# Patient Record
Sex: FEMALE | Race: WHITE | NOT HISPANIC OR LATINO | Employment: PART TIME | URBAN - METROPOLITAN AREA
[De-identification: names, ages, dates, MRNs, and addresses within clinical notes are randomized per-mention and may not be internally consistent; named-entity substitution may affect disease eponyms.]

---

## 2017-01-09 ENCOUNTER — LAB CONVERSION - ENCOUNTER (OUTPATIENT)
Dept: OTHER | Facility: OTHER | Age: 42
End: 2017-01-09

## 2017-01-09 ENCOUNTER — GENERIC CONVERSION - ENCOUNTER (OUTPATIENT)
Dept: OTHER | Facility: OTHER | Age: 42
End: 2017-01-09

## 2017-01-09 LAB
ADDITIONAL INFORMATION (HISTORICAL): NORMAL
ADEQUACY: (HISTORICAL): NORMAL
CYTOTECHNOLOGIST: (HISTORICAL): NORMAL
HPV MRNA E6/E7 (HISTORICAL): NOT DETECTED
INTERPRETATION (HISTORICAL): NORMAL
LMP (HISTORICAL): NORMAL
PREV. BX: (HISTORICAL): NORMAL
PREV. PAP (HISTORICAL): NORMAL
SOURCE (HISTORICAL): NORMAL

## 2017-07-07 ENCOUNTER — ALLSCRIPTS OFFICE VISIT (OUTPATIENT)
Dept: OTHER | Facility: OTHER | Age: 42
End: 2017-07-07

## 2017-07-07 DIAGNOSIS — Z12.31 ENCOUNTER FOR SCREENING MAMMOGRAM FOR MALIGNANT NEOPLASM OF BREAST: ICD-10-CM

## 2018-01-10 NOTE — PROGRESS NOTES
Assessment    1  Encounter for preventive health examination (V70 0) (Z00 00)   2  Obesity (278 00) (E66 9)    Plan   Laboratory exam ordered as part of routine general medical examination    · (1) CBC/PLT/DIFF; Status:Active; Requested for:[id number];    · (1) COMPREHENSIVE METABOLIC PANEL; Status:Active; Requested for:76Mne7458;    · (1) TSH; Status:Active; Requested for:12Oxr5756;    · (1) VITAMIN D 25-HYDROXY; Status:Active; Requested for:93Onw1400;   Screening, lipid    · (1) LIPID PANEL, FASTING; Status:Active; Requested for:07Jul2017;     * MAMMO SCREENING BILATERAL W CAD; Status:Hold For - Scheduling; Requested for:07Jul2017;   Perform:[facility]; BND:[id number]; Ordered;   For:Encounter for screening mammogram for malignant neoplasm of breast; Ordered By:Tara Urbano;      Discussion/Summary  health maintenance visit Currently, she eats a poor diet and has an inadequate exercise regimen  cervical cancer screening is current Breast cancer screening: mammogram has been ordered  Colorectal cancer screening: colorectal cancer screening is not indicated  Osteoporosis screening: bone mineral density testing is not indicated  Screening lab work includes hemoglobin, glucose, lipid profile, thyroid function testing and 25-hydroxyvitamin D  The immunizations are needed  Advice and education were given regarding nutrition, aerobic exercise and weight loss  Patient discussion: discussed with the patient  1  Obesity  Discussed healthy diet, limit junk food  Start regular aerobic exercise  2  Hx of blurred vision  Improved, has prescription glasses  Due for eye exam   3  HM  ?Tdap  Mammogram due, PAPs updated  Due for routine labs  Follow up in 1 year or prn  The patient was counseled regarding instructions for management, risk factor reductions, impressions  Chief Complaint  pt here for annual physical      History of Present Illness  HM, Adult Female:  The patient is being seen for a health maintenance evaluation  General Health: The patient's health since the last visit is described as good  She denies vision problems  Lifestyle:  She does not have a healthy diet  She has weight concerns  She does not exercise regularly  Screening: Cervical cancer screening includes a pap smear performed last year  Breast cancer screening includes uncertain timing of her last mammogram  She hasn't been previously screened for colorectal cancer  Metabolic screening includes lipid profile performed within the past five years, glucose screening performed 2015, thyroid function test performed 2015 and no previous DEXA  Cardiovascular risk factors: obesity and sedentary lifestyle, but no hypertension  Safety elements used: no seat belt  HPI: Mrs Coral Quinonez feels well, no complaints  She would like to lose some weight, does not exercise regularly  She walks frequently at work; however, also eats a lot of junk food while in the mall  She is still contemplating pregnancy  Denies any tremors  She has had issues with her vision, slightly better since using glasses regularly  She has not had an eye exam recently  Obesity (Follow-Up): The patient is being seen for follow-up of obesity  The patient reports no change in the condition  Interval symptoms:  stable poor eating habits, denies anxiety, denies dyspnea, denies fatigue and denies back pain  Associated symptoms: no constipation  The patient is not currently on medication for this problem  Disease management:  the patient is not doing well with her goals  Diet plan: limited junk food and limited high sugar drinks  Review of Systems    Constitutional: not feeling poorly and not feeling tired  Eyes: eyesight problems  ENT: no earache and no nasal discharge  Cardiovascular: no chest pain, no palpitations and no lower extremity edema  Respiratory: no shortness of breath and no cough     Gastrointestinal: no abdominal pain and no constipation  Genitourinary: no dysuria and no incontinence  Musculoskeletal: no arthralgias  Integumentary: no rashes  Neurological: no headache and no dizziness  Psychiatric: no sleep disturbances  no feelings of weakness      Active Problems    1  Calculus of kidney (592 0) (N20 0)   2  Encounter for gynecological examination with Papanicolaou smear of cervix (V72 31)   (Z01 419)   3  Encounter for screening mammogram for malignant neoplasm of breast (V76 12)   (Z12 31)   4  Patient desires pregnancy (V26 9) (Z31 9)   5  Well female exam with routine gynecological exam (V72 31) (Z01 419)    Past Medical History    · History of Contraceptives (V25 02)   · History of blurred vision (V12 49) (Z86 69)   · History of pneumonia (V12 61) (Z87 01)   · History of renal calculi (V13 01) (Z87 442)   · History of tremor (333 1) (G25 0)   · History of Kidney stones, calcium oxalate (592 0) (N20 0)   · History of Urinary problem (V47 4) (R39 89)    Surgical History    · History of  Section   · History of Oral Surgery Tooth Extraction    Family History  Mother    · Family history of diabetes mellitus (V18 0) (Z83 3)   · Family history of hypertension (V17 49) (Z82 49)  Father    · Family history of Drug abuse   · Family history of alcohol abuse (V61 41) (Z81 1)   · Family history of diabetes mellitus (V18 0) (Z83 3)   · Family history of hypertension (V17 49) (Z82 49)    Social History    · Denied: History of Alcohol use   ·    · Never smoker   · No drug use    Current Meds   1  No Reported Medications Recorded    Allergies    1  No Known Drug Allergies    2  No Known Environmental Allergies   3   No Known Food Allergies    Vitals   Recorded: 72SNO4919 10:05AM   Temperature 97 2 F   Heart Rate 78   Respiration 18   Systolic 556   Diastolic 70   Height 5 ft 3 5 in   Weight 209 lb 6 08 oz   BMI Calculated 36 51   BSA Calculated 1 98   O2 Saturation 98     Physical Exam    Constitutional   General appearance: No acute distress, well appearing and well nourished  appears healthy, comfortable, obese, clothing appropriate and well hydrated  Head and Face   Head and face: Normal     Eyes   Pupils and irises: Equal, round, reactive to light  Ears, Nose, Mouth, and Throat   External inspection of ears and nose: Normal     Otoscopic examination: Tympanic membranes translucent with normal light reflex  Canals patent without erythema  Oropharynx: Normal with no erythema, edema, exudate or lesions  Neck   Neck: Supple, symmetric, trachea midline, no masses  Pulmonary   Respiratory effort: No increased work of breathing or signs of respiratory distress  Auscultation of lungs: Clear to auscultation  Cardiovascular   Auscultation of heart: Normal rate and rhythm, normal S1 and S2, no murmurs  Examination of extremities for edema and/or varicosities: Normal     Abdomen   Abdomen: Non-tender, no masses  Lymphatic   Palpation of lymph nodes in neck: No lymphadenopathy  Musculoskeletal   Gait and station: Normal     Skin   Skin and subcutaneous tissue: Normal without rashes or lesions  Neurologic   Cortical function: Normal mental status  Psychiatric   Orientation to person, place, and time: Normal     Mood and affect: Normal        Results/Data  PHQ-2 Adult Depression Screening [id number] 10:06AM User, s     Test Name Result Flag Reference   PHQ-2 Adult Depression Score 0     Over the last two weeks, how often have you been bothered by any of the following problems?   Little interest or pleasure in doing things: Not at all - 0  Feeling down, depressed, or hopeless: Not at all - 0   PHQ-2 Adult Depression Screening Negative         Signatures   Electronically signed by : Wing Mary MD; Jul 7 2017 11:37AM EST                       (Author)

## 2018-01-13 NOTE — RESULT NOTES
Verified Results  (Q) THINPREP PAP AND HR HPV DNA [id number] 12:00AM DarytrenaLori     Test Name Result Flag Reference   CLINICAL INFORMATION:      none given   LMP:      NONE GIVEN   PREV  PAP:      NONE GIVEN   PREV  BX:      NONE GIVEN   SOURCE:      [facility]   STATEMENT OF ADEQUACY:      Satisfactory for evaluation  Endocervical/transformation zone component  present  Age and/or menstrual status not provided   INTERPRETATION/RESULT:      Negative for intraepithelial lesion or malignancy  CYTOTECHNOLOGIST:      MARCIANO Oviedo(ASCP)  CT screening location: [address] [place] [address]   HPV mRNA E6/E7 Not Detected  Not Detected   This test was performed using the APTIMA HPV Assay ([employer] )  This assay detects E6/E7 viral messenger RNA (mRNA) from 14  high-risk HPV types (16,18,31,33,35,39,45,51,52,56,58,59,66,68)         Signatures   Electronically signed by : SPENCER Fuchs ; Jan 9 2017  6:56PM EST                       (Author)

## 2018-01-14 VITALS
OXYGEN SATURATION: 98 % | TEMPERATURE: 97.2 F | DIASTOLIC BLOOD PRESSURE: 70 MMHG | BODY MASS INDEX: 35.74 KG/M2 | SYSTOLIC BLOOD PRESSURE: 124 MMHG | HEART RATE: 78 BPM | WEIGHT: 209.38 LBS | RESPIRATION RATE: 18 BRPM | HEIGHT: 64 IN

## 2018-10-14 ENCOUNTER — APPOINTMENT (EMERGENCY)
Dept: CT IMAGING | Facility: HOSPITAL | Age: 43
End: 2018-10-14
Payer: COMMERCIAL

## 2018-10-14 ENCOUNTER — HOSPITAL ENCOUNTER (OUTPATIENT)
Facility: HOSPITAL | Age: 43
Setting detail: OUTPATIENT SURGERY
LOS: 1 days | Discharge: HOME | End: 2018-10-15
Attending: EMERGENCY MEDICINE | Admitting: SURGERY
Payer: COMMERCIAL

## 2018-10-14 ENCOUNTER — ANESTHESIA (INPATIENT)
Dept: PERIOP | Facility: HOSPITAL | Age: 43
End: 2018-10-14
Payer: COMMERCIAL

## 2018-10-14 ENCOUNTER — ANESTHESIA EVENT (INPATIENT)
Dept: PERIOP | Facility: HOSPITAL | Age: 43
End: 2018-10-14
Payer: COMMERCIAL

## 2018-10-14 DIAGNOSIS — K35.890 OTHER ACUTE APPENDICITIS: ICD-10-CM

## 2018-10-14 DIAGNOSIS — K37 APPENDICITIS: Primary | ICD-10-CM

## 2018-10-14 LAB
ABO GROUP BLD: NORMAL
ALBUMIN SERPL BCP-MCNC: 3.6 G/DL (ref 3.5–5)
ALP SERPL-CCNC: 69 U/L (ref 46–116)
ALT SERPL W P-5'-P-CCNC: 22 U/L (ref 12–78)
ANION GAP SERPL CALCULATED.3IONS-SCNC: 3 MMOL/L (ref 4–13)
APTT PPP: 31 SECONDS (ref 24–36)
AST SERPL W P-5'-P-CCNC: 19 U/L (ref 5–45)
BACTERIA UR QL AUTO: ABNORMAL /HPF
BASOPHILS # BLD AUTO: 0.04 THOUSANDS/ΜL (ref 0–0.1)
BASOPHILS NFR BLD AUTO: 0 % (ref 0–1)
BILIRUB SERPL-MCNC: 0.5 MG/DL (ref 0.2–1)
BILIRUB UR QL STRIP: NEGATIVE
BLD GP AB SCN SERPL QL: NEGATIVE
BUN SERPL-MCNC: 13 MG/DL (ref 5–25)
CALCIUM SERPL-MCNC: 9 MG/DL (ref 8.3–10.1)
CHLORIDE SERPL-SCNC: 101 MMOL/L (ref 100–108)
CLARITY UR: ABNORMAL
CO2 SERPL-SCNC: 30 MMOL/L (ref 21–32)
COLOR UR: ABNORMAL
CREAT SERPL-MCNC: 0.73 MG/DL (ref 0.6–1.3)
EOSINOPHIL # BLD AUTO: 0.02 THOUSAND/ΜL (ref 0–0.61)
EOSINOPHIL NFR BLD AUTO: 0 % (ref 0–6)
ERYTHROCYTE [DISTWIDTH] IN BLOOD BY AUTOMATED COUNT: 12.8 % (ref 11.6–15.1)
EXT PREG TEST URINE: NEGATIVE
GFR SERPL CREATININE-BSD FRML MDRD: 102 ML/MIN/1.73SQ M
GLUCOSE SERPL-MCNC: 117 MG/DL (ref 65–140)
GLUCOSE UR STRIP-MCNC: NEGATIVE MG/DL
HCT VFR BLD AUTO: 42.3 % (ref 34.8–46.1)
HGB BLD-MCNC: 14 G/DL (ref 11.5–15.4)
HGB UR QL STRIP.AUTO: ABNORMAL
IMM GRANULOCYTES # BLD AUTO: 0.03 THOUSAND/UL (ref 0–0.2)
IMM GRANULOCYTES NFR BLD AUTO: 0 % (ref 0–2)
INR PPP: 1.01 (ref 0.86–1.17)
KETONES UR STRIP-MCNC: ABNORMAL MG/DL
LEUKOCYTE ESTERASE UR QL STRIP: ABNORMAL
LIPASE SERPL-CCNC: 121 U/L (ref 73–393)
LYMPHOCYTES # BLD AUTO: 1.94 THOUSANDS/ΜL (ref 0.6–4.47)
LYMPHOCYTES NFR BLD AUTO: 17 % (ref 14–44)
MCH RBC QN AUTO: 28.8 PG (ref 26.8–34.3)
MCHC RBC AUTO-ENTMCNC: 33.1 G/DL (ref 31.4–37.4)
MCV RBC AUTO: 87 FL (ref 82–98)
MONOCYTES # BLD AUTO: 0.89 THOUSAND/ΜL (ref 0.17–1.22)
MONOCYTES NFR BLD AUTO: 8 % (ref 4–12)
NEUTROPHILS # BLD AUTO: 8.35 THOUSANDS/ΜL (ref 1.85–7.62)
NEUTS SEG NFR BLD AUTO: 75 % (ref 43–75)
NITRITE UR QL STRIP: POSITIVE
NON-SQ EPI CELLS URNS QL MICRO: ABNORMAL /HPF
NRBC BLD AUTO-RTO: 0 /100 WBCS
PH UR STRIP.AUTO: 7 [PH] (ref 4.5–8)
PLATELET # BLD AUTO: 428 THOUSANDS/UL (ref 149–390)
PMV BLD AUTO: 9.3 FL (ref 8.9–12.7)
POTASSIUM SERPL-SCNC: 3.5 MMOL/L (ref 3.5–5.3)
PROT SERPL-MCNC: 7.7 G/DL (ref 6.4–8.2)
PROT UR STRIP-MCNC: ABNORMAL MG/DL
PROTHROMBIN TIME: 13 SECONDS (ref 11.8–14.2)
RBC # BLD AUTO: 4.86 MILLION/UL (ref 3.81–5.12)
RBC #/AREA URNS AUTO: ABNORMAL /HPF
RH BLD: NEGATIVE
SODIUM SERPL-SCNC: 134 MMOL/L (ref 136–145)
SP GR UR STRIP.AUTO: 1.02 (ref 1–1.03)
SPECIMEN EXPIRATION DATE: NORMAL
UROBILINOGEN UR QL STRIP.AUTO: 1 E.U./DL
WBC # BLD AUTO: 11.27 THOUSAND/UL (ref 4.31–10.16)
WBC #/AREA URNS AUTO: ABNORMAL /HPF

## 2018-10-14 PROCEDURE — 96376 TX/PRO/DX INJ SAME DRUG ADON: CPT

## 2018-10-14 PROCEDURE — 80053 COMPREHEN METABOLIC PANEL: CPT | Performed by: PHYSICIAN ASSISTANT

## 2018-10-14 PROCEDURE — 86900 BLOOD TYPING SEROLOGIC ABO: CPT | Performed by: PHYSICIAN ASSISTANT

## 2018-10-14 PROCEDURE — 83690 ASSAY OF LIPASE: CPT | Performed by: PHYSICIAN ASSISTANT

## 2018-10-14 PROCEDURE — 99285 EMERGENCY DEPT VISIT HI MDM: CPT

## 2018-10-14 PROCEDURE — 36415 COLL VENOUS BLD VENIPUNCTURE: CPT | Performed by: PHYSICIAN ASSISTANT

## 2018-10-14 PROCEDURE — 85025 COMPLETE CBC W/AUTO DIFF WBC: CPT | Performed by: PHYSICIAN ASSISTANT

## 2018-10-14 PROCEDURE — 88304 TISSUE EXAM BY PATHOLOGIST: CPT | Performed by: PATHOLOGY

## 2018-10-14 PROCEDURE — 81025 URINE PREGNANCY TEST: CPT | Performed by: PHYSICIAN ASSISTANT

## 2018-10-14 PROCEDURE — 74177 CT ABD & PELVIS W/CONTRAST: CPT

## 2018-10-14 PROCEDURE — 86901 BLOOD TYPING SEROLOGIC RH(D): CPT | Performed by: PHYSICIAN ASSISTANT

## 2018-10-14 PROCEDURE — 86850 RBC ANTIBODY SCREEN: CPT | Performed by: PHYSICIAN ASSISTANT

## 2018-10-14 PROCEDURE — 96374 THER/PROPH/DIAG INJ IV PUSH: CPT

## 2018-10-14 PROCEDURE — 85610 PROTHROMBIN TIME: CPT | Performed by: PHYSICIAN ASSISTANT

## 2018-10-14 PROCEDURE — 81001 URINALYSIS AUTO W/SCOPE: CPT | Performed by: PHYSICIAN ASSISTANT

## 2018-10-14 PROCEDURE — 96361 HYDRATE IV INFUSION ADD-ON: CPT

## 2018-10-14 PROCEDURE — 85730 THROMBOPLASTIN TIME PARTIAL: CPT | Performed by: PHYSICIAN ASSISTANT

## 2018-10-14 RX ORDER — ONDANSETRON 2 MG/ML
INJECTION INTRAMUSCULAR; INTRAVENOUS AS NEEDED
Status: DISCONTINUED | OUTPATIENT
Start: 2018-10-14 | End: 2018-10-14 | Stop reason: SURG

## 2018-10-14 RX ORDER — HYDROMORPHONE HCL/PF 1 MG/ML
0.5 SYRINGE (ML) INJECTION
Status: DISCONTINUED | OUTPATIENT
Start: 2018-10-14 | End: 2018-10-15 | Stop reason: HOSPADM

## 2018-10-14 RX ORDER — KETOROLAC TROMETHAMINE 30 MG/ML
INJECTION, SOLUTION INTRAMUSCULAR; INTRAVENOUS AS NEEDED
Status: DISCONTINUED | OUTPATIENT
Start: 2018-10-14 | End: 2018-10-14 | Stop reason: SURG

## 2018-10-14 RX ORDER — HYDROMORPHONE HCL/PF 1 MG/ML
0.5 SYRINGE (ML) INJECTION
Status: DISCONTINUED | OUTPATIENT
Start: 2018-10-14 | End: 2018-10-14 | Stop reason: HOSPADM

## 2018-10-14 RX ORDER — FENTANYL CITRATE 50 UG/ML
INJECTION, SOLUTION INTRAMUSCULAR; INTRAVENOUS AS NEEDED
Status: DISCONTINUED | OUTPATIENT
Start: 2018-10-14 | End: 2018-10-14 | Stop reason: SURG

## 2018-10-14 RX ORDER — DIPHENOXYLATE HYDROCHLORIDE AND ATROPINE SULFATE 2.5; .025 MG/1; MG/1
1 TABLET ORAL DAILY
COMMUNITY

## 2018-10-14 RX ORDER — ONDANSETRON 2 MG/ML
4 INJECTION INTRAMUSCULAR; INTRAVENOUS ONCE AS NEEDED
Status: DISCONTINUED | OUTPATIENT
Start: 2018-10-14 | End: 2018-10-14 | Stop reason: HOSPADM

## 2018-10-14 RX ORDER — ONDANSETRON 2 MG/ML
4 INJECTION INTRAMUSCULAR; INTRAVENOUS EVERY 4 HOURS PRN
Status: DISCONTINUED | OUTPATIENT
Start: 2018-10-14 | End: 2018-10-15 | Stop reason: HOSPADM

## 2018-10-14 RX ORDER — OXYCODONE HYDROCHLORIDE AND ACETAMINOPHEN 5; 325 MG/1; MG/1
1 TABLET ORAL EVERY 4 HOURS PRN
Status: DISCONTINUED | OUTPATIENT
Start: 2018-10-14 | End: 2018-10-15 | Stop reason: HOSPADM

## 2018-10-14 RX ORDER — PROPOFOL 10 MG/ML
INJECTION, EMULSION INTRAVENOUS AS NEEDED
Status: DISCONTINUED | OUTPATIENT
Start: 2018-10-14 | End: 2018-10-14 | Stop reason: SURG

## 2018-10-14 RX ORDER — SUCCINYLCHOLINE/SOD CL,ISO/PF 100 MG/5ML
SYRINGE (ML) INTRAVENOUS AS NEEDED
Status: DISCONTINUED | OUTPATIENT
Start: 2018-10-14 | End: 2018-10-14 | Stop reason: SURG

## 2018-10-14 RX ORDER — SODIUM CHLORIDE, SODIUM LACTATE, POTASSIUM CHLORIDE, CALCIUM CHLORIDE 600; 310; 30; 20 MG/100ML; MG/100ML; MG/100ML; MG/100ML
125 INJECTION, SOLUTION INTRAVENOUS CONTINUOUS
Status: DISCONTINUED | OUTPATIENT
Start: 2018-10-14 | End: 2018-10-15 | Stop reason: HOSPADM

## 2018-10-14 RX ORDER — SODIUM CHLORIDE 9 MG/ML
125 INJECTION, SOLUTION INTRAVENOUS CONTINUOUS
Status: DISCONTINUED | OUTPATIENT
Start: 2018-10-14 | End: 2018-10-15 | Stop reason: HOSPADM

## 2018-10-14 RX ORDER — GLYCOPYRROLATE 0.2 MG/ML
INJECTION INTRAMUSCULAR; INTRAVENOUS AS NEEDED
Status: DISCONTINUED | OUTPATIENT
Start: 2018-10-14 | End: 2018-10-14 | Stop reason: SURG

## 2018-10-14 RX ORDER — BUPIVACAINE HYDROCHLORIDE AND EPINEPHRINE 5; 5 MG/ML; UG/ML
INJECTION, SOLUTION EPIDURAL; INTRACAUDAL; PERINEURAL AS NEEDED
Status: DISCONTINUED | OUTPATIENT
Start: 2018-10-14 | End: 2018-10-14 | Stop reason: HOSPADM

## 2018-10-14 RX ORDER — ROCURONIUM BROMIDE 10 MG/ML
INJECTION, SOLUTION INTRAVENOUS AS NEEDED
Status: DISCONTINUED | OUTPATIENT
Start: 2018-10-14 | End: 2018-10-14 | Stop reason: SURG

## 2018-10-14 RX ORDER — SODIUM CHLORIDE 9 MG/ML
INJECTION, SOLUTION INTRAVENOUS CONTINUOUS PRN
Status: DISCONTINUED | OUTPATIENT
Start: 2018-10-14 | End: 2018-10-14 | Stop reason: SURG

## 2018-10-14 RX ORDER — LIDOCAINE HYDROCHLORIDE 10 MG/ML
INJECTION, SOLUTION INFILTRATION; PERINEURAL AS NEEDED
Status: DISCONTINUED | OUTPATIENT
Start: 2018-10-14 | End: 2018-10-14 | Stop reason: SURG

## 2018-10-14 RX ORDER — METOCLOPRAMIDE HYDROCHLORIDE 5 MG/ML
10 INJECTION INTRAMUSCULAR; INTRAVENOUS ONCE AS NEEDED
Status: DISCONTINUED | OUTPATIENT
Start: 2018-10-14 | End: 2018-10-14 | Stop reason: HOSPADM

## 2018-10-14 RX ADMIN — Medication 100 MG: at 19:36

## 2018-10-14 RX ADMIN — ROCURONIUM BROMIDE 25 MG: 10 INJECTION INTRAVENOUS at 19:42

## 2018-10-14 RX ADMIN — CEFAZOLIN SODIUM 2000 MG: 2 SOLUTION INTRAVENOUS at 17:01

## 2018-10-14 RX ADMIN — SODIUM CHLORIDE, SODIUM LACTATE, POTASSIUM CHLORIDE, AND CALCIUM CHLORIDE 125 ML/HR: .6; .31; .03; .02 INJECTION, SOLUTION INTRAVENOUS at 18:16

## 2018-10-14 RX ADMIN — Medication 500 MG: at 19:32

## 2018-10-14 RX ADMIN — PROPOFOL 200 MG: 10 INJECTION, EMULSION INTRAVENOUS at 19:36

## 2018-10-14 RX ADMIN — LIDOCAINE HYDROCHLORIDE 50 MG: 10 INJECTION, SOLUTION INFILTRATION; PERINEURAL at 19:36

## 2018-10-14 RX ADMIN — NEOSTIGMINE METHYLSULFATE 3 MG: 1 INJECTION, SOLUTION INTRAMUSCULAR; INTRAVENOUS; SUBCUTANEOUS at 20:10

## 2018-10-14 RX ADMIN — IOHEXOL 100 ML: 350 INJECTION, SOLUTION INTRAVENOUS at 16:07

## 2018-10-14 RX ADMIN — GLYCOPYRROLATE 0.4 MG: 0.2 INJECTION, SOLUTION INTRAMUSCULAR; INTRAVENOUS at 20:10

## 2018-10-14 RX ADMIN — ONDANSETRON 4 MG: 2 INJECTION INTRAMUSCULAR; INTRAVENOUS at 19:55

## 2018-10-14 RX ADMIN — CEFAZOLIN SODIUM 2000 MG: 2 SOLUTION INTRAVENOUS at 19:32

## 2018-10-14 RX ADMIN — FENTANYL CITRATE 50 MCG: 50 INJECTION INTRAMUSCULAR; INTRAVENOUS at 19:36

## 2018-10-14 RX ADMIN — KETOROLAC TROMETHAMINE 15 MG: 30 INJECTION, SOLUTION INTRAMUSCULAR at 20:00

## 2018-10-14 RX ADMIN — MORPHINE SULFATE 2 MG: 2 INJECTION, SOLUTION INTRAMUSCULAR; INTRAVENOUS at 14:52

## 2018-10-14 RX ADMIN — SODIUM CHLORIDE 1000 ML: 0.9 INJECTION, SOLUTION INTRAVENOUS at 14:52

## 2018-10-14 RX ADMIN — SODIUM CHLORIDE: 0.9 INJECTION, SOLUTION INTRAVENOUS at 19:32

## 2018-10-14 RX ADMIN — FENTANYL CITRATE 50 MCG: 50 INJECTION INTRAMUSCULAR; INTRAVENOUS at 19:48

## 2018-10-14 RX ADMIN — HYDROMORPHONE HYDROCHLORIDE 0.5 MG: 1 INJECTION, SOLUTION INTRAMUSCULAR; INTRAVENOUS; SUBCUTANEOUS at 18:18

## 2018-10-14 RX ADMIN — DEXAMETHASONE SODIUM PHOSPHATE 8 MG: 10 INJECTION INTRAMUSCULAR; INTRAVENOUS at 19:40

## 2018-10-14 RX ADMIN — METRONIDAZOLE 500 MG: 500 INJECTION, SOLUTION INTRAVENOUS at 18:16

## 2018-10-14 RX ADMIN — MORPHINE SULFATE 2 MG: 2 INJECTION, SOLUTION INTRAMUSCULAR; INTRAVENOUS at 15:43

## 2018-10-14 NOTE — ED PROVIDER NOTES
History  Chief Complaint   Patient presents with    Abdominal Pain     PT REPORTS VFF starting yesterday when getting up  Pt reports today pain has worsened and become more constant  pt denies n/v/d  reports decreased appitite  52-year-old female with past medical history of , who presents to the emergency department for right lower quadrant pain that started last night and has gradually worsened today  Patient describes the right lower quadrant nonradiating sharp and pulling pain as a 9/10  States that it is present with movement, and is mildly improved with lying completely still  She does complain of decreased p o  Intake secondary to losing her appetite today  Otherwise denies fevers, chills, chest pain, shortness of breath, nausea, vomiting, diarrhea, urinary pain/frequency/urgency, vaginal discharge  She is currently on her menstrual period  Did not attempt any over-the-counter analgesia prior to arrival in the emergency department  Last consumed food and drink at 10 o'clock this morning  Denies recent heavy lifting  Has had normal bowel movements morning  History provided by:  Patient   used: No    Abdominal Pain   Associated symptoms: no chest pain, no chills, no constipation, no cough, no diarrhea, no dysuria, no fever, no hematuria, no nausea, no shortness of breath, no vaginal bleeding, no vaginal discharge and no vomiting        Prior to Admission Medications   Prescriptions Last Dose Informant Patient Reported? Taking?   multivitamin (THERAGRAN) TABS   Yes Yes   Sig: Take 1 tablet by mouth daily      Facility-Administered Medications: None       Past Medical History:   Diagnosis Date    Known health problems: none        Past Surgical History:   Procedure Laterality Date     SECTION      WISDOM TOOTH EXTRACTION         History reviewed  No pertinent family history  I have reviewed and agree with the history as documented      Social History Substance Use Topics    Smoking status: Never Smoker    Smokeless tobacco: Never Used    Alcohol use No        Review of Systems   Constitutional: Negative for chills and fever  HENT: Negative  Eyes: Negative  Respiratory: Negative for cough, chest tightness, shortness of breath and wheezing  Cardiovascular: Negative for chest pain, palpitations and leg swelling  Gastrointestinal: Positive for abdominal pain  Negative for constipation, diarrhea, nausea and vomiting  Genitourinary: Negative for dysuria, flank pain, frequency, hematuria, urgency, vaginal bleeding, vaginal discharge and vaginal pain  Musculoskeletal: Negative for arthralgias, back pain, gait problem, joint swelling, myalgias, neck pain and neck stiffness  Skin: Negative for color change, pallor, rash and wound  Neurological: Negative for dizziness, syncope, weakness, light-headedness, numbness and headaches  Psychiatric/Behavioral: Negative  Physical Exam  Physical Exam   Constitutional: She is oriented to person, place, and time  She appears well-developed and well-nourished  No distress  HENT:   Head: Normocephalic and atraumatic  Mouth/Throat: Oropharynx is clear and moist    Eyes: Pupils are equal, round, and reactive to light  Conjunctivae and EOM are normal    Neck: Normal range of motion  Neck supple  Cardiovascular: Normal rate, regular rhythm and intact distal pulses  Pulmonary/Chest: Effort normal and breath sounds normal  She has no wheezes  She has no rales  Abdominal: Soft  Bowel sounds are normal  She exhibits no distension  There is tenderness (Positive McBurney's point tenderness  There is mild tenderness with palpation to the right upper quadrant, but negative Ellis sign  )  There is no rebound and no guarding  No CVA tenderness bilaterally  Musculoskeletal: Normal range of motion  She exhibits no edema or tenderness     Neurological: She is alert and oriented to person, place, and time  No cranial nerve deficit or sensory deficit  She exhibits normal muscle tone  Coordination normal    Skin: Skin is warm and dry  Capillary refill takes less than 2 seconds  She is not diaphoretic  Psychiatric: She has a normal mood and affect  Her behavior is normal    Nursing note and vitals reviewed        Vital Signs  ED Triage Vitals [10/14/18 1431]   Temperature Pulse Respirations Blood Pressure SpO2   98 3 °F (36 8 °C) 88 16 125/83 97 %      Temp Source Heart Rate Source Patient Position - Orthostatic VS BP Location FiO2 (%)   Oral Monitor Lying Right arm --      Pain Score       9           Vitals:    10/14/18 1431 10/14/18 1652   BP: 125/83 124/74   Pulse: 88 102   Patient Position - Orthostatic VS: Lying Lying       Visual Acuity      ED Medications  Medications   ceFAZolin (ANCEF) IVPB (premix) 2,000 mg (2,000 mg Intravenous New Bag 10/14/18 1701)   metroNIDAZOLE (FLAGYL) IVPB (premix) 500 mg (not administered)   sodium chloride 0 9 % bolus 1,000 mL (0 mL Intravenous Stopped 10/14/18 1650)   morphine injection 2 mg (2 mg Intravenous Given 10/14/18 1452)   morphine injection 2 mg (2 mg Intravenous Given 10/14/18 1543)   iohexol (OMNIPAQUE) 350 MG/ML injection (MULTI-DOSE) 100 mL (100 mL Intravenous Given 10/14/18 1607)       Diagnostic Studies  Results Reviewed     Procedure Component Value Units Date/Time    Urine Microscopic [[id number]]  (Abnormal) Collected:  10/14/18 1455    Lab Status:  Final result Specimen:  Urine from Urine, Clean Catch Updated:  10/14/18 1556     RBC, UA Innumerable (A) /hpf      WBC, UA 2-4 (A) /hpf      Epithelial Cells Occasional /hpf      Bacteria, UA Innumerable (A) /hpf     Comprehensive metabolic panel [[id number]]  (Abnormal) Collected:  10/14/18 1451    Lab Status:  Final result Specimen:  Blood from Arm, Right Updated:  10/14/18 1516     Sodium 134 (L) mmol/L      Potassium 3 5 mmol/L      Chloride 101 mmol/L      CO2 30 mmol/L      ANION GAP 3 (L) mmol/L      BUN 13 mg/dL      Creatinine 0 73 mg/dL      Glucose 117 mg/dL      Calcium 9 0 mg/dL      AST 19 U/L      ALT 22 U/L      Alkaline Phosphatase 69 U/L      Total Protein 7 7 g/dL      Albumin 3 6 g/dL      Total Bilirubin 0 50 mg/dL      eGFR 102 ml/min/1 73sq m     Narrative:         National Kidney Disease Education Program recommendations are as follows:  GFR calculation is accurate only with a steady state creatinine  Chronic Kidney disease less than 60 ml/min/1 73 sq  meters  Kidney failure less than 15 ml/min/1 73 sq  meters      Lipase [[id number]]  (Normal) Collected:  10/14/18 1451    Lab Status:  Final result Specimen:  Blood from Arm, Right Updated:  10/14/18 1516     Lipase 121 u/L     Protime-INR [[id number]]  (Normal) Collected:  10/14/18 1451    Lab Status:  Final result Specimen:  Blood from Arm, Right Updated:  10/14/18 1511     Protime 13 0 seconds      INR 1 01    APTT [[id number]]  (Normal) Collected:  10/14/18 1451    Lab Status:  Final result Specimen:  Blood from Arm, Right Updated:  10/14/18 1511     PTT 31 seconds     UA w Reflex to Microscopic w Reflex to Culture [[id number]]  (Abnormal) Collected:  10/14/18 1455    Lab Status:  Final result Specimen:  Urine from Urine, Clean Catch Updated:  10/14/18 1507     Color, UA Brown     Clarity, UA Turbid     Specific [place], UA 1 020     pH, UA 7 0     Leukocytes, UA Trace (A)     Nitrite, UA Positive (A)     Protein,  (2+) (A) mg/dl      Glucose, UA Negative mg/dl      Ketones, UA Trace (A) mg/dl      Urobilinogen, UA 1 0 E U /dl      Bilirubin, UA Negative     Blood, UA Large (A)    CBC and differential [[id number]]  (Abnormal) Collected:  10/14/18 1451    Lab Status:  Final result Specimen:  Blood from Arm, Right Updated:  10/14/18 1500     WBC 11 27 (H) Thousand/uL      RBC 4 86 Million/uL      Hemoglobin 14 0 g/dL      Hematocrit 42 3 %      MCV 87 fL      MCH 28 8 pg      MCHC 33 1 g/dL      RDW 12 8 %      MPV 9 3 fL      Platelets 585 (H) Thousands/uL      nRBC 0 /100 WBCs      Neutrophils Relative 75 %      Immat GRANS % 0 %      Lymphocytes Relative 17 %      Monocytes Relative 8 %      Eosinophils Relative 0 %      Basophils Relative 0 %      Neutrophils Absolute 8 35 (H) Thousands/µL      Immature Grans Absolute 0 03 Thousand/uL      Lymphocytes Absolute 1 94 Thousands/µL      Monocytes Absolute 0 89 Thousand/µL      Eosinophils Absolute 0 02 Thousand/µL      Basophils Absolute 0 04 Thousands/µL     POCT pregnancy, urine [[id number]]  (Normal) Resulted:  10/14/18 1500    Lab Status:  Final result Updated:  10/14/18 1500     EXT PREG TEST UR (Ref: Negative) negative                 CT abdomen pelvis with contrast   Final Result by Lord Hakan MD (10/14 7906)      Findings consistent with acute uncomplicated appendicitis  I personally discussed this study with Tomeka Duong on 10/14/2018 at 4:29 PM                Workstation performed: [id number]                    Procedures  Procedures       Phone Contacts  ED Phone Contact    ED Course  ED Course as of Oct 14 1721   Sun Oct 14, 2018   1507 WBC: (!) 11 27   1507 Patient has positive leuks and nitrites and ketones, consistent with UTI  Blood is from menstrual period  1512 Patient continues to have 9/10 pain, additional morphine 2 mg IV ordered for the patient  7330 Notified by radiologist that patient has appendicitis  Surgery paged (Dr Mahamed Samaniego)  65 Spoke with Dr Mahamed Samaniego (surgery), who wishes patient to be admitted to the floor with IV Ancef and Flagyl  He will come to see the patient and speak with anesthesia regarding surgery time                                   MDM  Number of Diagnoses or Management Options  Appendicitis:   Diagnosis management comments: Differential Diagnosis includes but is not limited to: appendicitis, cholecystitis, biliary colic, constipation, UTI, renal stone, hernia, ovarian torsion, ruptured ovarian cyst, pregnancy, if pregnant ectopic pregnancy, PID, STD  Labs shows mild leukocytosis, otherwise labs are generally unremarkable  CT abdomen pelvis with contrast shows uncomplicated appendicitis  Spoke with Dr Peter Salas (surgery), who recommends admission to the floor with IV Ancef and Flagyl  He will contact anesthesia for surgical procedure  UA with Ucx shows positive leuks and nitrites and ketones and blood  Positive for urinary tract infection, which will be treated with the Ancef  Morphine given for analgesia with moderate improvement  Admit to surgery for appendicitis with plan for appendectomy  Amount and/or Complexity of Data Reviewed  Clinical lab tests: ordered and reviewed  Tests in the radiology section of CPT®: reviewed and ordered  Discuss the patient with other providers: yes      CritCare Time    Disposition  Final diagnoses:   Appendicitis     Time reflects when diagnosis was documented in both MDM as applicable and the Disposition within this note     Time User Action Codes Description Comment    10/14/2018  4:53  [facility], [address] [place] [address] Appendicitis       ED Disposition     ED Disposition Condition Comment    Admit  Case was discussed with Dr Peter Salas (surgery) and the patient's admission status was agreed to be Admission Status: inpatient status to the service of Dr Peter Salas   Follow-up Information    None         Patient's Medications   Discharge Prescriptions    No medications on file     No discharge procedures on file      ED Provider  Electronically Signed by           Kashif Neff PA-C  10/14/18 2605

## 2018-10-14 NOTE — ANESTHESIA PREPROCEDURE EVALUATION
Review of Systems/Medical History  Patient summary reviewed  Chart reviewed  No history of anesthetic complications     Cardiovascular  Negative cardio ROS    Pulmonary  Negative pulmonary ROS        GI/Hepatic            Endo/Other    Obesity    GYN       Hematology   Musculoskeletal       Neurology   Psychology           Physical Exam    Airway    Mallampati score: II  TM Distance: >3 FB  Neck ROM: full     Dental       Cardiovascular  Comment: Negative ROS,     Pulmonary      Other Findings        Anesthesia Plan  ASA Score- 2 Emergent    Anesthesia Type- general with ASA Monitors  Additional Monitors:   Airway Plan: ETT  Plan Factors-    Induction- intravenous  Postoperative Plan-     Informed Consent- Anesthetic plan and risks discussed with patient  I personally reviewed this patient with the CRNA  Discussed and agreed on the Anesthesia Plan with the CRNA  Karrie Nissen

## 2018-10-14 NOTE — H&P
I agree with the emergency room history of present illness with physical findings  Patient is a 42-year-old who presents with 1 day of right lower quadrant abdominal pain  Imaging is consistent with acute appendicitis  She denies fevers or chills or shortness of breath  No history for per similar pain in the past     Complete review of systems negative except for abdominal pain  Past medical history includes  section, and wisdom tooth extraction  No known medical problems  Social history denies smoking or E2 H access  Physical exam HEENT within normal limits  Breath sounds are clear heart tones are regular  Abdomen is soft  Point tenderness over McBurney's point  Associated with minimal rebound  No rigidity  Back no CVA tenderness  Extremities no edema  Assessment is acute appendicitis  Recommend laparoscopic appendectomy  Risks and benefits explained patient agrees

## 2018-10-15 VITALS
DIASTOLIC BLOOD PRESSURE: 59 MMHG | WEIGHT: 230.38 LBS | RESPIRATION RATE: 16 BRPM | SYSTOLIC BLOOD PRESSURE: 106 MMHG | TEMPERATURE: 98 F | HEART RATE: 68 BPM | HEIGHT: 65 IN | OXYGEN SATURATION: 94 % | BODY MASS INDEX: 38.38 KG/M2

## 2018-10-15 PROBLEM — K35.80 ACUTE APPENDICITIS: Status: ACTIVE | Noted: 2018-10-15

## 2018-10-15 PROCEDURE — 90686 IIV4 VACC NO PRSV 0.5 ML IM: CPT | Performed by: SURGERY

## 2018-10-15 RX ORDER — OXYCODONE HYDROCHLORIDE AND ACETAMINOPHEN 5; 325 MG/1; MG/1
1 TABLET ORAL EVERY 4 HOURS PRN
Qty: 20 TABLET | Refills: 0 | Status: SHIPPED | OUTPATIENT
Start: 2018-10-15 | End: 2018-10-25

## 2018-10-15 RX ADMIN — OXYCODONE HYDROCHLORIDE AND ACETAMINOPHEN 1 TABLET: 5; 325 TABLET ORAL at 02:14

## 2018-10-15 RX ADMIN — SODIUM CHLORIDE, SODIUM LACTATE, POTASSIUM CHLORIDE, AND CALCIUM CHLORIDE 125 ML/HR: .6; .31; .03; .02 INJECTION, SOLUTION INTRAVENOUS at 01:17

## 2018-10-15 RX ADMIN — INFLUENZA VIRUS VACCINE 0.5 ML: 15; 15; 15; 15 SUSPENSION INTRAMUSCULAR at 12:44

## 2018-10-15 NOTE — ANESTHESIA POSTPROCEDURE EVALUATION
Post-Op Assessment Note      CV Status:  Stable    Mental Status:  Alert and awake    Hydration Status:  Euvolemic    PONV Controlled:  Controlled    Airway Patency:  Patent    Post Op Vitals Reviewed: Yes          Staff: CRNA           /55 (10/14/18 2021)    Temp 98 9 °F (37 2 °C) (10/14/18 2021)    Pulse 73 (10/14/18 2021)   Resp 18 (10/14/18 2021)    SpO2 99 % (10/14/18 2021)

## 2018-10-15 NOTE — PLAN OF CARE
INFECTION - ADULT     Absence or prevention of progression during hospitalization Progressing        PAIN - ADULT     Verbalizes/displays adequate comfort level or baseline comfort level Progressing        SAFETY ADULT     Patient will remain free of falls Progressing        SKIN/TISSUE INTEGRITY - ADULT     Incision(s), wounds(s) or drain site(s) healing without S/S of infection Progressing

## 2018-10-15 NOTE — PLAN OF CARE
Problem: SKIN/TISSUE INTEGRITY - ADULT  Goal: Incision(s), wounds(s) or drain site(s) healing without S/S of infection  INTERVENTIONS   - Assess and document risk factors for skin impairment   - Assess and document dressing, incision, wound bed, drain sites and surrounding tissue     Outcome: Completed Date Met: 10/15/18      Problem: PAIN - ADULT  Goal: Verbalizes/displays adequate comfort level or baseline comfort level  Interventions:  - Encourage patient to monitor pain and request assistance  - Assess pain using appropriate pain scale  - Administer analgesics based on type and severity of pain and evaluate response  - Implement non-pharmacological measures as appropriate and evaluate response  - Consider cultural and social influences on pain and pain management  - Notify physician/advanced practitioner if interventions unsuccessful or patient reports new pain   Outcome: Completed Date Met: 10/15/18      Problem: INFECTION - ADULT  Goal: Absence or prevention of progression during hospitalization  INTERVENTIONS:  - Assess and monitor for signs and symptoms of infection  - Monitor lab/diagnostic results  - Monitor all insertion sites, i e  indwelling lines, tubes, and drains  - Purvis appropriate cooling/warming therapies per order  - Administer medications as ordered  - Instruct and encourage patient and family to use good hand hygiene technique  - Identify and instruct in appropriate isolation precautions for identified infection/condition   Outcome: Completed Date Met: 10/15/18      Problem: SAFETY ADULT  Goal: Patient will remain free of falls  INTERVENTIONS:  - Assess patient frequently for physical needs  -  Identify cognitive and physical deficits and behaviors that affect risk of falls    -  Purvis fall precautions as indicated by assessment   - Educate patient/family on patient safety including physical limitations  - Instruct patient to call for assistance with activity based on assessment  - Modify environment to reduce risk of injury   Outcome: Completed Date Met: 10/15/18    Goal: Maintain or return to baseline ADL function  INTERVENTIONS:  -  Assess patient's ability to carry out ADLs; assess patient's baseline for ADL function and identify physical deficits which impact ability to perform ADLs (bathing, care of mouth/teeth, toileting, grooming, dressing, etc )  - Assess/evaluate cause of self-care deficits   - Assess range of motion  - Assess patient's mobility; develop plan if impaired  - Assess patient's need for assistive devices and provide as appropriate  - Encourage maximum independence but intervene and supervise when necessary  ¯ Assess for home care needs following discharge   ¯ Provide patient education as appropriate   Outcome: Completed Date Met: 10/15/18      Problem: Prexisting or High Potential for Compromised Skin Integrity  Goal: Skin integrity is maintained or improved  INTERVENTIONS:  - Identify patients at risk for skin breakdown  - Assess and monitor skin integrity  - Assess and monitor nutrition and hydration status  - Monitor labs (i e  albumin)  - Assess for incontinence   - Turn and reposition patient  - Assist with mobility/ambulation  - Relieve pressure over bony prominences  - Avoid friction and shearing  - Provide appropriate hygiene as needed including keeping skin clean and dry  - Evaluate need for skin moisturizer/barrier cream  - Collaborate with interdisciplinary team (i e  Nutrition, Rehabilitation, etc )   - Patient/family teaching   Outcome: Completed Date Met: 10/15/18

## 2018-10-15 NOTE — UTILIZATION REVIEW
Initial Clinical Review    Admission: Date/Time/Statement: 10/14/18 @ 1654 INPATIENT and changed to OUTPATIENT no CHARGE BED 10/15/2018 RE: discharge anticipated today, patient admitted with appendicitis on 10/14 and laparoscopic appendectomy done which is an outpatient procedure    Start   Ordered   10/15/18 0909  Outpatient No Charge Bed Once     Transfer Service: Surgery-General       Question: Admitting Physician Answer: Balwinder Cevallos    10/15/18 0909       ED: Date/Time/Mode of Arrival:   ED Arrival Information     Expected Arrival Acuity Means of Arrival Escorted By Service Admission Type    - 10/14/2018 14:10 Urgent Walk-In Self Surgery-General Urgent    Arrival Complaint    ABDOMINAL PAIN          Chief Complaint:   Chief Complaint   Patient presents with    Abdominal Pain     PT [id number] [address] starting yesterday when getting up  Pt reports today pain has worsened and become more constant  pt denies n/v/d  reports decreased appitite  History of Illness: 43-year-old female with past medical history of , who presents to the emergency department for right lower quadrant pain that started last night and has gradually worsened today  Patient describes the right lower quadrant nonradiating sharp and pulling pain as a 9/10  States that it is present with movement, and is mildly improved with lying completely still  She does complain of decreased p o  Intake secondary to losing her appetite today    ED Vital Signs:   ED Triage Vitals [10/14/18 1431]   Temperature Pulse Respirations Blood Pressure SpO2   98 3 °F (36 8 °C) 88 16 125/83 97 %      Temp Source Heart Rate Source Patient Position - Orthostatic VS BP Location FiO2 (%)   Oral Monitor Lying Right arm --      Pain Score       9        Wt Readings from Last 1 Encounters:   10/14/18 104 kg (230 lb 6 1 oz)       Vital Signs (abnormal): maximum temperature 99 1     Abnormal Labs/Diagnostic Test Results:   Na 134  Anion gap 3    UA trace leukocytes  + nitrites  2+ protein  Large blood  Wbc 11 27    Ct abdomen - Findings consistent with acute uncomplicated appendicitis    Procedure - laparoscopic appendectomy  Findings - Acute suppurative appendicitis was noted without perforation    ED Treatment:   Medication Administration from 10/14/2018 1409 to 10/14/2018 1750       Date/Time Order Dose Route Action Comments     10/14/2018 1650 sodium chloride 0 9 % bolus 1,000 mL 0 mL Intravenous Stopped      10/14/2018 1452 sodium chloride 0 9 % bolus 1,000 mL 1,000 mL Intravenous New Bag      10/14/2018 1452 morphine injection 2 mg 2 mg Intravenous Given      10/14/2018 1543 morphine injection 2 mg 2 mg Intravenous Given      10/14/2018 1607 iohexol (OMNIPAQUE) 350 MG/ML injection (MULTI-DOSE) 100 mL 100 mL Intravenous Given      10/14/2018 1701 ceFAZolin (ANCEF) IVPB (premix) 2,000 mg 2,000 mg Intravenous New Bag           Past Medical/Surgical History:    Known health problems: none        Admitting Diagnosis: Appendicitis [K37]  Abdominal pain [R10 9]    Age/Sex: 44 y o  female    Assessment/Plan: Patient is a 73-year-old who presents with 1 day of right lower quadrant abdominal pain  Imaging is consistent with acute appendicitis  Patient to OR for laparoscopic appendectomy  Post op IVF, diet as tolerated, OOB as tolerated, pain control   Anticipate discharge 10/15/2018    Admission Orders:  10/14/2018  1655 INPATIENT and changed to outpatient no charge bed 10/15/2018  0909  Scheduled Meds:   Current Facility-Administered Medications:  HYDROmorphone 0 5 mg Intravenous Q1H PRN    lactated ringers 125 mL/hr Intravenous Continuous Last Rate: 125 mL/hr (10/15/18 0117)   ondansetron 4 mg Intravenous Q4H PRN    oxyCODONE-acetaminophen 1 tablet Oral Q4H PRN    sodium chloride 125 mL/hr Intravenous Continuous Last Rate: Stopped (10/14/18 1829)     Continuous Infusions:   lactated ringers 125 mL/hr Last Rate: 125 mL/hr (10/15/18 0117)   sodium chloride 125 mL/hr Last Rate: Stopped (10/14/18 1829)     PRN Meds:   HYDROmorphone0 5 iv - used x 1      oxyCODONE-acetaminophen - used x 1  Activity as tolerated     scds  Diet surgical lite meal

## 2018-10-15 NOTE — OP NOTE
OPERATIVE REPORT  PATIENT NAME: Dante Foreman    :  1975  MRN: [id number]  Pt Location: * No operating room entered *    SURGERY DATE:         Preop Diagnosis:  Acute appendicitis  Postop diagnosis acute appendicitis    Procedure-laparoscopic appendectomy            Specimen(s):    Estimated Blood Loss:   Minimal    Drains:             Operative Findings:  Independent, nonsmoker, ASA 2 emergency      BMI 28, weight 230, height 65    Cardiovascular  Negative cardio ROS  Pulmonary  Negative pulmonary ROS       GI/Hepatic          Endo/Other  Obesity     GYN      Hematology Musculoskeletal      Neurology Psychology          Complications:   None    Procedure and Technique:  Patient was identified visually and via armband  Placed in the supine position  After general anesthesia the abdomen was prepped and draped in a sterile fashion  0 25% Marcaine with epinephrine was utilized throughout the procedure  Incision was made at the umbilicus  This carried down through skin subcutaneous tissue  Under direct vision a 12 mm trocar was inserted  This was followed by CO2 insufflation with back pressure 15  Two midline ports were then placed under direct vision  Acute suppurative appendicitis was noted without perforation  Mesoappendix was divided using Harmonic scalpel  Base of the appendix was then divided with the Endo-KARISHMA stapling device  The good the appendix was removed through the umbilical trocar site  Fascia was closed with 0 Vicryl suture followed by 4 Monocryl suture and Dermabond  The patient tolerated this procedure well  Sponge instrument count correct     I was present for the entire procedure    Patient Disposition:  PACU     SIGNATURE: Savannah Braga MD  DATE: 2018  TIME: 8:25 PM

## 2018-10-15 NOTE — DISCHARGE INSTRUCTIONS
Diet and activity as tolerated  May shower  May drive when not taking pain medication  Please call [phone] for a follow-up office visit with Dr Jaylen Rome for 2 weeks  [address] [place] [address]  Between [facility] and [employer]    Influenza Vaccine   WHAT YOU NEED TO KNOW:   What is the influenza vaccine? The influenza vaccine is an injection given to help prevent influenza (flu)  The flu is caused by a virus  The virus spreads from person to person through coughing and sneezing  Several types of viruses cause the flu  The viruses change over time, so new vaccines are made each year  The vaccine begins to protect you about 2 weeks after you get it  The flu shot usually injected into your upper arm  It may be given in your thigh  You may get a vaccine with a weak or dead virus  When should I get the influenza vaccine? The influenza vaccine is offered every year starting in September or October  Get the influenza vaccine as soon as it is available  Children 6 months to 6years old need 2 doses during the first year they get the vaccine  The 2 doses should be given at least 4 weeks apart  It is best if the same type of vaccine is given both times  The child can then receive 1 dose each year  Children 9 years or older should get 1 dose each year  Who should get the flu shot? · Infants 6 months or older    · Any healthy adult who would like to decrease the risk for the flu    · Anyone living with or caring for children younger than 5 years     · Healthcare workers    · Anyone who lives in a long-term care facility    · Anyone who has chronic health problems, such as asthma, diabetes, or blood disorders    · Anyone who has a weak immune system    · Women who are or will be pregnant during the flu season  Who should not get the flu shot? If you have an egg allergy, ask your healthcare provider if it is safe to get the flu shot   You will need to be closely monitored by a healthcare provider while you receive the vaccine, and for an hour or more after  The following should not get the flu shot:  · Infants younger than 6 months     · Anyone who has had an allergic reaction to the flu shot    · Anyone who is sick or has a fever    · Anyone who received a diagnosis of Guillain-Barré syndrome within 6 weeks of getting a flu vaccine    · Anyone who is allergic to thimerosal (mercury)  What are the risks of the influenza vaccine? The flu shot may cause mild symptoms, such as a fever, headache, and muscle aches  It may also cause mild to moderate soreness or redness at the area where you were given the shot  You may still get the flu after you receive the influenza vaccine  If you are allergic to eggs, ask about an egg-free vaccine  You may have an allergic reaction to the vaccine  This can be life-threatening  Call 911 for any of the following:   · Your mouth and throat are swollen  · You are wheezing or have trouble breathing  · You have chest pain or your heart is beating faster than normal for you  · You feel like you are going to faint  When should I seek immediate care? · Your face is red or swollen  · You have hives that spread over your body  · You feel weak or dizzy  When should I contact my healthcare provider? · You have increased pain, redness, or swelling around the area where the shot was given  · You have questions or concerns about the influenza vaccine  CARE AGREEMENT:   You have the right to help plan your care  Learn about your health condition and how it may be treated  Discuss treatment options with your caregivers to decide what care you want to receive  You always have the right to refuse treatment  The above information is an  only  It is not intended as medical advice for individual conditions or treatments   Talk to your doctor, nurse or pharmacist before following any medical regimen to see if it is safe and effective for you   © 2017 [facility] Information is for End User's use only and may not be sold, redistributed or otherwise used for commercial purposes  All illustrations and images included in CareNotes® are the copyrighted property of A D A M , Inc  or Mike Winchester

## 2018-10-15 NOTE — PROGRESS NOTES
Progress Note - General Surgery  Aston Garcia 44 y o  female MRN: [id number]  Unit/Bed#: -01 Encounter: [id number]      Assessment:  43 yo female POD#1 s/p laparoscopic appendectomy     Plan:  Discharge today  Surgical soft diet  Pain control prn  OOB and ambulate     Subjective: No events overnight  Pt complains of some incisional pain and bloating  Denies N/V    Objective:   Blood pressure 106/59, pulse 68, temperature 98 °F (36 7 °C), temperature source Oral, resp  rate 16, height 5' 5" (1 651 m), weight 104 kg (230 lb 6 1 oz), last menstrual period 10/12/2018, SpO2 94 %  ,Body mass index is 38 34 kg/m²        Intake/Output Summary (Last 24 hours) at 10/15/18 0818  Last data filed at 10/15/18 0225   Gross per 24 hour   Intake          2477 08 ml   Output              150 ml   Net          2327 08 ml       Invasive Devices     Peripheral Intravenous Line            Peripheral IV 10/14/18 Right Antecubital less than 1 day                Physical Exam:  Constitutional: no acute distress, AAOx3  Cardio: RRR  Pulm: normal respiratory effort   Abdomen: soft, rounded, tenderness to palpation in RLQ, no guarding or rebound   Incision: clean, dry, intact     Lab, Imaging and other studies:    CBC:   Lab Results   Component Value Date    WBC 11 27 (H) 10/14/2018    HGB 14 0 10/14/2018    HCT 42 3 10/14/2018    MCV 87 10/14/2018     (H) 10/14/2018    MCH 28 8 10/14/2018    MCHC 33 1 10/14/2018    RDW 12 8 10/14/2018    MPV 9 3 10/14/2018    NRBC 0 10/14/2018   , CMP:   Lab Results   Component Value Date     (L) 10/14/2018    K 3 5 10/14/2018     10/14/2018    CO2 30 10/14/2018    BUN 13 10/14/2018    CREATININE 0 73 10/14/2018    CALCIUM 9 0 10/14/2018    AST 19 10/14/2018    ALT 22 10/14/2018    ALKPHOS 69 10/14/2018    EGFR 102 10/14/2018       VTE Mechanical Prophylaxis: sequential compression device

## 2019-06-03 ENCOUNTER — TELEPHONE (OUTPATIENT)
Dept: OBGYN CLINIC | Facility: HOSPITAL | Age: 44
End: 2019-06-03

## 2019-06-03 NOTE — TELEPHONE ENCOUNTER
6/3/2019 SPOKE TO PT BUT SHE HAD INSURANCE THAT WAS UNFAMILIAR WHICH WAS CALLED [facility] SO ASKED HER TO CALL HER INSURANCE CARRIER AND MAKE SURE WE ARE IN NETWORK  SHE WILL ONLY CALL BACK IF WE ARE IN NETWORK AND SHE WISHES TO MAKE APPT W/DR FOSS

## 2019-06-12 ENCOUNTER — APPOINTMENT (OUTPATIENT)
Dept: RADIOLOGY | Facility: AMBULARY SURGERY CENTER | Age: 44
End: 2019-06-12
Payer: COMMERCIAL

## 2019-06-12 ENCOUNTER — OFFICE VISIT (OUTPATIENT)
Dept: OBGYN CLINIC | Facility: CLINIC | Age: 44
End: 2019-06-12
Payer: COMMERCIAL

## 2019-06-12 VITALS
HEIGHT: 65 IN | HEART RATE: 73 BPM | WEIGHT: 222 LBS | BODY MASS INDEX: 36.99 KG/M2 | DIASTOLIC BLOOD PRESSURE: 83 MMHG | SYSTOLIC BLOOD PRESSURE: 125 MMHG

## 2019-06-12 DIAGNOSIS — Z13.828 SCOLIOSIS CONCERN: ICD-10-CM

## 2019-06-12 DIAGNOSIS — Z13.828 SCOLIOSIS CONCERN: Primary | ICD-10-CM

## 2019-06-12 PROCEDURE — 99204 OFFICE O/P NEW MOD 45 MIN: CPT | Performed by: ORTHOPAEDIC SURGERY

## 2019-06-12 PROCEDURE — 72082 X-RAY EXAM ENTIRE SPI 2/3 VW: CPT

## 2019-08-16 ENCOUNTER — ANNUAL EXAM (OUTPATIENT)
Dept: OBGYN CLINIC | Facility: CLINIC | Age: 44
End: 2019-08-16
Payer: COMMERCIAL

## 2019-08-16 VITALS
HEIGHT: 64 IN | DIASTOLIC BLOOD PRESSURE: 84 MMHG | BODY MASS INDEX: 36.7 KG/M2 | WEIGHT: 215 LBS | SYSTOLIC BLOOD PRESSURE: 136 MMHG

## 2019-08-16 DIAGNOSIS — Z11.51 SCREENING FOR HPV (HUMAN PAPILLOMAVIRUS): ICD-10-CM

## 2019-08-16 DIAGNOSIS — Z12.31 ENCOUNTER FOR SCREENING MAMMOGRAM FOR MALIGNANT NEOPLASM OF BREAST: ICD-10-CM

## 2019-08-16 DIAGNOSIS — Z01.419 WOMEN'S ANNUAL ROUTINE GYNECOLOGICAL EXAMINATION: Primary | ICD-10-CM

## 2019-08-16 DIAGNOSIS — Z12.4 SCREENING FOR MALIGNANT NEOPLASM OF THE CERVIX: ICD-10-CM

## 2019-08-16 PROBLEM — E66.9 OBESITY: Status: ACTIVE | Noted: 2017-07-07

## 2019-08-16 PROCEDURE — 87624 HPV HI-RISK TYP POOLED RSLT: CPT | Performed by: OBSTETRICS & GYNECOLOGY

## 2019-08-16 PROCEDURE — G0145 SCR C/V CYTO,THINLAYER,RESCR: HCPCS | Performed by: OBSTETRICS & GYNECOLOGY

## 2019-08-16 PROCEDURE — 99396 PREV VISIT EST AGE 40-64: CPT | Performed by: OBSTETRICS & GYNECOLOGY

## 2019-08-17 NOTE — PROGRESS NOTES
ASSESSMENT & PLAN: Darryn Barnett is a 38 y o   with normal gynecologic exam     1   Routine well woman exam done today  2    Pap and HPV:Pap with HPV was done today  Current ASCCP Guidelines reviewed  Last Pap  2016 :  no abnormalities  3  Mammogram ordered  Recommend yearly mammography  4   The patient declined STD testing  Safe sex practices have been discussed  5  The patient is not sexually active   has ED  She declined contraception and options have been discussed  6  The following were reviewed in today's visit: breast self exam, mammography screening ordered, family planning choices, exercise and healthy diet  7  Patient to return to office in 12 months for annual      All questions have been answered to her satisfaction  CC:  Annual Gynecologic Examination    HPI: Darryn Barnett is a 38 y o  Jay Paul who presents for annual gynecologic examination  She has the following concerns:  none    Health Maintenance:    She exercises 0 days per week  She wears her seatbelt routinely  She does perform irregular monthly self breast exams  She feels safe at home  Patients does follow a regular diet  Past Medical History:   Diagnosis Date    HPV (human papilloma virus) infection     Known health problems: none     Scoliosis        Past Surgical History:   Procedure Laterality Date     SECTION      AL LAP,APPENDECTOMY N/A 10/14/2018    Procedure: APPENDECTOMY LAPAROSCOPIC;  Surgeon: Shaquille Riggins MD;  [facility];  Service: General    WISDOM TOOTH EXTRACTION         Past OB/Gyn History:  Period Cycle (Days): 28  Period Duration (Days): 4-5  Period Pattern: Regular  Menstrual Flow: Moderate  Dysmenorrhea: (!) Mild  Dysmenorrhea Symptoms: CrampingPatient's last menstrual period was 2019 (exact date)    Menstrual History:  OB History        1    Para   1    Term   1            AB        Living   1       SAB        TAB        Ectopic Multiple        Live Births   1                History of sexually transmitted infection No  Patient is not currently sexually active  heterosexual Birth control: none  Last Pap  2016:  no abnormalities  Family History   Problem Relation Age of Onset    Hypertension Father     Diabetes Father        Social History:  Social History     Socioeconomic History    Marital status: /Civil Union     Spouse name: Not on file    Number of children: Not on file    Years of education: Not on file    Highest education level: Not on file   Occupational History    Not on file   Social Needs    Financial 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 on file    Food insecurity:     Worry: Not on file     Inability: Not on file    Transportation needs:     Medical: Not on file     Non-medical: Not on file   Tobacco Use    Smoking status: Never Smoker    Smokeless tobacco: Never Used   Substance and Sexual Activity    Alcohol use: No    Drug use: No    Sexual activity: Never   Lifestyle    Physical activity:     Days per week: Not on file     Minutes per session: Not on file    Stress: Not on file   Relationships    Social connections:     Talks on phone: Not on file     Gets together: Not on file     Attends [employer] service: Not on file     Active member of club or organization: Not on file     Attends meetings of clubs or organizations: Not on file     Relationship status: Not on file    Intimate partner violence:     Fear of current or ex partner: Not on file     Emotionally abused: Not on file     Physically abused: Not on file     Forced sexual activity: Not on file   Other Topics Concern    Not on file   Social History Narrative    Not on file     Presently lives with her family  Patient is     Patient is currently employed   No Known Allergies    Current Outpatient Medications:     multivitamin (THERAGRAN) TABS, Take 1 tablet by mouth daily, Disp: , Rfl:     Review of Systems:  Review of Systems Constitutional: Negative  HENT: Negative  Respiratory: Negative  Cardiovascular: Negative  Gastrointestinal: Negative  Genitourinary: Negative  Neurological: Negative  Psychiatric/Behavioral: Negative  Physical Exam:  /84   Ht 5' 3 5" (1 613 m)   Wt 97 5 kg (215 lb)   LMP 08/09/2019 (Exact Date)   Breastfeeding? No   BMI 37 49 kg/m²    Physical Exam   Constitutional: She is oriented to person, place, and time  She appears well-developed and well-nourished  No distress  Genitourinary: Vagina normal and uterus normal  There is no rash, tenderness, lesion or injury on the right labia  There is no rash, tenderness, lesion or injury on the left labia  Vagina exhibits rugosity  No tenderness or bleeding in the vagina  No vaginal discharge found  Right adnexum does not display mass, does not display tenderness and does not display fullness  Left adnexum does not display mass, does not display tenderness and does not display fullness  Cervix is parous  Cervix exhibits pinkness  Cervix does not exhibit motion tenderness, discharge or polyp  Uterus is mobile  Uterus is not enlarged, tender or exhibiting a mass  HENT:   Head: Normocephalic and atraumatic  Cardiovascular: Normal rate, regular rhythm and normal heart sounds  No murmur heard  Pulmonary/Chest: Effort normal and breath sounds normal  No respiratory distress  She has no wheezes  Right breast exhibits no inverted nipple, no mass, no nipple discharge, no skin change and no tenderness  Left breast exhibits no inverted nipple, no mass, no nipple discharge, no skin change and no tenderness  Abdominal: Soft  She exhibits no distension  There is no tenderness  There is no guarding  Neurological: She is alert and oriented to person, place, and time  Skin: Skin is warm and dry  She is not diaphoretic  No erythema  No pallor  Nursing note and vitals reviewed

## 2019-08-19 LAB
HPV HR 12 DNA CVX QL NAA+PROBE: NEGATIVE
HPV16 DNA CVX QL NAA+PROBE: NEGATIVE
HPV18 DNA CVX QL NAA+PROBE: NEGATIVE

## 2019-08-20 LAB
LAB AP GYN PRIMARY INTERPRETATION: NORMAL
Lab: NORMAL

## 2019-09-23 PROBLEM — K35.80 ACUTE APPENDICITIS: Status: RESOLVED | Noted: 2018-10-15 | Resolved: 2019-09-23

## 2019-09-24 NOTE — PROGRESS NOTES
Assessment/Plan:  Problem List Items Addressed This Visit        Genitourinary    Kidney stones, calcium oxalate     Stable  Caution c calcium supplements  RESOLVED: Kidney stone       Other    Obesity     Recommend lifestyle modifications  Relevant Orders    TSH, 3rd generation with Free T4 reflex    Scoliosis concern     Stable  Management per Ortho  Tremor     Stable s meds  Other Visit Diagnoses     Annual physical exam    -  Primary    BMI 36 0-36 9,adult        Relevant Orders    TSH, 3rd generation with Free T4 reflex    Screening-pulmonary TB        Relevant Orders    TB Skin Test (Completed)    Screening for diabetes mellitus        Relevant Orders    Hemoglobin A1C    Screening for cardiovascular condition        Relevant Orders    CBC and differential    Comprehensive metabolic panel    Encounter for immunization        Relevant Orders    influenza vaccine, [id number], quadrivalent, 0 5 mL, preservative-free, for adult and pediatric patients 6 mos+ (AFLURIA, FLUARIX, FLULAVAL, FLUZONE) (Completed)    Lipid screening        Relevant Orders    Lipid panel    LDL cholesterol, direct    Thyroid disorder screen        Relevant Orders    TSH, 3rd generation with Free T4 reflex    Myalgia        Relevant Orders    Vitamin D 25 hydroxy           Return in about 6 weeks (around 11/8/2019) for Recheck Obesity, Labs  Future Appointments   Date Time Provider Michelle Martinez   9/30/2019  9:40 AM [facility] NURSE [facility]   10/8/2019 11:00 AM [facility]   11/8/2019  9:20 AM Winter Beavers DO [facility]        Subjective:     Ina Diaz is a 38 y o  female who presents today as a new patient for her medical conditions        New Patient    Previous PCP:  [facility] [place] / Dr Paresh Stuart at [place] [address] [facility]  Reason for Transfer:  Insurance  Last seen by previous PCP:  Fall 2018/7/7/17  Last Labs:  10/14/18  Last Physical:  7/7/17  Medical Records Requsted:  No      HPI:  Chief Complaint   Patient presents with    Annual Exam     annual exam, no concerns  -- Above per clinical staff and reviewed  --      HPI      Today:      Physical    No concerns  Obesity - Trying to watch diet  No regular exercise  Walks occasionally  Scoliosis - Management per Ortho  F/U PRN  Denies back pain  Occasionally has paresthesias mid-thoracic back        Reviewed:  Labs 10/14/18, Gyn 8/16/19     Buster Marciano Tucker yearly  Next appt 8/20  Overdue for Dentist     Gregory Montoya for Optho  May have had Tdap - [facility] - inpatient, baby born 5/18/2009 - CG request 9/27/19      PHQ-9 Depression Screening    PHQ-9:    Frequency of the following problems over the past two weeks:       Little interest or pleasure in doing things:  0 - not at all  Feeling down, depressed, or hopeless:  0 - not at all  PHQ-2 Score:  0           The following portions of the patient's history were reviewed and updated as appropriate: allergies, current medications, past family history, past medical history, past social history, past surgical history and problem list       Review of Systems   Constitutional: Negative for appetite change, chills, diaphoresis, fatigue and fever  Respiratory: Negative for chest tightness and shortness of breath  Gastrointestinal: Negative for abdominal pain, blood in stool, diarrhea, nausea and vomiting  Genitourinary: Negative for dysuria  Musculoskeletal: Negative for back pain          Current Outpatient Medications   Medication Sig Dispense Refill    Biotin 1 MG CAPS Take 1 mg by mouth daily      Calcium Carbonate-Vit D-Min (CALCIUM 1200 PO) Take 1 tablet by mouth daily      cholecalciferol (VITAMIN D3) 1,000 units tablet Take 2,000 Units by mouth daily      multivitamin (THERAGRAN) TABS Take 1 tablet by mouth daily       No current facility-administered medications for this visit  Objective:  /64   Pulse 81   Temp 98 2 °F (36 8 °C) (Tympanic)   Resp 16   Ht 5' 4" (1 626 m)   Wt 95 9 kg (211 lb 6 4 oz)   LMP 09/06/2019 (Exact Date)   SpO2 98%   Breastfeeding? No   BMI 36 29 kg/m²    Wt Readings from Last 3 Encounters:   09/27/19 95 9 kg (211 lb 6 4 oz)   08/16/19 97 5 kg (215 lb)   06/12/19 101 kg (222 lb)      BP Readings from Last 3 Encounters:   09/27/19 102/64   08/16/19 136/84   06/12/19 125/83          Physical Exam   Constitutional: She is oriented to person, place, and time  She appears well-developed and well-nourished  HENT:   Head: Normocephalic and atraumatic  Right Ear: Tympanic membrane, external ear and ear canal normal    Left Ear: Tympanic membrane, external ear and ear canal normal    Nose: Nose normal  Right sinus exhibits no maxillary sinus tenderness and no frontal sinus tenderness  Left sinus exhibits no maxillary sinus tenderness and no frontal sinus tenderness  Mouth/Throat: Uvula is midline, oropharynx is clear and moist and mucous membranes are normal  No tonsillar exudate  Eyes: Pupils are equal, round, and reactive to light  Conjunctivae and EOM are normal    Neck: Neck supple  No thyromegaly present  Cardiovascular: Normal rate, regular rhythm, normal heart sounds and intact distal pulses  Pulmonary/Chest: Effort normal and breath sounds normal    Abdominal: Soft  Bowel sounds are normal  She exhibits no distension and no mass  There is no tenderness  There is no rebound and no guarding  Musculoskeletal: Normal range of motion  She exhibits no edema or tenderness  +Thoracolumbar scoliosis   Lymphadenopathy:     She has no cervical adenopathy  Neurological: She is alert and oriented to person, place, and time  She displays normal reflexes  No cranial nerve deficit or sensory deficit  She exhibits normal muscle tone   Coordination normal    Skin: No rash noted    Psychiatric: She has a normal mood and affect  Her behavior is normal  Judgment and thought content normal    Nursing note and vitals reviewed  Lab Results:      Lab Results   Component Value Date    WBC 11 27 (H) 10/14/2018    HGB 14 0 10/14/2018    HCT 42 3 10/14/2018     (H) 10/14/2018    ALT 22 10/14/2018    AST 19 10/14/2018     06/24/2015    K 3 5 10/14/2018     10/14/2018    CREATININE 0 73 10/14/2018    BUN 13 10/14/2018    CO2 30 10/14/2018    INR 1 01 10/14/2018     No results found for: URICACID  Invalid input(s): BASENAME Vitamin D    No results found  POCT Labs              BMI Counseling: Body mass index is 36 29 kg/m²  The BMI is above normal  Nutrition recommendations include decreasing overall calorie intake  Exercise recommendations include exercising 3-5 times per week

## 2019-09-27 ENCOUNTER — OFFICE VISIT (OUTPATIENT)
Dept: FAMILY MEDICINE CLINIC | Facility: CLINIC | Age: 44
End: 2019-09-27
Payer: COMMERCIAL

## 2019-09-27 VITALS
RESPIRATION RATE: 16 BRPM | TEMPERATURE: 98.2 F | WEIGHT: 211.4 LBS | HEART RATE: 81 BPM | SYSTOLIC BLOOD PRESSURE: 102 MMHG | OXYGEN SATURATION: 98 % | HEIGHT: 64 IN | BODY MASS INDEX: 36.09 KG/M2 | DIASTOLIC BLOOD PRESSURE: 64 MMHG

## 2019-09-27 DIAGNOSIS — Z11.1 SCREENING-PULMONARY TB: ICD-10-CM

## 2019-09-27 DIAGNOSIS — E66.9 CLASS 2 OBESITY WITH BODY MASS INDEX (BMI) OF 36.0 TO 36.9 IN ADULT, UNSPECIFIED OBESITY TYPE, UNSPECIFIED WHETHER SERIOUS COMORBIDITY PRESENT: ICD-10-CM

## 2019-09-27 DIAGNOSIS — N20.0 KIDNEY STONES, CALCIUM OXALATE: ICD-10-CM

## 2019-09-27 DIAGNOSIS — R25.1 TREMOR: ICD-10-CM

## 2019-09-27 DIAGNOSIS — Z13.1 SCREENING FOR DIABETES MELLITUS: ICD-10-CM

## 2019-09-27 DIAGNOSIS — Z23 ENCOUNTER FOR IMMUNIZATION: ICD-10-CM

## 2019-09-27 DIAGNOSIS — Z13.6 SCREENING FOR CARDIOVASCULAR CONDITION: ICD-10-CM

## 2019-09-27 DIAGNOSIS — Z13.220 LIPID SCREENING: ICD-10-CM

## 2019-09-27 DIAGNOSIS — Z13.29 THYROID DISORDER SCREEN: ICD-10-CM

## 2019-09-27 DIAGNOSIS — N20.0 KIDNEY STONE: ICD-10-CM

## 2019-09-27 DIAGNOSIS — Z13.828 SCOLIOSIS CONCERN: ICD-10-CM

## 2019-09-27 DIAGNOSIS — M79.10 MYALGIA: ICD-10-CM

## 2019-09-27 DIAGNOSIS — Z00.00 ANNUAL PHYSICAL EXAM: Primary | ICD-10-CM

## 2019-09-27 PROCEDURE — 90471 IMMUNIZATION ADMIN: CPT

## 2019-09-27 PROCEDURE — 86580 TB INTRADERMAL TEST: CPT

## 2019-09-27 PROCEDURE — 90686 IIV4 VACC NO PRSV 0.5 ML IM: CPT

## 2019-09-27 PROCEDURE — 99386 PREV VISIT NEW AGE 40-64: CPT | Performed by: FAMILY MEDICINE

## 2019-09-27 RX ORDER — MELATONIN
2000 DAILY
COMMUNITY

## 2019-09-27 RX ORDER — NICOTINE POLACRILEX 2 MG
1 GUM BUCCAL DAILY
COMMUNITY

## 2019-09-27 NOTE — PATIENT INSTRUCTIONS
Obesity   AMBULATORY CARE:   Obesity  is when your body mass index (BMI) is greater than 30  Your healthcare provider will use your height and weight to measure your BMI  The risks of obesity include  many health problems, such as injuries or physical disability  You may need tests to check for the following:  · Diabetes     · High blood pressure or high cholesterol     · Heart disease     · Gallbladder or liver disease     · Cancer of the colon, breast, prostate, liver, or kidney     · Sleep apnea     · Arthritis or gout  Seek care immediately if:   · You have a severe headache, confusion, or difficulty speaking  · You have weakness on one side of your body  · You have chest pain, sweating, or shortness of breath  Contact your healthcare provider if:   · You have symptoms of gallbladder or liver disease, such as pain in your upper abdomen  · You have knee or hip pain and discomfort while walking  · You have symptoms of diabetes, such as intense hunger and thirst, and frequent urination  · You have symptoms of sleep apnea, such as snoring or daytime sleepiness  · You have questions or concerns about your condition or care  Treatment for obesity  focuses on helping you lose weight to improve your health  Even a small decrease in BMI can reduce the risk for many health problems  Your healthcare provider will help you set a weight-loss goal   · Lifestyle changes  are the first step in treating obesity  These include making healthy food choices and getting regular physical activity  Your healthcare provider may suggest a weight-loss program that involves coaching, education, and therapy  · Medicine  may help you lose weight when it is used with a healthy diet and physical activity  · Surgery  can help you lose weight if you are very obese and have other health problems  There are several types of weight-loss surgery  Ask your healthcare provider for more information    Be successful losing weight:   · Set small, realistic goals  An example of a small goal is to walk for 20 minutes 5 days a week  Anther goal is to lose 5% of your body weight  · Tell friends, family members, and coworkers about your goals  and ask for their support  Ask a friend to lose weight with you, or join a weight-loss support group  · Identify foods or triggers that may cause you to overeat , and find ways to avoid them  Remove tempting high-calorie foods from your home and workplace  Place a bowl of fresh fruit on your kitchen counter  If stress causes you to eat, then find other ways to cope with stress  · Keep a diary to track what you eat and drink  Also write down how many minutes of physical activity you do each day  Weigh yourself once a week and record it in your diary  Eating changes: You will need to eat 500 to 1,000 fewer calories each day than you currently eat to lose 1 to 2 pounds a week  The following changes will help you cut calories:  · Eat smaller portions  Use small plates, no larger than 9 inches in diameter  Fill your plate half full of fruits and vegetables  Measure your food using measuring cups until you know what a serving size looks like  · Eat 3 meals and 1 or 2 snacks each day  Plan your meals in advance  Estefania Cleary and eat at home most of the time  Eat slowly  · Eat fruits and vegetables at every meal   They are low in calories and high in fiber, which makes you feel full  Do not add butter, margarine, or cream sauce to vegetables  Use herbs to season steamed vegetables  · Eat less fat and fewer fried foods  Eat more baked or grilled chicken and fish  These protein sources are lower in calories and fat than red meat  Limit fast food  Dress your salads with olive oil and vinegar instead of bottled dressing  · Limit the amount of sugar you eat  Do not drink sugary beverages  Limit alcohol  Activity changes:  Physical activity is good for your body in many ways   It helps you burn calories and build strong muscles  It decreases stress and depression, and improves your mood  It can also help you sleep better  Talk to your healthcare provider before you begin an exercise program   · Exercise for at least 30 minutes 5 days a week  Start slowly  Set aside time each day for physical activity that you enjoy and that is convenient for you  It is best to do both weight training and an activity that increases your heart rate, such as walking, bicycling, or swimming  · Find ways to be more active  Do yard work and housecleaning  Walk up the stairs instead of using elevators  Spend your leisure time going to events that require walking, such as outdoor festivals or fairs  This extra physical activity can help you lose weight and keep it off  Follow up with your healthcare provider as directed: You may need to meet with a dietitian  Write down your questions so you remember to ask them during your visits  © 2017 [facility] Information is for End User's use only and may not be sold, redistributed or otherwise used for commercial purposes  All illustrations and images included in CareNotes® are the copyrighted property of [employer]  or Mike Winchester  The above information is an  only  It is not intended as medical advice for individual conditions or treatments  Talk to your doctor, nurse or pharmacist before following any medical regimen to see if it is safe and effective for you  Low Fat Diet   AMBULATORY CARE:   A low-fat diet  is an eating plan that is low in total fat, unhealthy fat, and cholesterol  You may need to follow a low-fat diet if you have trouble digesting or absorbing fat  You may also need to follow this diet if you have high cholesterol  You can also lower your cholesterol by increasing the amount of fiber in your diet  Soluble fiber is a type of fiber that helps to decrease cholesterol levels     Different types of fat in food:   · Limit unhealthy fats  A diet that is high in cholesterol, saturated fat, and trans fat may cause unhealthy cholesterol levels  Unhealthy cholesterol levels increase your risk of heart disease  ¨ Cholesterol:  Limit intake of cholesterol to less than 200 mg per day  Cholesterol is found in meat, eggs, and dairy  ¨ Saturated fat:  Limit saturated fat to less than 7% of your total daily calories  Ask your dietitian how many calories you need each day  Saturated fat is found in butter, cheese, ice cream, whole milk, and palm oil  Saturated fat is also found in meat, such as beef, pork, chicken skin, and processed meats  Processed meats include sausage, hot dogs, and bologna  ¨ Trans fat:  Avoid trans fat as much as possible  Trans fat is used in fried and baked foods  Foods that say trans fat free on the label may still have up to 0 5 grams of trans fat per serving  · Include healthy fats  Replace foods that are high in saturated and trans fat with foods high in healthy fats  This may help to decrease high cholesterol levels  ¨ Monounsaturated fats: These are found in avocados, nuts, and vegetable oils, such as olive, canola, and sunflower oil  ¨ Polyunsaturated fats: These can be found in vegetable oils, such as soybean or corn oil  Omega-3 fats can help to decrease the risk of heart disease  Omega-3 fats are found in fish, such as salmon, herring, trout, and tuna  Omega-3 fats can also be found in plant foods, such as walnuts, flaxseed, soybeans, and canola oil    Foods to limit or avoid:   · Grains:      ¨ Snacks that are made with partially hydrogenated oils, such as chips, regular crackers, and butter-flavored popcorn    ¨ High-fat baked goods, such as biscuits, croissants, doughnuts, pies, cookies, and pastries    · Dairy:      ¨ Whole milk, 2% milk, and yogurt and ice cream made with whole milk    ¨ Half and half creamer, heavy cream, and whipping cream    ¨ Cheese, cream cheese, and sour cream    · Meats and proteins:      ¨ High-fat cuts of meat (T-bone steak, regular hamburger, and ribs)    ¨ Fried meat, poultry (turkey and chicken), and fish    ¨ Poultry (chicken and turkey) with skin    ¨ Cold cuts (salami or bologna), hot dogs, salas, and sausage    ¨ Whole eggs and egg yolks    · Vegetables and fruits with added fat:      ¨ Fried vegetables or vegetables in butter or high-fat sauces, such as cream or cheese sauces    ¨ Fried fruit or fruit served with butter or cream    · Fats:      ¨ Butter, stick margarine, and shortening    ¨ Coconut, palm oil, and palm kernel oil  Foods to include:   · Grains:      ¨ Whole-grain breads, cereals, pasta, and brown rice    ¨ Low-fat crackers and pretzels    · Vegetables and fruits:      ¨ Fresh, frozen, or canned vegetables (no salt or low-sodium)    ¨ Fresh, frozen, dried, or canned fruit (canned in light syrup or fruit juice)    ¨ Avocado    · Low-fat dairy products:      ¨ Nonfat (skim) or 1% milk    ¨ Nonfat or low-fat cheese, yogurt, and cottage cheese    · Meats and proteins:      ¨ Chicken or turkey with no skin    ¨ Baked or broiled fish    ¨ Lean beef and pork (loin, round, extra lean hamburger)    ¨ Beans and peas, unsalted nuts, soy products    ¨ Egg whites and substitutes    ¨ Seeds and nuts    · Fats:      ¨ Unsaturated oil, such as canola, olive, peanut, soybean, or sunflower oil    ¨ Soft or liquid margarine and vegetable oil spread    ¨ Low-fat salad dressing  Other ways to decrease fat:   · Read food labels before you buy foods  Choose foods that have less than 30% of calories from fat  Choose low-fat or fat-free dairy products  Remember that fat free does not mean calorie free  These foods still contain calories, and too many calories can lead to weight gain  · Trim fat from meat and avoid fried food  Trim all visible fat from meat before you cook it  Remove the skin from poultry  Do not wallace meat, fish, or poultry  Bake, roast, boil, or broil these foods instead  Avoid fried foods  Eat a baked potato instead of Western Any fries  Steam vegetables instead of sautéing them in butter  · Add less fat to foods  Use imitation salas bits on salads and baked potatoes instead of regular salas bits  Use fat-free or low-fat salad dressings instead of regular dressings  Use low-fat or nonfat butter-flavored topping instead of regular butter or margarine on popcorn and other foods  Ways to decrease fat in recipes:  Replace high-fat ingredients with low-fat or nonfat ones  This may cause baked goods to be drier than usual  You may need to use nonfat cooking spray on pans to prevent food from sticking  You also may need to change the amount of other ingredients, such as water, in the recipe  Try the following:  · Use low-fat or light margarine instead of regular margarine or shortening  · Use lean ground turkey breast or chicken, or lean ground beef (less than 5% fat) instead of hamburger  · Add 1 teaspoon of canola oil to 8 ounces of skim milk instead of using cream or half and half  · Use grated zucchini, carrots, or apples in breads instead of coconut  · Use blenderized, low-fat cottage cheese, plain tofu, or low-fat ricotta cheese instead of cream cheese  · Use 1 egg white and 1 teaspoon of canola oil, or use ¼ cup (2 ounces) of fat-free egg substitute instead of a whole egg  · Replace half of the oil that is called for in a recipe with applesauce when you bake  Use 3 tablespoons of cocoa powder and 1 tablespoon of canola oil instead of a square of baking chocolate  How to increase fiber:  Eat enough high-fiber foods to get 20 to 30 grams of fiber every day  Slowly increase your fiber intake to avoid stomach cramps, gas, and other problems  · Eat 3 ounces of whole-grain foods each day  An ounce is about 1 slice of bread  Eat whole-grain breads, such as whole-wheat bread   Whole wheat, whole-wheat flour, or other whole grains should be listed as the first ingredient on the food label  Replace white flour with whole-grain flour or use half of each in recipes  Whole-grain flour is heavier than white flour, so you may have to add more yeast or baking powder  · Eat a high-fiber cereal for breakfast   Oatmeal is a good source of soluble fiber  Look for cereals that have bran or fiber in the name  Choose whole-grain products, such as brown rice, barley, and whole-wheat pasta  · Eat more beans, peas, and lentils  For example, add beans to soups or salads  Eat at least 5 cups of fruits and vegetables each day  Eat fruits and vegetables with the peel because the peel is high in fiber  © 2017 [facility] Information is for End User's use only and may not be sold, redistributed or otherwise used for commercial purposes  All illustrations and images included in CareNotes® are the copyrighted property of [employer]  or Mike Winchester  The above information is an  only  It is not intended as medical advice for individual conditions or treatments  Talk to your doctor, nurse or pharmacist before following any medical regimen to see if it is safe and effective for you  Heart Healthy Diet   AMBULATORY CARE:   A heart healthy diet  is an eating plan low in total fat, unhealthy fats, and sodium (salt)  A heart healthy diet helps decrease your risk for heart disease and stroke  Limit the amount of fat you eat to 25% to 35% of your total daily calories  Limit sodium to less than 2,300 mg each day  Healthy fats:  Healthy fats can help improve cholesterol levels  The risk for heart disease is decreased when cholesterol levels are normal  Choose healthy fats, such as the following:  · Unsaturated fat  is found in foods such as soybean, canola, olive, corn, and safflower oils  It is also found in soft tub margarine that is made with liquid vegetable oil       · Omega-3 fat  is found in certain fish, such as salmon, tuna, and trout, and in walnuts and flaxseed  Unhealthy fats:  Unhealthy fats can cause unhealthy cholesterol levels in your blood and increase your risk of heart disease  Limit unhealthy fats, such as the following:  · Cholesterol  is found in animal foods, such as eggs and lobster, and in dairy products made from whole milk  Limit cholesterol to less than 300 milligrams (mg) each day  You may need to limit cholesterol to 200 mg each day if you have heart disease  · Saturated fat  is found in meats, such as salas and hamburger  It is also found in chicken or turkey skin, whole milk, and butter  Limit saturated fat to less than 7% of your total daily calories  Limit saturated fat to less than 6% if you have heart disease or are at increased risk for it  · Trans fat  is found in packaged foods, such as potato chips and cookies  It is also in hard margarine, some fried foods, and shortening  Avoid trans fats as much as possible    Heart healthy foods and drinks to include:  Ask your dietitian or healthcare provider how many servings to have from each of the following food groups:  · Grains:      ¨ Whole-wheat breads, cereals, and pastas, and brown rice    ¨ Low-fat, low-sodium crackers and chips    · Vegetables:      ¨ Broccoli, green beans, green peas, and spinach    ¨ Collards, kale, and lima beans    ¨ Carrots, sweet potatoes, tomatoes, and peppers    ¨ Canned vegetables with no salt added    · Fruits:      ¨ Bananas, peaches, pears, and pineapple    ¨ Grapes, raisins, and dates    ¨ Oranges, tangerines, grapefruit, orange juice, and grapefruit juice    ¨ Apricots, mangoes, melons, and papaya    ¨ Raspberries and strawberries    ¨ Canned fruit with no added sugar    · Low-fat dairy products:      ¨ Nonfat (skim) milk, 1% milk, and low-fat almond, cashew, or soy milks fortified with calcium    ¨ Low-fat cheese, regular or frozen yogurt, and cottage cheese    · Meats and proteins , such as lean cuts of beef and pork (loin, leg, round), skinless chicken and turkey, legumes, soy products, egg whites, and nuts  Foods and drinks to limit or avoid:  Ask your dietitian or healthcare provider about these and other foods that are high in unhealthy fat, sodium, and sugar:  · Snack or packaged foods , such as frozen dinners, cookies, macaroni and cheese, and cereals with more than 300 mg of sodium per serving    · Canned or dry mixes  for cakes, soups, sauces, or gravies    · Vegetables with added sodium , such as instant potatoes, vegetables with added sauces, or regular canned vegetables    · Other foods high in sodium , such as ketchup, barbecue sauce, salad dressing, pickles, olives, soy sauce, and miso    · High-fat dairy foods  such as whole or 2% milk, cream cheese, or sour cream, and cheeses     · High-fat protein foods  such as high-fat cuts of beef (T-bone steaks, ribs), chicken or turkey with skin, and organ meats, such as liver    · Cured or smoked meats , such as hot dogs, salas, and sausage    · Unhealthy fats and oils , such as butter, stick margarine, shortening, and cooking oils such as coconut or palm oil    · Food and drinks high in sugar , such as soft drinks (soda), sports drinks, sweetened tea, candy, cake, cookies, pies, and doughnuts  Other diet guidelines to follow:   · Eat more foods containing omega-3 fats  Eat fish high in omega-3 fats at least 2 times a week  · Limit alcohol  Too much alcohol can damage your heart and raise your blood pressure  Women should limit alcohol to 1 drink a day  Men should limit alcohol to 2 drinks a day  A drink of alcohol is 12 ounces of beer, 5 ounces of wine, or 1½ ounces of liquor  · Choose low-sodium foods  High-sodium foods can lead to high blood pressure  Add little or no salt to food you prepare  Use herbs and spices in place of salt  · Eat more fiber  to help lower cholesterol levels   Eat at least 5 servings of fruits and vegetables each day  Eat 3 ounces of whole-grain foods each day  Legumes (beans) are also a good source of fiber  Lifestyle guidelines:   · Do not smoke  Nicotine and other chemicals in cigarettes and cigars can cause lung and heart damage  Ask your healthcare provider for information if you currently smoke and need help to quit  E-cigarettes or smokeless tobacco still contain nicotine  Talk to your healthcare provider before you use these products  · Exercise regularly  to help you maintain a healthy weight and improve your blood pressure and cholesterol levels  Ask your healthcare provider about the best exercise plan for you  Do not start an exercise program without asking your healthcare provider  Follow up with your healthcare provider as directed:  Write down your questions so you remember to ask them during your visits  © 2017 [facility] Information is for End User's use only and may not be sold, redistributed or otherwise used for commercial purposes  All illustrations and images included in CareNotes® are the copyrighted property of [employer]  or Mike Annabella  The above information is an  only  It is not intended as medical advice for individual conditions or treatments  Talk to your doctor, nurse or pharmacist before following any medical regimen to see if it is safe and effective for you  Calorie Counting Diet   WHAT YOU NEED TO KNOW:   What is a calorie counting diet? It is a meal plan based on counting calories each day to reach a healthy body weight  You will need to eat fewer calories if you are trying to lose weight  Weight loss may decrease your risk for certain health problems or improve your health if you have health problems  Some of these health problems include heart disease, high blood pressure, and diabetes  What foods should I avoid?   Your dietitian will tell you if you need to avoid certain foods based on your body weight and health condition  You may need to avoid high-fat foods if you are at risk for or have heart disease  You may need to eat fewer foods from the breads and starches food group if you have diabetes  How many calories are in foods? The following is a list of foods and drinks with the approximate number of calories in each  Check the food label to find the exact number of calories  A dietitian can tell you how many calories you should have from each food group each day    · Carbohydrate:      ¨ ½ of a 3-inch bagel, 1 slice of bread, or ½ of a hamburger bun or hot dog bun (80)    ¨ 1 (8-inch) flour tortilla or ½ cup of cooked rice (100)    ¨ 1 (6-inch) corn tortilla (80)    ¨ 1 (6-inch) pancake or 1 cup of bran flakes cereal (110)    ¨ ½ cup of cooked cereal (80)    ¨ ½ cup of cooked pasta (85)    ¨ 1 ounce of pretzels (100)    ¨ 3 cups of air-popped popcorn without butter or oil (80)    · Dairy:      ¨ 1 cup of skim or 1% milk (90)    ¨ 1 cup of 2% milk (120)    ¨ 1 cup of whole milk (160)    ¨ 1 cup of 2% chocolate milk (220)    ¨ 1 ounce of low-fat cheese with 3 grams of fat per ounce (70)    ¨ 1 ounce of cheddar cheese (114)    ¨ ½ cup of 1% fat cottage cheese (80)    ¨ 1 cup of plain or sugar-free, fat-free yogurt (90)    · Protein foods:      ¨ 3 ounces of fish (not breaded or fried) (95)    ¨ 3 ounces of breaded, fried fish (195)    ¨ ¾ cup of tuna canned in water (105)    ¨ 3 ounces of chicken breast without skin (105)    ¨ 1 fried chicken breast with skin (350)    ¨ ¼ cup of fat free egg substitute (40)    ¨ 1 large egg (75)    ¨ 3 ounces of lean beef or pork (165)    ¨ 3 ounces of fried pork chop or ham (185)    ¨ ½ cup of cooked dried beans, such as kidney, vance, lentils, or navy (115)    ¨ 3 ounces of bologna or lunch meat (225)    ¨ 2 links of breakfast sausage (140)    · Vegetables:      ¨ ½ cup of sliced mushrooms (10)    ¨ 1 cup of salad greens, such as lettuce, spinach, or luis eduardo (15)    ¨ ½ cup of steamed asparagus (20)    ¨ ½ cup of cooked summer squash, zucchini squash, or green or wax beans (25)    ¨ 1 cup of broccoli or cauliflower florets, or 1 medium tomato (25)    ¨ 1 large raw carrot or ½ cup of cooked carrots (40)    ¨ ? of a medium cucumber or 1 stalk of celery (5)    ¨ 1 small baked potato (160)    ¨ 1 cup of breaded, fried vegetables (230)    · Fruit:      ¨ 1 (6-inch) banana (55)     ¨ ½ of a 4-inch grapefruit (55)    ¨ 15 grapes (60)    ¨ 1 medium orange or apple (70)    ¨ 1 large peach (65)    ¨ 1 cup of fresh pineapple chunks (75)    ¨ 1 cup of melon cubes (50)    ¨ 1¼ cups of whole strawberries (45)    ¨ ½ cup of fruit canned in juice (55)    ¨ ½ cup of fruit canned in heavy syrup (110)    ¨ ?  cup of raisins (130)    ¨ ½ cup of unsweetened fruit juice (60)    ¨ ½ cup of grape, cranberry, or prune juice (90)    · Fat:      ¨ 10 peanuts or 2 teaspoons of peanut butter (55)    ¨ 2 tablespoons of avocado or 1 tablespoon of regular salad dressing (45)    ¨ 2 slices of salas (90)    ¨ 1 teaspoon of oil, such as safflower, canola, corn, or olive oil (45)    ¨ 2 teaspoons of low-fat margarine, or 1 tablespoon of low-fat mayonnaise (50)    ¨ 1 teaspoon of regular margarine (40)    ¨ 1 tablespoon of regular mayonnaise (135)    ¨ 1 tablespoon of cream cheese or 2 tablespoons of low-fat cream cheese (45)    ¨ 2 tablespoons of vegetable shortening (215)    · Dessert and sweets:      ¨ 8 animal crackers or 5 vanilla wafers (80)    ¨ 1 frozen fruit juice bar (80)    ¨ ½ cup of ice milk or low-fat frozen yogurt (90)    ¨ ½ cup of sherbet or sorbet (125)    ¨ ½ cup of sugar-free pudding or custard (60)    ¨ ½ cup of ice cream (140)    ¨ ½ cup of pudding or custard (175)    ¨ 1 (2-inch) square chocolate brownie (185)    · Combination foods:      ¨ Bean burrito made with an 8-inch tortilla, without cheese (275)    ¨ Chicken breast sandwich with lettuce and tomato (325)    ¨ 1 cup of chicken noodle soup (60)    ¨ 1 beef taco (175)    ¨ Regular hamburger with lettuce and tomato (310)    ¨ Regular cheeseburger with lettuce and tomato (410)     ¨ ¼ of a 12-inch cheese pizza (280)    ¨ Fried fish sandwich with lettuce and tomato (425)    ¨ Hot dog and bun (275)    ¨ 1½ cups of macaroni and cheese (310)    ¨ Taco salad with a fried tortilla shell (870)    · Low-calorie foods:      ¨ 1 tablespoon of ketchup or 1 tablespoon of fat free sour cream (15)    ¨ 1 teaspoon of mustard (5)    ¨ ¼ cup of salsa (20)    ¨ 1 large dill pickle (15)    ¨ 1 tablespoon of fat free salad dressing (10)    ¨ 2 teaspoons of low-sugar, light jam or jelly, or 1 tablespoon of sugar-free syrup (15)    ¨ 1 sugar-free popsicle (15)    ¨ 1 cup of club soda, seltzer water, or diet soda (0)  CARE AGREEMENT:   You have the right to help plan your care  Discuss treatment options with your caregivers to decide what care you want to receive  You always have the right to refuse treatment  The above information is an  only  It is not intended as medical advice for individual conditions or treatments  Talk to your doctor, nurse or pharmacist before following any medical regimen to see if it is safe and effective for you  © 2017 [address] Information is for End User's use only and may not be sold, redistributed or otherwise used for commercial purposes  All illustrations and images included in CareNotes® are the copyrighted property of [employer]  or Mike Winchester  Pediatric Dentists Address Phone Age start seeing children   Insurance [address] / MARANDA Domingo, DMD [address]  [place] [address] [phone] 12-18 months Accepts no insurances   [facility]     [facility] [address] [facility] [address] AND  [address]   [phone] 18 months * [facility] [address] [facility] [address]  [place] [address] [phone] 12 months     Jessy Dark, DDS [address] [facility] 12 months     Fadi Low [place] [address] [phone] 12 months     Yeny Temple[address] 7, [address] [place] [address] [phone] 18 months *     [facility] [address] [place] [address] [phone] 12 months *     Marie Baez [address]  [address] 12 months *     [facility] Address Phone Age start seeing children   Insurance [facility] / [address] [facility] MARANDA Mcke[address] [id number] [phone] 3  4 years * Accepts only   [address] [facility] Param [phone] 2  3 years     [facility], [facility] Param [phone] 12 months Accepts no insurances     Lani Jennings, DMD [address]  [employer]  [facility] Param [phone] 12 months     [address] [facility] [address] [place] [address] [phone] 18 months     Alize Ellington, DMD [address]  [facility] [phone] 12 months     Carmen Hernandez, DMD [address]  [employer]  [address]  [facility] [address] [phone] 12 months Accepts no insurances     Sherryle Norton,  [address] [phone] 2 years     [address]   [address] [facility] [phone] 3 years     Rosalio Redding, DMD [address]  [facility] Param [phone] 12 months     Viridiana Powers,  N   [address] [facility] [address] [phone] 3 years     [address] Phyllis Ge [facility], [address] [facility] [phone] 5-6 years    Na Catrina [address] [facility] , 2nd Floor of [address] [place] [address], [address] [phone] 43 months    [facility] [address] Amandeep Rater [address] [phone] 2 years    [facility], Pamela Castillo, [facility], [facility]  [address] [place] [phone] 12 months    Rex Baskin, [address] [facility],4Th Floor, [address], [address] 3 years    [address]  [address] [facility], [address] [phone] 12 months            Wellness Visit for Adults   AMBULATORY CARE:   A wellness visit  is when you see your healthcare provider to get screened for health problems  You can also get advice on how to stay healthy  Write down your questions so you remember to ask them  Ask your healthcare provider how often you should have a wellness visit  What happens at a wellness visit:  Your healthcare provider will ask about your health, and your family history of health problems  This includes high blood pressure, heart disease, and cancer  He or she will ask if you have symptoms that concern you, if you smoke, and about your mood  You may also be asked about your intake of medicines, supplements, food, and alcohol  Any of the following may be done:  · Your weight  will be checked  Your height may also be checked so your body mass index (BMI) can be calculated  Your BMI shows if you are at a healthy weight  · Your blood pressure  and heart rate will be checked  Your temperature may also be checked  · Blood and urine tests  may be done  Blood tests may be done to check your cholesterol levels  Abnormal cholesterol levels increase your risk for heart disease and stroke  You may also need a blood or urine test to check for diabetes if you are at increased risk  Urine tests may be done to look for signs of an infection or kidney disease  · A physical exam  includes checking your heartbeat and lungs with a stethoscope  Your healthcare provider may also check your skin to look for sun damage       · Screening tests  may be recommended  A screening test is done to check for diseases that may not cause symptoms  The screening tests you may need depend on your age, gender, family history, and lifestyle habits  For example, colorectal screening may be recommended if you are 48years old or older  Screening tests you need if you are a woman:   · A Pap smear  is used to screen for cervical cancer  Pap smears are usually done every 3 to 5 years depending on your age  You may need them more often if you have had abnormal Pap smear test results in the past  Ask your healthcare provider how often you should have a Pap smear  · A mammogram  is an x-ray of your breasts to screen for breast cancer  Experts recommend mammograms every 2 years starting at age 48 years  You may need a mammogram at age 52 years or younger if you have an increased risk for breast cancer  Talk to your healthcare provider about when you should start having mammograms and how often you need them  Vaccines you may need:   · Get an influenza vaccine  every year  The influenza vaccine protects you from the flu  Several types of viruses cause the flu  The viruses change over time, so new vaccines are made each year  · Get a tetanus-diphtheria (Td) booster vaccine  every 10 years  This vaccine protects you against tetanus and diphtheria  Tetanus is a severe infection that may cause painful muscle spasms and lockjaw  Diphtheria is a severe bacterial infection that causes a thick covering in the back of your mouth and throat  · Get a human papillomavirus (HPV) vaccine  if you are female and aged 23 to 32 or male 23 to 24 and never received it  This vaccine protects you from HPV infection  HPV is the most common infection spread by sexual contact  HPV may also cause vaginal, penile, and anal cancers  · Get a pneumococcal vaccine  if you are aged 72 years or older  The pneumococcal vaccine is an injection given to protect you from pneumococcal disease   Pneumococcal disease is an infection caused by pneumococcal bacteria  The infection may cause pneumonia, meningitis, or an ear infection  · Get a shingles vaccine  if you are aged 61 or older, even if you have had shingles before  The shingles vaccine is an injection to protect you from the varicella-zoster virus  This is the same virus that causes chickenpox  Shingles is a painful rash that develops in people who had chickenpox or have been exposed to the virus  How to eat healthy:  My Plate is a model for planning healthy meals  It shows the types and amounts of foods that should go on your plate  Fruits and vegetables make up about half of your plate, and grains and protein make up the other half  A serving of dairy is included on the side of your plate  The amount of calories and serving sizes you need depends on your age, gender, weight, and height  Examples of healthy foods are listed below:  · Eat a variety of vegetables  such as dark green, red, and orange vegetables  You can also include canned vegetables low in sodium (salt) and frozen vegetables without added butter or sauces  · Eat a variety of fresh fruits , canned fruit in 100% juice, frozen fruit, and dried fruit  · Include whole grains  At least half of the grains you eat should be whole grains  Examples include whole-wheat bread, wheat pasta, brown rice, and whole-grain cereals such as oatmeal     · Eat a variety of protein foods such as seafood (fish and shellfish), lean meat, and poultry without skin (turkey and chicken)  Examples of lean meats include pork leg, shoulder, or tenderloin, and beef round, sirloin, tenderloin, and extra lean ground beef  Other protein foods include eggs and egg substitutes, beans, peas, soy products, nuts, and seeds  · Choose low-fat dairy products such as skim or 1% milk or low-fat yogurt, cheese, and cottage cheese  · Limit unhealthy fats  such as butter, hard margarine, and shortening         Exercise: Exercise at least 30 minutes per day on most days of the week  Some examples of exercise include walking, biking, dancing, and swimming  You can also fit in more physical activity by taking the stairs instead of the elevator or parking farther away from stores  Include muscle strengthening activities 2 days each week  Regular exercise provides many health benefits  It helps you manage your weight, and decreases your risk for type 2 diabetes, heart disease, stroke, and high blood pressure  Exercise can also help improve your mood  Ask your healthcare provider about the best exercise plan for you  General health and safety guidelines:   · Do not smoke  Nicotine and other chemicals in cigarettes and cigars can cause lung damage  Ask your healthcare provider for information if you currently smoke and need help to quit  E-cigarettes or smokeless tobacco still contain nicotine  Talk to your healthcare provider before you use these products  · Limit alcohol  A drink of alcohol is 12 ounces of beer, 5 ounces of wine, or 1½ ounces of liquor  · Lose weight, if needed  Being overweight increases your risk of certain health conditions  These include heart disease, high blood pressure, type 2 diabetes, and certain types of cancer  · Protect your skin  Do not sunbathe or use tanning beds  Use sunscreen with a SPF 15 or higher  Apply sunscreen at least 15 minutes before you go outside  Reapply sunscreen every 2 hours  Wear protective clothing, hats, and sunglasses when you are outside  · Drive safely  Always wear your seatbelt  Make sure everyone in your car wears a seatbelt  A seatbelt can save your life if you are in an accident  Do not use your cell phone when you are driving  This could distract you and cause an accident  Pull over if you need to make a call or send a text message  · Practice safe sex  Use latex condoms if are sexually active and have more than one partner   Your healthcare provider may recommend screening tests for sexually transmitted infections (STIs)  · Wear helmets, lifejackets, and protective gear  Always wear a helmet when you ride a bike or motorcycle, go skiing, or play sports that could cause a head injury  Wear protective equipment when you play sports  Wear a lifejacket when you are on a boat or doing water sports  © 2017 [address] Information is for End User's use only and may not be sold, redistributed or otherwise used for commercial purposes  All illustrations and images included in CareNotes® are the copyrighted property of A D A M , Inc  or Mike Winchester  The above information is an  only  It is not intended as medical advice for individual conditions or treatments  Talk to your doctor, nurse or pharmacist before following any medical regimen to see if it is safe and effective for you  Please contact your insurance if you are uncertain of coverage for plan of care items  Your insurance may not cover the cost of your Vitamin D blood test, which is approximately $65-70  Please notify the lab prior to blood draw if you would like to decline this test       Cholesterol and Your Health   AMBULATORY CARE:   Cholesterol  is a waxy, fat-like substance  Cholesterol is made by your body, but also comes from certain foods you eat  Your body uses cholesterol to make hormones and new cells  Your body also uses cholesterol to protect nerves  Cholesterol comes from foods such as meat and dairy products  Your total cholesterol level is made up by LDL cholesterol, HDL cholesterol, and triglycerides:  · LDL cholesterol  is called bad cholesterol  because it forms plaque in your arteries  As plaque builds up, your arteries become narrow, and less blood flows through  When plaque decreases blood flow to your heart, you may have chest pain  If plaque completely blocks an artery that bring blood to your heart, you may have a heart attack  Plaque can break off and form blood clots  Blood clots may block arteries in your brain and cause a stroke  · HDL cholesterol  is called good cholesterol  because it helps remove LDL cholesterol from your arteries  It does this by attaching to LDL cholesterol and carrying it to your liver  Your liver breaks down LDL cholesterol so your body can get rid of it  High levels of HDL cholesterol can help prevent a heart attack and stroke  Low levels of HDL cholesterol can increase your risk for heart disease, heart attack, and stroke  · Triglycerides  are a type of fat that store energy from foods you eat  High levels of triglycerides also cause plaque buildup  This can increase your risk for a heart attack or stroke  If your triglyceride level is high, your LDL cholesterol level may also be high  How food affects your cholesterol levels:   · Unhealthy fats  increase LDL cholesterol and triglyceride levels in your blood  They are found in foods high in cholesterol, saturated fat, and trans fat:     ¨ Cholesterol  is found in eggs, dairy, and meat  ¨ Saturated fat  is found in butter, cheese, ice cream, whole milk, and coconut oil  Saturated fat is also found in meat, such as sausage, hot dogs, and bologna  ¨ Trans fat  is found in liquid oils and is used in fried and baked foods  Foods that contain trans fats include chips, crackers, muffins, sweet rolls, microwave popcorn, and cookies  · Healthy fats,  also called unsaturated fats, help lower LDL cholesterol and triglyceride levels  Healthy fats include the following:     ¨ Monounsaturated fats  are found in foods such as olive oil, canola oil, avocado, nuts, and olives  ¨ Polyunsaturated fats,  such as omega 3 fats, are found in fish, such as salmon, trout, and tuna  They can also be found in plant foods such as flaxseed, walnuts, and soybeans    Other things that affect your cholesterol levels:   · Smoking cigarettes    · Being overweight or obese     · Drinking large amounts of alcohol    · Not enough exercise or no exercise    · Certain genes passed from your parents to you  What you need to know about having your cholesterol levels checked: Adults 21to 39years of age should have their cholesterol levels checked every 4 to 6 years  Adults 45 years and older should have their cholesterol checked every 1 to 2 years  You may need your cholesterol checked more often, or at a younger age, if you have risk factors for heart disease  You may also need to have your cholesterol checked more often if you have other health conditions, such as diabetes  Blood tests are used to check cholesterol levels  Blood tests measure your levels of triglycerides, LDL cholesterol, and HDL cholesterol  Cholesterol level goals: Your cholesterol level goal may depend on your risk for heart disease  It may also depend on your age and other health conditions  Ask your healthcare provider if the following goals are right for you:  · Your total cholesterol level  should be less than 200 mg/dL  This number may also depend on your HDL and LDL cholesterol goals  · Your LDL cholesterol level  should be less than 130 mg/dL  · Your HDL cholesterol level  should be 60 mg/dL or higher  · Your triglyceride level  should be less than 150 mg/dL  Treatment for high cholesterol:  Treatment for high cholesterol will also decrease your risk of heart disease, heart attack, and stroke  Treatment may include any of the following:  · Medicines  may be given to lower your LDL cholesterol, triglyceride levels, or total cholesterol level   You may need medicines to lower your cholesterol if any of the following is true:     ¨ You have a history of stroke, TIA, unstable angina, or a heart attack    ¨ Your LDL cholesterol level is 190 mg/dL or higher    ¨ You are age 36to 76years of age, have diabetes, and your LDL cholesterol is 70 mg/dL or higher    ¨ You are age 36 to 76 years of age, have risk factors for heart disease, and your LDL cholesterol is 70 mg/dL or higher    · Lifestyle changes  include changes to your diet, exercise, weight loss, and quitting smoking  It also includes decreasing the amount of alcohol you drink  · Supplements  include fish oil, red yeast rice, and garlic  Fish oil may help lower your triglyceride and LDL cholesterol levels  It may also increase your HDL cholesterol level  Red yeast rice may help decrease your total cholesterol level and LDL cholesterol level  Garlic may help lower your total cholesterol level  Do not take these supplements without talking to your healthcare provider  Nutrition to help lower your cholesterol levels:  A registered dietitian can help you create a healthy eating plan  Read food labels and choose foods low in saturated fat, trans fats, and cholesterol  · Decrease the total amount of fat you eat  Choose lean meats, fat-free or 1% fat milk, and low-fat dairy products, such as yogurt and cheese  Try to limit or avoid red meats  Limit or do not eat fried foods or baked goods such as cookies  · Replace unhealthy fats with healthy fats  Cook foods in olive oil or canola oil  Choose soft margarines that are low in saturated fat and trans fat  Seeds, nuts, and avocados are other examples of healthy fats  · Eat foods with omega-3 fats  Examples include salmon, tuna, mackerel, walnuts, and flaxseed  Eat fish 2 times per week  Children and pregnant women should not eat fish that have high levels of mercury, such as shark, swordfish, and fabiano mackerel  · Increase the amount of plant-based foods you eat  Plant-based foods are low in cholesterol and fat  Eating more of these foods may help lower your cholesterol and help you lose weight  Examples of plant-based foods includes fruits, vegetables, legumes, and whole grains  Replace milk that contains dairy with almond, soy, or coconut milk   Eat beans and foods with soy for protein instead of meat  Ask your healthcare provider or dietitian for more information on plant-based foods  · Increase the amount of fiber you eat  High-fiber foods can help lower your LDL cholesterol  You should eat between 20 and 30 grams of fiber each day  Eat at least 5 servings of fruits and vegetables each day  Other examples of high-fiber foods include whole-grain or whole-wheat breads, pastas, or cereals, and brown rice  Eat 3 ounces of whole-grain foods each day  Increase fiber slowly  You may have abdominal discomfort, bloating, and gas if you add fiber to your diet too quickly  Lifestyle changes you can make to help lower your cholesterol levels:   · Maintain a healthy weight  Ask your healthcare provider how much you should weigh  Ask him or her to help you create a weight loss plan if you are overweight  Weight loss can decrease your total cholesterol and triglyceride levels  · Exercise regularly  Exercise can help lower your total cholesterol level and maintain a healthy weight  Exercise can also help increase your HDL cholesterol level  Work with your healthcare provider to create an exercise program that is right for you  Get at least 30 minutes of moderate exercise most days of the week  Examples of exercise include brisk walking, swimming, or biking  · Do not smoke  Nicotine and other chemicals in cigarettes and cigars can damage your lungs, heart, and blood vessels  They can also raise your triglyceride levels  Ask your healthcare provider for information if you currently smoke and need help to quit  E-cigarettes or smokeless tobacco still contain nicotine  Talk to your healthcare provider before you use these products  · Limit or do not drink alcohol  Alcohol can increase your triglyceride levels  Ask your healthcare provider if it is safe for you to drink alcohol  Also ask how much is safe for you to drink each day    © 2017 [address] Information is for End User's use only and may not be sold, redistributed or otherwise used for commercial purposes  All illustrations and images included in CareNotes® are the copyrighted property of A D A M , Inc  or Mike Winchester  The above information is an  only  It is not intended as medical advice for individual conditions or treatments  Talk to your doctor, nurse or pharmacist before following any medical regimen to see if it is safe and effective for you

## 2019-09-30 ENCOUNTER — CLINICAL SUPPORT (OUTPATIENT)
Dept: FAMILY MEDICINE CLINIC | Facility: CLINIC | Age: 44
End: 2019-09-30

## 2019-09-30 DIAGNOSIS — Z11.1 ENCOUNTER FOR PPD SKIN TEST READING: Primary | ICD-10-CM

## 2019-10-08 ENCOUNTER — HOSPITAL ENCOUNTER (OUTPATIENT)
Dept: RADIOLOGY | Age: 44
Discharge: HOME | End: 2019-10-08
Payer: COMMERCIAL

## 2019-10-08 VITALS — BODY MASS INDEX: 36.02 KG/M2 | HEIGHT: 64 IN | WEIGHT: 211 LBS

## 2019-10-08 DIAGNOSIS — Z12.31 ENCOUNTER FOR SCREENING MAMMOGRAM FOR MALIGNANT NEOPLASM OF BREAST: ICD-10-CM

## 2019-10-08 PROCEDURE — 77067 SCR MAMMO BI INCL CAD: CPT

## 2019-10-28 ENCOUNTER — TELEPHONE (OUTPATIENT)
Dept: FAMILY MEDICINE CLINIC | Facility: CLINIC | Age: 44
End: 2019-10-28

## 2019-10-28 NOTE — TELEPHONE ENCOUNTER
Called to speak with patient regarding insurance bill she received  Left non-detailed message to call office back to discuss  Regarding: RE: Non-Urgent Medical Question  Contact: [phone]  ----- Message from David Caba DO sent at 10/25/2019  9:13 AM EDT -----       ----- Message sent from David Caba DO to Felisa Melissa at 10/25/2019  9:10 AM -----   Dallin Tovar,    I am sorry to hear about your expenses  I will ask my  if she has any helpful suggestions  Take care,    Dr Briana Short    ----- Message -----     From: Zaria Lam     Sent: 10/24/2019  4:09 PM EDT       To: Amandeep Diez DO  Subject: Non-Urgent Medical Question    Had to cancel my appointment due to my medical coverage not paying for my past visits  Most recent bill is from I saw you for a physical  That bill is $367  I promise to reschedule as soon as I find better coverage  Thank you for understanding   Have a great weekend  :-)    Zaria Lam

## 2020-09-14 NOTE — PROGRESS NOTES
Assessment/Plan:  Problem List Items Addressed This Visit        Other    Obesity     Recommend lifestyle modifications  Relevant Orders    TSH, 3rd generation with Free T4 reflex    Vitamin D 25 hydroxy    Hyperlipidemia     Stable off statin  Current 10 year ASCVD risk is 0 4%  Recommend lifestyle modifications  Scoliosis concern     Stable  Management per Ortho  F/U PRN  Relevant Orders    XR hip/pelv 2-3 vws left if performed    Ambulatory referral to Physical Therapy    Vitamin D 25 hydroxy      Other Visit Diagnoses     Annual physical exam    -  Primary    Left hip pain        Relevant Orders    XR hip/pelv 2-3 vws left if performed    Ambulatory referral to Physical Therapy    Vitamin D 25 hydroxy    Suspect somatic dysfunction s/p antalgic gait caused by scoliosis and childbirth vs  Arthritis  Advise Motrin/Tylenol PRN  Screening for HIV (human immunodeficiency virus)        Relevant Orders    HIV 1/2 Antigen/Antibody (4th Generation) w Reflex [facility]    Need for hepatitis C screening test        Relevant Orders    Hepatitis C antibody    Need for vaccination        Relevant Orders    TDAP VACCINE GREATER THAN OR EQUAL TO 8YO IM (Completed)    Screening for diabetes mellitus        Relevant Orders    Hemoglobin A1C    Screening for cardiovascular condition        Relevant Orders    CBC and differential    Comprehensive metabolic panel    Lipid panel    LDL cholesterol, direct    BMI 36 0-36 9,adult        Relevant Orders    Lipid panel    Vitamin D 25 hydroxy    Myalgia        Relevant Orders    Vitamin D 25 hydroxy           Return in about 1 year (around 9/17/2021) for Annual physical; PRN Labs        Future Appointments   Date Time Provider Michelle Martinez   9/18/2020 11:15 AM [facility]   10/10/2020 10:40 AM [facility]   9/17/2021 10:20 AM Isabella Robles DO [facility] Subjective:     Bonifacio Bird is a 41 y o  female who presents today for a follow-up on her chronic medical conditions  HPI:  Chief Complaint   Patient presents with    Annual Exam     Annual exam,     Hip Pain     Left hip pain, feels pressure when walking and bending       -- Above per clinical staff and reviewed  --      HPI      Today:      Patient did not complete labs 09/27/2019    L Hip Pain - Symptoms x years, worse in the past year  Left anterior groin pain, no radiation  Pressure  Feels a grinding sensation  Current and max 4-5/10  Worse c flexing and walking  Denies trauma  Lasts a few seconds  No OTC meds as not exteremly painful  She states her hips have never been normal childbirth in 2010        Obesity - Trying to watch diet  No regular exercise  Walks occasionally        Scoliosis - Management per Ortho   F/U PRN  Denies back pain  Occasionally has paresthesias mid-thoracic back        Reviewed:  Labs 10/14/18, Gyn 8/16/19     Sees Collette Doty yearly   Next appt 9/20        Overdue for Dentist       Shiela Sutton q2 years        May have had Tdap - [facility] - inpatient, baby born 5/18/2009 - CG request 9/27/19 unsuccesfful           PHQ-9 Depression Screening    PHQ-9:    Frequency of the following problems over the past two weeks:       Little interest or pleasure in doing things:  0 - not at all  Feeling down, depressed, or hopeless:  0 - not at all  PHQ-2 Score:  0           The following portions of the patient's history were reviewed and updated as appropriate: allergies, current medications, past family history, past medical history, past social history, past surgical history and problem list       Review of Systems   Constitutional: Negative for appetite change, chills, diaphoresis, fatigue and fever  Respiratory: Negative for chest tightness and shortness of breath  Cardiovascular: Negative for chest pain  Gastrointestinal: Negative for abdominal pain, blood in stool, diarrhea, nausea and vomiting  Genitourinary: Negative for dysuria  Musculoskeletal: Positive for arthralgias  Negative for back pain  Current Outpatient Medications   Medication Sig Dispense Refill    Biotin 1 MG CAPS Take 1 mg by mouth daily      Calcium Carbonate-Vit D-Min (CALCIUM 1200 PO) Take 1 tablet by mouth daily      cholecalciferol (VITAMIN D3) 1,000 units tablet Take 2,000 Units by mouth daily      multivitamin (THERAGRAN) TABS Take 1 tablet by mouth daily       No current facility-administered medications for this visit  Objective:  /66   Pulse 77   Temp 98 7 °F (37 1 °C)   Resp 16   Ht 5' 4" (1 626 m)   Wt 96 9 kg (213 lb 9 6 oz)   SpO2 97%   BMI 36 66 kg/m²    Wt Readings from Last 3 Encounters:   09/17/20 96 9 kg (213 lb 9 6 oz)   10/08/19 95 7 kg (211 lb)   09/27/19 95 9 kg (211 lb 6 4 oz)      BP Readings from Last 3 Encounters:   09/17/20 100/66   09/27/19 102/64   08/16/19 136/84          Physical Exam  Vitals signs and nursing note reviewed  Constitutional:       Appearance: Normal appearance  She is well-developed  She is obese  HENT:      Head: Normocephalic and atraumatic  Right Ear: Tympanic membrane, ear canal and external ear normal       Left Ear: Tympanic membrane, ear canal and external ear normal       Nose: Nose normal       Right Sinus: No maxillary sinus tenderness or frontal sinus tenderness  Left Sinus: No maxillary sinus tenderness or frontal sinus tenderness  Mouth/Throat:      Mouth: Mucous membranes are moist       Pharynx: Oropharynx is clear  Uvula midline  Tonsils: No tonsillar exudate  Eyes:      Extraocular Movements: Extraocular movements intact  Conjunctiva/sclera: Conjunctivae normal       Pupils: Pupils are equal, round, and reactive to light  Neck:      Musculoskeletal: Neck supple     Cardiovascular:      Rate and Rhythm: Normal rate and regular rhythm  Pulses: Normal pulses  Heart sounds: Normal heart sounds  Pulmonary:      Effort: Pulmonary effort is normal       Breath sounds: Normal breath sounds  Abdominal:      General: Bowel sounds are normal  There is no distension  Palpations: Abdomen is soft  There is no mass  Tenderness: There is no abdominal tenderness  There is no guarding or rebound  Musculoskeletal:         General: Deformity (Scoliosis) present  No swelling or tenderness  Right lower leg: No edema  Left lower leg: No edema  Comments: B/L Hips Stable  L Hip c decreased AROM c flexion, internal and external rotation  Lumbar spine c full AROM in all planes  Lymphadenopathy:      Cervical: No cervical adenopathy  Skin:     Findings: No rash  Neurological:      General: No focal deficit present  Mental Status: She is alert and oriented to person, place, and time  Psychiatric:         Mood and Affect: Mood normal          Behavior: Behavior normal          Thought Content: Thought content normal          Judgment: Judgment normal          Lab Results:      Lab Results   Component Value Date    WBC 6 41 09/17/2020    HGB 13 7 09/17/2020    HCT 41 8 09/17/2020     (H) 09/17/2020    TRIG 76 09/17/2020    HDL 52 09/17/2020    LDLDIRECT 104 (H) 09/17/2020    ALT 20 09/17/2020    AST 16 09/17/2020     06/24/2015    K 4 2 09/17/2020     09/17/2020    CREATININE 0 65 09/17/2020    BUN 14 09/17/2020    CO2 26 09/17/2020    INR 1 01 10/14/2018    GLUF 86 09/17/2020     No results found for: URICACID  Invalid input(s): BASENAME Vitamin D    No results found  POCT Labs      BMI Counseling: Body mass index is 36 66 kg/m²  The BMI is above normal  Nutrition recommendations include encouraging healthy choices of fruits and vegetables  Exercise recommendations include exercising 3-5 times per week  No pharmacotherapy was ordered

## 2020-09-17 ENCOUNTER — HOSPITAL ENCOUNTER (OUTPATIENT)
Dept: RADIOLOGY | Facility: HOSPITAL | Age: 45
Discharge: HOME | End: 2020-09-17
Attending: FAMILY MEDICINE
Payer: COMMERCIAL

## 2020-09-17 ENCOUNTER — LAB (OUTPATIENT)
Dept: LAB | Facility: CLINIC | Age: 45
End: 2020-09-17
Payer: COMMERCIAL

## 2020-09-17 ENCOUNTER — OFFICE VISIT (OUTPATIENT)
Dept: FAMILY MEDICINE CLINIC | Facility: CLINIC | Age: 45
End: 2020-09-17
Payer: COMMERCIAL

## 2020-09-17 VITALS
HEIGHT: 64 IN | OXYGEN SATURATION: 97 % | SYSTOLIC BLOOD PRESSURE: 100 MMHG | DIASTOLIC BLOOD PRESSURE: 66 MMHG | RESPIRATION RATE: 16 BRPM | BODY MASS INDEX: 36.47 KG/M2 | WEIGHT: 213.6 LBS | TEMPERATURE: 98.7 F | HEART RATE: 77 BPM

## 2020-09-17 DIAGNOSIS — Z23 NEED FOR VACCINATION: ICD-10-CM

## 2020-09-17 DIAGNOSIS — Z13.6 SCREENING FOR CARDIOVASCULAR CONDITION: ICD-10-CM

## 2020-09-17 DIAGNOSIS — M79.10 MYALGIA: ICD-10-CM

## 2020-09-17 DIAGNOSIS — M25.552 LEFT HIP PAIN: ICD-10-CM

## 2020-09-17 DIAGNOSIS — Z11.4 SCREENING FOR HIV (HUMAN IMMUNODEFICIENCY VIRUS): ICD-10-CM

## 2020-09-17 DIAGNOSIS — M16.12 OSTEOARTHRITIS OF LEFT HIP, UNSPECIFIED OSTEOARTHRITIS TYPE: ICD-10-CM

## 2020-09-17 DIAGNOSIS — Z13.828 SCOLIOSIS CONCERN: ICD-10-CM

## 2020-09-17 DIAGNOSIS — Z00.00 ANNUAL PHYSICAL EXAM: Primary | ICD-10-CM

## 2020-09-17 DIAGNOSIS — Z13.220 LIPID SCREENING: ICD-10-CM

## 2020-09-17 DIAGNOSIS — E66.9 CLASS 2 OBESITY WITH BODY MASS INDEX (BMI) OF 36.0 TO 36.9 IN ADULT, UNSPECIFIED OBESITY TYPE, UNSPECIFIED WHETHER SERIOUS COMORBIDITY PRESENT: ICD-10-CM

## 2020-09-17 DIAGNOSIS — Z13.1 SCREENING FOR DIABETES MELLITUS: ICD-10-CM

## 2020-09-17 DIAGNOSIS — E78.5 HYPERLIPIDEMIA, UNSPECIFIED HYPERLIPIDEMIA TYPE: ICD-10-CM

## 2020-09-17 DIAGNOSIS — Z11.59 NEED FOR HEPATITIS C SCREENING TEST: ICD-10-CM

## 2020-09-17 DIAGNOSIS — Z13.29 THYROID DISORDER SCREEN: ICD-10-CM

## 2020-09-17 LAB
25(OH)D3 SERPL-MCNC: 30.4 NG/ML (ref 30–100)
ALBUMIN SERPL BCP-MCNC: 4 G/DL (ref 3.5–5)
ALP SERPL-CCNC: 58 U/L (ref 46–116)
ALT SERPL W P-5'-P-CCNC: 20 U/L (ref 12–78)
ANION GAP SERPL CALCULATED.3IONS-SCNC: 9 MMOL/L (ref 4–13)
AST SERPL W P-5'-P-CCNC: 16 U/L (ref 5–45)
BASOPHILS # BLD AUTO: 0.05 THOUSANDS/ΜL (ref 0–0.1)
BASOPHILS NFR BLD AUTO: 1 % (ref 0–1)
BILIRUB SERPL-MCNC: 0.6 MG/DL (ref 0.2–1)
BUN SERPL-MCNC: 14 MG/DL (ref 5–25)
CALCIUM SERPL-MCNC: 9.1 MG/DL (ref 8.3–10.1)
CHLORIDE SERPL-SCNC: 103 MMOL/L (ref 100–108)
CHOLEST SERPL-MCNC: 173 MG/DL (ref 50–200)
CO2 SERPL-SCNC: 26 MMOL/L (ref 21–32)
CREAT SERPL-MCNC: 0.65 MG/DL (ref 0.6–1.3)
EOSINOPHIL # BLD AUTO: 0.08 THOUSAND/ΜL (ref 0–0.61)
EOSINOPHIL NFR BLD AUTO: 1 % (ref 0–6)
ERYTHROCYTE [DISTWIDTH] IN BLOOD BY AUTOMATED COUNT: 13.7 % (ref 11.6–15.1)
EST. AVERAGE GLUCOSE BLD GHB EST-MCNC: 105 MG/DL
GFR SERPL CREATININE-BSD FRML MDRD: 108 ML/MIN/1.73SQ M
GLUCOSE P FAST SERPL-MCNC: 86 MG/DL (ref 65–99)
HBA1C MFR BLD: 5.3 %
HCT VFR BLD AUTO: 41.8 % (ref 34.8–46.1)
HCV AB SER QL: NORMAL
HDLC SERPL-MCNC: 52 MG/DL
HGB BLD-MCNC: 13.7 G/DL (ref 11.5–15.4)
IMM GRANULOCYTES # BLD AUTO: 0.02 THOUSAND/UL (ref 0–0.2)
IMM GRANULOCYTES NFR BLD AUTO: 0 % (ref 0–2)
LDLC SERPL CALC-MCNC: 106 MG/DL (ref 0–100)
LDLC SERPL DIRECT ASSAY-MCNC: 104 MG/DL (ref 0–100)
LYMPHOCYTES # BLD AUTO: 2.47 THOUSANDS/ΜL (ref 0.6–4.47)
LYMPHOCYTES NFR BLD AUTO: 39 % (ref 14–44)
MCH RBC QN AUTO: 29.1 PG (ref 26.8–34.3)
MCHC RBC AUTO-ENTMCNC: 32.8 G/DL (ref 31.4–37.4)
MCV RBC AUTO: 89 FL (ref 82–98)
MONOCYTES # BLD AUTO: 0.45 THOUSAND/ΜL (ref 0.17–1.22)
MONOCYTES NFR BLD AUTO: 7 % (ref 4–12)
NEUTROPHILS # BLD AUTO: 3.34 THOUSANDS/ΜL (ref 1.85–7.62)
NEUTS SEG NFR BLD AUTO: 52 % (ref 43–75)
NONHDLC SERPL-MCNC: 121 MG/DL
NRBC BLD AUTO-RTO: 0 /100 WBCS
PLATELET # BLD AUTO: 401 THOUSANDS/UL (ref 149–390)
PMV BLD AUTO: 9.4 FL (ref 8.9–12.7)
POTASSIUM SERPL-SCNC: 4.2 MMOL/L (ref 3.5–5.3)
PROT SERPL-MCNC: 7.3 G/DL (ref 6.4–8.2)
RBC # BLD AUTO: 4.7 MILLION/UL (ref 3.81–5.12)
SODIUM SERPL-SCNC: 138 MMOL/L (ref 136–145)
TRIGL SERPL-MCNC: 76 MG/DL
TSH SERPL DL<=0.05 MIU/L-ACNC: 2.89 UIU/ML (ref 0.36–3.74)
WBC # BLD AUTO: 6.41 THOUSAND/UL (ref 4.31–10.16)

## 2020-09-17 PROCEDURE — 99396 PREV VISIT EST AGE 40-64: CPT | Performed by: FAMILY MEDICINE

## 2020-09-17 PROCEDURE — 90715 TDAP VACCINE 7 YRS/> IM: CPT | Performed by: FAMILY MEDICINE

## 2020-09-17 PROCEDURE — 80061 LIPID PANEL: CPT

## 2020-09-17 PROCEDURE — 85025 COMPLETE CBC W/AUTO DIFF WBC: CPT

## 2020-09-17 PROCEDURE — 99214 OFFICE O/P EST MOD 30 MIN: CPT | Performed by: FAMILY MEDICINE

## 2020-09-17 PROCEDURE — 90471 IMMUNIZATION ADMIN: CPT | Performed by: FAMILY MEDICINE

## 2020-09-17 PROCEDURE — 84443 ASSAY THYROID STIM HORMONE: CPT

## 2020-09-17 PROCEDURE — 83036 HEMOGLOBIN GLYCOSYLATED A1C: CPT

## 2020-09-17 PROCEDURE — 36415 COLL VENOUS BLD VENIPUNCTURE: CPT

## 2020-09-17 PROCEDURE — 82306 VITAMIN D 25 HYDROXY: CPT

## 2020-09-17 PROCEDURE — 3725F SCREEN DEPRESSION PERFORMED: CPT | Performed by: FAMILY MEDICINE

## 2020-09-17 PROCEDURE — 86803 HEPATITIS C AB TEST: CPT

## 2020-09-17 PROCEDURE — 73502 X-RAY EXAM HIP UNI 2-3 VIEWS: CPT

## 2020-09-17 PROCEDURE — 87389 HIV-1 AG W/HIV-1&-2 AB AG IA: CPT

## 2020-09-17 PROCEDURE — 83721 ASSAY OF BLOOD LIPOPROTEIN: CPT

## 2020-09-17 PROCEDURE — 80053 COMPREHEN METABOLIC PANEL: CPT

## 2020-09-17 NOTE — PROGRESS NOTES
Assessment/Plan:  Problem List Items Addressed This Visit        Other    Obesity     Recommend lifestyle modifications  Relevant Orders    TSH, 3rd generation with Free T4 reflex    Vitamin D 25 hydroxy    Hyperlipidemia     Stable off statin  Current 10 year ASCVD risk is 0 4%  Recommend lifestyle modifications  Scoliosis concern     Stable  Management per Ortho  F/U PRN  Relevant Orders    XR hip/pelv 2-3 vws left if performed    Ambulatory referral to Physical Therapy    Vitamin D 25 hydroxy      Other Visit Diagnoses     Annual physical exam    -  Primary    Left hip pain        Relevant Orders    XR hip/pelv 2-3 vws left if performed    Ambulatory referral to Physical Therapy    Vitamin D 25 hydroxy    Screening for HIV (human immunodeficiency virus)        Relevant Orders    HIV 1/2 Antigen/Antibody (4th Generation) w Reflex [facility]    Need for hepatitis C screening test        Relevant Orders    Hepatitis C antibody    Need for vaccination        Relevant Orders    TDAP VACCINE GREATER THAN OR EQUAL TO 6YO IM (Completed)    Screening for diabetes mellitus        Relevant Orders    Hemoglobin A1C    Screening for cardiovascular condition        Relevant Orders    CBC and differential    Comprehensive metabolic panel    Lipid panel    LDL cholesterol, direct    BMI 36 0-36 9,adult        Relevant Orders    Lipid panel    Vitamin D 25 hydroxy    Myalgia        Relevant Orders    Vitamin D 25 hydroxy           Return in about 1 year (around 9/17/2021) for Annual physical; PRN Labs        Future Appointments   Date Time Provider Michelle Martinez   9/18/2020 11:15 AM Fransisco Ortiz MD [facility]   10/10/2020 10:40 AM [facility]   9/17/2021 10:20 AM DO [facility]        Subjective:     James Navarrete is a 41 y o  female who presents today for her physical         HPI:  Chief Complaint   Patient presents with   Chilo Fulton Annual Exam     Annual exam,     Hip Pain     Left hip pain, feels pressure when walking and bending       -- Above per clinical staff and reviewed  --      HPI      Today:      Physical    Trying to watch diet  No regular exercise  HIV and Hep C screening ordered today  Scott James yearly   Next appt 9/20   Mammo up to date        Overdue for Dentist        Sees Optho q2 years       Will receive Tdap today  The following portions of the patient's history were reviewed and updated as appropriate: allergies, current medications, past family history, past medical history, past social history, past surgical history and problem list       Review of Systems     See other note  Current Outpatient Medications   Medication Sig Dispense Refill    Biotin 1 MG CAPS Take 1 mg by mouth daily      Calcium Carbonate-Vit D-Min (CALCIUM 1200 PO) Take 1 tablet by mouth daily      cholecalciferol (VITAMIN D3) 1,000 units tablet Take 2,000 Units by mouth daily      multivitamin (THERAGRAN) TABS Take 1 tablet by mouth daily       No current facility-administered medications for this visit  Objective:  /66   Pulse 77   Temp 98 7 °F (37 1 °C)   Resp 16   Ht 5' 4" (1 626 m)   Wt 96 9 kg (213 lb 9 6 oz)   SpO2 97%   BMI 36 66 kg/m²    Wt Readings from Last 3 Encounters:   09/17/20 96 9 kg (213 lb 9 6 oz)   10/08/19 95 7 kg (211 lb)   09/27/19 95 9 kg (211 lb 6 4 oz)      BP Readings from Last 3 Encounters:   09/17/20 100/66   09/27/19 102/64   08/16/19 136/84          Physical Exam     Vitals signs and nursing note reviewed  Constitutional:       Appearance: Normal appearance  She is well-developed  She is obese  HENT:      Head: Normocephalic and atraumatic        Right Ear: Tympanic membrane, ear canal and external ear normal       Left Ear: Tympanic membrane, ear canal and external ear normal       Nose: Nose normal       Right Sinus: No maxillary sinus tenderness or frontal sinus tenderness  Left Sinus: No maxillary sinus tenderness or frontal sinus tenderness  Mouth/Throat:      Mouth: Mucous membranes are moist       Pharynx: Oropharynx is clear  Uvula midline  Tonsils: No tonsillar exudate  Eyes:      Extraocular Movements: Extraocular movements intact  Conjunctiva/sclera: Conjunctivae normal       Pupils: Pupils are equal, round, and reactive to light  Neck:      Musculoskeletal: Neck supple  Cardiovascular:      Rate and Rhythm: Normal rate and regular rhythm  Pulses: Normal pulses  Heart sounds: Normal heart sounds  Pulmonary:      Effort: Pulmonary effort is normal       Breath sounds: Normal breath sounds  Abdominal:      General: Bowel sounds are normal  There is no distension  Palpations: Abdomen is soft  There is no mass  Tenderness: There is no abdominal tenderness  There is no guarding or rebound  Musculoskeletal:         General: Deformity (Scoliosis) present  No swelling or tenderness  Right lower leg: No edema  Left lower leg: No edema  Comments: B/L Hips Stable  L Hip c decreased AROM c flexion, internal and external rotation  Lumbar spine c full AROM in all planes  Lymphadenopathy:      Cervical: No cervical adenopathy  Skin:     Findings: No rash  Neurological:      General: No focal deficit present  Mental Status: She is alert and oriented to person, place, and time  Psychiatric:         Mood and Affect: Mood normal          Behavior: Behavior normal          Thought Content:  Thought content normal          Judgment: Judgment normal      Lab Results:      Lab Results   Component Value Date    WBC 6 41 09/17/2020    HGB 13 7 09/17/2020    HCT 41 8 09/17/2020     (H) 09/17/2020    TRIG 76 09/17/2020    HDL 52 09/17/2020    LDLDIRECT 104 (H) 09/17/2020    ALT 20 09/17/2020    AST 16 09/17/2020     06/24/2015    K 4 2 09/17/2020     09/17/2020    CREATININE 0 65 09/17/2020    BUN 14 09/17/2020    CO2 26 09/17/2020    INR 1 01 10/14/2018    GLUF 86 09/17/2020     No results found for: URICACID  Invalid input(s): BASENAME Vitamin D    No results found       POCT Labs

## 2020-09-17 NOTE — PATIENT INSTRUCTIONS
Wellness Visit for Adults   AMBULATORY CARE:   A wellness visit  is when you see your healthcare provider to get screened for health problems  You can also get advice on how to stay healthy  Write down your questions so you remember to ask them  Ask your healthcare provider how often you should have a wellness visit  What happens at a wellness visit:  Your healthcare provider will ask about your health, and your family history of health problems  This includes high blood pressure, heart disease, and cancer  He or she will ask if you have symptoms that concern you, if you smoke, and about your mood  You may also be asked about your intake of medicines, supplements, food, and alcohol  Any of the following may be done:  · Your weight  will be checked  Your height may also be checked so your body mass index (BMI) can be calculated  Your BMI shows if you are at a healthy weight  · Your blood pressure  and heart rate will be checked  Your temperature may also be checked  · Blood and urine tests  may be done  Blood tests may be done to check your cholesterol levels  Abnormal cholesterol levels increase your risk for heart disease and stroke  You may also need a blood or urine test to check for diabetes if you are at increased risk  Urine tests may be done to look for signs of an infection or kidney disease  · A physical exam  includes checking your heartbeat and lungs with a stethoscope  Your healthcare provider may also check your skin to look for sun damage  · Screening tests  may be recommended  A screening test is done to check for diseases that may not cause symptoms  The screening tests you may need depend on your age, gender, family history, and lifestyle habits  For example, colorectal screening may be recommended if you are 48years old or older  Screening tests you need if you are a woman:   · A Pap smear  is used to screen for cervical cancer   Pap smears are usually done every 3 to 5 years depending on your age  You may need them more often if you have had abnormal Pap smear test results in the past  Ask your healthcare provider how often you should have a Pap smear  · A mammogram  is an x-ray of your breasts to screen for breast cancer  Experts recommend mammograms every 2 years starting at age 48 years  You may need a mammogram at age 52 years or younger if you have an increased risk for breast cancer  Talk to your healthcare provider about when you should start having mammograms and how often you need them  Vaccines you may need:   · Get an influenza vaccine  every year  The influenza vaccine protects you from the flu  Several types of viruses cause the flu  The viruses change over time, so new vaccines are made each year  · Get a tetanus-diphtheria (Td) booster vaccine  every 10 years  This vaccine protects you against tetanus and diphtheria  Tetanus is a severe infection that may cause painful muscle spasms and lockjaw  Diphtheria is a severe bacterial infection that causes a thick covering in the back of your mouth and throat  · Get a human papillomavirus (HPV) vaccine  if you are female and aged 23 to 32 or male 23 to 24 and never received it  This vaccine protects you from HPV infection  HPV is the most common infection spread by sexual contact  HPV may also cause vaginal, penile, and anal cancers  · Get a pneumococcal vaccine  if you are aged 72 years or older  The pneumococcal vaccine is an injection given to protect you from pneumococcal disease  Pneumococcal disease is an infection caused by pneumococcal bacteria  The infection may cause pneumonia, meningitis, or an ear infection  · Get a shingles vaccine  if you are aged 61 or older, even if you have had shingles before  The shingles vaccine is an injection to protect you from the varicella-zoster virus  This is the same virus that causes chickenpox   Shingles is a painful rash that develops in people who had chickenpox or have been exposed to the virus  How to eat healthy:  My Plate is a model for planning healthy meals  It shows the types and amounts of foods that should go on your plate  Fruits and vegetables make up about half of your plate, and grains and protein make up the other half  A serving of dairy is included on the side of your plate  The amount of calories and serving sizes you need depends on your age, gender, weight, and height  Examples of healthy foods are listed below:  · Eat a variety of vegetables  such as dark green, red, and orange vegetables  You can also include canned vegetables low in sodium (salt) and frozen vegetables without added butter or sauces  · Eat a variety of fresh fruits , canned fruit in 100% juice, frozen fruit, and dried fruit  · Include whole grains  At least half of the grains you eat should be whole grains  Examples include whole-wheat bread, wheat pasta, brown rice, and whole-grain cereals such as oatmeal     · Eat a variety of protein foods such as seafood (fish and shellfish), lean meat, and poultry without skin (turkey and chicken)  Examples of lean meats include pork leg, shoulder, or tenderloin, and beef round, sirloin, tenderloin, and extra lean ground beef  Other protein foods include eggs and egg substitutes, beans, peas, soy products, nuts, and seeds  · Choose low-fat dairy products such as skim or 1% milk or low-fat yogurt, cheese, and cottage cheese  · Limit unhealthy fats  such as butter, hard margarine, and shortening  Exercise:  Exercise at least 30 minutes per day on most days of the week  Some examples of exercise include walking, biking, dancing, and swimming  You can also fit in more physical activity by taking the stairs instead of the elevator or parking farther away from stores  Include muscle strengthening activities 2 days each week  Regular exercise provides many health benefits   It helps you manage your weight, and decreases your risk for type 2 diabetes, heart disease, stroke, and high blood pressure  Exercise can also help improve your mood  Ask your healthcare provider about the best exercise plan for you  General health and safety guidelines:   · Do not smoke  Nicotine and other chemicals in cigarettes and cigars can cause lung damage  Ask your healthcare provider for information if you currently smoke and need help to quit  E-cigarettes or smokeless tobacco still contain nicotine  Talk to your healthcare provider before you use these products  · Limit alcohol  A drink of alcohol is 12 ounces of beer, 5 ounces of wine, or 1½ ounces of liquor  · Lose weight, if needed  Being overweight increases your risk of certain health conditions  These include heart disease, high blood pressure, type 2 diabetes, and certain types of cancer  · Protect your skin  Do not sunbathe or use tanning beds  Use sunscreen with a SPF 15 or higher  Apply sunscreen at least 15 minutes before you go outside  Reapply sunscreen every 2 hours  Wear protective clothing, hats, and sunglasses when you are outside  · Drive safely  Always wear your seatbelt  Make sure everyone in your car wears a seatbelt  A seatbelt can save your life if you are in an accident  Do not use your cell phone when you are driving  This could distract you and cause an accident  Pull over if you need to make a call or send a text message  · Practice safe sex  Use latex condoms if are sexually active and have more than one partner  Your healthcare provider may recommend screening tests for sexually transmitted infections (STIs)  · Wear helmets, lifejackets, and protective gear  Always wear a helmet when you ride a bike or motorcycle, go skiing, or play sports that could cause a head injury  Wear protective equipment when you play sports  Wear a lifejacket when you are on a boat or doing water sports    © 2017 [address] Information is for End User's use only and may not be sold, redistributed or otherwise used for commercial purposes  All illustrations and images included in CareNotes® are the copyrighted property of [employer]  or Mike Winchester  The above information is an  only  It is not intended as medical advice for individual conditions or treatments  Talk to your doctor, nurse or pharmacist before following any medical regimen to see if it is safe and effective for you  Obesity   AMBULATORY CARE:   Obesity  is when your body mass index (BMI) is greater than 30  Your healthcare provider will use your height and weight to measure your BMI  The risks of obesity include  many health problems, such as injuries or physical disability  You may need tests to check for the following:  · Diabetes     · High blood pressure or high cholesterol     · Heart disease     · Gallbladder or liver disease     · Cancer of the colon, breast, prostate, liver, or kidney     · Sleep apnea     · Arthritis or gout  Seek care immediately if:   · You have a severe headache, confusion, or difficulty speaking  · You have weakness on one side of your body  · You have chest pain, sweating, or shortness of breath  Contact your healthcare provider if:   · You have symptoms of gallbladder or liver disease, such as pain in your upper abdomen  · You have knee or hip pain and discomfort while walking  · You have symptoms of diabetes, such as intense hunger and thirst, and frequent urination  · You have symptoms of sleep apnea, such as snoring or daytime sleepiness  · You have questions or concerns about your condition or care  Treatment for obesity  focuses on helping you lose weight to improve your health  Even a small decrease in BMI can reduce the risk for many health problems  Your healthcare provider will help you set a weight-loss goal   · Lifestyle changes  are the first step in treating obesity   These include making healthy food choices and getting regular physical activity  Your healthcare provider may suggest a weight-loss program that involves coaching, education, and therapy  · Medicine  may help you lose weight when it is used with a healthy diet and physical activity  · Surgery  can help you lose weight if you are very obese and have other health problems  There are several types of weight-loss surgery  Ask your healthcare provider for more information  Be successful losing weight:   · Set small, realistic goals  An example of a small goal is to walk for 20 minutes 5 days a week  Anther goal is to lose 5% of your body weight  · Tell friends, family members, and coworkers about your goals  and ask for their support  Ask a friend to lose weight with you, or join a weight-loss support group  · Identify foods or triggers that may cause you to overeat , and find ways to avoid them  Remove tempting high-calorie foods from your home and workplace  Place a bowl of fresh fruit on your kitchen counter  If stress causes you to eat, then find other ways to cope with stress  · Keep a diary to track what you eat and drink  Also write down how many minutes of physical activity you do each day  Weigh yourself once a week and record it in your diary  Eating changes: You will need to eat 500 to 1,000 fewer calories each day than you currently eat to lose 1 to 2 pounds a week  The following changes will help you cut calories:  · Eat smaller portions  Use small plates, no larger than 9 inches in diameter  Fill your plate half full of fruits and vegetables  Measure your food using measuring cups until you know what a serving size looks like  · Eat 3 meals and 1 or 2 snacks each day  Plan your meals in advance  Brad Blake and eat at home most of the time  Eat slowly  · Eat fruits and vegetables at every meal   They are low in calories and high in fiber, which makes you feel full   Do not add butter, margarine, or cream sauce to vegetables  Use herbs to season steamed vegetables  · Eat less fat and fewer fried foods  Eat more baked or grilled chicken and fish  These protein sources are lower in calories and fat than red meat  Limit fast food  Dress your salads with olive oil and vinegar instead of bottled dressing  · Limit the amount of sugar you eat  Do not drink sugary beverages  Limit alcohol  Activity changes:  Physical activity is good for your body in many ways  It helps you burn calories and build strong muscles  It decreases stress and depression, and improves your mood  It can also help you sleep better  Talk to your healthcare provider before you begin an exercise program   · Exercise for at least 30 minutes 5 days a week  Start slowly  Set aside time each day for physical activity that you enjoy and that is convenient for you  It is best to do both weight training and an activity that increases your heart rate, such as walking, bicycling, or swimming  · Find ways to be more active  Do yard work and housecleaning  Walk up the stairs instead of using elevators  Spend your leisure time going to events that require walking, such as outdoor festivals or fairs  This extra physical activity can help you lose weight and keep it off  Follow up with your healthcare provider as directed: You may need to meet with a dietitian  Write down your questions so you remember to ask them during your visits  © 2017 [address] Information is for End User's use only and may not be sold, redistributed or otherwise used for commercial purposes  All illustrations and images included in CareNotes® are the copyrighted property of [employer]  or [employer] Winche[employer]  The above information is an  only  It is not intended as medical advice for individual conditions or treatments   Talk to your doctor, nurse or pharmacist before following any medical regimen to see if it is safe and effective for you     Cholesterol and Your Health   AMBULATORY CARE:   Cholesterol  is a waxy, fat-like substance  Cholesterol is made by your body, but also comes from certain foods you eat  Your body uses cholesterol to make hormones and new cells  Your body also uses cholesterol to protect nerves  Cholesterol comes from foods such as meat and dairy products  Your total cholesterol level is made up by LDL cholesterol, HDL cholesterol, and triglycerides:  · LDL cholesterol  is called bad cholesterol  because it forms plaque in your arteries  As plaque builds up, your arteries become narrow, and less blood flows through  When plaque decreases blood flow to your heart, you may have chest pain  If plaque completely blocks an artery that bring blood to your heart, you may have a heart attack  Plaque can break off and form blood clots  Blood clots may block arteries in your brain and cause a stroke  · HDL cholesterol  is called good cholesterol  because it helps remove LDL cholesterol from your arteries  It does this by attaching to LDL cholesterol and carrying it to your liver  Your liver breaks down LDL cholesterol so your body can get rid of it  High levels of HDL cholesterol can help prevent a heart attack and stroke  Low levels of HDL cholesterol can increase your risk for heart disease, heart attack, and stroke  · Triglycerides  are a type of fat that store energy from foods you eat  High levels of triglycerides also cause plaque buildup  This can increase your risk for a heart attack or stroke  If your triglyceride level is high, your LDL cholesterol level may also be high  How food affects your cholesterol levels:   · Unhealthy fats  increase LDL cholesterol and triglyceride levels in your blood  They are found in foods high in cholesterol, saturated fat, and trans fat:     ¨ Cholesterol  is found in eggs, dairy, and meat  ¨ Saturated fat  is found in butter, cheese, ice cream, whole milk, and coconut oil  Saturated fat is also found in meat, such as sausage, hot dogs, and bologna  ¨ Trans fat  is found in liquid oils and is used in fried and baked foods  Foods that contain trans fats include chips, crackers, muffins, sweet rolls, microwave popcorn, and cookies  · Healthy fats,  also called unsaturated fats, help lower LDL cholesterol and triglyceride levels  Healthy fats include the following:     ¨ Monounsaturated fats  are found in foods such as olive oil, canola oil, avocado, nuts, and olives  ¨ Polyunsaturated fats,  such as omega 3 fats, are found in fish, such as salmon, trout, and tuna  They can also be found in plant foods such as flaxseed, walnuts, and soybeans  Other things that affect your cholesterol levels:   · Smoking cigarettes    · Being overweight or obese     · Drinking large amounts of alcohol    · Not enough exercise or no exercise    · Certain genes passed from your parents to you  What you need to know about having your cholesterol levels checked: Adults 21to 39years of age should have their cholesterol levels checked every 4 to 6 years  Adults 45 years and older should have their cholesterol checked every 1 to 2 years  You may need your cholesterol checked more often, or at a younger age, if you have risk factors for heart disease  You may also need to have your cholesterol checked more often if you have other health conditions, such as diabetes  Blood tests are used to check cholesterol levels  Blood tests measure your levels of triglycerides, LDL cholesterol, and HDL cholesterol  Cholesterol level goals: Your cholesterol level goal may depend on your risk for heart disease  It may also depend on your age and other health conditions  Ask your healthcare provider if the following goals are right for you:  · Your total cholesterol level  should be less than 200 mg/dL  This number may also depend on your HDL and LDL cholesterol goals       · Your LDL cholesterol level  should be less than 130 mg/dL  · Your HDL cholesterol level  should be 60 mg/dL or higher  · Your triglyceride level  should be less than 150 mg/dL  Treatment for high cholesterol:  Treatment for high cholesterol will also decrease your risk of heart disease, heart attack, and stroke  Treatment may include any of the following:  · Medicines  may be given to lower your LDL cholesterol, triglyceride levels, or total cholesterol level  You may need medicines to lower your cholesterol if any of the following is true:     ¨ You have a history of stroke, TIA, unstable angina, or a heart attack    ¨ Your LDL cholesterol level is 190 mg/dL or higher    ¨ You are age 36to 76years of age, have diabetes, and your LDL cholesterol is 70 mg/dL or higher    ¨ You are age 36to 76years of age, have risk factors for heart disease, and your LDL cholesterol is 70 mg/dL or higher    · Lifestyle changes  include changes to your diet, exercise, weight loss, and quitting smoking  It also includes decreasing the amount of alcohol you drink  · Supplements  include fish oil, red yeast rice, and garlic  Fish oil may help lower your triglyceride and LDL cholesterol levels  It may also increase your HDL cholesterol level  Red yeast rice may help decrease your total cholesterol level and LDL cholesterol level  Garlic may help lower your total cholesterol level  Do not take these supplements without talking to your healthcare provider  Nutrition to help lower your cholesterol levels:  A registered dietitian can help you create a healthy eating plan  Read food labels and choose foods low in saturated fat, trans fats, and cholesterol  · Decrease the total amount of fat you eat  Choose lean meats, fat-free or 1% fat milk, and low-fat dairy products, such as yogurt and cheese  Try to limit or avoid red meats  Limit or do not eat fried foods or baked goods such as cookies  · Replace unhealthy fats with healthy fats    Cook foods in olive oil or canola oil  Choose soft margarines that are low in saturated fat and trans fat  Seeds, nuts, and avocados are other examples of healthy fats  · Eat foods with omega-3 fats  Examples include salmon, tuna, mackerel, walnuts, and flaxseed  Eat fish 2 times per week  Children and pregnant women should not eat fish that have high levels of mercury, such as shark, swordfish, and fabiano mackerel  · Increase the amount of plant-based foods you eat  Plant-based foods are low in cholesterol and fat  Eating more of these foods may help lower your cholesterol and help you lose weight  Examples of plant-based foods includes fruits, vegetables, legumes, and whole grains  Replace milk that contains dairy with almond, soy, or coconut milk  Eat beans and foods with soy for protein instead of meat  Ask your healthcare provider or dietitian for more information on plant-based foods  · Increase the amount of fiber you eat  High-fiber foods can help lower your LDL cholesterol  You should eat between 20 and 30 grams of fiber each day  Eat at least 5 servings of fruits and vegetables each day  Other examples of high-fiber foods include whole-grain or whole-wheat breads, pastas, or cereals, and brown rice  Eat 3 ounces of whole-grain foods each day  Increase fiber slowly  You may have abdominal discomfort, bloating, and gas if you add fiber to your diet too quickly  Lifestyle changes you can make to help lower your cholesterol levels:   · Maintain a healthy weight  Ask your healthcare provider how much you should weigh  Ask him or her to help you create a weight loss plan if you are overweight  Weight loss can decrease your total cholesterol and triglyceride levels  · Exercise regularly  Exercise can help lower your total cholesterol level and maintain a healthy weight  Exercise can also help increase your HDL cholesterol level   Work with your healthcare provider to create an exercise program that is right for you  Get at least 30 minutes of moderate exercise most days of the week  Examples of exercise include brisk walking, swimming, or biking  · Do not smoke  Nicotine and other chemicals in cigarettes and cigars can damage your lungs, heart, and blood vessels  They can also raise your triglyceride levels  Ask your healthcare provider for information if you currently smoke and need help to quit  E-cigarettes or smokeless tobacco still contain nicotine  Talk to your healthcare provider before you use these products  · Limit or do not drink alcohol  Alcohol can increase your triglyceride levels  Ask your healthcare provider if it is safe for you to drink alcohol  Also ask how much is safe for you to drink each day  © 2017 [facility] [facility] Information is for End User's use only and may not be sold, redistributed or otherwise used for commercial purposes  All illustrations and images included in CareNotes® are the copyrighted property of [employer]  or [employer]inche[employer]  The above information is an  only  It is not intended as medical advice for individual conditions or treatments  Talk to your doctor, nurse or pharmacist before following any medical regimen to see if it is safe and effective for you  Please contact your insurance if you are uncertain of coverage for plan of care items  Your insurance may not cover the cost of your Vitamin D blood test, which is approximately $65-70    Please notify the lab prior to blood draw if you would like to decline this test

## 2020-09-17 NOTE — RESULT ENCOUNTER NOTE
Hyperlipidemia - Advise low fat, low cholesterol diet, exercise, and weight loss  See cholesterol handout given at appointment today  All other resulted labs stable        Message sent to patient via [employer] patient portal

## 2020-09-18 ENCOUNTER — ANNUAL EXAM (OUTPATIENT)
Dept: OBGYN CLINIC | Facility: CLINIC | Age: 45
End: 2020-09-18
Payer: COMMERCIAL

## 2020-09-18 VITALS
BODY MASS INDEX: 36.47 KG/M2 | HEIGHT: 64 IN | WEIGHT: 213.6 LBS | TEMPERATURE: 98.2 F | SYSTOLIC BLOOD PRESSURE: 114 MMHG | DIASTOLIC BLOOD PRESSURE: 70 MMHG

## 2020-09-18 DIAGNOSIS — Z12.31 VISIT FOR SCREENING MAMMOGRAM: ICD-10-CM

## 2020-09-18 DIAGNOSIS — Z01.419 WOMEN'S ANNUAL ROUTINE GYNECOLOGICAL EXAMINATION: Primary | ICD-10-CM

## 2020-09-18 DIAGNOSIS — L29.9 ITCHING: ICD-10-CM

## 2020-09-18 LAB — HIV 1+2 AB+HIV1 P24 AG SERPL QL IA: NORMAL

## 2020-09-18 PROCEDURE — S0612 ANNUAL GYNECOLOGICAL EXAMINA: HCPCS | Performed by: OBSTETRICS & GYNECOLOGY

## 2020-09-18 NOTE — RESULT ENCOUNTER NOTE
Negative Hepatitis C screening        Message sent to patient via [employer] patient portal

## 2020-09-20 NOTE — PROGRESS NOTES
ASSESSMENT & PLAN: Eriberto Arrington is a 41 y o   with normal gynecologic exam     1   Routine well woman exam done today  2    Pap and HPV:Pap with HPV was not done today  Current ASCCP Guidelines reviewed  Last Pap  2019 :  no abnormalities  3  Mammogram ordered  Recommend yearly mammography  4   The patient declined STD testing  Safe sex practices have been discussed  5  The patient is not sexually active  She declined contraception and options have been discussed  6  The following were reviewed in today's visit: breast self exam, STD testing, family planning choices, adequate intake of calcium and vitamin D, exercise and healthy diet  She reports abdominal itching after having her flu vaccine  Request a referral to Dermatology  7  Patient to return to office in 12 months for annual      All questions have been answered to her satisfaction  CC:  Annual Gynecologic Examination    HPI: Eriberto Arrington is a 41 y o  [place] who presents for annual gynecologic examination  She has the following concerns:  Abdominal itching after having her flu vaccine    Health Maintenance:    She exercises 0 days per week  She wears her seatbelt routinely  She does perform irregular monthly self breast exams  She feels safe at home  Patients does follow a regular diet        Past Medical History:   Diagnosis Date    Abnormal Pap smear of cervix     HPV (human papilloma virus) infection     Hyperlipidemia     Kidney stone 2015    Kidney stones, calcium oxalate 2015    Obesity     Pneumonia     Resolved 2017     Scoliosis     Tremor     Left Hand Resting Tremor    Varicella     Visual impairment     Wear glasses       Past Surgical History:   Procedure Laterality Date     SECTION       x 1 ; Failure to progress    ME LAP,APPENDECTOMY N/A 10/14/2018    Procedure: APPENDECTOMY LAPAROSCOPIC;  Surgeon: Tony Sidhu MD;  [facility];  Service: General    WISDOM TOOTH EXTRACTION         Past OB/Gyn History:  Period Cycle (Days): 28  Period Duration (Days): 5  Period Pattern: Regular  Menstrual Flow: Moderate  Menstrual Control: Thin pad  Dysmenorrhea: (!) Mild  Dysmenorrhea Symptoms: CrampingPatient's last menstrual period was 2020 (exact date)  Menstrual History:  OB History        1    Para   1    Term   1       0    AB   0    Living   1       SAB   0    TAB   0    Ectopic   0    Multiple   0    Live Births   1                  History of sexually transmitted infection No  Patient is not currently sexually active  heterosexual Birth control: none  Last Pap  2019 :  no abnormalities      Family History   Problem Relation Age of Onset    Hypertension Father     Alcohol abuse Father     Kidney disease Father         CKD on ESRD    Diabetes type II Father 48    No Known Problems Mother     Scoliosis Brother     Hip dysplasia Daughter     No Known Problems Maternal Grandmother     No Known Problems Maternal Grandfather     No Known Problems Paternal Grandmother     No Known Problems Paternal Grandfather     No Known Problems Maternal Aunt     No Known Problems Maternal Aunt     No Known Problems Paternal Aunt     No Known Problems Paternal Aunt     No Known Problems Paternal Aunt     No Known Problems Paternal Aunt        Social History:  Social History     Socioeconomic History    Marital status: /Civil Union     Spouse name: Not on file    Number of children: 1    Years of education: Not on file    Highest education level: Not on file   Occupational History    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 on file    Food insecurity     Worry: Not on file     Inability: Not on file   [employer] needs     Medical: Not on file     Non-medical: Not on file   Tobacco Use    Smoking status: Never Smoker    Smokeless tobacco: Never Used   Substance and Sexual Activity    Alcohol use: Yes     Alcohol/week: 1 0 standard drinks     Types: 1 Glasses of wine per week     Frequency: Monthly or less     Drinks per session: 1 or 2     Binge frequency: Never     Comment: rare    Drug use: No    Sexual activity: Not Currently     Birth control/protection: None   Lifestyle    Physical activity     Days per week: Not on file     Minutes per session: Not on file    Stress: Not on file   Relationships    Social connections     Talks on phone: Not on file     Gets together: Not on file     Attends Jew service: Not on file     Active member of club or organization: Not on file     Attends meetings of clubs or organizations: Not on file     Relationship status: Not on file    Intimate partner violence     Fear of current or ex partner: Not on file     Emotionally abused: Not on file     Physically abused: Not on file     Forced sexual activity: Not on file   Other Topics Concern    Not on file   Social History Narrative        Lives with , Daughter    1 Child - 1 Daughter    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 [employer]     Presently lives with her family  Patient is   Patient is currently employed   No Known Allergies    Current Outpatient Medications:     Biotin 1 MG CAPS, Take 1 mg by mouth daily, Disp: , Rfl:     Calcium Carbonate-Vit D-Min (CALCIUM 1200 PO), Take 1 tablet by mouth daily, Disp: , Rfl:     cholecalciferol (VITAMIN D3) 1,000 units tablet, Take 2,000 Units by mouth daily, Disp: , Rfl:     multivitamin (THERAGRAN) TABS, Take 1 tablet by mouth daily, Disp: , Rfl:     Review of Systems:  Review of Systems   Constitutional: Negative  HENT: Negative  Respiratory: Negative  Cardiovascular: Negative  Gastrointestinal: Negative  Genitourinary: Negative  Neurological: Negative          Physical Exam:  /70   Temp 98 2 °F (36 8 °C)   Ht 5' 3 5" (1 613 m)   Wt 96 9 kg (213 lb 9 6 oz)   LMP 08/24/2020 (Exact Date)   BMI 37 24 kg/m²    Physical Exam  Constitutional:       Appearance: Normal appearance  Genitourinary:      Vulva, urethra, bladder, vagina, cervix, uterus, right adnexa and left adnexa normal       Vaginal rugosity present  No vaginal discharge, tenderness or bleeding  Cervix is parous  Cervical pinkness present  Uterus is mobile  Uterus is not enlarged or tender  HENT:      Head: Normocephalic and atraumatic  Eyes:      Extraocular Movements: Extraocular movements intact  Conjunctiva/sclera: Conjunctivae normal       Pupils: Pupils are equal, round, and reactive to light  Cardiovascular:      Rate and Rhythm: Normal rate and regular rhythm  Heart sounds: Normal heart sounds  No murmur  Pulmonary:      Effort: Pulmonary effort is normal  No respiratory distress  Breath sounds: Normal breath sounds  No wheezing  Chest:      Breasts:         Right: Normal          Left: Normal    Abdominal:      General: There is no distension  Palpations: Abdomen is soft  Tenderness: There is no abdominal tenderness  There is no guarding  Neurological:      General: No focal deficit present  Mental Status: She is alert and oriented to person, place, and time  Skin:     General: Skin is warm and dry  Vitals signs and nursing note reviewed

## 2020-09-21 NOTE — RESULT ENCOUNTER NOTE
Xray of left hip and pelvis shows no new bony abnormality, but moderate to severe arthritic changes in the left hip joint  Ok to start physical therapy as referred, but nurse to contact patient with new order for Ortho consult as well  Message sent to patient via [employer] patient portal   Nurse to also call patient

## 2020-09-28 ENCOUNTER — OFFICE VISIT (OUTPATIENT)
Dept: OBGYN CLINIC | Facility: CLINIC | Age: 45
End: 2020-09-28
Payer: COMMERCIAL

## 2020-09-28 VITALS
DIASTOLIC BLOOD PRESSURE: 78 MMHG | HEART RATE: 69 BPM | WEIGHT: 214 LBS | BODY MASS INDEX: 36.54 KG/M2 | SYSTOLIC BLOOD PRESSURE: 121 MMHG | HEIGHT: 64 IN | TEMPERATURE: 97.9 F

## 2020-09-28 DIAGNOSIS — M25.552 LEFT HIP PAIN: ICD-10-CM

## 2020-09-28 DIAGNOSIS — Z13.828 SCOLIOSIS CONCERN: ICD-10-CM

## 2020-09-28 DIAGNOSIS — M16.32 OSTEOARTHRITIS RESULTING FROM LEFT HIP DYSPLASIA: Primary | ICD-10-CM

## 2020-09-28 PROCEDURE — 1036F TOBACCO NON-USER: CPT | Performed by: ORTHOPAEDIC SURGERY

## 2020-09-28 PROCEDURE — 99244 OFF/OP CNSLTJ NEW/EST MOD 40: CPT | Performed by: ORTHOPAEDIC SURGERY

## 2020-09-28 NOTE — PROGRESS NOTES
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       Discussion/Summary:    41 y o  female left hip pain and osteoarthritis from likely developmental hip dysplasia with acetabular dysplasia, left worse than right  It is also possible she had spondyloepiphyseal dysplasia given her lumbar scoliosis  Conservative treatment is recommended initially, FL inj of cortisone will be ordered for diagnostic and therapeutic reasons  PT referral will be given  Discussed that due to her age it is advised that she should try to exhaust conservative measures prior to total hip replacement  She may take over the counter NSAIDs as needed, if these are ineffective can consider prescription strength NSAID  Might consider formal leg length evaluation with CT scanogram in the future  If conservative measures fail will discus total hip arthroplasty   Follow up PRN    The patient was seen and examined by Dr Gabrielle Pelletier and myself  The assessment and plan were formulated by Dr Gabrielle Pelletier and I assisted in carrying it out  Subjective:   Patient ID: Alan Benavidez is a 41 y o  female   HPI    Patient presents to the office complaining of left hip pain, she has a history of hip dysplasia  Patient is referred to us by Yuliana Perez DO for consultation for these symptoms  Symptoms began starting earlier this year, no injury or trauma  Pain is located groin and lateral  Pain is described as sharp, intermittent  The pain does not radiate   The pain is 3-4/10 at worst, 0/10 at best  It is made worse by walking long distances  It is made better by rest  So far the patient has tried no medications, PT, or formal treatment  The patient has had past episodes similar to this  The patient denies any numbness or tingling  The following portions of the patient's history were reviewed and updated as appropriate: allergies, current medications, past family history, past social history, past surgical history and problem list     Social History     Socioeconomic History    Marital status: /Civil Union     Spouse name: Not on file    Number of children: 1    Years of education: Not on file    Highest education level: Not on file   Occupational History    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[address] 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 on file    Food insecurity     Worry: Not on file     Inability: Not on file   [employer] needs     Medical: Not on file     Non-medical: Not on file   Tobacco Use    Smoking status: Never Smoker    Smokeless tobacco: Never Used   Substance and Sexual Activity    Alcohol use:  Yes     Alcohol/week: 1 0 standard drinks     Types: 1 Glasses of wine per week     Frequency: Monthly or less     Drinks per session: 1 or 2     Binge frequency: Never     Comment: rare    Drug use: No    Sexual activity: Not Currently     Birth control/protection: None   Lifestyle    Physical activity     Days per week: Not on file     Minutes per session: Not on file    Stress: Not on file   Relationships    Social connections     Talks on phone: Not on file     Gets together: Not on file     Attends Oriental orthodox service: Not on file     Active member of club or organization: Not on file     Attends meetings of clubs or organizations: Not on file     Relationship status: Not on file    Intimate partner violence     Fear of current or ex partner: Not on file     Emotionally abused: Not on file Physically abused: Not on file     Forced sexual activity: Not on file   Other Topics Concern    Not on file   Social History Narrative        Lives with , Daughter    1 Child - 1 Daughter    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 [employer]     Past Medical History:   Diagnosis Date    Abnormal Pap smear of cervix     HPV (human papilloma virus) infection     Hyperlipidemia     Kidney stone 2015    Kidney stones, calcium oxalate 2015    Obesity     Pneumonia     Resolved 2017     Scoliosis     Tremor     Left Hand Resting Tremor    Varicella     Visual impairment     Wear glasses     Past Surgical History:   Procedure Laterality Date     SECTION       x 1 ; Failure to progress    NV LAP,APPENDECTOMY N/A 10/14/2018    Procedure: APPENDECTOMY LAPAROSCOPIC;  Surgeon: Miriam Bermeo MD;  [facility];  Service: General    WISDOM TOOTH EXTRACTION       No Known Allergies  Current Outpatient Medications on File Prior to Visit   Medication Sig Dispense Refill    Biotin 1 MG CAPS Take 1 mg by mouth daily      Calcium Carbonate-Vit D-Min (CALCIUM 1200 PO) Take 1 tablet by mouth daily      cholecalciferol (VITAMIN D3) 1,000 units tablet Take 2,000 Units by mouth daily      multivitamin (THERAGRAN) TABS Take 1 tablet by mouth daily       No current facility-administered medications on file prior to visit  Review of Systems   Constitutional: Negative for chills, fever and unexpected weight change  HENT: Negative for hearing loss, nosebleeds and sore throat  Eyes: Negative for pain, redness and visual disturbance  Respiratory: Negative for cough, shortness of breath and wheezing  Cardiovascular: Negative for chest pain, palpitations and leg swelling  Gastrointestinal: Negative for abdominal pain, nausea and vomiting  Endocrine: Negative for polydipsia and polyuria  Genitourinary: Negative for dysuria and hematuria  Musculoskeletal:         As noted in HPI   Skin: Negative for rash and wound  Neurological: Negative for dizziness, numbness and headaches  Psychiatric/Behavioral: Negative for decreased concentration, dysphoric mood and suicidal ideas  The patient is not nervous/anxious  Objective:    Vitals:    09/28/20 1526   BP: 121/78   Pulse: 69   Temp: 97 9 °F (36 6 °C)       Physical Exam  Constitutional:       Appearance: She is well-developed  HENT:      Head: Normocephalic and atraumatic  Eyes:      General: No scleral icterus  Conjunctiva/sclera: Conjunctivae normal    Neck:      Musculoskeletal: Neck supple  Cardiovascular:      Comments: No discernible arrhthymias  Pulmonary:      Effort: Pulmonary effort is normal  No respiratory distress  Breath sounds: No stridor  Abdominal:      General: There is no distension  Palpations: Abdomen is soft  Skin:     General: Skin is warm and dry  Findings: No erythema  Neurological:      Mental Status: She is alert and oriented to person, place, and time  Psychiatric:         Behavior: Behavior normal          Left Hip Exam     Tenderness   The patient is experiencing tenderness in the greater trochanter  Range of Motion   Abduction: abnormal   Adduction: abnormal   Flexion: normal   External rotation: normal   Internal rotation: abnormal     Muscle Strength   The patient has normal left hip strength  Tests   ZENY: positive  Je: negative    Other   Erythema: absent  Scars: absent  Sensation: normal  Pulse: present    Comments:  Pain with passive supine internal and external rotation  SLR with no pain            I have personally reviewed pertinent films in PACS and my interpretation is XR of the left hip from 9/17/20 shows moderate to severe left hip osteoarthritis and acetabular dysplasia bilaterally, left worse than right  Left femoral head is less than 50% covered by acetabular dome  Osteophyte formation is seen  Scoliosis of the lumbar spine is partially visualized  Procedures  No Procedures performed today    Portions of the record may have been created with voice recognition software  Occasional wrong word or "sound a like" substitutions may have occurred due to the inherent limitations of voice recognition software  Read the chart carefully and recognize, using context, where substitutions have occurred

## 2020-10-10 ENCOUNTER — HOSPITAL ENCOUNTER (OUTPATIENT)
Dept: RADIOLOGY | Age: 45
Discharge: HOME | End: 2020-10-10
Payer: COMMERCIAL

## 2020-10-10 VITALS — BODY MASS INDEX: 37.56 KG/M2 | WEIGHT: 212 LBS | HEIGHT: 63 IN

## 2020-10-10 DIAGNOSIS — Z12.31 VISIT FOR SCREENING MAMMOGRAM: ICD-10-CM

## 2020-10-10 PROCEDURE — 77067 SCR MAMMO BI INCL CAD: CPT

## 2020-10-10 PROCEDURE — 77063 BREAST TOMOSYNTHESIS BI: CPT

## 2020-10-12 ENCOUNTER — HOSPITAL ENCOUNTER (OUTPATIENT)
Dept: RADIOLOGY | Facility: HOSPITAL | Age: 45
Discharge: HOME | End: 2020-10-12
Payer: COMMERCIAL

## 2020-10-12 DIAGNOSIS — M16.32 OSTEOARTHRITIS RESULTING FROM LEFT HIP DYSPLASIA: ICD-10-CM

## 2020-10-12 PROCEDURE — 77002 NEEDLE LOCALIZATION BY XRAY: CPT

## 2020-10-12 PROCEDURE — 20610 DRAIN/INJ JOINT/BURSA W/O US: CPT

## 2020-10-12 RX ORDER — METHYLPREDNISOLONE ACETATE 80 MG/ML
80 INJECTION, SUSPENSION INTRA-ARTICULAR; INTRALESIONAL; INTRAMUSCULAR; SOFT TISSUE
Status: COMPLETED | OUTPATIENT
Start: 2020-10-12 | End: 2020-10-12

## 2020-10-12 RX ORDER — BUPIVACAINE HYDROCHLORIDE 2.5 MG/ML
10 INJECTION, SOLUTION EPIDURAL; INFILTRATION; INTRACAUDAL
Status: COMPLETED | OUTPATIENT
Start: 2020-10-12 | End: 2020-10-12

## 2020-10-12 RX ORDER — LIDOCAINE HYDROCHLORIDE 10 MG/ML
10 INJECTION, SOLUTION EPIDURAL; INFILTRATION; INTRACAUDAL; PERINEURAL
Status: COMPLETED | OUTPATIENT
Start: 2020-10-12 | End: 2020-10-12

## 2020-10-12 RX ADMIN — IOHEXOL 2 ML: 300 INJECTION, SOLUTION INTRAVENOUS at 11:52

## 2020-10-12 RX ADMIN — METHYLPREDNISOLONE ACETATE 80 MG: 80 INJECTION, SUSPENSION INTRA-ARTICULAR; INTRALESIONAL; INTRAMUSCULAR; SOFT TISSUE at 11:51

## 2020-10-12 RX ADMIN — LIDOCAINE HYDROCHLORIDE 8 ML: 10 INJECTION, SOLUTION EPIDURAL; INFILTRATION; INTRACAUDAL at 11:49

## 2020-10-12 RX ADMIN — BUPIVACAINE HYDROCHLORIDE 2 ML: 2.5 INJECTION, SOLUTION EPIDURAL; INFILTRATION; INTRACAUDAL; PERINEURAL at 11:51

## 2020-11-02 ENCOUNTER — OFFICE VISIT (OUTPATIENT)
Dept: OBGYN CLINIC | Facility: CLINIC | Age: 45
End: 2020-11-02
Payer: COMMERCIAL

## 2020-11-02 VITALS
HEIGHT: 63 IN | TEMPERATURE: 98.8 F | DIASTOLIC BLOOD PRESSURE: 71 MMHG | BODY MASS INDEX: 38.45 KG/M2 | HEART RATE: 71 BPM | SYSTOLIC BLOOD PRESSURE: 120 MMHG | WEIGHT: 217 LBS

## 2020-11-02 DIAGNOSIS — M16.32 OSTEOARTHRITIS RESULTING FROM LEFT HIP DYSPLASIA: Primary | ICD-10-CM

## 2020-11-02 PROCEDURE — 3008F BODY MASS INDEX DOCD: CPT | Performed by: ORTHOPAEDIC SURGERY

## 2020-11-02 PROCEDURE — 99211 OFF/OP EST MAY X REQ PHY/QHP: CPT | Performed by: ORTHOPAEDIC SURGERY

## 2021-03-22 ENCOUNTER — PATIENT MESSAGE (OUTPATIENT)
Dept: FAMILY MEDICINE CLINIC | Facility: CLINIC | Age: 46
End: 2021-03-22

## 2021-03-23 NOTE — TELEPHONE ENCOUNTER
Patient is UTD on Tdap  Patient is not UTD on her Hep B and MMR (no records on file)  Patient will also need a NV for a 1 step PPD or blood work ordered for TB  Will route to provider as patient is not due for a office visit until 9/2021

## 2021-03-23 NOTE — TELEPHONE ENCOUNTER
From: Yuliya Kiran  To: Que Mathews DO  Sent: 3/22/2021 9:53 PM EDT  Subject: Non-Urgent Medical Question    I have a medical form from [facility] [place] that needs to be filled by you  Do you have any free time available? I would like to bring it in and show you  I'm working from 10:30am-2pm Tuesday through Friday  I can fill in the top portion anytime  Just need your help with the sections 2 and 3       Thank you,     Robyn Pearl

## 2021-04-13 ENCOUNTER — PATIENT MESSAGE (OUTPATIENT)
Dept: FAMILY MEDICINE CLINIC | Facility: CLINIC | Age: 46
End: 2021-04-13

## 2021-04-13 NOTE — TELEPHONE ENCOUNTER
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 Wasn't sure if should hit the education and  tab or the  tab  So I deleted it for now  I definitely want to be vaccinated  My  is already vaccinated  Got his done through the [facility] (he's a )       Thanks,   Joss Stone

## 2021-04-29 ENCOUNTER — IMMUNIZATIONS (OUTPATIENT)
Dept: FAMILY MEDICINE CLINIC | Facility: HOSPITAL | Age: 46
End: 2021-04-29

## 2021-04-29 DIAGNOSIS — Z23 ENCOUNTER FOR IMMUNIZATION: Primary | ICD-10-CM

## 2021-04-29 PROCEDURE — 0001A SARS-COV-2 / COVID-19 MRNA VACCINE (PFIZER-BIONTECH) 30 MCG: CPT

## 2021-04-29 PROCEDURE — 91300 SARS-COV-2 / COVID-19 MRNA VACCINE (PFIZER-BIONTECH) 30 MCG: CPT

## 2021-05-14 NOTE — PROGRESS NOTES
COVID-19 Outpatient Progress Note    Assessment/Plan:    Problem List Items Addressed This Visit     None      Visit Diagnoses     Viral infection, unspecified    -  Primary    Relevant Orders    Novel Coronavirus (Covid-19),PCR [facility] - Collected at Mobile Vans or Care Now         Disposition:     I referred patient to one of our centralized sites for a COVID-19 swab  Pending labs  Patient should remain in quarantine until further notice  If her COVID-19 test is negative, she can discontinue quarantine  If her COVID-19 test is positive, she should complete 10 days of isolation through Tuesday, 5/18/21, at the earliest ending on Wednesday, 5/19/21  I have spent 17 minutes directly with the patient  Greater than 50% of this time was spent in counseling/coordination of care regarding: diagnostic results, prognosis, instructions for management, patient and family education, risk factor reductions and impressions  Encounter provider Juana Moe DO    Provider located at [address] [place] [address],[address]  [address]  [facility] [address] [phone]    Recent Visits  No visits were found meeting these conditions  Showing recent visits within past 7 days and meeting all other requirements     Today's Visits  Date Type Provider Dept   05/17/21 Telemedicine Sanam Navarro DO [facility] today's visits and meeting all other requirements     Future Appointments  No visits were found meeting these conditions  Showing future appointments within next 150 days and meeting all other requirements      This virtual check-in was done via [employer] and patient was informed that this is a secure, HIPAA-compliant platform  She agrees to proceed  Patient agrees to participate in a virtual check in via telephone or video visit instead of presenting to the office to address urgent/immediate medical needs   Patient is aware this is a billable service  After connecting through [facility], the patient was identified by name and date of birth  Albert Brannon was informed that this was a telemedicine visit and that the exam was being conducted confidentially over secure lines  My office door was closed  No one else was in the room  Albert Brannon acknowledged consent and understanding of privacy and security of the telemedicine visit  I informed the patient that I have reviewed her record in Epic and presented the opportunity for her to ask any questions regarding the visit today  The patient agreed to participate  Subjective:   Albert Brannon is a 41 y o  female who is concerned about COVID-19  Patient's symptoms include nasal congestion, rhinorrhea and cough (Dry)  Patient denies fever, chills, fatigue, sore throat, anosmia, loss of taste, shortness of breath, chest tightness, abdominal pain, nausea, vomiting, diarrhea, myalgias and headaches  Date of symptom onset: 5/8/2021    Exposure:   Contact with a person who is under investigation (PUI) for or who is positive for COVID-19 within the last 14 days?: No    Hospitalized recently for fever and/or lower respiratory symptoms?: No      Currently a healthcare worker that is involved in direct patient care?: No      Works in a special setting where the risk of COVID-19 transmission may be high? (this may include long-term care, correctional and group home facilities; homeless shelters; assisted-living facilities and group homes ): No      Resident in a special setting where the risk of COVID-19 transmission may be high? (this may include long-term care, correctional and group home facilities; homeless shelters; assisted-living facilities and group homes ): No      Patient could not use AM well  Using Dayquil and Nyquil s relief  Had 1st COVID vaccine on 4/29/21, due for 2nd vaccine Saturday, 5/22/21        No results found for: [address] [facility], [address] [facility], [address] Medical History:   Diagnosis Date    Abnormal Pap smear of cervix     HPV (human papilloma virus) infection     Hyperlipidemia     Kidney stone 2015    Kidney stones, calcium oxalate 2015    Obesity     Pneumonia     Resolved 2017     Scoliosis     Tremor     Left Hand Resting Tremor    Varicella     Visual impairment     Wear glasses     Past Surgical History:   Procedure Laterality Date     SECTION       x 1 ; Failure to progress    FL INJECTION LEFT HIP (NON ARTHROGRAM)  10/12/2020    CT LAP,APPENDECTOMY N/A 10/14/2018    Procedure: APPENDECTOMY LAPAROSCOPIC;  Surgeon: Margarita Noble MD;  Location[facility];  Service: General    WISDOM TOOTH EXTRACTION       Current Outpatient Medications   Medication Sig Dispense Refill    Biotin 1 MG CAPS Take 1 mg by mouth daily      Calcium Carbonate-Vit D-Min (CALCIUM 1200 PO) Take 1 tablet by mouth daily      cholecalciferol (VITAMIN D3) 1,000 units tablet Take 2,000 Units by mouth daily      multivitamin (THERAGRAN) TABS Take 1 tablet by mouth daily       No current facility-administered medications for this visit  No Known Allergies    Review of Systems   Constitutional: Negative for chills, fatigue and fever  HENT: Positive for congestion and rhinorrhea  Negative for sore throat  Respiratory: Positive for cough (Dry)  Negative for chest tightness and shortness of breath  Gastrointestinal: Negative for abdominal pain, diarrhea, nausea and vomiting  Musculoskeletal: Negative for myalgias  Neurological: Negative for headaches  Objective:    Vitals:    21 1444   Temp: 97 7 °F (36 5 °C)   TempSrc: Temporal   Weight: 98 4 kg (217 lb)   Height: 5' 3" (1 6 m)       Physical Exam  Vitals signs and nursing note reviewed  Constitutional:       General: She is not in acute distress  Appearance: Normal appearance  She is well-developed  She is obese  HENT:      Head: Normocephalic and atraumatic        Right Ear: External ear normal       Left Ear: External ear normal       Nose: Nose normal       Right Sinus: No maxillary sinus tenderness or frontal sinus tenderness  Left Sinus: No maxillary sinus tenderness or frontal sinus tenderness  Mouth/Throat:      Mouth: Mucous membranes are moist       Pharynx: Oropharynx is clear  Eyes:      Extraocular Movements: Extraocular movements intact  Conjunctiva/sclera: Conjunctivae normal    Neck:      Musculoskeletal: Normal range of motion  Pulmonary:      Effort: Pulmonary effort is normal  No respiratory distress  Musculoskeletal:         General: No swelling  Right lower leg: No edema  Left lower leg: No edema  Skin:     General: Skin is warm and dry  Neurological:      General: No focal deficit present  Mental Status: She is alert and oriented to person, place, and time  Psychiatric:         Mood and Affect: Mood normal        VIRTUAL VISIT DISCLAIMER    Soraida Chamberlain acknowledges that she has consented to an online visit or consultation  She understands that the online visit is based solely on information provided by her, and that, in the absence of a face-to-face physical evaluation by the physician, the diagnosis she receives is both limited and provisional in terms of accuracy and completeness  This is not intended to replace a full medical face-to-face evaluation by the physician  Soraida Chamberlain understands and accepts these terms

## 2021-05-17 ENCOUNTER — TELEMEDICINE (OUTPATIENT)
Dept: FAMILY MEDICINE CLINIC | Facility: CLINIC | Age: 46
End: 2021-05-17
Payer: COMMERCIAL

## 2021-05-17 VITALS — BODY MASS INDEX: 38.45 KG/M2 | HEIGHT: 63 IN | TEMPERATURE: 97.7 F | WEIGHT: 217 LBS

## 2021-05-17 DIAGNOSIS — B34.9 VIRAL INFECTION, UNSPECIFIED: Primary | ICD-10-CM

## 2021-05-17 DIAGNOSIS — B34.9 VIRAL INFECTION, UNSPECIFIED: ICD-10-CM

## 2021-05-17 PROCEDURE — U0003 INFECTIOUS AGENT DETECTION BY NUCLEIC ACID (DNA OR RNA); SEVERE ACUTE RESPIRATORY SYNDROME CORONAVIRUS 2 (SARS-COV-2) (CORONAVIRUS DISEASE [COVID-19]), AMPLIFIED PROBE TECHNIQUE, MAKING USE OF HIGH THROUGHPUT TECHNOLOGIES AS DESCRIBED BY CMS-2020-01-R: HCPCS | Performed by: FAMILY MEDICINE

## 2021-05-17 PROCEDURE — U0005 INFEC AGEN DETEC AMPLI PROBE: HCPCS | Performed by: FAMILY MEDICINE

## 2021-05-17 PROCEDURE — 99213 OFFICE O/P EST LOW 20 MIN: CPT | Performed by: FAMILY MEDICINE

## 2021-05-17 NOTE — PATIENT INSTRUCTIONS
[address]    Your healthcare provider and/or public health staff have evaluated you and have determined that you do not need to remain in the hospital at this time  At this time you can be isolated at home where you will be monitored by staff from your local or state health department  You should carefully follow the prevention and isolation steps below until a healthcare provider or local or state health department says that you can return to your normal activities  Stay home except to get medical care    People who are mildly ill with COVID-19 are able to isolate at home during their illness  You should restrict activities outside your home, except for getting medical care  Do not go to work, school, or public areas  Avoid using public transportation, ride-sharing, or taxis  Separate yourself from other people and animals in your home    People: As much as possible, you should stay in a specific room and away from other people in your home  Also, you should use a separate bathroom, if available  Animals: You should restrict contact with pets and other animals while you are sick with COVID-19, just like you would around other people  Although there have not been reports of pets or other animals becoming sick with COVID-19, it is still recommended that people sick with COVID-19 limit contact with animals until more information is known about the virus  When possible, have another member of your household care for your animals while you are sick  If you are sick with COVID-19, avoid contact with your pet, including petting, snuggling, being kissed or licked, and sharing food  If you must care for your pet or be around animals while you are sick, wash your hands before and after you interact with pets and wear a facemask  See COVID-19 and Animals for more information      Call ahead before visiting your doctor    If you have a medical appointment, call the healthcare provider and tell them that you have or may have COVID-19  This will help the healthcare providers office take steps to keep other people from getting infected or exposed  Wear a facemask    You should wear a facemask when you are around other people (e g , sharing a room or vehicle) or pets and before you enter a healthcare providers office  If you are not able to wear a facemask (for example, because it causes trouble breathing), then people who live with you should not stay in the same room with you, or they should wear a facemask if they enter your room  Cover your coughs and sneezes    Cover your mouth and nose with a tissue when you cough or sneeze  Throw used tissues in a lined trash can  Immediately wash your hands with soap and water for at least 20 seconds or, if soap and water are not available, clean your hands with an alcohol-based hand  that contains at least 60% alcohol  Clean your hands often    Wash your hands often with soap and water for at least 20 seconds, especially after blowing your nose, coughing, or sneezing; going to the bathroom; and before eating or preparing food  If soap and water are not readily available, use an alcohol-based hand  with at least 60% alcohol, covering all surfaces of your hands and rubbing them together until they feel dry  Soap and water are the best option if hands are visibly dirty  Avoid touching your eyes, nose, and mouth with unwashed hands  Avoid sharing personal household items    You should not share dishes, drinking glasses, cups, eating utensils, towels, or bedding with other people or pets in your home  After using these items, they should be washed thoroughly with soap and water  Clean all high-touch surfaces everyday    High touch surfaces include counters, tabletops, doorknobs, bathroom fixtures, toilets, phones, keyboards, tablets, and bedside tables  Also, clean any surfaces that may have blood, stool, or body fluids on them   Use a household cleaning spray or wipe, according to the label instructions  Labels contain instructions for safe and effective use of the cleaning product including precautions you should take when applying the product, such as wearing gloves and making sure you have good ventilation during use of the product  Monitor your symptoms    Seek prompt medical attention if your illness is worsening (e g , difficulty breathing)  Before seeking care, call your healthcare provider and tell them that you have, or are being evaluated for, COVID-19  Put on a facemask before you enter the facility  These steps will help the healthcare providers office to keep other people in the office or waiting room from getting infected or exposed  Ask your healthcare provider to call the local or [facility] health department  Persons who are placed under active monitoring or facilitated self-monitoring should follow instructions provided by their local health department or occupational health professionals, as appropriate  If you have a medical emergency and need to call 911, notify the dispatch personnel that you have, or are being evaluated for COVID-19  If possible, put on a facemask before emergency medical services arrive      Discontinuing home isolation    Patients with confirmed COVID-19 should remain under home isolation precautions until the following conditions are met:   - They have had no fever for at least 24 hours (that is one full day of no fever without the use medicine that reduces fevers)  AND  - other symptoms have improved (for example, when their cough or shortness of breath have improved)  AND  - If had mild or moderate illness, at least 10 days have passed since their symptoms first appeared or if severe illness (needed oxygen) or immunosuppressed, at least 20 days have passed since symptoms first appeared  Patients with confirmed COVID-19 should also notify close contacts (including their workplace) and ask that they self-quarantine  Currently, close contact is defined as being within 6 feet for 15 minutes or more from the period 24 hours starting 48 hours before symptom onset to the time at which the patient went into isolation  Close contacts of patients diagnosed with COVID-19 should be instructed by the patient to self-quarantine for 14 days from the last time of their last contact with the patient  Source: [employer] fi      COVID TESTING TRIAGE:    Other Screening: (Travel, Work, School, etc  with no known direct exposure to [address]) - Refer to [employer] ([employer], [address] [place] [address], [employer]) Call ahead to see if these locations provide these services  In an effort to best serve our community in this time of increased COVID activity, [facility] will only be providing COVID testing for those individuals who are symptomatic or have had a direct exposure to a COVID case  Unfortunately, due to resource constraints we are unable to provide testing for asymptomatic individuals who need a swab for clearance to travel, or return to work or school  Please note that [facility] Employee's in need of testing for return to work in a Network position can continue to obtain testing through this hotline  Thank you in advance for your understanding and cooperation during these challenging times  Symptomatic Patient or Exposed Patient(Close Contact with COVID + Person):  Testing Sites:   Centralized Testing [facility]Address: [place] [place] [address]  oHours: Monday-Friday 8:00A - 5:00P  [address] ([facility]ty [facility])oAddress: [address] [facility] [place], [place] [address]HAZLLE: Monday-Friday 8:00A - 5:00P   [facility])oAddress: [address] [facility], [place] [address]WTNCLH: Monday-Friday 8:00A - 5:00P  [address] CenteroAddress: [address] [place], [place] [address]x Hours: Monday-Friday 8:00A - 5:00P   [facility]oAddress: [address], hospitals, [place] [address]RMEMHZ: Monday-Friday 8:00A - 5:00P & Saturday 8:00A - 1:00P   [facility] [employer] oAddress: [address] [place]  [address](located in the parking lot behind the building)oHours: Monday-Friday 8:00A - 1:00P   [address] [place] [address] oAddress: [address] [place], [place]  [address]lTylof: Monday-Friday 10:00A - 2:00P   [facility] Sites:  [facility] [address] [facility]) oAddress: [address] [facility], [place] [address], hospitals, [address]: Monday-Friday 7:30A - 10:30P & Sat/Sun 8:00A - 8:00P   [facility] [place] ([facility])oAddress: [address] [facility] [address]rAraku: Monday-Friday 7:30A - 10:30P & Sat/Sun 8A - 8P  [address] Now- [place]oAddress: [address] [facility] [address]bQcrzw: Monday-Friday 8:00A - 8:00P & Sat/Sun 8:00A - 4:00P  [address] ([address] [facility],6Th [facility] Center)oAddress: [address], [address]: Monday-Friday 8:00A - 8:00P & Sat/Sun 8:00A - 4:00P  [address] Now- [place] oAddress: [address]) Librado Bloomt: Monday-Friday 8:00A - 8:00P  Chanetta Jakub [facility] Now-ForksoAddress: [address] [place], [place] [address]WZHVOA: Monday-Friday 8:00A-8:00P & Sat/Sun 8:00A - 4:00P  [address] Now- [place]Address: [address] [facility] [address]eVvvyk: Monday-Friday 8:00A - 8:00P & Sat/Sun 8:00A - 4:00P  [address], [place], [place] [address]WQFQQV: Monday-Friday 8:00A - 8:00P  [address] Now- [place]oAddress: [place], [place] [address]lKrflv: 7 days a week 8:00A - 8:00P    [facility] [facility] Now- [place]oAddress: [address] [facility] [address], [place] [address]tMinab: Monday-Friday 8:00A - 8:00P  [address] Now- [facility]noAddress: [address] [place], [place] [address]gDsrso: Monday-Friday 8:00A - 8:00P & Sat/Sun 8:00A - 4:00P   [facility])oAddress: [address], [place], [place] [address]EVTJFI: Monday-Friday 8:00A - 8:00P  [address] Now- [place]Address: [address] [place], [place] [address]ZDIQSC: Monday-Friday 8:00A - 8:00P & Sat/Sun 8:00A - 4:00PSt  [facility] NowPhoenix Memorial HospitaltownoAddress: [address] [place], [place] [address]YEFHAD: 7 days a week 8:00A - 8:00P   [address] Now [place]Address: [address], [place], [place] [address]GBYKUW: 7 days a week 8:00A - 8:00P  [address], [address], [place], [place] [address]DPTDBB: Monday-Friday 7:30A - 10:30P & Sat/Sun 8:00A - 8:00P

## 2021-05-18 LAB — SARS-COV-2 RNA RESP QL NAA+PROBE: NEGATIVE

## 2021-05-18 NOTE — RESULT ENCOUNTER NOTE
As her COVID-19 test is negative, she can discontinue quarantine  Message sent to patient via [employer] patient portal   Nurse to also call patient

## 2021-05-22 ENCOUNTER — IMMUNIZATIONS (OUTPATIENT)
Dept: FAMILY MEDICINE CLINIC | Facility: HOSPITAL | Age: 46
End: 2021-05-22

## 2021-05-22 DIAGNOSIS — Z23 ENCOUNTER FOR IMMUNIZATION: Primary | ICD-10-CM

## 2021-05-22 PROCEDURE — 91300 SARS-COV-2 / COVID-19 MRNA VACCINE (PFIZER-BIONTECH) 30 MCG: CPT

## 2021-05-22 PROCEDURE — 0002A SARS-COV-2 / COVID-19 MRNA VACCINE (PFIZER-BIONTECH) 30 MCG: CPT

## 2021-09-17 ENCOUNTER — OFFICE VISIT (OUTPATIENT)
Dept: FAMILY MEDICINE CLINIC | Facility: CLINIC | Age: 46
End: 2021-09-17
Payer: OTHER GOVERNMENT

## 2021-09-17 VITALS
DIASTOLIC BLOOD PRESSURE: 62 MMHG | RESPIRATION RATE: 16 BRPM | OXYGEN SATURATION: 98 % | TEMPERATURE: 97.5 F | BODY MASS INDEX: 37.55 KG/M2 | HEIGHT: 65 IN | HEART RATE: 70 BPM | SYSTOLIC BLOOD PRESSURE: 118 MMHG | WEIGHT: 225.4 LBS

## 2021-09-17 DIAGNOSIS — M79.10 MYALGIA: ICD-10-CM

## 2021-09-17 DIAGNOSIS — E78.5 HYPERLIPIDEMIA, UNSPECIFIED HYPERLIPIDEMIA TYPE: ICD-10-CM

## 2021-09-17 DIAGNOSIS — Z13.6 SCREENING FOR CARDIOVASCULAR CONDITION: ICD-10-CM

## 2021-09-17 DIAGNOSIS — Z00.00 ANNUAL PHYSICAL EXAM: Primary | ICD-10-CM

## 2021-09-17 DIAGNOSIS — E66.9 OBESITY (BMI 35.0-39.9 WITHOUT COMORBIDITY): ICD-10-CM

## 2021-09-17 DIAGNOSIS — Z13.1 SCREENING FOR DIABETES MELLITUS: ICD-10-CM

## 2021-09-17 DIAGNOSIS — R25.1 TREMOR: ICD-10-CM

## 2021-09-17 DIAGNOSIS — E66.01 SEVERE OBESITY (BMI 35.0-39.9) WITH COMORBIDITY (HCC): ICD-10-CM

## 2021-09-17 DIAGNOSIS — M16.32 OSTEOARTHRITIS RESULTING FROM LEFT HIP DYSPLASIA: ICD-10-CM

## 2021-09-17 DIAGNOSIS — E66.9 CLASS 2 OBESITY WITH BODY MASS INDEX (BMI) OF 37.0 TO 37.9 IN ADULT, UNSPECIFIED OBESITY TYPE, UNSPECIFIED WHETHER SERIOUS COMORBIDITY PRESENT: ICD-10-CM

## 2021-09-17 DIAGNOSIS — Z12.11 SCREENING FOR COLORECTAL CANCER: ICD-10-CM

## 2021-09-17 DIAGNOSIS — Z12.12 SCREENING FOR COLORECTAL CANCER: ICD-10-CM

## 2021-09-17 PROCEDURE — 99396 PREV VISIT EST AGE 40-64: CPT | Performed by: FAMILY MEDICINE

## 2021-09-17 NOTE — PROGRESS NOTES
Assessment/Plan:  Problem List Items Addressed This Visit        Musculoskeletal and Integument    Osteoarthritis resulting from left hip dysplasia     Management per Ortho  Stable  Other    Obesity     Worsening  Recommend lifestyle modifications  Relevant Orders    Vitamin D 25 hydroxy    Hyperlipidemia     Stable off statin  Current 10 year ASCVD risk is 0 6%  Recommend lifestyle modifications  Relevant Orders    CBC and differential    Comprehensive metabolic panel    Lipid panel    TSH, 3rd generation with Free T4 reflex    LDL cholesterol, direct    Tremor     Stable s meds  Relevant Orders    CBC and differential    TSH, 3rd generation with Free T4 reflex    Magnesium      Other Visit Diagnoses     Annual physical exam    -  Primary    Screening for diabetes mellitus        Relevant Orders    Hemoglobin A1C    Screening for cardiovascular condition        Relevant Orders    CBC and differential    Comprehensive metabolic panel    Obesity (BMI 35 0-39 9 without comorbidity)        Relevant Orders    Vitamin D 25 hydroxy    Severe obesity (BMI 35 0-39  9) with comorbidity ([address] )        Relevant Orders    Vitamin D 25 hydroxy    BMI 37 0-37 9, adult        Relevant Orders    Vitamin D 25 hydroxy    Screening for colorectal cancer        Relevant Orders    Ambulatory referral to Gastroenterology    Myalgia        Relevant Orders    Vitamin D 25 hydroxy           Return in about 1 year (around 9/17/2022) for Annual physical; PRN Labs  Future Appointments   Date Time Provider Michelle Martinez   10/15/2021  1:15 PM Obinna Gant MD [facility]   10/16/2021 10:40 AM [facility]   9/19/2022  2:00 PM DO [facility]        Subjective:     Alan Rodriguez is a 40 y o  female who presents today for a follow-up on her chronic medical conditions          HPI:  Chief Complaint   Patient presents with    Physical Exam     no concerns     Medication Refill     none    Labs Only     complted     HM     PHQ     -- Above per clinical staff and reviewed  --      HPI      Today:      Return in about 1 year (around 9/17/2021) for Annual physical; PRN Labs  L Hip Pain / Left Hip Dysplasia - Management per Ortho Dr Aura Hooper   F/U PRN  She states she needs hip replacement at 51yo  She is not doing CSI as she is getting relief from CBD gummies  She states her hips have never been normal childbirth in 2010        Obesity - Trying to watch diet   No regular exercise   Walks occasionally  Hyperlipidemia - No statin previously        Scoliosis - Management per Ortho   F/U PRN    Denies back pain   Occasionally has paresthesias mid-thoracic back        Reviewed:  Labs 9/17/20, Gyn 9/18/20, Ortho 11/2/20     Sees Esha [facility] yearly   Next appt 10/21        Overdue for Dentist  Michelle Zuleta q2 years        May [address] - inpatient, baby born 5/18/2009 - CG request 9/27/19 unsuccesfful             PHQ-9 Depression Screening    PHQ-9:   Frequency of the following problems over the past two weeks:      Little interest or pleasure in doing things: 0 - not at all  Feeling down, depressed, or hopeless: 0 - not at all  PHQ-2 Score: 0           The following portions of the patient's history were reviewed and updated as appropriate: allergies, current medications, past family history, past medical history, past social history, past surgical history and problem list       Review of Systems   Constitutional: Negative for appetite change, chills, diaphoresis, fatigue and fever  Respiratory: Negative for chest tightness and shortness of breath  Cardiovascular: Negative for chest pain  Gastrointestinal: Negative for abdominal pain, blood in stool, diarrhea, nausea and vomiting  Genitourinary: Negative for dysuria          Current Outpatient Medications   Medication Sig Dispense Refill    Biotin 1 MG CAPS Take 1 mg by mouth daily      Calcium Carbonate-Vit D-Min (CALCIUM 1200 PO) Take 1 tablet by mouth daily      cholecalciferol (VITAMIN D3) 1,000 units tablet Take 2,000 Units by mouth daily      multivitamin (THERAGRAN) TABS Take 1 tablet by mouth daily      NON FORMULARY Take 1 tablet by mouth daily CBD Gummies       No current facility-administered medications for this visit  Objective:  /62   Pulse 70   Temp 97 5 °F (36 4 °C)   Resp 16   Ht 5' 4 76" (1 645 m)   Wt 102 kg (225 lb 6 4 oz)   SpO2 98%   BMI 37 78 kg/m²    Wt Readings from Last 3 Encounters:   09/17/21 102 kg (225 lb 6 4 oz)   05/17/21 98 4 kg (217 lb)   11/02/20 98 4 kg (217 lb)      BP Readings from Last 3 Encounters:   09/17/21 118/62   11/02/20 120/71   09/28/20 121/78          Physical Exam  Vitals and nursing note reviewed  Constitutional:       Appearance: Normal appearance  She is well-developed  She is obese  HENT:      Head: Normocephalic and atraumatic  Right Ear: Tympanic membrane, ear canal and external ear normal       Left Ear: Tympanic membrane, ear canal and external ear normal       Nose: Nose normal       Right Sinus: No maxillary sinus tenderness or frontal sinus tenderness  Left Sinus: No maxillary sinus tenderness or frontal sinus tenderness  Mouth/Throat:      Mouth: Mucous membranes are moist       Pharynx: Uvula midline  Tonsils: No tonsillar exudate  Eyes:      Extraocular Movements: Extraocular movements intact  Conjunctiva/sclera: Conjunctivae normal       Pupils: Pupils are equal, round, and reactive to light  Cardiovascular:      Rate and Rhythm: Normal rate and regular rhythm  Pulses: Normal pulses  Heart sounds: Normal heart sounds  Pulmonary:      Effort: Pulmonary effort is normal       Breath sounds: Normal breath sounds  Abdominal:      General: Bowel sounds are normal  There is no distension        Palpations: Abdomen is soft  There is no mass  Tenderness: There is no abdominal tenderness  There is no guarding or rebound  Musculoskeletal:         General: No swelling or tenderness  Cervical back: Neck supple  Right lower leg: No edema  Left lower leg: No edema  Lymphadenopathy:      Cervical: No cervical adenopathy  Skin:     Findings: No rash  Neurological:      General: No focal deficit present  Mental Status: She is alert and oriented to person, place, and time  Psychiatric:         Mood and Affect: Mood normal          Behavior: Behavior normal          Thought Content: Thought content normal          Judgment: Judgment normal          Lab Results:      Lab Results   Component Value Date    WBC 6 41 09/17/2020    HGB 13 7 09/17/2020    HCT 41 8 09/17/2020     (H) 09/17/2020    TRIG 76 09/17/2020    HDL 52 09/17/2020    LDLDIRECT 104 (H) 09/17/2020    ALT 20 09/17/2020    AST 16 09/17/2020     06/24/2015    K 4 2 09/17/2020     09/17/2020    CREATININE 0 65 09/17/2020    BUN 14 09/17/2020    CO2 26 09/17/2020    INR 1 01 10/14/2018    GLUF 86 09/17/2020    HGBA1C 5 3 09/17/2020     No results found for: URICACID  Invalid input(s): BASENAME Vitamin D    No results found  POCT Labs      BMI Counseling: Body mass index is 37 78 kg/m²  The BMI is above normal  Nutrition recommendations include encouraging healthy choices of fruits and vegetables  Exercise recommendations include exercising 3-5 times per week  No pharmacotherapy was ordered  Rationale for BMI follow-up plan is due to patient being overweight or obese  Depression Screening and Follow-up Plan:   Patient was screened for depression during today's encounter  They screened negative with a PHQ-2 score of 0

## 2021-09-17 NOTE — PATIENT INSTRUCTIONS
Wellness Visit for Adults   AMBULATORY CARE:   A wellness visit  is when you see your healthcare provider to get screened for health problems  Your healthcare provider will also give you advice on how to stay healthy  Write down your questions so you remember to ask them  Ask your healthcare provider how often you should have a wellness visit  What happens at a wellness visit:  Your healthcare provider will ask about your health, and your family history of health problems  This includes high blood pressure, heart disease, and cancer  He or she will ask if you have symptoms that concern you, if you smoke, and about your mood  You may also be asked about your intake of medicines, supplements, food, and alcohol  Any of the following may be done:  · Your weight  will be checked  Your height may also be checked so your body mass index (BMI) can be calculated  Your BMI shows if you are at a healthy weight  · Your blood pressure  and heart rate will be checked  Your temperature may also be checked  · Blood and urine tests  may be done  Blood tests may be done to check your cholesterol levels  Abnormal cholesterol levels increase your risk for heart disease and stroke  You may also need a blood or urine test to check for diabetes if you are at increased risk  Urine tests may be done to look for signs of an infection or kidney disease  · A physical exam  includes checking your heartbeat and lungs with a stethoscope  Your healthcare provider may also check your skin to look for sun damage  · Screening tests  may be recommended  A screening test is done to check for diseases that may not cause symptoms  The screening tests you may need depend on your age, gender, family history, and lifestyle habits  For example, colorectal screening may be recommended if you are 48years old or older  Screening tests you need if you are a woman:   · A Pap smear  is used to screen for cervical cancer   Pap smears are usually done every 3 to 5 years depending on your age  You may need them more often if you have had abnormal Pap smear test results in the past  Ask your healthcare provider how often you should have a Pap smear  · A mammogram  is an x-ray of your breasts to screen for breast cancer  Experts recommend mammograms every 2 years starting at age 48 years  You may need a mammogram at age 52 years or younger if you have an increased risk for breast cancer  Talk to your healthcare provider about when you should start having mammograms and how often you need them  Vaccines you may need:   · Get an influenza vaccine  every year  The influenza vaccine protects you from the flu  Several types of viruses cause the flu  The viruses change over time, so new vaccines are made each year  · Get a tetanus-diphtheria (Td) booster vaccine  every 10 years  This vaccine protects you against tetanus and diphtheria  Tetanus is a severe infection that may cause painful muscle spasms and lockjaw  Diphtheria is a severe bacterial infection that causes a thick covering in the back of your mouth and throat  · Get a human papillomavirus (HPV) vaccine  if you are female and aged 23 to 32 or male 23 to 24 and never received it  This vaccine protects you from HPV infection  HPV is the most common infection spread by sexual contact  HPV may also cause vaginal, penile, and anal cancers  · Get a pneumococcal vaccine  if you are aged 72 years or older  The pneumococcal vaccine is an injection given to protect you from pneumococcal disease  Pneumococcal disease is an infection caused by pneumococcal bacteria  The infection may cause pneumonia, meningitis, or an ear infection  · Get a shingles vaccine  if you are 60 or older, even if you have had shingles before  The shingles vaccine is an injection to protect you from the varicella-zoster virus  This is the same virus that causes chickenpox   Shingles is a painful rash that develops in people who had chickenpox or have been exposed to the virus  How to eat healthy:  My Plate is a model for planning healthy meals  It shows the types and amounts of foods that should go on your plate  Fruits and vegetables make up about half of your plate, and grains and protein make up the other half  A serving of dairy is included on the side of your plate  The amount of calories and serving sizes you need depends on your age, gender, weight, and height  Examples of healthy foods are listed below:  · Eat a variety of vegetables  such as dark green, red, and orange vegetables  You can also include canned vegetables low in sodium (salt) and frozen vegetables without added butter or sauces  · Eat a variety of fresh fruits , canned fruit in 100% juice, frozen fruit, and dried fruit  · Include whole grains  At least half of the grains you eat should be whole grains  Examples include whole-wheat bread, wheat pasta, brown rice, and whole-grain cereals such as oatmeal     · Eat a variety of protein foods such as seafood (fish and shellfish), lean meat, and poultry without skin (turkey and chicken)  Examples of lean meats include pork leg, shoulder, or tenderloin, and beef round, sirloin, tenderloin, and extra lean ground beef  Other protein foods include eggs and egg substitutes, beans, peas, soy products, nuts, and seeds  · Choose low-fat dairy products such as skim or 1% milk or low-fat yogurt, cheese, and cottage cheese  · Limit unhealthy fats  such as butter, hard margarine, and shortening  Exercise:  Exercise at least 30 minutes per day on most days of the week  Some examples of exercise include walking, biking, dancing, and swimming  You can also fit in more physical activity by taking the stairs instead of the elevator or parking farther away from stores  Include muscle strengthening activities 2 days each week  Regular exercise provides many health benefits   It helps you manage your weight, and decreases your risk for type 2 diabetes, heart disease, stroke, and high blood pressure  Exercise can also help improve your mood  Ask your healthcare provider about the best exercise plan for you  General health and safety guidelines:   · Do not smoke  Nicotine and other chemicals in cigarettes and cigars can cause lung damage  Ask your healthcare provider for information if you currently smoke and need help to quit  E-cigarettes or smokeless tobacco still contain nicotine  Talk to your healthcare provider before you use these products  · Limit alcohol  A drink of alcohol is 12 ounces of beer, 5 ounces of wine, or 1½ ounces of liquor  · Lose weight, if needed  Being overweight increases your risk of certain health conditions  These include heart disease, high blood pressure, type 2 diabetes, and certain types of cancer  · Protect your skin  Do not sunbathe or use tanning beds  Use sunscreen with a SPF 15 or higher  Apply sunscreen at least 15 minutes before you go outside  Reapply sunscreen every 2 hours  Wear protective clothing, hats, and sunglasses when you are outside  · Drive safely  Always wear your seatbelt  Make sure everyone in your car wears a seatbelt  A seatbelt can save your life if you are in an accident  Do not use your cell phone when you are driving  This could distract you and cause an accident  Pull over if you need to make a call or send a text message  · Practice safe sex  Use latex condoms if are sexually active and have more than one partner  Your healthcare provider may recommend screening tests for sexually transmitted infections (STIs)  · Wear helmets, lifejackets, and protective gear  Always wear a helmet when you ride a bike or motorcycle, go skiing, or play sports that could cause a head injury  Wear protective equipment when you play sports  Wear a lifejacket when you are on a boat or doing water sports      © Copyright [employer] 2021 Information is for End User's use only and may not be sold, redistributed or otherwise used for commercial purposes  All illustrations and images included in CareNotes® are the copyrighted property of [employer]  or James Hanley  The above information is an  only  It is not intended as medical advice for individual conditions or treatments  Talk to your doctor, nurse or pharmacist before following any medical regimen to see if it is safe and effective for you  Obesity   AMBULATORY CARE:   Obesity  is when your body mass index (BMI) is greater than 30  Your healthcare provider will use your height and weight to measure your BMI  The risks of obesity include  many health problems, such as injuries or physical disability  You may need tests to check for the following:  · Diabetes    · High blood pressure or high cholesterol    · Heart disease    · Gallbladder or liver disease    · Cancer of the colon, breast, prostate, liver, or kidney    · Sleep apnea    · Arthritis or gout    Seek care immediately if:   · You have a severe headache, confusion, or difficulty speaking  · You have weakness on one side of your body  · You have chest pain, sweating, or shortness of breath  Contact your healthcare provider if:   · You have symptoms of gallbladder or liver disease, such as pain in your upper abdomen  · You have knee or hip pain and discomfort while walking  · You have symptoms of diabetes, such as intense hunger and thirst, and frequent urination  · You have symptoms of sleep apnea, such as snoring or daytime sleepiness  · You have questions or concerns about your condition or care  Treatment for obesity  focuses on helping you lose weight to improve your health  Even a small decrease in BMI can reduce the risk for many health problems  Your healthcare provider will help you set a weight-loss goal   · Lifestyle changes  are the first step in treating obesity   These include making healthy food choices and getting regular physical activity  Your healthcare provider may suggest a weight-loss program that involves coaching, education, and therapy  · Medicine  may help you lose weight when it is used with a healthy diet and physical activity  · Surgery  can help you lose weight if you are very obese and have other health problems  There are several types of weight-loss surgery  Ask your healthcare provider for more information  Be successful losing weight:   · Set small, realistic goals  An example of a small goal is to walk for 20 minutes 5 days a week  Anther goal is to lose 5% of your body weight  · Tell friends, family members, and coworkers about your goals  and ask for their support  Ask a friend to lose weight with you, or join a weight-loss support group  · Identify foods or triggers that may cause you to overeat , and find ways to avoid them  Remove tempting high-calorie foods from your home and workplace  Place a bowl of fresh fruit on your kitchen counter  If stress causes you to eat, then find other ways to cope with stress  · Keep a diary to track what you eat and drink  Also write down how many minutes of physical activity you do each day  Weigh yourself once a week and record it in your diary  Eating changes: You will need to eat 500 to 1,000 fewer calories each day than you currently eat to lose 1 to 2 pounds a week  The following changes will help you cut calories:  · Eat smaller portions  Use small plates, no larger than 9 inches in diameter  Fill your plate half full of fruits and vegetables  Measure your food using measuring cups until you know what a serving size looks like  · Eat 3 meals and 1 or 2 snacks each day  Plan your meals in advance  [place] and eat at home most of the time  Eat slowly  Do not skip meals  Skipping meals can lead to overeating later in the day  This can make it harder for you to lose weight   Talk with a dietitian to help you make a meal plan and schedule that is right for you  · Eat fruits and vegetables at every meal   They are low in calories and high in fiber, which makes you feel full  Do not add butter, margarine, or cream sauce to vegetables  Use herbs to season steamed vegetables  · Eat less fat and fewer fried foods  Eat more baked or grilled chicken and fish  These protein sources are lower in calories and fat than red meat  Limit fast food  Dress your salads with olive oil and vinegar instead of bottled dressing  · Limit the amount of sugar you eat  Do not drink sugary beverages  Limit alcohol  Activity changes:  Physical activity is good for your body in many ways  It helps you burn calories and build strong muscles  It decreases stress and depression, and improves your mood  It can also help you sleep better  Talk to your healthcare provider before you begin an exercise program   · Exercise for at least 30 minutes 5 days a week  Start slowly  Set aside time each day for physical activity that you enjoy and that is convenient for you  It is best to do both weight training and an activity that increases your heart rate, such as walking, bicycling, or swimming  · Find ways to be more active  Do yard work and housecleaning  Walk up the stairs instead of using elevators  Spend your leisure time going to events that require walking, such as outdoor festivals or fairs  This extra physical activity can help you lose weight and keep it off  Follow up with your healthcare provider as directed: You may need to meet with a dietitian  Write down your questions so you remember to ask them during your visits  © Copyright [employer] 2021 Information is for End User's use only and may not be sold, redistributed or otherwise used for commercial purposes   All illustrations and images included in CareNotes® are the copyrighted property of [employer] Inc  or James Celestin   The above information is an  only  It is not intended as medical advice for individual conditions or treatments  Talk to your doctor, nurse or pharmacist before following any medical regimen to see if it is safe and effective for you  Cholesterol and Your Health   AMBULATORY CARE:   Cholesterol  is a waxy, fat-like substance  Your body uses cholesterol to make hormones and new cells, and to protect nerves  Cholesterol is made by your body  It also comes from certain foods you eat, such as meat and dairy products  Your healthcare provider can help you set goals for your cholesterol levels  He or she can help you create a plan to meet your goals  Cholesterol level goals: Your cholesterol level goals depend on your risk for heart disease, your age, and your other health conditions  The following are general guidelines:  · Total cholesterol  includes low-density lipoprotein (LDL), high-density lipoprotein (HDL), and triglyceride levels  The total cholesterol level should be lower than 200 mg/dL and is best at about 150 mg/dL  · LDL cholesterol  is called bad cholesterol  because it forms plaque in your arteries  As plaque builds up, your arteries become narrow, and less blood flows through  When plaque decreases blood flow to your heart, you may have chest pain  If plaque completely blocks an artery that brings blood to your heart, you may have a heart attack  Plaque can break off and form blood clots  Blood clots may block arteries in your brain and cause a stroke  The level should be less than 130 mg/dL and is best at about 100 mg/dL  · HDL cholesterol  is called good cholesterol  because it helps remove LDL cholesterol from your arteries  It does this by attaching to LDL cholesterol and carrying it to your liver  Your liver breaks down LDL cholesterol so your body can get rid of it  High levels of HDL cholesterol can help prevent a heart attack and stroke   Low levels of HDL cholesterol can increase your risk for heart disease, heart attack, and stroke  The level should be 60 mg/dL or higher  · Triglycerides  are a type of fat that store energy from foods you eat  High levels of triglycerides also cause plaque buildup  This can increase your risk for a heart attack or stroke  If your triglyceride level is high, your LDL cholesterol level may also be high  The level should be less than 150 mg/dL  Any of the following can increase your risk for high cholesterol:   · Smoking cigarettes    · Being overweight or obese, or not getting enough exercise    · Drinking large amounts of alcohol    · A medical condition such as hypertension (high blood pressure) or diabetes    · Certain genes passed from your parents to you    · Age older than 65 years    What you need to know about having your cholesterol levels checked: Adults 21to 39years of age should have their cholesterol levels checked every 4 to 6 years  Adults 45 years or older should have their cholesterol checked every 1 to 2 years  You may need your cholesterol checked more often, or at a younger age, if you have risk factors for heart disease  You may also need to have your cholesterol checked more often if you have other health conditions, such as diabetes  Blood tests are used to check cholesterol levels  Blood tests measure your levels of triglycerides, LDL cholesterol, and HDL cholesterol  How healthy fats affect your cholesterol levels:  Healthy fats, also called unsaturated fats, help lower LDL cholesterol and triglyceride levels  Healthy fats include the following:  · Monounsaturated fats  are found in foods such as olive oil, canola oil, avocado, nuts, and olives  · Polyunsaturated fats,  such as omega 3 fats, are found in fish, such as salmon, trout, and tuna  They can also be found in plant foods such as flaxseed, walnuts, and soybeans      How unhealthy fats affect your cholesterol levels:  Unhealthy fats increase LDL cholesterol and triglyceride levels  They are found in foods high in cholesterol, saturated fat, and trans fat:  · Cholesterol  is found in eggs, dairy, and meat  · Saturated fat  is found in butter, cheese, ice cream, whole milk, and coconut oil  Saturated fat is also found in meat, such as sausage, hot dogs, and bologna  · Trans fat  is found in liquid oils and is used in fried and baked foods  Foods that contain trans fats include chips, crackers, muffins, sweet rolls, microwave popcorn, and cookies  Treatment  for high cholesterol will also decrease your risk of heart disease, heart attack, and stroke  Treatment may include any of the following:  · Lifestyle changes  may include food, exercise, weight loss, and quitting smoking  You may also need to decrease the amount of alcohol you drink  Your healthcare provider will want you to start with lifestyle changes  Other treatment may be added if lifestyle changes are not enough  Your healthcare provider may recommend you work with a team to manage hyperlipidemia  The team may include medical experts such as a dietitian, an exercise or physical therapist, and a behavior therapist  Your family members may be included in helping you create lifestyle changes  · Medicines  may be given to lower your LDL cholesterol, triglyceride levels, or total cholesterol level  You may need medicines to lower your cholesterol if any of the following is true:    ? You have a history of stroke, TIA, unstable angina, or a heart attack  ? Your LDL cholesterol level is 190 mg/dL or higher  ? You are age 36 to 76 years, have diabetes or heart disease risk factors, and your LDL cholesterol is 70 mg/dL or higher  · Supplements  include fish oil, red yeast rice, and garlic  Fish oil may help lower your triglyceride and LDL cholesterol levels  It may also increase your HDL cholesterol level  Red yeast rice may help decrease your total cholesterol level and LDL cholesterol level   Garlic may help lower your total cholesterol level  Do not take any supplements without talking to your healthcare provider  Food changes you can make to lower your cholesterol levels:  A dietitian can help you create a healthy eating plan  He or she can show you how to read food labels and choose foods low in saturated fat, trans fats, and cholesterol  · Decrease the total amount of fat you eat  Choose lean meats, fat-free or 1% fat milk, and low-fat dairy products, such as yogurt and cheese  Try to limit or avoid red meats  Limit or do not eat fried foods or baked goods, such as cookies  · Replace unhealthy fats with healthy fats  Cook foods in olive oil or canola oil  Choose soft margarines that are low in saturated fat and trans fat  Seeds, nuts, and avocados are other examples of healthy fats  · Eat foods with omega-3 fats  Examples include salmon, tuna, mackerel, walnuts, and flaxseed  Eat fish 2 times per week  Pregnant women should not eat fish that have high levels of mercury, such as shark, swordfish, and fabiano mackerel  · Increase the amount of high-fiber foods you eat  High-fiber foods can help lower your LDL cholesterol  Aim to get between 20 and 30 grams of fiber each day  Fruits and vegetables are high in fiber  Eat at least 5 servings each day  Other high-fiber foods are whole-grain or whole-wheat breads, pastas, or cereals, and brown rice  Eat 3 ounces of whole-grain foods each day  Increase fiber slowly  You may have abdominal discomfort, bloating, and gas if you add fiber to your diet too quickly  · Eat healthy protein foods  Examples include low-fat dairy products, skinless chicken and turkey, fish, and nuts  · Limit foods and drinks that are high in sugar  Your dietitian or healthcare provider can help you create daily limits for high-sugar foods and drinks  The limit may be lower if you have diabetes or another health condition   Limits can also help you lose weight if needed  Lifestyle changes you can make to lower your cholesterol levels:   · Maintain a healthy weight  Ask your healthcare provider what a healthy weight is for you  Ask him or her to help you create a weight loss plan if needed  Weight loss can decrease your total cholesterol and triglyceride levels  Weight loss may also help keep your blood pressure at a healthy level  · Be physically active throughout the day  Physical activity, such as exercise, can help lower your total cholesterol level and maintain a healthy weight  Physical activity can also help increase your HDL cholesterol level  Work with your healthcare provider to create an program that is right for you  Get at least 30 to 40 minutes of moderate physical activity most days of the week  Examples of exercise include brisk walking, swimming, or biking  Also include strength training at least 2 times each week  Your healthcare providers can help you create a physical activity plan  · Do not smoke  Nicotine and other chemicals in cigarettes and cigars can raise your cholesterol levels  Ask your healthcare provider for information if you currently smoke and need help to quit  E-cigarettes or smokeless tobacco still contain nicotine  Talk to your healthcare provider before you use these products  · Limit or do not drink alcohol  Alcohol can increase your triglyceride levels  Ask your healthcare provider before you drink alcohol  Ask how much is okay for you to drink in 24 hours or 1 week  Follow up with your doctor as directed:  Write down your questions so you remember to ask them during your visits  © Copyright [employer] 2021 Information is for End User's use only and may not be sold, redistributed or otherwise used for commercial purposes  All illustrations and images included in CareNotes® are the copyrighted property of [employer]  or James Celestin   The above information is an  only   It is not intended as medical advice for individual conditions or treatments  Talk to your doctor, nurse or pharmacist before following any medical regimen to see if it is safe and effective for you  Please contact your insurance if you are uncertain of coverage for plan of care items  Your insurance may not cover the cost of your Vitamin D blood test, which is approximately $65-70    Please notify the lab prior to blood draw if you would like to decline this test

## 2021-09-18 ENCOUNTER — PATIENT MESSAGE (OUTPATIENT)
Dept: FAMILY MEDICINE CLINIC | Facility: CLINIC | Age: 46
End: 2021-09-18

## 2021-09-18 ENCOUNTER — LAB (OUTPATIENT)
Dept: LAB | Age: 46
End: 2021-09-18
Payer: OTHER GOVERNMENT

## 2021-09-18 DIAGNOSIS — E78.5 HYPERLIPIDEMIA, UNSPECIFIED HYPERLIPIDEMIA TYPE: ICD-10-CM

## 2021-09-18 DIAGNOSIS — E66.01 SEVERE OBESITY (BMI 35.0-39.9) WITH COMORBIDITY (HCC): ICD-10-CM

## 2021-09-18 DIAGNOSIS — E66.9 OBESITY (BMI 35.0-39.9 WITHOUT COMORBIDITY): ICD-10-CM

## 2021-09-18 DIAGNOSIS — M79.10 MYALGIA: ICD-10-CM

## 2021-09-18 DIAGNOSIS — Z13.1 SCREENING FOR DIABETES MELLITUS: ICD-10-CM

## 2021-09-18 DIAGNOSIS — E66.9 CLASS 2 OBESITY WITH BODY MASS INDEX (BMI) OF 37.0 TO 37.9 IN ADULT, UNSPECIFIED OBESITY TYPE, UNSPECIFIED WHETHER SERIOUS COMORBIDITY PRESENT: ICD-10-CM

## 2021-09-18 DIAGNOSIS — Z13.6 SCREENING FOR CARDIOVASCULAR CONDITION: ICD-10-CM

## 2021-09-18 DIAGNOSIS — R25.1 TREMOR: ICD-10-CM

## 2021-09-18 LAB
25(OH)D3 SERPL-MCNC: 56.4 NG/ML (ref 30–100)
ALBUMIN SERPL BCP-MCNC: 3.4 G/DL (ref 3.5–5)
ALP SERPL-CCNC: 61 U/L (ref 46–116)
ALT SERPL W P-5'-P-CCNC: 27 U/L (ref 12–78)
ANION GAP SERPL CALCULATED.3IONS-SCNC: 3 MMOL/L (ref 4–13)
AST SERPL W P-5'-P-CCNC: 16 U/L (ref 5–45)
BASOPHILS # BLD AUTO: 0.04 THOUSANDS/ΜL (ref 0–0.1)
BASOPHILS NFR BLD AUTO: 1 % (ref 0–1)
BILIRUB SERPL-MCNC: 0.32 MG/DL (ref 0.2–1)
BUN SERPL-MCNC: 16 MG/DL (ref 5–25)
CALCIUM ALBUM COR SERPL-MCNC: 9.2 MG/DL (ref 8.3–10.1)
CALCIUM SERPL-MCNC: 8.7 MG/DL (ref 8.3–10.1)
CHLORIDE SERPL-SCNC: 110 MMOL/L (ref 100–108)
CHOLEST SERPL-MCNC: 151 MG/DL (ref 50–200)
CO2 SERPL-SCNC: 28 MMOL/L (ref 21–32)
CREAT SERPL-MCNC: 0.51 MG/DL (ref 0.6–1.3)
EOSINOPHIL # BLD AUTO: 0.11 THOUSAND/ΜL (ref 0–0.61)
EOSINOPHIL NFR BLD AUTO: 2 % (ref 0–6)
ERYTHROCYTE [DISTWIDTH] IN BLOOD BY AUTOMATED COUNT: 13.2 % (ref 11.6–15.1)
EST. AVERAGE GLUCOSE BLD GHB EST-MCNC: 114 MG/DL
GFR SERPL CREATININE-BSD FRML MDRD: 116 ML/MIN/1.73SQ M
GLUCOSE P FAST SERPL-MCNC: 89 MG/DL (ref 65–99)
HBA1C MFR BLD: 5.6 %
HCT VFR BLD AUTO: 41.5 % (ref 34.8–46.1)
HDLC SERPL-MCNC: 49 MG/DL
HGB BLD-MCNC: 13.6 G/DL (ref 11.5–15.4)
IMM GRANULOCYTES # BLD AUTO: 0.01 THOUSAND/UL (ref 0–0.2)
IMM GRANULOCYTES NFR BLD AUTO: 0 % (ref 0–2)
LDLC SERPL CALC-MCNC: 84 MG/DL (ref 0–100)
LDLC SERPL DIRECT ASSAY-MCNC: 81 MG/DL (ref 0–100)
LYMPHOCYTES # BLD AUTO: 1.79 THOUSANDS/ΜL (ref 0.6–4.47)
LYMPHOCYTES NFR BLD AUTO: 34 % (ref 14–44)
MAGNESIUM SERPL-MCNC: 2.3 MG/DL (ref 1.6–2.6)
MCH RBC QN AUTO: 29.4 PG (ref 26.8–34.3)
MCHC RBC AUTO-ENTMCNC: 32.8 G/DL (ref 31.4–37.4)
MCV RBC AUTO: 90 FL (ref 82–98)
MONOCYTES # BLD AUTO: 0.4 THOUSAND/ΜL (ref 0.17–1.22)
MONOCYTES NFR BLD AUTO: 8 % (ref 4–12)
NEUTROPHILS # BLD AUTO: 2.99 THOUSANDS/ΜL (ref 1.85–7.62)
NEUTS SEG NFR BLD AUTO: 55 % (ref 43–75)
NONHDLC SERPL-MCNC: 102 MG/DL
NRBC BLD AUTO-RTO: 0 /100 WBCS
PLATELET # BLD AUTO: 401 THOUSANDS/UL (ref 149–390)
PMV BLD AUTO: 10 FL (ref 8.9–12.7)
POTASSIUM SERPL-SCNC: 4 MMOL/L (ref 3.5–5.3)
PROT SERPL-MCNC: 6.9 G/DL (ref 6.4–8.2)
RBC # BLD AUTO: 4.63 MILLION/UL (ref 3.81–5.12)
SODIUM SERPL-SCNC: 141 MMOL/L (ref 136–145)
TRIGL SERPL-MCNC: 92 MG/DL
TSH SERPL DL<=0.05 MIU/L-ACNC: 1.97 UIU/ML (ref 0.36–3.74)
WBC # BLD AUTO: 5.34 THOUSAND/UL (ref 4.31–10.16)

## 2021-09-18 PROCEDURE — 83721 ASSAY OF BLOOD LIPOPROTEIN: CPT

## 2021-09-18 PROCEDURE — 80061 LIPID PANEL: CPT

## 2021-09-18 PROCEDURE — 80053 COMPREHEN METABOLIC PANEL: CPT

## 2021-09-18 PROCEDURE — 84443 ASSAY THYROID STIM HORMONE: CPT

## 2021-09-18 PROCEDURE — 36415 COLL VENOUS BLD VENIPUNCTURE: CPT

## 2021-09-18 PROCEDURE — 85025 COMPLETE CBC W/AUTO DIFF WBC: CPT

## 2021-09-18 PROCEDURE — 83735 ASSAY OF MAGNESIUM: CPT

## 2021-09-18 PROCEDURE — 83036 HEMOGLOBIN GLYCOSYLATED A1C: CPT

## 2021-09-18 PROCEDURE — 82306 VITAMIN D 25 HYDROXY: CPT

## 2021-09-19 NOTE — RESULT ENCOUNTER NOTE
Stable labs  Hemoglobin A1c is normal at 5 6, but close to prediabetes  Recommend lifestyle modifications - low carb, low sugar diet, exercise, and weight loss  Elevated chloride, slightly low anion gap, low creatinine, low albumin, stable slightly elevated platelets (may be a sign of inflammation - hips) are not clinically worrisome  No action is needed at this time  All other labs stable        Message sent to patient via [employer] patient portal

## 2021-09-20 NOTE — TELEPHONE ENCOUNTER
From: Abhi Villagran  To: Mary Mora DO  Sent: 9/18/2021 5:15 PM EDT  Subject: Test Results Question    What is Basophils? Says I have 1

## 2021-09-20 NOTE — TELEPHONE ENCOUNTER
From: Adelso Holm  To: Rody Boyle DO  Sent: 9/18/2021 5:24 PM EDT  Subject: Test Results Question    Can you help me understand the blood test results? Do the results that are in green, mean everything is normal levels?

## 2021-10-16 ENCOUNTER — HOSPITAL ENCOUNTER (OUTPATIENT)
Dept: RADIOLOGY | Age: 46
Discharge: HOME | End: 2021-10-16
Payer: OTHER GOVERNMENT

## 2021-10-16 VITALS — BODY MASS INDEX: 36.19 KG/M2 | HEIGHT: 64 IN | WEIGHT: 212 LBS

## 2021-10-16 DIAGNOSIS — Z12.31 ENCOUNTER FOR SCREENING MAMMOGRAM FOR MALIGNANT NEOPLASM OF BREAST: ICD-10-CM

## 2021-10-16 PROCEDURE — 77063 BREAST TOMOSYNTHESIS BI: CPT

## 2021-10-16 PROCEDURE — 77067 SCR MAMMO BI INCL CAD: CPT

## 2021-10-17 ENCOUNTER — PATIENT MESSAGE (OUTPATIENT)
Dept: FAMILY MEDICINE CLINIC | Facility: CLINIC | Age: 46
End: 2021-10-17

## 2021-10-22 ENCOUNTER — PATIENT MESSAGE (OUTPATIENT)
Dept: FAMILY MEDICINE CLINIC | Facility: CLINIC | Age: 46
End: 2021-10-22

## 2021-11-18 ENCOUNTER — OFFICE VISIT (OUTPATIENT)
Dept: OBGYN CLINIC | Facility: CLINIC | Age: 46
End: 2021-11-18
Payer: OTHER GOVERNMENT

## 2021-11-18 VITALS
BODY MASS INDEX: 36.37 KG/M2 | HEIGHT: 64 IN | SYSTOLIC BLOOD PRESSURE: 116 MMHG | WEIGHT: 213 LBS | DIASTOLIC BLOOD PRESSURE: 64 MMHG

## 2021-11-18 DIAGNOSIS — Z12.31 ENCOUNTER FOR SCREENING MAMMOGRAM FOR MALIGNANT NEOPLASM OF BREAST: ICD-10-CM

## 2021-11-18 DIAGNOSIS — Z01.419 WELL FEMALE EXAM WITH ROUTINE GYNECOLOGICAL EXAM: Primary | ICD-10-CM

## 2021-11-18 PROCEDURE — 99396 PREV VISIT EST AGE 40-64: CPT | Performed by: OBSTETRICS & GYNECOLOGY

## 2021-11-20 ENCOUNTER — PATIENT MESSAGE (OUTPATIENT)
Dept: FAMILY MEDICINE CLINIC | Facility: CLINIC | Age: 46
End: 2021-11-20

## 2021-11-24 ENCOUNTER — PATIENT MESSAGE (OUTPATIENT)
Dept: FAMILY MEDICINE CLINIC | Facility: CLINIC | Age: 46
End: 2021-11-24

## 2022-01-15 ENCOUNTER — PATIENT MESSAGE (OUTPATIENT)
Dept: FAMILY MEDICINE CLINIC | Facility: CLINIC | Age: 47
End: 2022-01-15

## 2022-01-17 NOTE — TELEPHONE ENCOUNTER
From: Oneal Brady  To: Pepper Doyle DO  Sent: 11/24/2021 3:43 PM EST  Subject: Arthritis     I was wondering who you recommend since my last orthopedic surgeon left [facility]? I now feel my arthritis in my right hip  Not sure how bad my arthritis has gotten now that its in both hips

## 2022-01-22 ENCOUNTER — PATIENT MESSAGE (OUTPATIENT)
Dept: FAMILY MEDICINE CLINIC | Facility: CLINIC | Age: 47
End: 2022-01-22

## 2022-01-22 DIAGNOSIS — Z11.9 ENCOUNTER FOR SCREENING FOR INFECTIOUS AND PARASITIC DISEASES, UNSPECIFIED: Primary | ICD-10-CM

## 2022-01-24 ENCOUNTER — PATIENT MESSAGE (OUTPATIENT)
Dept: FAMILY MEDICINE CLINIC | Facility: CLINIC | Age: 47
End: 2022-01-24

## 2022-01-24 NOTE — TELEPHONE ENCOUNTER
From: Jair Neumann  To: Lisbet Boyle DO  Sent: 11/24/2021 3:43 PM EST  Subject: Arthritis     I was wondering who you recommend since my last orthopedic surgeon left [facility]? I now feel my arthritis in my right hip  Not sure how bad my arthritis has gotten now that its in both hips

## 2022-01-24 NOTE — TELEPHONE ENCOUNTER
From: Louis Mittal  To: Steven Alexander DO  Sent: 11/24/2021 3:43 PM EST  Subject: Arthritis     I was wondering who you recommend since my last orthopedic surgeon left [facility]? I now feel my arthritis in my right hip  Not sure how bad my arthritis has gotten now that its in both hips

## 2022-02-28 ENCOUNTER — TELEPHONE (OUTPATIENT)
Dept: GASTROENTEROLOGY | Facility: CLINIC | Age: 47
End: 2022-02-28

## 2022-02-28 NOTE — TELEPHONE ENCOUNTER
Left message to reschedule appt 3/2 with Dr Luiza Pradhan   I offered her 3/15  3/16 with Dr Trino Fowler

## 2022-03-02 ENCOUNTER — CONSULT (OUTPATIENT)
Dept: GASTROENTEROLOGY | Facility: AMBULARY SURGERY CENTER | Age: 47
End: 2022-03-02
Payer: OTHER GOVERNMENT

## 2022-03-02 VITALS
DIASTOLIC BLOOD PRESSURE: 60 MMHG | RESPIRATION RATE: 18 BRPM | BODY MASS INDEX: 36.56 KG/M2 | HEIGHT: 64 IN | SYSTOLIC BLOOD PRESSURE: 106 MMHG

## 2022-03-02 DIAGNOSIS — Z12.11 SCREENING FOR COLORECTAL CANCER: ICD-10-CM

## 2022-03-02 DIAGNOSIS — Z12.12 SCREENING FOR COLORECTAL CANCER: ICD-10-CM

## 2022-03-02 PROCEDURE — 99203 OFFICE O/P NEW LOW 30 MIN: CPT | Performed by: PHYSICIAN ASSISTANT

## 2022-03-02 RX ORDER — SODIUM PICOSULFATE, MAGNESIUM OXIDE, AND ANHYDROUS CITRIC ACID 10; 3.5; 12 MG/160ML; G/160ML; G/160ML
1 LIQUID ORAL ONCE
Qty: 320 ML | Refills: 0 | Status: SHIPPED | OUTPATIENT
Start: 2022-03-02 | End: 2022-03-02

## 2022-03-02 NOTE — PATIENT INSTRUCTIONS
Scheduled date of colonoscopy (as of today):5/23/2022  Physician performing colonoscopy:dr erazo  Location of colonoscopy:[facility]  Bowel prep reviewed with patient:KEV MCNAMARA PA-C/PABLO GIVEN  Instructions reviewed with patient by:CHAI MCCRAY  Clearances:

## 2022-03-02 NOTE — PROGRESS NOTES
[facility] - Outpatient Consultation  Kasie Zelaya 40 y o  female MRN: [id number]  Encounter: [id number]          ASSESSMENT AND PLAN:      1  Screening for colorectal cancer  No prior colonoscopy  Patient without GI complaints  Hemoglobin last year was normal   No blood thinners  No family history of colon cancer       -schedule colonoscopy  -clenpiq ordered  Can use MiraLax with magnesium citrate if not covered   -discussed risks of the procedure including bleeding, infection and perforation  Patient will follow up after procedure as needed  ______________________________________________________________________    HPI:      Kasie Zelaya is a 55-year-old female with past medical history of osteoarthritis, scoliosis, hyperlipidemia who presents as a new patient for colon cancer screening  Lab work from 2021 with normal hemoglobin, platelets 607  CMP with normal liver function tests and kidney function  Patient reports she is doing well and has no complaints at this time  She denies any change in bowel movements, constipation, diarrhea, blood in stool, decreased appetite, unintentional weight loss or abdominal pain  She also denies any nausea, vomiting, reflux, dysphagia or odynophagia  Abdominal surgeries consistent for appendectomy and   Family history is without known colon cancer  Denies smoking or alcohol use  No prior EGD or colonoscopy  REVIEW OF SYSTEMS:    CONSTITUTIONAL: Denies any fever, chills, rigors, and weight loss  HEENT: No earache or tinnitus  Denies hearing loss or visual disturbances  CARDIOVASCULAR: No chest pain or palpitations  RESPIRATORY: Denies any cough, hemoptysis, shortness of breath or dyspnea on exertion  GASTROINTESTINAL: As noted in the History of Present Illness  GENITOURINARY: No problems with urination  Denies any hematuria or dysuria    NEUROLOGIC: No dizziness or vertigo, denies headaches  MUSCULOSKELETAL: Denies any muscle or joint pain  SKIN: Denies skin rashes or itching  ENDOCRINE: Denies excessive thirst  Denies intolerance to heat or cold  PSYCHOSOCIAL: Denies depression or anxiety  Denies any recent memory loss         Historical Information   Past Medical History:   Diagnosis Date    Abnormal Pap smear of cervix     HPV (human papilloma virus) infection 2007    Hyperlipidemia     Kidney stone 2015    Kidney stones, calcium oxalate 2015    Obesity     Pneumonia     Resolved 2017     Scoliosis     Tremor     Left Hand Resting Tremor    Varicella     Visual impairment     Wear glasses     Past Surgical History:   Procedure Laterality Date    APPENDECTOMY  2018    Appendix taken out     SECTION       x 1 ; Failure to progress    FL INJECTION LEFT HIP (NON ARTHROGRAM)  10/12/2020    NV LAP,APPENDECTOMY N/A 10/14/2018    Procedure: APPENDECTOMY LAPAROSCOPIC;  Surgeon: Latoya Hernandez MD;  [facility];  Service: General    WISDOM TOOTH EXTRACTION       Social History   Social History     Substance and Sexual Activity   Alcohol Use Yes    Alcohol/week: 1 0 standard drink    Types: 1 Glasses of wine per week    Comment: occ  social     Social History     Substance and Sexual Activity   Drug Use No     Social History     Tobacco Use   Smoking Status Never Smoker   Smokeless Tobacco Never Used     Family History   Problem Relation Age of Onset    Hypertension Father     Alcohol abuse Father     Kidney disease Father         CKD on ESRD    Diabetes type II Father 48    No Known Problems Mother     Scoliosis Brother     Hip dysplasia Daughter     No Known Problems Maternal Grandmother     No Known Problems Maternal Grandfather     No Known Problems Paternal Grandmother     No Known Problems Paternal Grandfather     No Known Problems Maternal Aunt     No Known Problems Maternal Aunt     No Known Problems Paternal Aunt     No Known Problems Paternal Aunt     No Known Problems Paternal Aunt     No Known Problems Paternal Aunt        Meds/Allergies       Current Outpatient Medications:     Biotin 1 MG CAPS    Calcium Carbonate-Vit D-Min (CALCIUM 1200 PO)    cholecalciferol (VITAMIN D3) 1,000 units tablet    multivitamin (THERAGRAN) TABS    NON FORMULARY    Sod Picosulfate-Mag Ox-Cit Acd (Clenpiq) 10-3 5-12 MG-GM -GM/160ML SOLN    No Known Allergies        Objective     Blood pressure 106/60, resp  rate 18, height 5' 4" (1 626 m), not currently breastfeeding  Body mass index is 36 56 kg/m²  PHYSICAL EXAM:      General Appearance:   Alert, cooperative, no distress   HEENT:   Normocephalic, atraumatic, anicteric      Neck:  Supple, symmetrical, trachea midline   Lungs:   Clear to auscultation bilaterally; no rales, rhonchi or wheezing; respirations unlabored    Heart[de-identified]   Regular rate and rhythm; no murmur, rub, or gallop  Abdomen:   Soft, non-tender, non-distended; normal bowel sounds; no masses, no organomegaly    Genitalia:   Deferred    Rectal:   Deferred    Extremities:  No cyanosis, clubbing or edema    Pulses:  2+ and symmetric    Skin:  No jaundice, rashes, or lesions    Lymph nodes:  No palpable cervical lymphadenopathy        Lab Results:   No visits with results within 1 Day(s) from this visit     Latest known visit with results is:   Lab on 09/18/2021   Component Date Value    WBC 09/18/2021 5 34     RBC 09/18/2021 4 63     Hemoglobin 09/18/2021 13 6     Hematocrit 09/18/2021 41 5     MCV 09/18/2021 90     MCH 09/18/2021 29 4     MCHC 09/18/2021 32 8     RDW 09/18/2021 13 2     MPV 09/18/2021 10 0     Platelets 84/10/2602 401*    nRBC 09/18/2021 0     Neutrophils Relative 09/18/2021 55     Immat GRANS % 09/18/2021 0     Lymphocytes Relative 09/18/2021 34     Monocytes Relative 09/18/2021 8     Eosinophils Relative 09/18/2021 2     Basophils Relative 09/18/2021 1     Neutrophils Absolute 09/18/2021 2 99     Immature Grans Absolute 09/18/2021 0 01     Lymphocytes Absolute 09/18/2021 1 79     Monocytes Absolute 09/18/2021 0 40     Eosinophils Absolute 09/18/2021 0 11     Basophils Absolute 09/18/2021 0 04     Sodium 09/18/2021 141     Potassium 09/18/2021 4 0     Chloride 09/18/2021 110*    CO2 09/18/2021 28     ANION GAP 09/18/2021 3*    BUN 09/18/2021 16     Creatinine 09/18/2021 0 51*    Glucose, Fasting 09/18/2021 89     Calcium 09/18/2021 8 7     Corrected Calcium 09/18/2021 9 2     AST 09/18/2021 16     ALT 09/18/2021 27     Alkaline Phosphatase 09/18/2021 61     Total Protein 09/18/2021 6 9     Albumin 09/18/2021 3 4*    Total Bilirubin 09/18/2021 0 32     eGFR 09/18/2021 116     Cholesterol 09/18/2021 151     Triglycerides 09/18/2021 92     HDL, Direct 09/18/2021 49     LDL Calculated 09/18/2021 84     Non-HDL-Chol (CHOL-HDL) 09/18/2021 102     TSH 3RD GENERATON 09/18/2021 1 970     Hemoglobin A1C 09/18/2021 5 6     EAG 09/18/2021 114     Vit D, 25-Hydroxy 09/18/2021 56 4     LDL Direct 09/18/2021 81     Magnesium 09/18/2021 2 3          Radiology Results:   No results found

## 2022-05-09 ENCOUNTER — ANESTHESIA (OUTPATIENT)
Dept: ANESTHESIOLOGY | Facility: HOSPITAL | Age: 47
End: 2022-05-09

## 2022-05-09 ENCOUNTER — ANESTHESIA EVENT (OUTPATIENT)
Dept: ANESTHESIOLOGY | Facility: HOSPITAL | Age: 47
End: 2022-05-09

## 2022-05-23 ENCOUNTER — ANESTHESIA EVENT (OUTPATIENT)
Dept: GASTROENTEROLOGY | Facility: AMBULARY SURGERY CENTER | Age: 47
End: 2022-05-23

## 2022-05-23 ENCOUNTER — HOSPITAL ENCOUNTER (OUTPATIENT)
Dept: GASTROENTEROLOGY | Facility: AMBULARY SURGERY CENTER | Age: 47
Setting detail: OUTPATIENT SURGERY
Discharge: HOME | End: 2022-05-23
Attending: INTERNAL MEDICINE
Payer: OTHER GOVERNMENT

## 2022-05-23 ENCOUNTER — ANESTHESIA (OUTPATIENT)
Dept: GASTROENTEROLOGY | Facility: AMBULARY SURGERY CENTER | Age: 47
End: 2022-05-23

## 2022-05-23 VITALS
OXYGEN SATURATION: 98 % | SYSTOLIC BLOOD PRESSURE: 110 MMHG | BODY MASS INDEX: 34.82 KG/M2 | RESPIRATION RATE: 18 BRPM | HEIGHT: 65 IN | WEIGHT: 209 LBS | DIASTOLIC BLOOD PRESSURE: 64 MMHG | TEMPERATURE: 97.2 F | HEART RATE: 70 BPM

## 2022-05-23 DIAGNOSIS — Z12.12 SCREENING FOR COLORECTAL CANCER: ICD-10-CM

## 2022-05-23 DIAGNOSIS — Z12.11 SCREENING FOR COLORECTAL CANCER: ICD-10-CM

## 2022-05-23 PROBLEM — Z86.010 HISTORY OF COLON POLYPS: Status: ACTIVE | Noted: 2022-05-01

## 2022-05-23 PROBLEM — Z86.0100 HISTORY OF COLON POLYPS: Status: ACTIVE | Noted: 2022-05-01

## 2022-05-23 PROCEDURE — 88305 TISSUE EXAM BY PATHOLOGIST: CPT | Performed by: PATHOLOGY

## 2022-05-23 PROCEDURE — 45380 COLONOSCOPY AND BIOPSY: CPT | Performed by: INTERNAL MEDICINE

## 2022-05-23 PROCEDURE — 45385 COLONOSCOPY W/LESION REMOVAL: CPT | Performed by: INTERNAL MEDICINE

## 2022-05-23 RX ORDER — SODIUM CHLORIDE, SODIUM LACTATE, POTASSIUM CHLORIDE, CALCIUM CHLORIDE 600; 310; 30; 20 MG/100ML; MG/100ML; MG/100ML; MG/100ML
INJECTION, SOLUTION INTRAVENOUS CONTINUOUS PRN
Status: DISCONTINUED | OUTPATIENT
Start: 2022-05-23 | End: 2022-05-23

## 2022-05-23 RX ORDER — PROPOFOL 10 MG/ML
INJECTION, EMULSION INTRAVENOUS AS NEEDED
Status: DISCONTINUED | OUTPATIENT
Start: 2022-05-23 | End: 2022-05-23

## 2022-05-23 RX ORDER — SIMETHICONE 20 MG/.3ML
EMULSION ORAL CODE/TRAUMA/SEDATION MEDICATION
Status: COMPLETED | OUTPATIENT
Start: 2022-05-23 | End: 2022-05-23

## 2022-05-23 RX ADMIN — SODIUM CHLORIDE, SODIUM LACTATE, POTASSIUM CHLORIDE, AND CALCIUM CHLORIDE: .6; .31; .03; .02 INJECTION, SOLUTION INTRAVENOUS at 10:56

## 2022-05-23 RX ADMIN — PROPOFOL 50 MG: 10 INJECTION, EMULSION INTRAVENOUS at 11:09

## 2022-05-23 RX ADMIN — PROPOFOL 150 MG: 10 INJECTION, EMULSION INTRAVENOUS at 10:59

## 2022-05-23 RX ADMIN — PROPOFOL 50 MG: 10 INJECTION, EMULSION INTRAVENOUS at 11:02

## 2022-05-23 RX ADMIN — PROPOFOL 50 MG: 10 INJECTION, EMULSION INTRAVENOUS at 11:04

## 2022-05-23 RX ADMIN — Medication 40 MG: at 11:03

## 2022-05-23 RX ADMIN — PROPOFOL 50 MG: 10 INJECTION, EMULSION INTRAVENOUS at 11:06

## 2022-05-23 NOTE — H&P
History and Physical - [facility]  Amy Aguirre 40 y o  female MRN: [id number]                  HPI: Amy Aguirre is a 40y o  year old female who presents for index screening colonoscopy      REVIEW OF SYSTEMS: Per the HPI, and otherwise unremarkable      Historical Information   Past Medical History:   Diagnosis Date    Abnormal Pap smear of cervix     HPV (human papilloma virus) infection     Hyperlipidemia     Kidney stone 2015    Kidney stones, calcium oxalate 2015    Obesity     Pneumonia     Resolved 2017     Scoliosis     Tremor     Left Hand Resting Tremor    Varicella     Visual impairment     Wear glasses     Past Surgical History:   Procedure Laterality Date    APPENDECTOMY  2018    Appendix taken out     SECTION       x 1 ; Failure to progress    FL INJECTION LEFT HIP (NON ARTHROGRAM)  10/12/2020    CT LAP,APPENDECTOMY N/A 10/14/2018    Procedure: APPENDECTOMY LAPAROSCOPIC;  Surgeon: Paddy Hopson MD;  [facility];  Service: General    WISDOM TOOTH EXTRACTION       Social History   Social History     Substance and Sexual Activity   Alcohol Use Yes    Alcohol/week: 1 0 standard drink    Types: 1 Glasses of wine per week    Comment: occ  social     Social History     Substance and Sexual Activity   Drug Use No     Social History     Tobacco Use   Smoking Status Never Smoker   Smokeless Tobacco Never Used     Family History   Problem Relation Age of Onset    Hypertension Father     Alcohol abuse Father     Kidney disease Father         CKD on ESRD    Diabetes type II Father 48    No Known Problems Mother     Scoliosis Brother     Hip dysplasia Daughter     No Known Problems Maternal Grandmother     No Known Problems Maternal Grandfather     No Known Problems Paternal Grandmother     No Known Problems Paternal Grandfather     No Known Problems Maternal Aunt     No Known Problems Maternal Aunt     No Known Problems Paternal Aunt     No Known Problems Paternal Aunt     No Known Problems Paternal Aunt     No Known Problems Paternal Aunt        Meds/Allergies       Current Outpatient Medications:     Biotin 1 MG CAPS    Calcium Carbonate-Vit D-Min (CALCIUM 1200 PO)    cholecalciferol (VITAMIN D3) 1,000 units tablet    multivitamin (THERAGRAN) TABS    NON FORMULARY    No Known Allergies    Objective     /78   Pulse 81   Temp (!) 97 1 °F (36 2 °C) (Temporal)   Resp 16   Ht 5' 5" (1 651 m)   Wt 94 8 kg (209 lb)   SpO2 97%   BMI 34 78 kg/m²       PHYSICAL EXAM    Gen: NAD  Head: NCAT  CV: RRR  CHEST: Clear  ABD: soft, NT/ND  EXT: no edema      ASSESSMENT/PLAN:  This is a 40y o  year old female here for index screening colonoscopy, and she is stable and optimized for her procedure

## 2022-05-23 NOTE — ANESTHESIA POSTPROCEDURE EVALUATION
Post-Op Assessment Note    CV Status:  Stable    Pain management: adequate     Mental Status:  Alert and awake   Hydration Status:  Euvolemic   PONV Controlled:  Controlled   Airway Patency:  Patent      Post Op Vitals Reviewed: Yes      Staff: CRNA, Anesthesiologist         No complications documented      BP     116 /81   Temp 98   Pulse 76   Resp 15   SpO2 100

## 2022-05-23 NOTE — ANESTHESIA PREPROCEDURE EVALUATION
Procedure:  COLONOSCOPY    Relevant Problems   CARDIO   (+) Hyperlipidemia      /RENAL   (+) Kidney stones, calcium oxalate      MUSCULOSKELETAL   (+) Osteoarthritis resulting from left hip dysplasia      Other   (+) Obesity   (+) Scoliosis concern   (+) Screening for colorectal cancer        Physical Exam    Airway    Mallampati score: I  TM Distance: >3 FB  Neck ROM: full     Dental   No notable dental hx     Cardiovascular  Rhythm: regular, Rate: normal, Cardiovascular exam normal    Pulmonary  Pulmonary exam normal Breath sounds clear to auscultation,     Other Findings        Anesthesia Plan  ASA Score- 2     Anesthesia Type- IV sedation with anesthesia with ASA Monitors  Additional Monitors:   Airway Plan:           Plan Factors-Exercise tolerance (METS): >4 METS  Chart reviewed  Patient summary reviewed  Patient is not a current smoker  Induction-     Postoperative Plan-     Informed Consent- Anesthetic plan and risks discussed with patient  I personally reviewed this patient with the CRNA  Discussed and agreed on the Anesthesia Plan with the CRNA  Grant Hernandez

## 2022-05-28 ENCOUNTER — PATIENT MESSAGE (OUTPATIENT)
Dept: FAMILY MEDICINE CLINIC | Facility: CLINIC | Age: 47
End: 2022-05-28

## 2022-05-28 LAB — SARS-COV-2 AG UPPER RESP QL IA: ABNORMAL

## 2022-05-31 ENCOUNTER — DOCUMENTATION (OUTPATIENT)
Dept: FAMILY MEDICINE CLINIC | Facility: CLINIC | Age: 47
End: 2022-05-31

## 2022-05-31 ENCOUNTER — TELEMEDICINE (OUTPATIENT)
Dept: FAMILY MEDICINE CLINIC | Facility: CLINIC | Age: 47
End: 2022-05-31
Payer: OTHER GOVERNMENT

## 2022-05-31 VITALS — HEIGHT: 65 IN | BODY MASS INDEX: 34.78 KG/M2

## 2022-05-31 DIAGNOSIS — U07.1 COVID-19: Primary | ICD-10-CM

## 2022-05-31 DIAGNOSIS — E66.9 CLASS 1 OBESITY WITH BODY MASS INDEX (BMI) OF 34.0 TO 34.9 IN ADULT, UNSPECIFIED OBESITY TYPE, UNSPECIFIED WHETHER SERIOUS COMORBIDITY PRESENT: ICD-10-CM

## 2022-05-31 DIAGNOSIS — E78.5 HYPERLIPIDEMIA, UNSPECIFIED HYPERLIPIDEMIA TYPE: ICD-10-CM

## 2022-05-31 DIAGNOSIS — E66.9 OBESITY (BMI 30.0-34.9): ICD-10-CM

## 2022-05-31 PROCEDURE — 99213 OFFICE O/P EST LOW 20 MIN: CPT | Performed by: FAMILY MEDICINE

## 2022-05-31 PROCEDURE — 87811 SARS-COV-2 COVID19 W/OPTIC: CPT | Performed by: FAMILY MEDICINE

## 2022-05-31 NOTE — PATIENT INSTRUCTIONS
[address]    Your healthcare provider and/or public health staff have evaluated you and have determined that you do not need to remain in the hospital at this time  At this time you can be isolated at home where you will be monitored by staff from your local or state health department  You should carefully follow the prevention and isolation steps below until a healthcare provider or local or state health department says that you can return to your normal activities  Stay home except to get medical care    People who are mildly ill with COVID-19 are able to isolate at home during their illness  You should restrict activities outside your home, except for getting medical care  Do not go to work, school, or public areas  Avoid using public transportation, ride-sharing, or taxis  Separate yourself from other people and animals in your home    People: As much as possible, you should stay in a specific room and away from other people in your home  Also, you should use a separate bathroom, if available  Animals: You should restrict contact with pets and other animals while you are sick with COVID-19, just like you would around other people  Although there have not been reports of pets or other animals becoming sick with COVID-19, it is still recommended that people sick with COVID-19 limit contact with animals until more information is known about the virus  When possible, have another member of your household care for your animals while you are sick  If you are sick with COVID-19, avoid contact with your pet, including petting, snuggling, being kissed or licked, and sharing food  If you must care for your pet or be around animals while you are sick, wash your hands before and after you interact with pets and wear a facemask  See COVID-19 and Animals for more information      Call ahead before visiting your doctor    If you have a medical appointment, call the healthcare provider and tell them that you have or may have COVID-19  This will help the healthcare providers office take steps to keep other people from getting infected or exposed  Wear a facemask    You should wear a facemask when you are around other people (e g , sharing a room or vehicle) or pets and before you enter a healthcare providers office  If you are not able to wear a facemask (for example, because it causes trouble breathing), then people who live with you should not stay in the same room with you, or they should wear a facemask if they enter your room  Cover your coughs and sneezes    Cover your mouth and nose with a tissue when you cough or sneeze  Throw used tissues in a lined trash can  Immediately wash your hands with soap and water for at least 20 seconds or, if soap and water are not available, clean your hands with an alcohol-based hand  that contains at least 60% alcohol  Clean your hands often    Wash your hands often with soap and water for at least 20 seconds, especially after blowing your nose, coughing, or sneezing; going to the bathroom; and before eating or preparing food  If soap and water are not readily available, use an alcohol-based hand  with at least 60% alcohol, covering all surfaces of your hands and rubbing them together until they feel dry  Soap and water are the best option if hands are visibly dirty  Avoid touching your eyes, nose, and mouth with unwashed hands  Avoid sharing personal household items    You should not share dishes, drinking glasses, cups, eating utensils, towels, or bedding with other people or pets in your home  After using these items, they should be washed thoroughly with soap and water  Clean all high-touch surfaces everyday    High touch surfaces include counters, tabletops, doorknobs, bathroom fixtures, toilets, phones, keyboards, tablets, and bedside tables  Also, clean any surfaces that may have blood, stool, or body fluids on them   Use a household cleaning spray or wipe, according to the label instructions  Labels contain instructions for safe and effective use of the cleaning product including precautions you should take when applying the product, such as wearing gloves and making sure you have good ventilation during use of the product  Monitor your symptoms    Seek prompt medical attention if your illness is worsening (e g , difficulty breathing)  Before seeking care, call your healthcare provider and tell them that you have, or are being evaluated for, COVID-19  Put on a facemask before you enter the facility  These steps will help the healthcare providers office to keep other people in the office or waiting room from getting infected or exposed  Ask your healthcare provider to call the local or [facility] health department  Persons who are placed under active monitoring or facilitated self-monitoring should follow instructions provided by their local health department or occupational health professionals, as appropriate  If you have a medical emergency and need to call 911, notify the dispatch personnel that you have, or are being evaluated for COVID-19  If possible, put on a facemask before emergency medical services arrive  Discontinuing home isolation    Patients with confirmed COVID-19 should remain under home isolation precautions until the following conditions are met:    They have had no fever for at least 24 hours (that is one full day of no fever without the use medicine that reduces fevers)  AND  other symptoms have improved (for example, when their cough or shortness of breath have improved)  AND  If had mild or moderate illness, at least 10 days have passed since their symptoms first appeared or if severe illness (needed oxygen) or immunosuppressed, at least 20 days have passed since symptoms first appeared  Patients with confirmed COVID-19 should also notify close contacts (including their workplace) and ask that they self-quarantine  Currently, close contact is defined as being within 6 feet for 15 minutes or more from the period 24 hours starting 48 hours before symptom onset to the time at which the patient went into isolation  Close contacts of patients diagnosed with COVID-19 should be instructed by the patient to self-quarantine for 14 days from the last time of their last contact with the patient  Source: [employer]        Obesity   AMBULATORY CARE:   Obesity  means your body mass index (BMI) is greater than 30  Your healthcare provider will use your height and weight to measure your BMI  The risks of obesity include  many health problems, including injuries or physical disability  Diabetes (high blood sugar level)    High blood pressure or high cholesterol    Heart disease    Stroke    Gallbladder or liver disease    Cancer of the colon, breast, prostate, liver, or kidney    Sleep apnea    Arthritis or gout    Screening  is done to check for health conditions before you have signs or symptoms  If you are 28to 79years old, your blood sugar level may be checked every 3 years for signs of prediabetes or diabetes  Your healthcare provider will check your blood pressure at each visit  High blood pressure can lead to a stroke or other problems  Your provider may check for signs of heart disease, cancer, or other health problems  Seek care immediately if:   You have a severe headache, confusion, or difficulty speaking  You have weakness on one side of your body  You have chest pain, sweating, or shortness of breath  Call your doctor if:   You have symptoms of gallbladder or liver disease, such as pain in your upper abdomen  You have knee or hip pain and discomfort while walking  You have symptoms of diabetes, such as intense hunger and thirst, and frequent urination      You have symptoms of sleep apnea, such as snoring or daytime sleepiness  You have questions or concerns about your condition or care  Treatment for obesity  focuses on helping you lose weight to improve your health  Even a small decrease in BMI can reduce the risk for many health problems  Your healthcare provider will help you set a weight-loss goal   Lifestyle changes  are the first step in treating obesity  These include making healthy food choices and getting regular physical activity  Your healthcare provider may suggest a weight-loss program that involves coaching, education, and therapy  Medicine  may help you lose weight when it is used with a healthy foods and physical activity  Surgery  can help you lose weight if you are very obese and have other health problems  There are several types of weight-loss surgery  Ask your healthcare provider for more information  Tips for safe weight loss:   Set small, realistic goals  An example of a small goal is to walk for 20 minutes 5 days a week  Anther goal is to lose 5% of your body weight  Tell friends, family members, and coworkers about your goals  and ask for their support  Ask a friend to lose weight with you, or join a weight-loss support group  Identify foods or triggers that may cause you to overeat , and find ways to avoid them  Remove tempting high-calorie foods from your home and workplace  Place a bowl of fresh fruit on your kitchen counter  If stress causes you to eat, then find other ways to cope with stress  A counselor or therapist may be able to help you  Keep a diary to track what you eat and drink  Also write down how many minutes of physical activity you do each day  Weigh yourself once a week and record it in your diary  Eating changes: You will need to eat 500 to 1,000 fewer calories each day than you currently eat to lose 1 to 2 pounds a week  The following changes will help you cut calories:  Eat smaller portions  Use small plates, no larger than 9 inches in diameter   Fill your plate half full of fruits and vegetables  Measure your food using measuring cups until you know what a serving size looks like  Eat 3 meals and 1 or 2 snacks each day  Plan your meals in advance  Yaneth Bermeo and eat at home most of the time  Eat slowly  Do not skip meals  Skipping meals can lead to overeating later in the day  This can make it harder for you to lose weight  Talk with a dietitian to help you make a meal plan and schedule that is right for you  Eat fruits and vegetables at every meal   They are low in calories and high in fiber, which makes you feel full  Do not add butter, margarine, or cream sauce to vegetables  Use herbs to season steamed vegetables  Eat less fat and fewer fried foods  Eat more baked or grilled chicken and fish  These protein sources are lower in calories and fat than red meat  Limit fast food  Dress your salads with olive oil and vinegar instead of bottled dressing  Limit the amount of sugar you eat  Do not drink sugary beverages  Limit alcohol  Activity changes:  Physical activity is good for your body in many ways  It helps you burn calories and build strong muscles  It decreases stress and depression, and improves your mood  It can also help you sleep better  Talk to your healthcare provider before you begin an exercise program   Exercise for at least 30 minutes 5 days a week  Start slowly  Set aside time each day for physical activity that you enjoy and that is convenient for you  It is best to do both weight training and an activity that increases your heart rate, such as walking, bicycling, or swimming  Find ways to be more active  Do yard work and housecleaning  Walk up the stairs instead of using elevators  Spend your leisure time going to events that require walking, such as outdoor festivals or fairs  This extra physical activity can help you lose weight and keep it off  Follow up with your doctor as directed:   You may need to meet with a dietitian  Write down your questions so you remember to ask them during your visits  © Copyright [employer] 2022 Information is for End User's use only and may not be sold, redistributed or otherwise used for commercial purposes  All illustrations and images included in CareNotes® are the copyrighted property of [employer]  or James Hanley  The above information is an  only  It is not intended as medical advice for individual conditions or treatments  Talk to your doctor, nurse or pharmacist before following any medical regimen to see if it is safe and effective for you

## 2022-05-31 NOTE — TELEPHONE ENCOUNTER
From: Kai Later  To: Sole Rowe DO  Sent: 5/28/2022 12:59 PM EDT  Subject: COVID test     I tested positive today for COVID  Still waiting for my daughter Courtney Cronin test results  Is there anything I can do? Kandy Brannon already emailed my supervisor

## 2022-05-31 NOTE — PROGRESS NOTES
COVID-19 Outpatient Progress Note    Assessment/Plan:    Problem List Items Addressed This Visit        Other    Obesity    Hyperlipidemia      Other Visit Diagnoses     COVID-19    -  Primary    Obesity (BMI 30 0-34  9)        BMI 34 0-34 9,adult             Disposition:     Risks and benefits of COVID-19 vaccination was discussed with patient  Patient has COVID-19 infection  Based off CDC guidelines, they were recommended to isolate for 5 days from the date of the positive test  If they remain asymptomatic, isolation may be ended followed by 5 days of wearing a mask when around othes to minimize risk of infecting others  If they have a fever, continue to stay home until fever resolves for at least 24 hours  Discussed symptom directed medication options with patient  Complete 5 days of isolation through Wednesday, 06/01/2022, at the earliest ending on Thursday, 06/02/2022 if she meets fever criteria  She would need to mask for an additional 5 days  Patient was counseled regarding Paxlovid and declines therapy  She is aware that tomorrow, Wednesday, 06/01/2022, is the last day she would be eligible to start Paxlovid  She will call back if she is interested  Patient meets criteria for PAXLOVID and they have been counseled appropriately according to EUA documentation released by the FDA  After discussion, patient agrees to treatment  Penny Shown is an investigational medicine used to treat mild-to-moderate COVID-19 in adults and children (15years of age and older weighing at least 80 pounds (40 kg)) with positive results of direct SARS-CoV-2 viral testing, and who are at high risk for progression to severe COVID-19, including hospitalization or death  PAXLOVID is investigational because it is still being studied  There is limited information about the safety and effectiveness of using PAXLOVID to treat people with mild-to-moderate COVID-19      The FDA has authorized the emergency use of PAXLOVID for the treatment of mild-tomoderate COVID-19 in adults and children (15years of age and older weighing at least 80 pounds (40 kg)) with a positive test for the virus that causes COVID-19, and who are at high risk for progression to severe COVID-19, including hospitalization or death, under an EUA  What should I tell my healthcare provider before I take PAXLOVID? Tell your healthcare provider if you:  - Have any allergies  - Have liver or kidney disease  - Are pregnant or plan to become pregnant  - Are breastfeeding a child  - Have any serious illnesses    Tell your healthcare provider about all the medicines you take, including prescription and over-the-counter medicines, vitamins, and herbal supplements  Some medicines may interact with PAXLOVID and may cause serious side effects  Keep a list of your medicines to show your healthcare provider and pharmacist when you get a new medicine  You can ask your healthcare provider or pharmacist for a list of medicines that interact with PAXLOVID  Do not start taking a new medicine without telling your healthcare provider  Your healthcare provider can tell you if it is safe to take PAXLOVID with other medicines  Tell your healthcare provider if you are taking combined hormonal contraceptive  PAXLOVID may affect how your birth control pills work  Females who are able to become pregnant should use another effective alternative form of contraception or an additional barrier method of contraception  Talk to your healthcare provider if you have any questions about contraceptive methods that might be right for you  How do I take PAXLOVID? PAXLOVID consists of 2 medicines: nirmatrelvir and ritonavir  - Take 2 pink tablets of nirmatrelvir with 1 white tablet of ritonavir by mouth 2 times each day (in the morning and in the evening) for 5 days  For each dose, take all 3 tablets at the same time  - If you have kidney disease, talk to your healthcare provider   You may need a different dose  - Swallow the tablets whole  Do not chew, break, or crush the tablets  - Take PAXLOVID with or without food  - Do not stop taking PAXLOVID without talking to your healthcare provider, even if you feel better  - If you miss a dose of PAXLOVID within 8 hours of the time it is usually taken, take it as soon as you remember  If you miss a dose by more than 8 hours, skip the missed dose and take the next dose at your regular time  Do not take 2 doses of PAXLOVID at the same time  - If you take too much PAXLOVID, call your healthcare provider or go to the nearest hospital emergency room right away  - If you are taking a ritonavir- or cobicistat-containing medicine to treat hepatitis C or Human Immunodeficiency Virus (HIV), you should continue to take your medicine as prescribed by your healthcare provider   - Talk to your healthcare provider if you do not feel better or if you feel worse after 5 days  Who should generally not take PAXLOVID? Do not take PAXLOVID if:  You are allergic to nirmatrelvir, ritonavir, or any of the ingredients in PAXLOVID  You are taking any of the following medicines:  - Alfuzosin  - Pethidine, piroxicam, propoxyphene  - Ranolazine  - Amiodarone, dronedarone, flecainide, propafenone, quinidine  - Colchicine  - Lurasidone, pimozide, clozapine  - Dihydroergotamine, ergotamine, methylergonovine  - Lovastatin, simvastatin  - Sildenafil (Revatio®) for pulmonary arterial hypertension (PAH)  - Triazolam, oral midazolam  - Apalutamide  - Carbamazepine, phenobarbital, phenytoin  - Rifampin  - St  Getachews Wort (hypericum perforatum)    What are the important possible side effects of PAXLOVID? Possible side effects of PAXLOVID are:  - Liver Problems   Tell your healthcare provider right away if you have any of these signs and symptoms of liver problems: loss of appetite, yellowing of your skin and the whites of eyes (jaundice), dark-colored urine, pale colored stools and itchy skin, stomach area (abdominal) pain  - Resistance to HIV Medicines  If you have untreated HIV infection, PAXLOVID may lead to some HIV medicines not working as well in the future  - Other possible side effects include: altered sense of taste, diarrhea, high blood pressure, or muscle aches    These are not all the possible side effects of PAXLOVID  Not many people have taken PAXLOVID  Serious and unexpected side effects may happen  Johny Richard is still being studied, so it is possible that all of the risks are not known at this time  What other treatment choices are there? Like Jene Bloch may allow for the emergency use of other medicines to treat people with COVID-19  Go to https://[employer]/ for information on the emergency use of other medicines that are authorized by FDA to treat people with COVID-19  Your healthcare provider may talk with you about clinical trials for which you may be eligible  It is your choice to be treated or not to be treated with PAXLOVID  Should you decide not to receive it or for your child not to receive it, it will not change your standard medical care  What if I am pregnant or breastfeeding? There is no experience treating pregnant women or breastfeeding mothers with PAXLOVID  For a mother and unborn baby, the benefit of taking PAXLOVID may be greater than the risk from the treatment  If you are pregnant, discuss your options and specific situation with your healthcare provider  It is recommended that you use effective barrier contraception or do not have sexual activity while taking PAXLOVID  If you are breastfeeding, discuss your options and specific situation with your healthcare provider  How do I report side effects with PAXLOVID?     Contact your healthcare provider if you have any side effects that bother you or do not go away     Report side effects to FDA MedWatch at www fda gov/medwatch or call 9-569-AZR0533 or you can report side effects to [facility]  at the contact information provided below  Website Fax number Telephone number   [place] [phone]     How should I store [address]? Store PAXLOVID tablets at room temperature between 68°F to 77°F (20°C to 25°C)  Full fact sheet for patients, parents, and caregivers can be found at: [employer] bartholomew    I have spent 18 minutes directly with the patient  Greater than 50% of this time was spent in counseling/coordination of care regarding: diagnostic results, prognosis, risks and benefits of treatment options, instructions for management, patient and family education, importance of treatment compliance, risk factor reductions and impressions  Encounter provider Kevin Brownlee DO    Provider located at [address] [place] [id number] [phone]    Recent Visits  No visits were found meeting these conditions  Showing recent visits within past 7 days and meeting all other requirements  Today's Visits  Date Type Provider Dept   05/31/22 Telemedicine Sam Mccracken DO Pg  [facility] today's visits and meeting all other requirements  Future Appointments  No visits were found meeting these conditions  Showing future appointments within next 150 days and meeting all other requirements     This virtual check-in was done via [employer] and patient was informed that this is a secure, HIPAA-compliant platform  She agrees to proceed  Patient agrees to participate in a virtual check in via telephone or video visit instead of presenting to the office to address urgent/immediate medical needs  Patient is aware this is a billable service  After connecting through [facility], the patient was identified by name and date of birth  Clarisse Pelletier was informed that this was a telemedicine visit and that the exam was being conducted confidentially over secure lines  My office door was closed  No one else was in the room  Clarisse Pelletier acknowledged consent and understanding of privacy and security of the telemedicine visit  I informed the patient that I have reviewed her record in Epic and presented the opportunity for her to ask any questions regarding the visit today  The patient agreed to participate  Verification of patient location:  Patient is located in the following state in which I hold an active license: [place]    Subjective:   Clarisse Pelletier is a 40 y o  female who has been screened for COVID-19  Symptom change since last report: improving  Patient's symptoms include fatigue, nasal congestion, rhinorrhea, cough and myalgias  Patient denies fever, chills, sore throat, anosmia, loss of taste, shortness of breath, chest tightness, abdominal pain, nausea, vomiting, diarrhea and headaches  - Date of symptom onset: 5/27/2022  - Date of positive COVID-19 test: 5/28/2022  Type of test: Home antigen  Home antigen result verified by provider  Will get picture of test uploaded/scanned into patient's chart  COVID-19 vaccination status: Fully vaccinated (primary series)    Sarah Garay has been staying home and has isolated themselves in her home  She is taking care to not share personal items and is cleaning all surfaces that are touched often, like counters, tabletops, and doorknobs using household cleaning sprays or wipes  She is wearing a mask when she leaves her room  Last Tylenol at 5:00am today c benefit  Eating and drinking s difficulty        Lab Results   Component Value Date    SARSCOV2 Negative 05/17/2021     Past Medical History:   Diagnosis Date    Abnormal Pap smear of cervix     Arthritis     It was last year    COVID-19 05/27/2022    History of colon polyps 05/2022    HPV (human papilloma virus) infection 2007    Hyperlipidemia     Internal hemorrhoids 2022    Kidney stone 2015    Kidney stones, calcium oxalate 2015    Obesity     Pneumonia     Resolved 2017     Scoliosis     Tremor     Left Hand Resting Tremor    Varicella     Visual impairment     Wear glasses     Past Surgical History:   Procedure Laterality Date    APPENDECTOMY  2018    Appendix taken out     SECTION       x 1 ; Failure to progress    FL INJECTION LEFT HIP (NON ARTHROGRAM)  10/12/2020    AR LAP,APPENDECTOMY N/A 10/14/2018    Procedure: APPENDECTOMY LAPAROSCOPIC;  Surgeon: Princess Santiago MD;  [facility];  Service: General    WISDOM TOOTH EXTRACTION       Current Outpatient Medications   Medication Sig Dispense Refill    Biotin 1 MG CAPS Take 1 mg by mouth daily      Calcium Carbonate-Vit D-Min (CALCIUM 1200 PO) Take 1 tablet by mouth daily      cholecalciferol (VITAMIN D3) 1,000 units tablet Take 2,000 Units by mouth daily      multivitamin (THERAGRAN) TABS Take 1 tablet by mouth daily      NON FORMULARY Take 1 tablet by mouth daily CBD Gummies       No current facility-administered medications for this visit  No Known Allergies    Review of Systems   Constitutional: Positive for fatigue  Negative for chills and fever  HENT: Positive for congestion and rhinorrhea  Negative for sore throat  Respiratory: Positive for cough  Negative for chest tightness and shortness of breath  Gastrointestinal: Negative for abdominal pain, diarrhea, nausea and vomiting  Musculoskeletal: Positive for myalgias  Neurological: Negative for headaches  Objective:    Vitals:    22 1030   Height: 5' 5" (1 651 m)       Physical Exam  Vitals and nursing note reviewed  Constitutional:       General: She is not in acute distress  Appearance: Normal appearance  She is well-developed  She is obese  HENT:      Head: Normocephalic and atraumatic        Right Ear: External ear normal       Left Ear: External ear normal       Nose: Nose normal       Mouth/Throat:      Mouth: Mucous membranes are moist       Pharynx: No posterior oropharyngeal erythema  Eyes:      Extraocular Movements: Extraocular movements intact  Conjunctiva/sclera: Conjunctivae normal    Pulmonary:      Effort: Pulmonary effort is normal  No respiratory distress  Abdominal:      Palpations: Abdomen is soft  Tenderness: There is no abdominal tenderness  There is no guarding or rebound  Musculoskeletal:         General: No swelling  Cervical back: Normal range of motion  Right lower leg: No edema  Left lower leg: No edema  Skin:     General: Skin is warm and dry  Neurological:      General: No focal deficit present  Mental Status: She is alert and oriented to person, place, and time  Psychiatric:         Mood and Affect: Mood normal          VIRTUAL VISIT DISCLAIMER    Christie Perales verbally agrees to participate in [employer]  Pt is aware that [employer] could be limited without vital signs or the ability to perform a full hands-on physical exam  Suzie Marcano understands she or the provider may request at any time to terminate the video visit and request the patient to seek care or treatment in person  BMI Counseling: Body mass index is 34 78 kg/m²  The BMI is above normal  Nutrition recommendations include 3-5 servings of fruits/vegetables daily  Exercise recommendations include exercising 3-5 times per week

## 2022-08-03 ENCOUNTER — HOSPITAL ENCOUNTER (EMERGENCY)
Facility: HOSPITAL | Age: 47
Discharge: HOME | End: 2022-08-03
Attending: EMERGENCY MEDICINE
Payer: OTHER GOVERNMENT

## 2022-08-03 VITALS
SYSTOLIC BLOOD PRESSURE: 124 MMHG | WEIGHT: 223.99 LBS | OXYGEN SATURATION: 97 % | DIASTOLIC BLOOD PRESSURE: 76 MMHG | HEIGHT: 65 IN | RESPIRATION RATE: 16 BRPM | TEMPERATURE: 97.7 F | HEART RATE: 90 BPM | BODY MASS INDEX: 37.32 KG/M2

## 2022-08-03 DIAGNOSIS — T63.441A BEE STING REACTION: ICD-10-CM

## 2022-08-03 DIAGNOSIS — T78.2XXA ANAPHYLAXIS, INITIAL ENCOUNTER: Primary | ICD-10-CM

## 2022-08-03 PROCEDURE — 96372 THER/PROPH/DIAG INJ SC/IM: CPT

## 2022-08-03 PROCEDURE — 99283 EMERGENCY DEPT VISIT LOW MDM: CPT

## 2022-08-03 PROCEDURE — 96375 TX/PRO/DX INJ NEW DRUG ADDON: CPT

## 2022-08-03 PROCEDURE — 93005 ELECTROCARDIOGRAM TRACING: CPT

## 2022-08-03 PROCEDURE — 99284 EMERGENCY DEPT VISIT MOD MDM: CPT | Performed by: EMERGENCY MEDICINE

## 2022-08-03 PROCEDURE — 96374 THER/PROPH/DIAG INJ IV PUSH: CPT

## 2022-08-03 RX ORDER — EPINEPHRINE 0.3 MG/.3ML
0.3 INJECTION SUBCUTANEOUS ONCE
Qty: 0.6 ML | Refills: 0 | Status: SHIPPED | OUTPATIENT
Start: 2022-08-03 | End: 2022-10-11

## 2022-08-03 RX ORDER — DIPHENHYDRAMINE HYDROCHLORIDE 50 MG/ML
25 INJECTION INTRAMUSCULAR; INTRAVENOUS ONCE
Status: COMPLETED | OUTPATIENT
Start: 2022-08-03 | End: 2022-08-03

## 2022-08-03 RX ORDER — METHYLPREDNISOLONE SODIUM SUCCINATE 125 MG/2ML
125 INJECTION, POWDER, LYOPHILIZED, FOR SOLUTION INTRAMUSCULAR; INTRAVENOUS ONCE
Status: COMPLETED | OUTPATIENT
Start: 2022-08-03 | End: 2022-08-03

## 2022-08-03 RX ORDER — EPINEPHRINE 1 MG/ML
0.5 INJECTION, SOLUTION, CONCENTRATE INTRAVENOUS ONCE
Status: COMPLETED | OUTPATIENT
Start: 2022-08-03 | End: 2022-08-03

## 2022-08-03 RX ORDER — FAMOTIDINE 10 MG/ML
20 INJECTION, SOLUTION INTRAVENOUS ONCE
Status: COMPLETED | OUTPATIENT
Start: 2022-08-03 | End: 2022-08-03

## 2022-08-03 RX ADMIN — METHYLPREDNISOLONE SODIUM SUCCINATE 125 MG: 125 INJECTION, POWDER, FOR SOLUTION INTRAMUSCULAR; INTRAVENOUS at 15:19

## 2022-08-03 RX ADMIN — DIPHENHYDRAMINE HYDROCHLORIDE 25 MG: 50 INJECTION, SOLUTION INTRAMUSCULAR; INTRAVENOUS at 15:16

## 2022-08-03 RX ADMIN — EPINEPHRINE 0.5 MG: 1 INJECTION, SOLUTION, CONCENTRATE INTRAVENOUS at 15:08

## 2022-08-03 RX ADMIN — FAMOTIDINE 20 MG: 10 INJECTION, SOLUTION INTRAVENOUS at 15:11

## 2022-08-03 NOTE — DISCHARGE INSTRUCTIONS
-  EpiPen from your pharmacy and use if you have another episode of allergic reaction with symptoms that include hives, facial swelling, lightheadedness, nausea, vomiting, diarrhea  - return to the emergency department if any of these symptoms returned this evening

## 2022-08-03 NOTE — ED ATTENDING ATTESTATION
8/3/2022  Keily ROSSIal, DO, saw and evaluated the patient  I have discussed the patient with the resident/non-physician practitioner and agree with the resident's/non-physician practitioner's findings, Plan of Care, and MDM as documented in the resident's/non-physician practitioner's note, except where noted  All available labs and Radiology studies were reviewed  I was present for key portions of any procedure(s) performed by the resident/non-physician practitioner and I was immediately available to provide assistance  At this point I agree with the current assessment done in the Emergency Department  I have conducted an independent evaluation of this patient a history and physical is as follows:    Patient is a 32-year-old female who presents with an allergic reaction  Patient states that she was stung multiple times by yellow jackets  She describes at least 3 bee stings  Immediately after, she developed hives and itching  She also describes swelling of her tongue  She denies any difficulty swallowing or difficulty breathing  She does feel as though her speech is a little abnormal   She denies chest pain, abdominal pain, diarrhea or other complaints  She has not had similar reactions in the past     On exam, patient has generalized urticarial rash with excoriations  She is in no acute distress  Heart is tachycardic, regular rhythm  Periorbital edema bilaterally  Mild dysarthria and mild swelling of the tongue  No swelling of the uvula or posterior pharynx  The patient was given antihistamines, steroids and IM epinephrine  She experienced significant relief with these medications  She was observed for 3 hours  She no longer has angioedema and is stable for discharge  Will prescribe EpiPen  Patient advised to return to ED if her symptoms recur  She is well appearing on discharge  Portions of the above record have been created with voice recognition software    Occasional wrong word or "sound alike" substitutions may have occurred due to the inherent limitations of voice recognition software  Read the chart carefully and recognize, using context, where substitutions may have occurred        ED Course         Critical Care Time  CriticalCare Time  Performed by: Edi Osborne DO  Authorized by: Edi Osborne DO     Critical care provider statement:     Critical care time (minutes):  35    Critical care time was exclusive of:  Separately billable procedures and treating other patients    Critical care was time spent personally by me on the following activities:  Obtaining history from patient or surrogate, evaluation of patient's response to treatment, examination of patient, development of treatment plan with patient or surrogate, re-evaluation of patient's condition, review of old charts, interpretation of cardiac output measurements and ordering and performing treatments and interventions  Comments:      Patient treated for anaphylaxis

## 2022-08-03 NOTE — ED PROVIDER NOTES
History  Chief Complaint   Patient presents with    Allergic Reaction       States that patient was stung by yellow jackets 45 mins prior to arrival   Never stung by bee prior  Patient has + hives, and feels that lips are swelling  Did take benadryl prior to coming  Patient is a 79-year-old female with no significant PMH that presents to the emergency department 45 minutes after being stung by 3 yellow jackets  She reports being stung on her right ankle and left buttock and states she has never been stung before  After being stung she initially started to feel itchy it is across her body and developed a rash including hives worse in her axilla bilaterally  She reports taking a 25 mg tablet of Benadryl with no perceived improvement of symptoms  She also started to notice swelling of her face including eyelids, on either side of her nose, and her lips  While in the department she states she feels like her tongue is swelling mildly and feels like it is affecting her speech  She denies shortness of breath, wheezing, difficulty swallowing at this time  The patient also denies nausea, vomiting, diarrhea, lightheadedness, weakness, fatigue  She has no history of anaphylactic reaction but has never been stung by a bee or wasp in the past   Prior to this event she felt well  History provided by:  Patient  Allergic Reaction  Presenting symptoms: itching, rash (Hives) and swelling    Presenting symptoms: no difficulty breathing, no difficulty swallowing and no wheezing    Severity:  Moderate  Duration:  45 minutes  Prior allergic episodes:  No prior episodes  Context: insect bite/sting    Relieved by:  Nothing  Worsened by:  Nothing  Ineffective treatments:  Antihistamines      Prior to Admission Medications   Prescriptions Last Dose Informant Patient Reported? Taking?    Biotin 1 MG CAPS  Self Yes No   Sig: Take 1 mg by mouth daily   Calcium Carbonate-Vit D-Min (CALCIUM 1200 PO)  Self Yes No   Sig: Take 1 tablet by mouth daily   NON FORMULARY  Self Yes No   Sig: Take 1 tablet by mouth daily CBD Gummies   cholecalciferol (VITAMIN D3) 1,000 units tablet  Self Yes No   Sig: Take 2,000 Units by mouth daily   multivitamin (THERAGRAN) TABS  Self Yes No   Sig: Take 1 tablet by mouth daily      Facility-Administered Medications: None       Past Medical History:   Diagnosis Date    Abnormal Pap smear of cervix     Arthritis     It was last year    COVID-19 2022    History of colon polyps 2022    HPV (human papilloma virus) infection     Hyperlipidemia     Internal hemorrhoids 2022    Kidney stone 2015    Kidney stones, calcium oxalate 2015    Obesity     Pneumonia     Resolved 2017     Scoliosis     Tremor     Left Hand Resting Tremor    Varicella     Visual impairment     Wear glasses       Past Surgical History:   Procedure Laterality Date    APPENDECTOMY  2018    Appendix taken out     SECTION       x 1 ; Failure to progress    FL INJECTION LEFT HIP (NON ARTHROGRAM)  10/12/2020    OK LAP,APPENDECTOMY N/A 10/14/2018    Procedure: APPENDECTOMY LAPAROSCOPIC;  Surgeon: Ralph Zhang MD;  Location[facility];  Service: General    WISDOM TOOTH EXTRACTION         Family History   Problem Relation Age of Onset    Hypertension Father     Alcohol abuse Father     Kidney disease Father         CKD on ESRD    Diabetes type II Father 48    No Known Problems Mother     Scoliosis Brother     Hip dysplasia Daughter     No Known Problems Maternal Grandmother     No Known Problems Maternal Grandfather     No Known Problems Paternal Grandmother     No Known Problems Paternal Grandfather     No Known Problems Maternal Aunt     No Known Problems Maternal Aunt     No Known Problems Paternal Aunt     No Known Problems Paternal Aunt     No Known Problems Paternal Aunt     No Known Problems Paternal Aunt      I have reviewed and agree with the history as documented  E-Cigarette/Vaping    E-Cigarette Use Never User      E-Cigarette/Vaping Substances    Nicotine No     THC No     CBD No     Flavoring No      Social History     Tobacco Use    Smoking status: Never Smoker    Smokeless tobacco: Never Used   Vaping Use    Vaping Use: Never used   Substance Use Topics    Alcohol use: Yes     Alcohol/week: 1 0 standard drink     Types: 1 Glasses of wine per week     Comment: Once in a while  I barely touch  alchahol   Drug use: No        Review of Systems   Constitutional: Negative for chills and fever  HENT: Positive for voice change  Negative for congestion and trouble swallowing  Eyes: Negative for pain and visual disturbance  Respiratory: Negative for cough, shortness of breath and wheezing  Cardiovascular: Negative for chest pain and palpitations  Gastrointestinal: Negative for abdominal pain, diarrhea, nausea and vomiting  Musculoskeletal: Negative for arthralgias and back pain  Skin: Positive for itching and rash (Hives)  Negative for color change  Neurological: Negative for dizziness, seizures, syncope, light-headedness and headaches  All other systems reviewed and are negative  Physical Exam  ED Triage Vitals   Temperature Pulse Respirations Blood Pressure SpO2   08/03/22 1439 08/03/22 1439 08/03/22 1439 08/03/22 1439 08/03/22 1439   97 7 °F (36 5 °C) (!) 120 16 170/68 94 %      Temp src Heart Rate Source Patient Position - Orthostatic VS BP Location FiO2 (%)   -- 08/03/22 1527 08/03/22 1439 08/03/22 1527 --    Monitor Lying Left arm       Pain Score       08/03/22 1527       No Pain             Orthostatic Vital Signs  Vitals:    08/03/22 1439 08/03/22 1527 08/03/22 1700 08/03/22 1800   BP: 170/68 132/84 124/67 124/76   Pulse: (!) 120 80 92 90   Patient Position - Orthostatic VS: Lying Lying         Physical Exam  Vitals and nursing note reviewed     Constitutional:       General: She is in acute distress (Mild due to facial swelling and itching)  Appearance: Normal appearance  She is well-developed  She is not ill-appearing  HENT:      Head: Normocephalic and atraumatic  Comments: Edema of eyelids and nasolabial folds bilaterally  Very mild swelling of the left side of the tongue     Right Ear: External ear normal       Left Ear: External ear normal       Mouth/Throat:      Pharynx: Oropharynx is clear  No oropharyngeal exudate or posterior oropharyngeal erythema  Eyes:      General:         Right eye: No discharge  Left eye: No discharge  Extraocular Movements: Extraocular movements intact  Conjunctiva/sclera: Conjunctivae normal    Cardiovascular:      Rate and Rhythm: Normal rate and regular rhythm  Pulses: Normal pulses  Heart sounds: Normal heart sounds  No murmur heard  Pulmonary:      Effort: Pulmonary effort is normal  No respiratory distress  Breath sounds: Normal breath sounds  No wheezing, rhonchi or rales  Abdominal:      General: Abdomen is flat  Palpations: Abdomen is soft  Tenderness: There is no abdominal tenderness  There is no guarding or rebound  Musculoskeletal:         General: No swelling or deformity  Normal range of motion  Cervical back: Neck supple  No tenderness  Skin:     General: Skin is warm and dry  Capillary Refill: Capillary refill takes less than 2 seconds  Findings: Rash ( diffuse macular rash across the body with uric area on the chest and arms) present  Neurological:      Mental Status: She is alert           ED Medications  Medications   Famotidine (PF) (PEPCID) injection 20 mg (20 mg Intravenous Given 8/3/22 1511)   diphenhydrAMINE (BENADRYL) injection 25 mg (25 mg Intravenous Given 8/3/22 1516)   methylPREDNISolone sodium succinate (Solu-MEDROL) injection 125 mg (125 mg Intravenous Given 8/3/22 1519)   EPINEPHrine PF (ADRENALIN) 1 mg/mL injection 0 5 mg (0 5 mg Intramuscular Given 8/3/22 1508)       Diagnostic Studies  Results Reviewed     None                 No orders to display         Procedures  ECG 12 Lead Documentation Only    Date/Time: 8/3/2022 3:00 PM  Performed by: Troy Kurtz DO  Authorized by: Troy Kurtz DO     Indications / Diagnosis:  Anaphylaxis  ECG reviewed by me, the ED Provider: yes    Patient location:  ED  Previous ECG:     Previous ECG:  Unavailable  Interpretation:     Interpretation: normal    Quality:     Tracing quality:  Limited by artifact  Rate:     ECG rate:  98    ECG rate assessment: normal    Rhythm:     Rhythm: sinus rhythm    Ectopy:     Ectopy: none    QRS:     QRS axis:  Normal    QRS intervals:  Normal  Conduction:     Conduction: normal    ST segments:     ST segments:  Normal  T waves:     T waves: normal            ED Course                             SBIRT 22yo+    Flowsheet Row Most Recent Value   SBIRT (23 yo +)    In order to provide better care to our patients, we are screening all of our patients for alcohol and drug use  Would it be okay to ask you these screening questions? Unable to answer at this time Filed at: 08/03/2022 1449                [facility]  Number of Diagnoses or Management Options  Anaphylaxis, initial encounter  Bee sting reaction  Diagnosis management comments: 47F comes to the emergency department 45 minutes after being stung by 3 yellow jackets  She has never been stone in the past and has never had any allergic reactions in the past but presents with uric area and facial swelling  She denies bowel symptoms, lightheadedness, and blood pressure is initially 170/68  EKG is within normal limits  Out of concern for anaphylactic reaction the patient is administered 125 mg of Solu-Medrol, additional 25 mg of Benadryl, 20 mg of Pepcid, and 0 5 mg of epinephrine  After observation of the patient for 3 hours swelling and hives are completely resolved  She states itchiness has completely resolved as well    Had long discussion with the patient regarding symptoms to watch out for concerning rebound anaphylactic symptoms  Also prescribed the patient EpiPen to be used for future anaphylactic reactions  Return precautions are discussed with the patient and they demonstrate understanding of the plan  The patient's questions are all answered to the their satisfaction and the patient is discharged home  Disposition  Final diagnoses:   Anaphylaxis, initial encounter   Bee sting reaction     Time reflects when diagnosis was documented in both MDM as applicable and the Disposition within this note     Time User Action Codes Description Comment    8/3/2022  6:09 PM Sudarshan Eppersonpe  2XXA] Anaphylaxis, initial encounter     8/3/2022  6:10 PM Isabelljr Sweeney Add [[id number] [id number]] Bee sting reaction       ED Disposition     ED Disposition   Discharge    Condition   Stable    Date/Time   Wed Aug 3, 2022  6:08 PM    Comment   Suze Olson [facility] discharge to home/self care  Follow-up Information     Follow up With Specialties Details Why Contact Info Additional Information    Gadiel Ocampo DO Family Medicine Call  To schedule an appointment for further evaluation and treatment of allergies [address]  [place] [phone]       [facility] Emergency Department Emergency Medicine Go to  If symptoms of mouth swelling or hives return  Or if you have nausea and vomiting or lightheadedness   [address] [facility] [id number] [place] [address] Emergency Department,  [address], [place], [place], [address]          Discharge Medication List as of 8/3/2022  6:18 PM      START taking these medications    Details   EPINEPHrine (EPIPEN) 0 3 mg/0 3 mL SOAJ Inject 0 3 mL (0 3 mg total) into a muscle once for 1 dose, Starting Wed 8/3/2022, Normal         CONTINUE these medications which have NOT CHANGED    Details   Biotin 1 MG CAPS Take 1 mg by mouth daily, Historical Med      Calcium Carbonate-Vit D-Min (CALCIUM 1200 PO) Take 1 tablet by mouth daily, Historical Med      cholecalciferol (VITAMIN D3) 1,000 units tablet Take 2,000 Units by mouth daily, Historical Med      multivitamin (THERAGRAN) TABS Take 1 tablet by mouth daily, Historical Med      NON FORMULARY Take 1 tablet by mouth daily CBD Gummies, Historical Med           No discharge procedures on file  PDMP Review     None           ED Provider  Attending physically available and evaluated Soraida Chamberlain I managed the patient along with the ED Attending      Electronically Signed by         Teodora Whittaker,   08/03/22 [address] [place] [address], DO  08/03/22 2163

## 2022-08-05 LAB
ATRIAL RATE: 101 BPM
P AXIS: 64 DEGREES
PR INTERVAL: 136 MS
QRS AXIS: -14 DEGREES
QRSD INTERVAL: 78 MS
QT INTERVAL: 358 MS
QTC INTERVAL: 458 MS
T WAVE AXIS: 48 DEGREES
VENTRICULAR RATE: 98 BPM

## 2022-08-05 PROCEDURE — 93010 ELECTROCARDIOGRAM REPORT: CPT | Performed by: INTERNAL MEDICINE

## 2022-08-08 ENCOUNTER — TELEPHONE (OUTPATIENT)
Dept: OTHER | Facility: OTHER | Age: 47
End: 2022-08-08

## 2022-08-08 NOTE — TELEPHONE ENCOUNTER
Pt called, requesting a call back from office, at best convenience, to schedule an allergy appointment   Please assist

## 2022-08-10 ENCOUNTER — PATIENT MESSAGE (OUTPATIENT)
Dept: FAMILY MEDICINE CLINIC | Facility: CLINIC | Age: 47
End: 2022-08-10

## 2022-08-10 ENCOUNTER — OFFICE VISIT (OUTPATIENT)
Dept: FAMILY MEDICINE CLINIC | Facility: CLINIC | Age: 47
End: 2022-08-10
Payer: OTHER GOVERNMENT

## 2022-08-10 VITALS
HEART RATE: 102 BPM | DIASTOLIC BLOOD PRESSURE: 72 MMHG | TEMPERATURE: 98.2 F | WEIGHT: 219 LBS | BODY MASS INDEX: 36.49 KG/M2 | OXYGEN SATURATION: 98 % | HEIGHT: 65 IN | SYSTOLIC BLOOD PRESSURE: 120 MMHG

## 2022-08-10 DIAGNOSIS — Z13.6 SCREENING FOR CARDIOVASCULAR CONDITION: ICD-10-CM

## 2022-08-10 DIAGNOSIS — E78.5 HYPERLIPIDEMIA, UNSPECIFIED HYPERLIPIDEMIA TYPE: ICD-10-CM

## 2022-08-10 DIAGNOSIS — E66.9 CLASS 2 OBESITY WITH BODY MASS INDEX (BMI) OF 36.0 TO 36.9 IN ADULT, UNSPECIFIED OBESITY TYPE, UNSPECIFIED WHETHER SERIOUS COMORBIDITY PRESENT: ICD-10-CM

## 2022-08-10 DIAGNOSIS — Z13.1 SCREENING FOR DIABETES MELLITUS: ICD-10-CM

## 2022-08-10 DIAGNOSIS — L03.90 CELLULITIS, UNSPECIFIED CELLULITIS SITE: Primary | ICD-10-CM

## 2022-08-10 DIAGNOSIS — M79.10 MYALGIA: ICD-10-CM

## 2022-08-10 DIAGNOSIS — T63.481A ANAPHYLAXIS DUE TO INSECT VENOM: ICD-10-CM

## 2022-08-10 PROBLEM — T78.2XXA ANAPHYLAXIS DUE TO INSECT VENOM: Status: ACTIVE | Noted: 2022-08-10

## 2022-08-10 PROCEDURE — 99214 OFFICE O/P EST MOD 30 MIN: CPT | Performed by: FAMILY MEDICINE

## 2022-08-10 RX ORDER — CLINDAMYCIN HYDROCHLORIDE 300 MG/1
300 CAPSULE ORAL 3 TIMES DAILY
Qty: 30 CAPSULE | Refills: 0 | Status: SHIPPED | OUTPATIENT
Start: 2022-08-10 | End: 2022-08-20

## 2022-08-10 RX ORDER — FAMOTIDINE 20 MG/1
20 TABLET, FILM COATED ORAL 2 TIMES DAILY
Qty: 20 TABLET | Refills: 0 | Status: SHIPPED | OUTPATIENT
Start: 2022-08-10 | End: 2022-09-19

## 2022-08-10 NOTE — PROGRESS NOTES
Assessment/Plan:  Problem List Items Addressed This Visit        Other    Obesity     Improved  Recommend lifestyle modifications  Relevant Orders    Hemoglobin A1C    Vitamin D 25 hydroxy    Hyperlipidemia     Pending labs  Previously stable withouth statin  Recommend lifestyle modifications  The 10-year ASCVD risk score (Ella Pena et al , 2013) is: 0 6%    Values used to calculate the score:      Age: 56 years      Sex: Female      Is Non- : No      Diabetic: No      Tobacco smoker: No      Systolic Blood Pressure: 416 mmHg      Is BP treated: No      HDL Cholesterol: 49 mg/dL      Total Cholesterol: 151 mg/dL           Relevant Orders    CBC and differential    Comprehensive metabolic panel    Lipid panel    TSH, 3rd generation with Free T4 reflex    LDL cholesterol, direct    Anaphylaxis due to insect venom     New  Use Epi Pen PRN  Pending Allergy consult  Relevant Orders    Ambulatory Referral to Allergy      Other Visit Diagnoses     Cellulitis, unspecified cellulitis site    -  Primary    Relevant Medications    clindamycin (CLEOCIN) 300 MG capsule    famotidine (PEPCID) 20 mg tablet    Start Clindamycin 300mg TID x 10 days  Advise Claritin/Zyrtec/Allegra/Benadryl and Pepcid 20mg twice daily as needed for itching  Avoid decongestants if you have high blood pressure  Use cool compresses  Cellulitis margins outlined c skin marker  Screening for diabetes mellitus        Relevant Orders    Hemoglobin A1C    Screening for cardiovascular condition        Relevant Orders    CBC and differential    Comprehensive metabolic panel    LDL cholesterol, direct    Myalgia        Relevant Orders    Vitamin D 25 hydroxy           Return if symptoms worsen or fail to improve        Future Appointments   Date Time Provider Michelle Martinez   9/19/2022  2:00 PM DO SARAH [facility]   10/22/2022 10:40 AM [facility]        Subjective:     Anamaria Nash is a 56 y o  female who presents today for a follow-up on her acute medical conditions  HPI:  Chief Complaint   Patient presents with    Bee Sting     Right Lower Leg     -- Above per clinical staff and reviewed  --    HPI      Today:      PTO c daughter Zeina Crespo - On Right Lower Leg  Occurred Wednesday, 8/2/22  She was stung 3 times - right ankle x 2 stings, left buttock x 1  Redness RLE x 2 days, worsening  Denies warmth and pain, but had itching  Using Gold Collins, Daughter's Rx cream, Cortisone 10, s benefit  Seen at [address] [place] [address] 8/3/22 for anaphylaxis - had tongue and facial swelling, itching, rash, slurred speech  She took Benadryl 25mg prior to ER arrival   This was her first insect sting  In ER, she received Pepcid 20mg IV, Beneadryl 25mg IV, SoluMedrol 125mg IV, Epinepherine 1mg/mL injetion 0 5mg IM once  Rx Epi Pen  PHQ-2/9 Depression Screening    Little interest or pleasure in doing things: 0 - not at all  Feeling down, depressed, or hopeless: 0 - not at all           The following portions of the patient's history were reviewed and updated as appropriate: allergies, current medications, past family history, past medical history, past social history, past surgical history and problem list       Review of Systems   Constitutional: Negative for appetite change, chills, diaphoresis, fatigue and fever  Respiratory: Negative for cough, chest tightness, shortness of breath and wheezing  Cardiovascular: Negative for chest pain  Gastrointestinal: Negative for abdominal pain, blood in stool, diarrhea, nausea and vomiting  Genitourinary: Negative for dysuria  Skin: Positive for rash          Current Outpatient Medications   Medication Sig Dispense Refill    Calcium Carbonate-Vit D-Min (CALCIUM 1200 PO) Take 1 tablet by mouth daily      clindamycin (CLEOCIN) 300 MG capsule Take 1 capsule (300 mg total) by mouth 3 (three) times a day for 10 days 30 capsule 0    famotidine (PEPCID) 20 mg tablet Take 1 tablet (20 mg total) by mouth 2 (two) times a day for 10 days 20 tablet 0    multivitamin (THERAGRAN) TABS Take 1 tablet by mouth daily      NON FORMULARY Take 1 tablet by mouth daily CBD Gummies      cholecalciferol (VITAMIN D3) 1,000 units tablet Take 2,000 Units by mouth daily (Patient not taking: Reported on 8/10/2022)      EPINEPHrine (EPIPEN) 0 3 mg/0 3 mL SOAJ Inject 0 3 mL (0 3 mg total) into a muscle once for 1 dose (Patient not taking: Reported on 8/10/2022) 0 6 mL 0     No current facility-administered medications for this visit  Objective:  /72 (BP Location: Left arm, Patient Position: Sitting, Cuff Size: Large)   Pulse 102   Temp 98 2 °F (36 8 °C) (Temporal)   Ht 5' 5" (1 651 m)   Wt 99 3 kg (219 lb)   SpO2 98%   BMI 36 44 kg/m²    Wt Readings from Last 3 Encounters:   08/10/22 99 3 kg (219 lb)   08/03/22 102 kg (223 lb 15 8 oz)   05/23/22 94 8 kg (209 lb)      BP Readings from Last 3 Encounters:   08/10/22 120/72   08/03/22 124/76   05/23/22 110/64          Physical Exam  Vitals and nursing note reviewed  Constitutional:       Appearance: Normal appearance  She is well-developed  She is obese  HENT:      Head: Normocephalic and atraumatic  Mouth/Throat:      Mouth: Mucous membranes are moist       Pharynx: Oropharynx is clear  Eyes:      Conjunctiva/sclera: Conjunctivae normal    Neck:      Thyroid: No thyromegaly  Cardiovascular:      Rate and Rhythm: Normal rate and regular rhythm  Pulses: Normal pulses  Heart sounds: Normal heart sounds  Pulmonary:      Effort: Pulmonary effort is normal       Breath sounds: Normal breath sounds  Musculoskeletal:         General: No swelling or tenderness  Cervical back: Neck supple  Right lower leg: No edema  Left lower leg: No edema     Skin:     Findings: Rash (Left superior posterior thigh c 8cm x 5 cm raised patch c rubor and calor, no dolor; Right anterior tibia c 9cm x 9cm erythematous skin c rubor and calor, no dolor) present  Neurological:      General: No focal deficit present  Mental Status: She is alert and oriented to person, place, and time  Psychiatric:         Mood and Affect: Mood normal          Behavior: Behavior normal          Thought Content: Thought content normal          Judgment: Judgment normal          Lab Results:      Lab Results   Component Value Date    WBC 5 34 09/18/2021    HGB 13 6 09/18/2021    HCT 41 5 09/18/2021     (H) 09/18/2021    TRIG 92 09/18/2021    HDL 49 09/18/2021    LDLDIRECT 81 09/18/2021    ALT 27 09/18/2021    AST 16 09/18/2021     06/24/2015    K 4 0 09/18/2021     (H) 09/18/2021    CREATININE 0 51 (L) 09/18/2021    BUN 16 09/18/2021    CO2 28 09/18/2021    INR 1 01 10/14/2018    GLUF 89 09/18/2021    HGBA1C 5 6 09/18/2021     No results found for: URICACID  Invalid input(s): BASENAME Vitamin D    No results found       POCT Labs

## 2022-08-10 NOTE — PATIENT INSTRUCTIONS
Advise Claritin/Zyrtec/Allegra/Benadryl and Pepcid 20mg twice daily as needed for itching  Avoid decongestants if you have high blood pressure  Please contact your insurance if you are uncertain of coverage for plan of care items  Your insurance may not cover the cost of your Vitamin D blood test, which is approximately $65-70    Please notify the lab prior to blood draw if you would like to decline this test

## 2022-08-10 NOTE — TELEPHONE ENCOUNTER
From: Jennifer Jamison  To: Deanna Dickey DO  Sent: 8/10/2022 10:05 AM EDT  Subject: Sting site    Im wondering if what can do anything about this sting site? Today is reddest it been and is very itchy  Right now Ive been using (Gold Bond) itch cream  I just took a Benadryl today  At 10am      Thank you for any advice you can give       Fernando Cap

## 2022-08-10 NOTE — PATIENT COMMUNICATION
Patient has appt Friday, 8/12/22 to evaluate this issue  I would recommend sooner appt here or at Urgent Care as this may be a cellulitis

## 2022-08-12 ENCOUNTER — PATIENT MESSAGE (OUTPATIENT)
Dept: FAMILY MEDICINE CLINIC | Facility: CLINIC | Age: 47
End: 2022-08-12

## 2022-08-12 NOTE — ASSESSMENT & PLAN NOTE
Pending labs  Previously stable withouth statin  Recommend lifestyle modifications        The 10-year ASCVD risk score (Ruthie Nelson et al , 2013) is: 0 6%    Values used to calculate the score:      Age: 56 years      Sex: Female      Is Non- : No      Diabetic: No      Tobacco smoker: No      Systolic Blood Pressure: 715 mmHg      Is BP treated: No      HDL Cholesterol: 49 mg/dL      Total Cholesterol: 151 mg/dL

## 2022-08-12 NOTE — TELEPHONE ENCOUNTER
From: Jair Neumann  To: Lisbet Boyle DO  Sent: 8/12/2022 10:12 AM EDT  Subject: Infection pictures    My right leg, I see improvement  Compared my left thigh last night and this morning  Looks like my thigh is worse  Im planning to finish the antibiotics you gave me  Is there anything I can do about my thigh? I am using the Pepcid you prescribed to me with the antibiotics  Im also using the Benadryl I have on hand       Thank you for helping me   Quita Lantigua

## 2022-09-19 ENCOUNTER — OFFICE VISIT (OUTPATIENT)
Dept: FAMILY MEDICINE CLINIC | Facility: CLINIC | Age: 47
End: 2022-09-19
Payer: OTHER GOVERNMENT

## 2022-09-19 VITALS
HEART RATE: 70 BPM | TEMPERATURE: 97.4 F | DIASTOLIC BLOOD PRESSURE: 70 MMHG | BODY MASS INDEX: 36.65 KG/M2 | WEIGHT: 220 LBS | HEIGHT: 65 IN | OXYGEN SATURATION: 99 % | SYSTOLIC BLOOD PRESSURE: 110 MMHG

## 2022-09-19 DIAGNOSIS — E66.9 CLASS 2 OBESITY WITH BODY MASS INDEX (BMI) OF 36.0 TO 36.9 IN ADULT, UNSPECIFIED OBESITY TYPE, UNSPECIFIED WHETHER SERIOUS COMORBIDITY PRESENT: ICD-10-CM

## 2022-09-19 DIAGNOSIS — M16.32 OSTEOARTHRITIS RESULTING FROM LEFT HIP DYSPLASIA: ICD-10-CM

## 2022-09-19 DIAGNOSIS — Z86.010 HISTORY OF COLON POLYPS: ICD-10-CM

## 2022-09-19 DIAGNOSIS — Z00.00 ANNUAL PHYSICAL EXAM: Primary | ICD-10-CM

## 2022-09-19 DIAGNOSIS — E78.5 HYPERLIPIDEMIA, UNSPECIFIED HYPERLIPIDEMIA TYPE: ICD-10-CM

## 2022-09-19 PROCEDURE — 99396 PREV VISIT EST AGE 40-64: CPT | Performed by: FAMILY MEDICINE

## 2022-09-19 NOTE — PROGRESS NOTES
Assessment/Plan:  Problem List Items Addressed This Visit        Musculoskeletal and Integument    Osteoarthritis resulting from left hip dysplasia     Management per Ortho  Stable  Other    Obesity     Stable  Recommend lifestyle modifications  Hyperlipidemia     Pending labs  Previously stable withouth statin  Recommend lifestyle modifications  The 10-year ASCVD risk score (Petrona Fajardo et al , 2013) is: 0 5%    Values used to calculate the score:      Age: 56 years      Sex: Female      Is Non- : No      Diabetic: No      Tobacco smoker: No      Systolic Blood Pressure: 923 mmHg      Is BP treated: No      HDL Cholesterol: 49 mg/dL      Total Cholesterol: 151 mg/dL           History of colon polyps     Colonoscopy is up-to-date  Other Visit Diagnoses     Annual physical exam    -  Primary           Return in about 1 year (around 9/19/2023) for  Annual physical - HL;  PRN Labs  Future Appointments   Date Time Provider Michelle Martinez   10/22/2022 10:40 AM [facility]   12/21/2022  1:00 PM Yamini Wheeler DO [facility]   9/20/2023  1:00 PM Yuliana Perez DO [facility]        Subjective:     Kong Arshad is a 56 y o  female who presents today for a follow-up on her chronic medical conditions  HPI:  Chief Complaint   Patient presents with    Physical Exam     -- Above per clinical staff and reviewed  --      HPI      Today:      Return in about 1 year (around 9/17/2022) for Annual physical; PRN Labs  Patient did not complete labs 8/10/22   had a stroke 3/22, in SNF in [place], hoping to transfer to SNF in [facility] and SNF  involved  L Hip Pain / Left Hip Dysplasia - Management per Ortho Dr Gabrielle Pelletier   F/U PRN  She states she needs hip replacement at 49yo    She is not doing CSI as she is getting relief from CBD gummies    She states her hips have never been normal childbirth in 2010  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 - Has not made appt yet with Allergist - referred 8/10/22  Has not used to use her Epi Pen yet           Obesity - Trying to watch diet   No regular exercise   Walks occasionally      Hyperlipidemia - No statin previously        Scoliosis - Management per Ortho   F/U PRN    Denies back pain   Occasionally has paresthesias mid-thoracic back       H/O Colon Polyps - Next colonoscopy due 5/22/27        Reviewed: [facility] [place] 9/17/20, Gyn 11/18/21, Ortho 11/2/20     Sees Rodriguez [facility] yearly   Next appt 11/22        Overdue for Dentist     Sees Optho q2 years               PHQ-2/9 Depression Screening    Little interest or pleasure in doing things: 0 - not at all  Feeling down, depressed, or hopeless: 0 - not at all  PHQ-2 Score: 0  PHQ-2 Interpretation: Negative depression screen             The following portions of the patient's history were reviewed and updated as appropriate: allergies, current medications, past family history, past medical history, past social history, past surgical history and problem list       Review of Systems   Constitutional: Negative for appetite change, chills, diaphoresis, fatigue and fever  Respiratory: Negative for chest tightness and shortness of breath  Cardiovascular: Negative for chest pain  Gastrointestinal: Negative for abdominal pain, blood in stool, diarrhea, nausea and vomiting  Genitourinary: Negative for dysuria  Musculoskeletal: Positive for arthralgias          Current Outpatient Medications   Medication Sig Dispense Refill    Calcium Carbonate-Vit D-Min (CALCIUM 1200 PO) Take 1 tablet by mouth daily      EPINEPHrine (EPIPEN) 0 3 mg/0 3 mL SOAJ Inject 0 3 mL (0 3 mg total) into a muscle once for 1 dose 0 6 mL 0    multivitamin (THERAGRAN) TABS Take 1 tablet by mouth daily      NON FORMULARY Take 1 tablet by mouth daily CBD Gummies      cholecalciferol (VITAMIN D3) 1,000 units tablet Take 2,000 Units by mouth daily (Patient not taking: No sig reported)       No current facility-administered medications for this visit  Objective:  /70   Pulse 70   Temp (!) 97 4 °F (36 3 °C)   Ht 5' 5" (1 651 m)   Wt 99 8 kg (220 lb)   SpO2 99%   BMI 36 61 kg/m²    Wt Readings from Last 3 Encounters:   09/19/22 99 8 kg (220 lb)   08/10/22 99 3 kg (219 lb)   08/03/22 102 kg (223 lb 15 8 oz)      BP Readings from Last 3 Encounters:   09/19/22 110/70   08/10/22 120/72   08/03/22 124/76          Physical Exam  Vitals and nursing note reviewed  Constitutional:       Appearance: Normal appearance  She is well-developed  She is obese  HENT:      Head: Normocephalic and atraumatic  Right Ear: Tympanic membrane, ear canal and external ear normal       Left Ear: Tympanic membrane, ear canal and external ear normal       Nose: Nose normal       Right Sinus: No maxillary sinus tenderness or frontal sinus tenderness  Left Sinus: No maxillary sinus tenderness or frontal sinus tenderness  Mouth/Throat:      Mouth: Mucous membranes are moist       Pharynx: Oropharynx is clear  Uvula midline  Tonsils: No tonsillar exudate  Eyes:      Extraocular Movements: Extraocular movements intact  Conjunctiva/sclera: Conjunctivae normal       Pupils: Pupils are equal, round, and reactive to light  Cardiovascular:      Rate and Rhythm: Normal rate and regular rhythm  Pulses: Normal pulses  Heart sounds: Normal heart sounds  Pulmonary:      Effort: Pulmonary effort is normal       Breath sounds: Normal breath sounds  Abdominal:      General: Bowel sounds are normal  There is no distension  Palpations: Abdomen is soft  There is no mass  Tenderness: There is no abdominal tenderness  There is no guarding or rebound  Musculoskeletal:         General: No swelling or tenderness  Cervical back: Neck supple  Right lower leg: No edema        Left lower leg: No edema    Lymphadenopathy:      Cervical: No cervical adenopathy  Skin:     Findings: No rash  Neurological:      General: No focal deficit present  Mental Status: She is alert and oriented to person, place, and time  Psychiatric:         Mood and Affect: Mood normal          Behavior: Behavior normal          Thought Content: Thought content normal          Judgment: Judgment normal          Lab Results:      Lab Results   Component Value Date    WBC 5 34 09/18/2021    HGB 13 6 09/18/2021    HCT 41 5 09/18/2021     (H) 09/18/2021    TRIG 92 09/18/2021    HDL 49 09/18/2021    LDLDIRECT 81 09/18/2021    ALT 27 09/18/2021    AST 16 09/18/2021     06/24/2015    K 4 0 09/18/2021     (H) 09/18/2021    CREATININE 0 51 (L) 09/18/2021    BUN 16 09/18/2021    CO2 28 09/18/2021    INR 1 01 10/14/2018    GLUF 89 09/18/2021    HGBA1C 5 6 09/18/2021     No results found for: URICACID  Invalid input(s): BASENAME Vitamin D    No results found  POCT Labs        Depression Screening and Follow-up Plan: Patient was screened for depression during today's encounter  They screened negative with a PHQ-2 score of 0

## 2022-09-19 NOTE — PATIENT INSTRUCTIONS
Wellness Visit for Adults   AMBULATORY CARE:   A wellness visit  is when you see your healthcare provider to get screened for health problems  Your healthcare provider will also give you advice on how to stay healthy  Write down your questions so you remember to ask them  Ask your healthcare provider how often you should have a wellness visit  What happens at a wellness visit:  Your healthcare provider will ask about your health, and your family history of health problems  This includes high blood pressure, heart disease, and cancer  He or she will ask if you have symptoms that concern you, if you smoke, and about your mood  You may also be asked about your intake of medicines, supplements, food, and alcohol  Any of the following may be done:  · Your weight  will be checked  Your height may also be checked so your body mass index (BMI) can be calculated  Your BMI shows if you are at a healthy weight  · Your blood pressure  and heart rate will be checked  Your temperature may also be checked  · Blood and urine tests  may be done  Blood tests may be done to check your cholesterol levels  Abnormal cholesterol levels increase your risk for heart disease and stroke  You may also need a blood or urine test to check for diabetes if you are at increased risk  Urine tests may be done to look for signs of an infection or kidney disease  · A physical exam  includes checking your heartbeat and lungs with a stethoscope  Your healthcare provider may also check your skin to look for sun damage  · Screening tests  may be recommended  A screening test is done to check for diseases that may not cause symptoms  The screening tests you may need depend on your age, gender, family history, and lifestyle habits  For example, colorectal screening may be recommended if you are 48years old or older  Screening tests you need if you are a woman:   · A Pap smear  is used to screen for cervical cancer   Pap smears are usually done every 3 to 5 years depending on your age  You may need them more often if you have had abnormal Pap smear test results in the past  Ask your healthcare provider how often you should have a Pap smear  · A mammogram  is an x-ray of your breasts to screen for breast cancer  Experts recommend mammograms every 2 years starting at age 48 years  You may need a mammogram at age 52 years or younger if you have an increased risk for breast cancer  Talk to your healthcare provider about when you should start having mammograms and how often you need them  Vaccines you may need:   · Get an influenza vaccine  every year  The influenza vaccine protects you from the flu  Several types of viruses cause the flu  The viruses change over time, so new vaccines are made each year  · Get a tetanus-diphtheria (Td) booster vaccine  every 10 years  This vaccine protects you against tetanus and diphtheria  Tetanus is a severe infection that may cause painful muscle spasms and lockjaw  Diphtheria is a severe bacterial infection that causes a thick covering in the back of your mouth and throat  · Get a human papillomavirus (HPV) vaccine  if you are female and aged 23 to 32 or male 23 to 24 and never received it  This vaccine protects you from HPV infection  HPV is the most common infection spread by sexual contact  HPV may also cause vaginal, penile, and anal cancers  · Get a pneumococcal vaccine  if you are aged 72 years or older  The pneumococcal vaccine is an injection given to protect you from pneumococcal disease  Pneumococcal disease is an infection caused by pneumococcal bacteria  The infection may cause pneumonia, meningitis, or an ear infection  · Get a shingles vaccine  if you are 60 or older, even if you have had shingles before  The shingles vaccine is an injection to protect you from the varicella-zoster virus  This is the same virus that causes chickenpox   Shingles is a painful rash that develops in people who had chickenpox or have been exposed to the virus  How to eat healthy:  My Plate is a model for planning healthy meals  It shows the types and amounts of foods that should go on your plate  Fruits and vegetables make up about half of your plate, and grains and protein make up the other half  A serving of dairy is included on the side of your plate  The amount of calories and serving sizes you need depends on your age, gender, weight, and height  Examples of healthy foods are listed below:  · Eat a variety of vegetables  such as dark green, red, and orange vegetables  You can also include canned vegetables low in sodium (salt) and frozen vegetables without added butter or sauces  · Eat a variety of fresh fruits , canned fruit in 100% juice, frozen fruit, and dried fruit  · Include whole grains  At least half of the grains you eat should be whole grains  Examples include whole-wheat bread, wheat pasta, brown rice, and whole-grain cereals such as oatmeal     · Eat a variety of protein foods such as seafood (fish and shellfish), lean meat, and poultry without skin (turkey and chicken)  Examples of lean meats include pork leg, shoulder, or tenderloin, and beef round, sirloin, tenderloin, and extra lean ground beef  Other protein foods include eggs and egg substitutes, beans, peas, soy products, nuts, and seeds  · Choose low-fat dairy products such as skim or 1% milk or low-fat yogurt, cheese, and cottage cheese  · Limit unhealthy fats  such as butter, hard margarine, and shortening  Exercise:  Exercise at least 30 minutes per day on most days of the week  Some examples of exercise include walking, biking, dancing, and swimming  You can also fit in more physical activity by taking the stairs instead of the elevator or parking farther away from stores  Include muscle strengthening activities 2 days each week  Regular exercise provides many health benefits   It helps you manage your weight, and decreases your risk for type 2 diabetes, heart disease, stroke, and high blood pressure  Exercise can also help improve your mood  Ask your healthcare provider about the best exercise plan for you  General health and safety guidelines:   · Do not smoke  Nicotine and other chemicals in cigarettes and cigars can cause lung damage  Ask your healthcare provider for information if you currently smoke and need help to quit  E-cigarettes or smokeless tobacco still contain nicotine  Talk to your healthcare provider before you use these products  · Limit alcohol  A drink of alcohol is 12 ounces of beer, 5 ounces of wine, or 1½ ounces of liquor  · Lose weight, if needed  Being overweight increases your risk of certain health conditions  These include heart disease, high blood pressure, type 2 diabetes, and certain types of cancer  · Protect your skin  Do not sunbathe or use tanning beds  Use sunscreen with a SPF 15 or higher  Apply sunscreen at least 15 minutes before you go outside  Reapply sunscreen every 2 hours  Wear protective clothing, hats, and sunglasses when you are outside  · Drive safely  Always wear your seatbelt  Make sure everyone in your car wears a seatbelt  A seatbelt can save your life if you are in an accident  Do not use your cell phone when you are driving  This could distract you and cause an accident  Pull over if you need to make a call or send a text message  · Practice safe sex  Use latex condoms if are sexually active and have more than one partner  Your healthcare provider may recommend screening tests for sexually transmitted infections (STIs)  · Wear helmets, lifejackets, and protective gear  Always wear a helmet when you ride a bike or motorcycle, go skiing, or play sports that could cause a head injury  Wear protective equipment when you play sports  Wear a lifejacket when you are on a boat or doing water sports      © Copyright [employer] 2022 Information is for End User's use only and may not be sold, redistributed or otherwise used for commercial purposes  All illustrations and images included in CareNotes® are the copyrighted property of [employer]  or James Hanley  The above information is an  only  It is not intended as medical advice for individual conditions or treatments  Talk to your doctor, nurse or pharmacist before following any medical regimen to see if it is safe and effective for you  Obesity   AMBULATORY CARE:   Obesity  means your body mass index (BMI) is greater than 30  Your healthcare provider will use your height and weight to measure your BMI  The risks of obesity include  many health problems, including injuries or physical disability  · Diabetes (high blood sugar level)    · High blood pressure or high cholesterol    · Heart disease    · Stroke    · Gallbladder or liver disease    · Cancer of the colon, breast, prostate, liver, or kidney    · Sleep apnea    · Arthritis or gout    Screening  is done to check for health conditions before you have signs or symptoms  If you are 28to 79years old, your blood sugar level may be checked every 3 years for signs of prediabetes or diabetes  Your healthcare provider will check your blood pressure at each visit  High blood pressure can lead to a stroke or other problems  Your provider may check for signs of heart disease, cancer, or other health problems  Seek care immediately if:   · You have a severe headache, confusion, or difficulty speaking  · You have weakness on one side of your body  · You have chest pain, sweating, or shortness of breath  Call your doctor if:   · You have symptoms of gallbladder or liver disease, such as pain in your upper abdomen  · You have knee or hip pain and discomfort while walking  · You have symptoms of diabetes, such as intense hunger and thirst, and frequent urination      · You have symptoms of sleep apnea, such as snoring or daytime sleepiness  · You have questions or concerns about your condition or care  Treatment for obesity  focuses on helping you lose weight to improve your health  Even a small decrease in BMI can reduce the risk for many health problems  Your healthcare provider will help you set a weight-loss goal   · Lifestyle changes  are the first step in treating obesity  These include making healthy food choices and getting regular physical activity  Your healthcare provider may suggest a weight-loss program that involves coaching, education, and therapy  · Medicine  may help you lose weight when it is used with a healthy foods and physical activity  · Surgery  can help you lose weight if you are very obese and have other health problems  There are several types of weight-loss surgery  Ask your healthcare provider for more information  Tips for safe weight loss:   · Set small, realistic goals  An example of a small goal is to walk for 20 minutes 5 days a week  Anther goal is to lose 5% of your body weight  · Tell friends, family members, and coworkers about your goals  and ask for their support  Ask a friend to lose weight with you, or join a weight-loss support group  · Identify foods or triggers that may cause you to overeat , and find ways to avoid them  Remove tempting high-calorie foods from your home and workplace  Place a bowl of fresh fruit on your kitchen counter  If stress causes you to eat, then find other ways to cope with stress  A counselor or therapist may be able to help you  · Keep a diary to track what you eat and drink  Also write down how many minutes of physical activity you do each day  Weigh yourself once a week and record it in your diary  Eating changes: You will need to eat 500 to 1,000 fewer calories each day than you currently eat to lose 1 to 2 pounds a week  The following changes will help you cut calories:  · Eat smaller portions    Use small plates, no larger than 9 inches in diameter  Fill your plate half full of fruits and vegetables  Measure your food using measuring cups until you know what a serving size looks like  · Eat 3 meals and 1 or 2 snacks each day  Plan your meals in advance  Ed Jimenez and eat at home most of the time  Eat slowly  Do not skip meals  Skipping meals can lead to overeating later in the day  This can make it harder for you to lose weight  Talk with a dietitian to help you make a meal plan and schedule that is right for you  · Eat fruits and vegetables at every meal   They are low in calories and high in fiber, which makes you feel full  Do not add butter, margarine, or cream sauce to vegetables  Use herbs to season steamed vegetables  · Eat less fat and fewer fried foods  Eat more baked or grilled chicken and fish  These protein sources are lower in calories and fat than red meat  Limit fast food  Dress your salads with olive oil and vinegar instead of bottled dressing  · Limit the amount of sugar you eat  Do not drink sugary beverages  Limit alcohol  Activity changes:  Physical activity is good for your body in many ways  It helps you burn calories and build strong muscles  It decreases stress and depression, and improves your mood  It can also help you sleep better  Talk to your healthcare provider before you begin an exercise program   · Exercise for at least 30 minutes 5 days a week  Start slowly  Set aside time each day for physical activity that you enjoy and that is convenient for you  It is best to do both weight training and an activity that increases your heart rate, such as walking, bicycling, or swimming  · Find ways to be more active  Do yard work and housecleaning  Walk up the stairs instead of using elevators  Spend your leisure time going to events that require walking, such as outdoor festivals or fairs  This extra physical activity can help you lose weight and keep it off         Follow up with your doctor as directed: You may need to meet with a dietitian  Write down your questions so you remember to ask them during your visits  © Copyright [employer] 2022 Information is for End User's use only and may not be sold, redistributed or otherwise used for commercial purposes  All illustrations and images included in CareNotes® are the copyrighted property of [employer]  or James Celestin   The above information is an  only  It is not intended as medical advice for individual conditions or treatments  Talk to your doctor, nurse or pharmacist before following any medical regimen to see if it is safe and effective for you  Cholesterol and Your Health   AMBULATORY CARE:   Cholesterol  is a waxy, fat-like substance  Your body uses cholesterol to make hormones and new cells, and to protect nerves  Cholesterol is made by your body  It also comes from certain foods you eat, such as meat and dairy products  Your healthcare provider can help you set goals for your cholesterol levels  He or she can help you create a plan to meet your goals  Cholesterol level goals: Your cholesterol level goals depend on your risk for heart disease, your age, and your other health conditions  The following are general guidelines:  · Total cholesterol  includes low-density lipoprotein (LDL), high-density lipoprotein (HDL), and triglyceride levels  The total cholesterol level should be lower than 200 mg/dL and is best at about 150 mg/dL  · LDL cholesterol  is called bad cholesterol  because it forms plaque in your arteries  As plaque builds up, your arteries become narrow, and less blood flows through  When plaque decreases blood flow to your heart, you may have chest pain  If plaque completely blocks an artery that brings blood to your heart, you may have a heart attack  Plaque can break off and form blood clots  Blood clots may block arteries in your brain and cause a stroke   The level should be less than 130 mg/dL and is best at about 100 mg/dL  · HDL cholesterol  is called good cholesterol  because it helps remove LDL cholesterol from your arteries  It does this by attaching to LDL cholesterol and carrying it to your liver  Your liver breaks down LDL cholesterol so your body can get rid of it  High levels of HDL cholesterol can help prevent a heart attack and stroke  Low levels of HDL cholesterol can increase your risk for heart disease, heart attack, and stroke  The level should be 60 mg/dL or higher  · Triglycerides  are a type of fat that store energy from foods you eat  High levels of triglycerides also cause plaque buildup  This can increase your risk for a heart attack or stroke  If your triglyceride level is high, your LDL cholesterol level may also be high  The level should be less than 150 mg/dL  Any of the following can increase your risk for high cholesterol:   · Smoking cigarettes    · Being overweight or obese, or not getting enough exercise    · Drinking large amounts of alcohol    · A medical condition such as hypertension (high blood pressure) or diabetes    · Certain genes passed from your parents to you    · Age older than 65 years    What you need to know about having your cholesterol levels checked: Adults 21to 39years of age should have their cholesterol levels checked every 4 to 6 years  Adults 45 years or older should have their cholesterol checked every 1 to 2 years  You may need your cholesterol checked more often, or at a younger age, if you have risk factors for heart disease  You may also need to have your cholesterol checked more often if you have other health conditions, such as diabetes  Blood tests are used to check cholesterol levels  Blood tests measure your levels of triglycerides, LDL cholesterol, and HDL cholesterol  How healthy fats affect your cholesterol levels:  Healthy fats, also called unsaturated fats, help lower LDL cholesterol and triglyceride levels  Healthy fats include the following:  · Monounsaturated fats  are found in foods such as olive oil, canola oil, avocado, nuts, and olives  · Polyunsaturated fats,  such as omega 3 fats, are found in fish, such as salmon, trout, and tuna  They can also be found in plant foods such as flaxseed, walnuts, and soybeans  How unhealthy fats affect your cholesterol levels:  Unhealthy fats increase LDL cholesterol and triglyceride levels  They are found in foods high in cholesterol, saturated fat, and trans fat:  · Cholesterol  is found in eggs, dairy, and meat  · Saturated fat  is found in butter, cheese, ice cream, whole milk, and coconut oil  Saturated fat is also found in meat, such as sausage, hot dogs, and bologna  · Trans fat  is found in liquid oils and is used in fried and baked foods  Foods that contain trans fats include chips, crackers, muffins, sweet rolls, microwave popcorn, and cookies  Treatment  for high cholesterol will also decrease your risk of heart disease, heart attack, and stroke  Treatment may include any of the following:  · Lifestyle changes  may include food, exercise, weight loss, and quitting smoking  You may also need to decrease the amount of alcohol you drink  Your healthcare provider will want you to start with lifestyle changes  Other treatment may be added if lifestyle changes are not enough  Your healthcare provider may recommend you work with a team to manage hyperlipidemia  The team may include medical experts such as a dietitian, an exercise or physical therapist, and a behavior therapist  Your family members may be included in helping you create lifestyle changes  · Medicines  may be given to lower your LDL cholesterol, triglyceride levels, or total cholesterol level  You may need medicines to lower your cholesterol if any of the following is true:    ? You have a history of stroke, TIA, unstable angina, or a heart attack  ?  Your LDL cholesterol level is 190 mg/dL or higher  ? You are age 36 to 76 years, have diabetes or heart disease risk factors, and your LDL cholesterol is 70 mg/dL or higher  · Supplements  include fish oil, red yeast rice, and garlic  Fish oil may help lower your triglyceride and LDL cholesterol levels  It may also increase your HDL cholesterol level  Red yeast rice may help decrease your total cholesterol level and LDL cholesterol level  Garlic may help lower your total cholesterol level  Do not take any supplements without talking to your healthcare provider  Food changes you can make to lower your cholesterol levels:  A dietitian can help you create a healthy eating plan  He or she can show you how to read food labels and choose foods low in saturated fat, trans fats, and cholesterol  · Decrease the total amount of fat you eat  Choose lean meats, fat-free or 1% fat milk, and low-fat dairy products, such as yogurt and cheese  Try to limit or avoid red meats  Limit or do not eat fried foods or baked goods, such as cookies  · Replace unhealthy fats with healthy fats  Cook foods in olive oil or canola oil  Choose soft margarines that are low in saturated fat and trans fat  Seeds, nuts, and avocados are other examples of healthy fats  · Eat foods with omega-3 fats  Examples include salmon, tuna, mackerel, walnuts, and flaxseed  Eat fish 2 times per week  Pregnant women should not eat fish that have high levels of mercury, such as shark, swordfish, and fabiano mackerel  · Increase the amount of high-fiber foods you eat  High-fiber foods can help lower your LDL cholesterol  Aim to get between 20 and 30 grams of fiber each day  Fruits and vegetables are high in fiber  Eat at least 5 servings each day  Other high-fiber foods are whole-grain or whole-wheat breads, pastas, or cereals, and brown rice  Eat 3 ounces of whole-grain foods each day  Increase fiber slowly   You may have abdominal discomfort, bloating, and gas if you add fiber to your diet too quickly  · Eat healthy protein foods  Examples include low-fat dairy products, skinless chicken and turkey, fish, and nuts  · Limit foods and drinks that are high in sugar  Your dietitian or healthcare provider can help you create daily limits for high-sugar foods and drinks  The limit may be lower if you have diabetes or another health condition  Limits can also help you lose weight if needed  Lifestyle changes you can make to lower your cholesterol levels:   · Maintain a healthy weight  Ask your healthcare provider what a healthy weight is for you  Ask him or her to help you create a weight loss plan if needed  Weight loss can decrease your total cholesterol and triglyceride levels  Weight loss may also help keep your blood pressure at a healthy level  · Be physically active throughout the day  Physical activity, such as exercise, can help lower your total cholesterol level and maintain a healthy weight  Physical activity can also help increase your HDL cholesterol level  Work with your healthcare provider to create an program that is right for you  Get at least 30 to 40 minutes of moderate physical activity most days of the week  Examples of exercise include brisk walking, swimming, or biking  Also include strength training at least 2 times each week  Your healthcare providers can help you create a physical activity plan  · Do not smoke  Nicotine and other chemicals in cigarettes and cigars can raise your cholesterol levels  Ask your healthcare provider for information if you currently smoke and need help to quit  E-cigarettes or smokeless tobacco still contain nicotine  Talk to your healthcare provider before you use these products  · Limit or do not drink alcohol  Alcohol can increase your triglyceride levels  Ask your healthcare provider before you drink alcohol  Ask how much is okay for you to drink in 24 hours or 1 week      Follow up with your doctor as directed:  Write down your questions so you remember to ask them during your visits  © Copyright [employer] 2022 Information is for End User's use only and may not be sold, redistributed or otherwise used for commercial purposes  All illustrations and images included in CareNotes® are the copyrighted property of [employer]  or James Hanley  The above information is an  only  It is not intended as medical advice for individual conditions or treatments  Talk to your doctor, nurse or pharmacist before following any medical regimen to see if it is safe and effective for you

## 2022-09-22 NOTE — ASSESSMENT & PLAN NOTE
Pending labs  Previously stable withouth statin  Recommend lifestyle modifications        The 10-year ASCVD risk score (Ella Pena et al , 2013) is: 0 5%    Values used to calculate the score:      Age: 56 years      Sex: Female      Is Non- : No      Diabetic: No      Tobacco smoker: No      Systolic Blood Pressure: 976 mmHg      Is BP treated: No      HDL Cholesterol: 49 mg/dL      Total Cholesterol: 151 mg/dL

## 2022-10-05 ENCOUNTER — PATIENT MESSAGE (OUTPATIENT)
Dept: FAMILY MEDICINE CLINIC | Facility: CLINIC | Age: 47
End: 2022-10-05

## 2022-10-05 ENCOUNTER — LAB (OUTPATIENT)
Dept: LAB | Facility: AMBULARY SURGERY CENTER | Age: 47
End: 2022-10-05
Payer: OTHER GOVERNMENT

## 2022-10-05 DIAGNOSIS — M79.10 MYALGIA: ICD-10-CM

## 2022-10-05 DIAGNOSIS — Z13.1 SCREENING FOR DIABETES MELLITUS: ICD-10-CM

## 2022-10-05 DIAGNOSIS — E66.9 CLASS 2 OBESITY WITH BODY MASS INDEX (BMI) OF 36.0 TO 36.9 IN ADULT, UNSPECIFIED OBESITY TYPE, UNSPECIFIED WHETHER SERIOUS COMORBIDITY PRESENT: ICD-10-CM

## 2022-10-05 DIAGNOSIS — Z13.6 SCREENING FOR CARDIOVASCULAR CONDITION: ICD-10-CM

## 2022-10-05 DIAGNOSIS — E78.5 HYPERLIPIDEMIA, UNSPECIFIED HYPERLIPIDEMIA TYPE: ICD-10-CM

## 2022-10-05 LAB
25(OH)D3 SERPL-MCNC: 52 NG/ML (ref 30–100)
ALBUMIN SERPL BCP-MCNC: 3.5 G/DL (ref 3.5–5)
ALP SERPL-CCNC: 61 U/L (ref 46–116)
ALT SERPL W P-5'-P-CCNC: 26 U/L (ref 12–78)
ANION GAP SERPL CALCULATED.3IONS-SCNC: 4 MMOL/L (ref 4–13)
AST SERPL W P-5'-P-CCNC: 18 U/L (ref 5–45)
BASOPHILS # BLD AUTO: 0.04 THOUSANDS/ΜL (ref 0–0.1)
BASOPHILS NFR BLD AUTO: 1 % (ref 0–1)
BILIRUB SERPL-MCNC: 0.63 MG/DL (ref 0.2–1)
BUN SERPL-MCNC: 13 MG/DL (ref 5–25)
CALCIUM SERPL-MCNC: 8.7 MG/DL (ref 8.3–10.1)
CHLORIDE SERPL-SCNC: 108 MMOL/L (ref 96–108)
CHOLEST SERPL-MCNC: 166 MG/DL
CO2 SERPL-SCNC: 25 MMOL/L (ref 21–32)
CREAT SERPL-MCNC: 0.64 MG/DL (ref 0.6–1.3)
EOSINOPHIL # BLD AUTO: 0.06 THOUSAND/ΜL (ref 0–0.61)
EOSINOPHIL NFR BLD AUTO: 1 % (ref 0–6)
ERYTHROCYTE [DISTWIDTH] IN BLOOD BY AUTOMATED COUNT: 13 % (ref 11.6–15.1)
EST. AVERAGE GLUCOSE BLD GHB EST-MCNC: 117 MG/DL
GFR SERPL CREATININE-BSD FRML MDRD: 107 ML/MIN/1.73SQ M
GLUCOSE P FAST SERPL-MCNC: 94 MG/DL (ref 65–99)
HBA1C MFR BLD: 5.7 %
HCT VFR BLD AUTO: 43.3 % (ref 34.8–46.1)
HDLC SERPL-MCNC: 56 MG/DL
HGB BLD-MCNC: 14.1 G/DL (ref 11.5–15.4)
IMM GRANULOCYTES # BLD AUTO: 0.01 THOUSAND/UL (ref 0–0.2)
IMM GRANULOCYTES NFR BLD AUTO: 0 % (ref 0–2)
LDLC SERPL CALC-MCNC: 98 MG/DL (ref 0–100)
LDLC SERPL DIRECT ASSAY-MCNC: 90 MG/DL (ref 0–100)
LYMPHOCYTES # BLD AUTO: 2.05 THOUSANDS/ΜL (ref 0.6–4.47)
LYMPHOCYTES NFR BLD AUTO: 40 % (ref 14–44)
MCH RBC QN AUTO: 28.7 PG (ref 26.8–34.3)
MCHC RBC AUTO-ENTMCNC: 32.6 G/DL (ref 31.4–37.4)
MCV RBC AUTO: 88 FL (ref 82–98)
MONOCYTES # BLD AUTO: 0.39 THOUSAND/ΜL (ref 0.17–1.22)
MONOCYTES NFR BLD AUTO: 8 % (ref 4–12)
NEUTROPHILS # BLD AUTO: 2.63 THOUSANDS/ΜL (ref 1.85–7.62)
NEUTS SEG NFR BLD AUTO: 50 % (ref 43–75)
NONHDLC SERPL-MCNC: 110 MG/DL
NRBC BLD AUTO-RTO: 0 /100 WBCS
PLATELET # BLD AUTO: 383 THOUSANDS/UL (ref 149–390)
PMV BLD AUTO: 9.6 FL (ref 8.9–12.7)
POTASSIUM SERPL-SCNC: 3.7 MMOL/L (ref 3.5–5.3)
PROT SERPL-MCNC: 7.3 G/DL (ref 6.4–8.4)
RBC # BLD AUTO: 4.91 MILLION/UL (ref 3.81–5.12)
SODIUM SERPL-SCNC: 137 MMOL/L (ref 135–147)
TRIGL SERPL-MCNC: 60 MG/DL
TSH SERPL DL<=0.05 MIU/L-ACNC: 2.35 UIU/ML (ref 0.45–4.5)
WBC # BLD AUTO: 5.18 THOUSAND/UL (ref 4.31–10.16)

## 2022-10-05 PROCEDURE — 83721 ASSAY OF BLOOD LIPOPROTEIN: CPT

## 2022-10-05 PROCEDURE — 85025 COMPLETE CBC W/AUTO DIFF WBC: CPT

## 2022-10-05 PROCEDURE — 84443 ASSAY THYROID STIM HORMONE: CPT

## 2022-10-05 PROCEDURE — 80053 COMPREHEN METABOLIC PANEL: CPT

## 2022-10-05 PROCEDURE — 80061 LIPID PANEL: CPT

## 2022-10-05 PROCEDURE — 83036 HEMOGLOBIN GLYCOSYLATED A1C: CPT

## 2022-10-05 PROCEDURE — 82306 VITAMIN D 25 HYDROXY: CPT

## 2022-10-05 PROCEDURE — 36415 COLL VENOUS BLD VENIPUNCTURE: CPT

## 2022-10-06 ENCOUNTER — TELEPHONE (OUTPATIENT)
Dept: FAMILY MEDICINE CLINIC | Facility: CLINIC | Age: 47
End: 2022-10-06

## 2022-10-06 PROBLEM — R73.01 IFG (IMPAIRED FASTING GLUCOSE): Status: ACTIVE | Noted: 2022-10-06

## 2022-10-06 NOTE — TELEPHONE ENCOUNTER
Please schedule patient for non Urgent lab follow-up appointment to discuss new diagnosis of prediabetes - krystal ok  Labs done 10/5/22

## 2022-10-06 NOTE — RESULT ENCOUNTER NOTE
Unstable labs - will review with patient at upcoming appointment  IFG - New Dx  To consider Metformin and Pre-DM counseling?

## 2022-10-11 ENCOUNTER — OFFICE VISIT (OUTPATIENT)
Dept: FAMILY MEDICINE CLINIC | Facility: CLINIC | Age: 47
End: 2022-10-11
Payer: OTHER GOVERNMENT

## 2022-10-11 VITALS
BODY MASS INDEX: 36.29 KG/M2 | SYSTOLIC BLOOD PRESSURE: 128 MMHG | HEIGHT: 65 IN | TEMPERATURE: 97.7 F | RESPIRATION RATE: 18 BRPM | HEART RATE: 96 BPM | DIASTOLIC BLOOD PRESSURE: 72 MMHG | OXYGEN SATURATION: 98 % | WEIGHT: 217.8 LBS

## 2022-10-11 DIAGNOSIS — E78.5 HYPERLIPIDEMIA, UNSPECIFIED HYPERLIPIDEMIA TYPE: ICD-10-CM

## 2022-10-11 DIAGNOSIS — R73.01 IFG (IMPAIRED FASTING GLUCOSE): Primary | ICD-10-CM

## 2022-10-11 DIAGNOSIS — E66.9 CLASS 2 OBESITY WITH BODY MASS INDEX (BMI) OF 36.0 TO 36.9 IN ADULT, UNSPECIFIED OBESITY TYPE, UNSPECIFIED WHETHER SERIOUS COMORBIDITY PRESENT: ICD-10-CM

## 2022-10-11 PROBLEM — Z12.12 SCREENING FOR COLORECTAL CANCER: Status: RESOLVED | Noted: 2022-05-23 | Resolved: 2022-10-11

## 2022-10-11 PROBLEM — Z12.11 SCREENING FOR COLORECTAL CANCER: Status: RESOLVED | Noted: 2022-05-23 | Resolved: 2022-10-11

## 2022-10-11 PROCEDURE — 99214 OFFICE O/P EST MOD 30 MIN: CPT | Performed by: FAMILY MEDICINE

## 2022-10-11 RX ORDER — METFORMIN HYDROCHLORIDE 500 MG/1
1500 TABLET, EXTENDED RELEASE ORAL
Qty: 90 TABLET | Refills: 1 | Status: SHIPPED | OUTPATIENT
Start: 2022-10-11

## 2022-10-11 NOTE — PROGRESS NOTES
Assessment/Plan:  Problem List Items Addressed This Visit        Endocrine    IFG (impaired fasting glucose) - Primary     New  Start Metformin XR 500mg 3 pills QD  Recommend lifestyle modifications  Relevant Medications    metFORMIN (GLUCOPHAGE-XR) 500 mg 24 hr tablet    Other Relevant Orders    Ambulatory Referral to Diabetic Education    Comprehensive metabolic panel    Comprehensive metabolic panel    Hemoglobin A1C       Other    Obesity     Improved  Recommend lifestyle modifications  Relevant Orders    TSH, 3rd generation with Free T4 reflex    Hyperlipidemia     Stable withouth statin  Recommend lifestyle modifications  The 10-year ASCVD risk score (Petrona Fajardo et al , 2013) is: 0 6%    Values used to calculate the score:      Age: 56 years      Sex: Female      Is Non- : No      Diabetic: No      Tobacco smoker: No      Systolic Blood Pressure: 697 mmHg      Is BP treated: No      HDL Cholesterol: 56 mg/dL      Total Cholesterol: 166 mg/dL           Relevant Orders    Comprehensive metabolic panel    Lipid panel    TSH, 3rd generation with Free T4 reflex    LDL cholesterol, direct           Return in about 4 months (around 2/11/2023) for 4mo - IFG, HL, Labs  Future Appointments   Date Time Provider Michelle Martinez   10/22/2022 10:40 AM [facility]   12/21/2022  1:00 PM Yamini Wheeler DO [facility]   2/14/2023  4:40 PM DO [facility]   9/20/2023  1:00 PM Yuliana Perez DO [facility]        Subjective:     Kogn Arshad is a 56 y o  female who presents today for a follow-up on her chronic medical conditions  HPI:  Chief Complaint   Patient presents with   • Follow-up     F/u labs  Prediabetes  • HM     Pt declines flu shot today  Will be setting up GYN visit and mammo is scheduled for 10/27       -- Above per clinical staff and reviewed   --      HPI      Today:    Return in about 1 year (around 9/19/2023) for  Annual physical - HL;  PRN Labs  F/U Labs - Prediabetes         had a stroke 3/22, in SNF in [place], hoping to transfer to SNF in [facility]  involved  IFG - New Dx 10/5/22       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 - Pending appt c Allergist Dr Edvin Garg 12/22/22 - referred 8/10/22  Has not used to use her Epi Pen yet           Obesity - Trying to watch diet   No regular exercise  Laura Arceo occasionally      Hyperlipidemia - No statin previously                     From previous note:    Return in about 1 year (around 9/17/2022) for Annual physical; PRN Labs      Patient did not complete labs 8/10/22          had a stroke 3/22, in SNF in [place], hoping to transfer to SNF in [facility]  involved        L Hip Pain / Left Hip Dysplasia - Management per Ortho Dr Fco Lao   F/U PRN  Jeffry Dwyer states she needs hip replacement at 51yo   She is not doing CSI as she is getting relief from CBD gummies    She states her hips have never been normal childbirth in 2010      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 - Has not made appt yet with Allergist - referred 8/10/22  Has not used to use her Epi Pen yet           Obesity - Trying to watch diet   No regular exercise   Walks occasionally      Hyperlipidemia - No statin previously        Scoliosis - Management per Ortho   F/U PRN    Denies back pain   Occasionally has paresthesias mid-thoracic back        H/O Colon Polyps - Next colonoscopy due 5/22/27        Reviewed:  Labs 10/5/22, Gyn 11/18/21, Ortho 11/2/20     Sees Clarisse Novoa yearly   Next appt 11/22        Overdue for Dentist     Sees Optho q2 years              The following portions of the patient's history were reviewed and updated as appropriate: allergies, current medications, past family history, past medical history, past social history, past surgical history and problem list       Review of Systems Constitutional: Negative for appetite change, chills, diaphoresis, fatigue and fever  Respiratory: Negative for chest tightness and shortness of breath  Cardiovascular: Negative for chest pain  Gastrointestinal: Negative for abdominal pain, blood in stool, diarrhea, nausea and vomiting  Genitourinary: Negative for dysuria  Current Outpatient Medications   Medication Sig Dispense Refill   • Calcium Carbonate-Vit D-Min (CALCIUM 1200 PO) Take 1 tablet by mouth daily     • cholecalciferol (VITAMIN D3) 1,000 units tablet Take 2,000 Units by mouth daily     • EPINEPHrine (EPIPEN) 0 3 mg/0 3 mL SOAJ Inject 0 3 mL (0 3 mg total) into a muscle once for 1 dose 0 6 mL 0   • metFORMIN (GLUCOPHAGE-XR) 500 mg 24 hr tablet Take 3 tablets (1,500 mg total) by mouth daily with breakfast Increase as directed  90 tablet 1   • multivitamin (THERAGRAN) TABS Take 1 tablet by mouth daily       No current facility-administered medications for this visit  Objective:  /72   Pulse 96   Temp 97 7 °F (36 5 °C)   Resp 18   Ht 5' 5" (1 651 m)   Wt 98 8 kg (217 lb 12 8 oz)   SpO2 98%   BMI 36 24 kg/m²    Wt Readings from Last 3 Encounters:   10/11/22 98 8 kg (217 lb 12 8 oz)   09/19/22 99 8 kg (220 lb)   08/10/22 99 3 kg (219 lb)      BP Readings from Last 3 Encounters:   10/11/22 128/72   09/19/22 110/70   08/10/22 120/72          Physical Exam  Vitals and nursing note reviewed  Constitutional:       Appearance: Normal appearance  She is well-developed  She is obese  HENT:      Head: Normocephalic and atraumatic  Eyes:      Conjunctiva/sclera: Conjunctivae normal    Neck:      Thyroid: No thyromegaly  Cardiovascular:      Rate and Rhythm: Normal rate and regular rhythm  Pulses: Normal pulses  Heart sounds: Normal heart sounds  Pulmonary:      Effort: Pulmonary effort is normal       Breath sounds: Normal breath sounds  Musculoskeletal:         General: No swelling or tenderness  Cervical back: Neck supple  Right lower leg: No edema  Left lower leg: No edema  Neurological:      General: No focal deficit present  Mental Status: She is alert and oriented to person, place, and time  Psychiatric:         Mood and Affect: Mood normal          Lab Results:      Lab Results   Component Value Date    WBC 5 18 10/05/2022    HGB 14 1 10/05/2022    HCT 43 3 10/05/2022     10/05/2022    TRIG 60 10/05/2022    HDL 56 10/05/2022    LDLDIRECT 90 10/05/2022    ALT 26 10/05/2022    AST 18 10/05/2022     06/24/2015    K 3 7 10/05/2022     10/05/2022    CREATININE 0 64 10/05/2022    BUN 13 10/05/2022    CO2 25 10/05/2022    INR 1 01 10/14/2018    GLUF 94 10/05/2022    HGBA1C 5 7 (H) 10/05/2022     No results found for: URICACID  Invalid input(s): BASENAME Vitamin D    No results found       POCT Labs

## 2022-10-11 NOTE — PATIENT INSTRUCTIONS
Increase Metformin XR 500mg every 3 days stomach tolerates:  1 pill in AM; 2 pills in AM; 3 pills in AM   Take with food in stomach

## 2022-10-11 NOTE — ASSESSMENT & PLAN NOTE
Stable withouth statin  Recommend lifestyle modifications        The 10-year ASCVD risk score (Pete Foster et al , 2013) is: 0 6%    Values used to calculate the score:      Age: 56 years      Sex: Female      Is Non- : No      Diabetic: No      Tobacco smoker: No      Systolic Blood Pressure: 606 mmHg      Is BP treated: No      HDL Cholesterol: 56 mg/dL      Total Cholesterol: 166 mg/dL

## 2022-10-12 ENCOUNTER — PATIENT MESSAGE (OUTPATIENT)
Dept: FAMILY MEDICINE CLINIC | Facility: CLINIC | Age: 47
End: 2022-10-12

## 2022-10-12 NOTE — TELEPHONE ENCOUNTER
From: Viri Monroy  To: Antwon Morris  Sent: 10/12/2022 10:49 AM EDT  Subject: Medcation    I took my first pill this morning with breakfast  Just want to make sure I’m doing it the coolest way you suggested  Take my next 2 pills: 1 on Thursday and 1 on Friday both with breakfast      What about the weekend? Wait to see if I have a reaction (meaning feeling sick) or just skip Saturday and Sunday? Or overtime take 1 on Saturday and Sunday? Thank you for your help     Randy Huggins

## 2022-10-22 ENCOUNTER — HOSPITAL ENCOUNTER (OUTPATIENT)
Dept: RADIOLOGY | Age: 47
Discharge: HOME | End: 2022-10-22
Payer: OTHER GOVERNMENT

## 2022-10-22 VITALS — WEIGHT: 217 LBS | HEIGHT: 65 IN | BODY MASS INDEX: 36.15 KG/M2

## 2022-10-22 DIAGNOSIS — Z12.31 ENCOUNTER FOR SCREENING MAMMOGRAM FOR MALIGNANT NEOPLASM OF BREAST: ICD-10-CM

## 2022-10-22 PROCEDURE — 77067 SCR MAMMO BI INCL CAD: CPT

## 2022-10-22 PROCEDURE — 77063 BREAST TOMOSYNTHESIS BI: CPT

## 2022-10-28 ENCOUNTER — TELEPHONE (OUTPATIENT)
Dept: DIABETES SERVICES | Facility: CLINIC | Age: 47
End: 2022-10-28

## 2023-03-27 ENCOUNTER — OFFICE VISIT (OUTPATIENT)
Dept: FAMILY MEDICINE CLINIC | Facility: CLINIC | Age: 48
End: 2023-03-27

## 2023-03-27 VITALS
RESPIRATION RATE: 16 BRPM | HEIGHT: 65 IN | WEIGHT: 216.2 LBS | SYSTOLIC BLOOD PRESSURE: 110 MMHG | TEMPERATURE: 98.6 F | OXYGEN SATURATION: 98 % | BODY MASS INDEX: 36.02 KG/M2 | HEART RATE: 72 BPM | DIASTOLIC BLOOD PRESSURE: 70 MMHG

## 2023-03-27 DIAGNOSIS — R73.01 IFG (IMPAIRED FASTING GLUCOSE): Primary | ICD-10-CM

## 2023-03-27 DIAGNOSIS — Z86.010 HISTORY OF COLON POLYPS: ICD-10-CM

## 2023-03-27 DIAGNOSIS — N20.0 KIDNEY STONES, CALCIUM OXALATE: ICD-10-CM

## 2023-03-27 DIAGNOSIS — Z13.828 SCOLIOSIS CONCERN: ICD-10-CM

## 2023-03-27 DIAGNOSIS — S80.811A ABRASION OF SKIN OF RIGHT LOWER LEG: ICD-10-CM

## 2023-03-27 DIAGNOSIS — E66.9 CLASS 2 OBESITY WITH BODY MASS INDEX (BMI) OF 35.0 TO 35.9 IN ADULT, UNSPECIFIED OBESITY TYPE, UNSPECIFIED WHETHER SERIOUS COMORBIDITY PRESENT: ICD-10-CM

## 2023-03-27 DIAGNOSIS — E78.5 HYPERLIPIDEMIA, UNSPECIFIED HYPERLIPIDEMIA TYPE: ICD-10-CM

## 2023-03-27 DIAGNOSIS — Z13.1 SCREENING FOR DIABETES MELLITUS: ICD-10-CM

## 2023-03-27 DIAGNOSIS — Z13.6 SCREENING FOR CARDIOVASCULAR CONDITION: ICD-10-CM

## 2023-03-27 DIAGNOSIS — T63.481A ANAPHYLAXIS DUE TO INSECT VENOM: ICD-10-CM

## 2023-03-27 DIAGNOSIS — M16.32 OSTEOARTHRITIS RESULTING FROM LEFT HIP DYSPLASIA: ICD-10-CM

## 2023-03-27 NOTE — PROGRESS NOTES
Assessment/Plan:  Problem List Items Addressed This Visit        Endocrine    IFG (impaired fasting glucose) - Primary     Pending labs  Patient declines Metformin XR 500mg 3 pills QD because she is worried about possible kidney damage, and declines GLP-1 agonist   Recommend lifestyle modifications  Relevant Orders    Comprehensive metabolic panel    Hemoglobin A1C       Musculoskeletal and Integument    Osteoarthritis resulting from left hip dysplasia     Management per Ortho  Stable  Genitourinary    Kidney stones, calcium oxalate     Stable  Caution c calcium supplements  Other    Obesity     Stable  Recommend lifestyle modifications  Patient declines GLP-1 agonist            Relevant Orders    TSH, 3rd generation with Free T4 reflex    Hyperlipidemia     Pending labs  Previously stable withouth statin  Recommend lifestyle modifications  The 10-year ASCVD risk score (Nick MTZ, et al , 2019) is: 0 5%    Values used to calculate the score:      Age: 56 years      Sex: Female      Is Non- : No      Diabetic: No      Tobacco smoker: No      Systolic Blood Pressure: 693 mmHg      Is BP treated: No      HDL Cholesterol: 56 mg/dL      Total Cholesterol: 166 mg/dL           Relevant Orders    CBC and differential    Comprehensive metabolic panel    Lipid panel    TSH, 3rd generation with Free T4 reflex    LDL cholesterol, direct    History of colon polyps     Colonoscopy is up-to-date  Scoliosis concern     Stable  Management per Ortho  F/U PRN  Anaphylaxis due to insect venom     Stable  Use Epi Pen PRN  Pending Allergy consult  Other Visit Diagnoses     Abrasion of skin of right lower leg        Stable  Continue Neosporin  Tdap is up-to-date         Screening for diabetes mellitus        Relevant Orders    Hemoglobin A1C    Screening for cardiovascular condition        Relevant Orders    CBC and differential Comprehensive metabolic panel    Lipid panel    LDL cholesterol, direct           Return in about 6 months (around 9/27/2023) for Physical / 6mo - IFG, HL, Labs  Future Appointments   Date Time Provider Michelle Martinez   5/9/2023  9:30 AM Lori Amor MD [facility]   9/20/2023  1:00 PM Radha Fails, DO [facility]   9/27/2023  2:20 PM Radha Fails, DO [facility]        Subjective:     Kat Hairston is a 56 y o  female who presents today for a follow-up on her chronic medical conditions  HPI:  Chief Complaint   Patient presents with   • Follow-up     4 month follow up      -- Above per clinical staff and reviewed  --      HPI      Today:      Return in about 1 year (around 9/19/2023) for  Annual physical - HL;  PRN Labs  Return in about 4 months (around 2/11/2023) for 4mo - IFG, HL, Labs  4mo OV    Patient did not complete labs 10/11/2022          had a stroke 3/22  He is living at [facility] in [place]  It is unknown when he will return home  She sees him weekly   [facility]       IFG - New Dx 10/5/22  She is not taking Metformin XR 500mg 3 pills QD, and she only took 1 pill QD for 1 week and stopped it because people told her it was bad for her kidneys  She previously declined Pre-DM education         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 - Pending appt c Allergist Dr Mami Erickson 12/22/22 - referred 8/10/22  Michael Sheppard not used to use her Epi Pen yet  Obesity - Watching diet   No regular exercise   Walks occasionally  Hyperlipidemia - No statin previously          L Hip Pain / Left Hip Dysplasia - Management per Ortho Dr April Cortes   F/U PRN  Freddy Parada states she needs hip replacement at 49yo  Freddy Parada is not doing CSI   She states her hips have never been normal childbirth in 2010       Scoliosis - Management per Ortho   F/U PRN    Denies back pain   Occasionally has paresthesias mid-thoracic back       H/O Colon Polyps - Next "colonoscopy due 5/22/27       Reviewed:  Labs 10/5/22, Gyn 11/18/21, Ortho 11/2/20    Sees Rj Sagastume yearly   Next appt 5/23 - Overdue       PHQ-2/9 Depression Screening    Little interest or pleasure in doing things: 0 - not at all  Feeling down, depressed, or hopeless: 0 - not at all  PHQ-2 Score: 0  PHQ-2 Interpretation: Negative depression screen               The following portions of the patient's history were reviewed and updated as appropriate: allergies, current medications, past family history, past medical history, past social history, past surgical history and problem list       Review of Systems   Constitutional: Negative for appetite change, chills, diaphoresis, fatigue and fever  Respiratory: Negative for chest tightness and shortness of breath  Cardiovascular: Negative for chest pain  Gastrointestinal: Negative for abdominal pain, blood in stool, diarrhea, nausea and vomiting  Genitourinary: Negative for dysuria  Current Outpatient Medications   Medication Sig Dispense Refill   • Calcium Carbonate-Vit D-Min (CALCIUM 1200 PO) Take 1 tablet by mouth daily     • cholecalciferol (VITAMIN D3) 1,000 units tablet Take 2,000 Units by mouth daily     • multivitamin (THERAGRAN) TABS Take 1 tablet by mouth daily     • EPINEPHrine (EPIPEN) 0 3 mg/0 3 mL SOAJ Inject 0 3 mL (0 3 mg total) into a muscle once for 1 dose (Patient not taking: Reported on 3/27/2023) 0 6 mL 0     No current facility-administered medications for this visit         Objective:  /70 (BP Location: Left arm, Patient Position: Sitting, Cuff Size: Large)   Pulse 72   Temp 98 6 °F (37 °C) (Temporal)   Resp 16   Ht 5' 5\" (1 651 m)   Wt 98 1 kg (216 lb 3 2 oz)   SpO2 98%   BMI 35 98 kg/m²    Wt Readings from Last 3 Encounters:   03/27/23 98 1 kg (216 lb 3 2 oz)   10/22/22 98 4 kg (217 lb)   10/11/22 98 8 kg (217 lb 12 8 oz)      BP Readings from Last 3 Encounters:   03/27/23 " 110/70   10/11/22 128/72   09/19/22 110/70          Physical Exam  Vitals and nursing note reviewed  Constitutional:       Appearance: Normal appearance  She is well-developed  She is obese  HENT:      Head: Normocephalic and atraumatic  Eyes:      Conjunctiva/sclera: Conjunctivae normal    Neck:      Thyroid: No thyromegaly  Cardiovascular:      Rate and Rhythm: Normal rate and regular rhythm  Pulses: Normal pulses  Heart sounds: Normal heart sounds  Pulmonary:      Effort: Pulmonary effort is normal       Breath sounds: Normal breath sounds  Abdominal:      General: Bowel sounds are normal  There is no distension  Palpations: Abdomen is soft  There is no mass  Tenderness: There is no abdominal tenderness  There is no guarding or rebound  Musculoskeletal:         General: No swelling or tenderness  Cervical back: Neck supple  Right lower leg: No edema  Left lower leg: No edema  Skin:     Comments: Right distal anterior tibia c 2cm x 2cm abrasion c surrounding erythema  No calor or D/C  Neurological:      General: No focal deficit present  Mental Status: She is alert and oriented to person, place, and time  Psychiatric:         Mood and Affect: Mood normal          Behavior: Behavior normal          Thought Content: Thought content normal          Judgment: Judgment normal          Lab Results:      Lab Results   Component Value Date    WBC 5 18 10/05/2022    HGB 14 1 10/05/2022    HCT 43 3 10/05/2022     10/05/2022    TRIG 60 10/05/2022    HDL 56 10/05/2022    LDLDIRECT 90 10/05/2022    ALT 26 10/05/2022    AST 18 10/05/2022     06/24/2015    K 3 7 10/05/2022     10/05/2022    CREATININE 0 64 10/05/2022    BUN 13 10/05/2022    CO2 25 10/05/2022    INR 1 01 10/14/2018    GLUF 94 10/05/2022    HGBA1C 5 7 (H) 10/05/2022     No results found for: URICACID  Invalid input(s): BASENAME Vitamin D    No results found       POCT Labs      BMI Counseling: Body mass index is 35 98 kg/m²  The BMI is above normal  Nutrition recommendations include encouraging healthy choices of fruits and vegetables  Exercise recommendations include exercising 3-5 times per week  No pharmacotherapy was ordered  Rationale for BMI follow-up plan is due to patient being overweight or obese  Depression Screening and Follow-up Plan: Patient was screened for depression during today's encounter  They screened negative with a PHQ-2 score of 0

## 2023-03-27 NOTE — ASSESSMENT & PLAN NOTE
Pending labs  Previously stable withouth statin  Recommend lifestyle modifications        The 10-year ASCVD risk score (Nick MTZ, et al , 2019) is: 0 5%    Values used to calculate the score:      Age: 56 years      Sex: Female      Is Non- : No      Diabetic: No      Tobacco smoker: No      Systolic Blood Pressure: 566 mmHg      Is BP treated: No      HDL Cholesterol: 56 mg/dL      Total Cholesterol: 166 mg/dL

## 2023-03-27 NOTE — ASSESSMENT & PLAN NOTE
Pending labs  Patient declines Metformin XR 500mg 3 pills QD because she is worried about possible kidney damage, and declines GLP-1 agonist   Recommend lifestyle modifications

## 2023-05-09 ENCOUNTER — OFFICE VISIT (OUTPATIENT)
Dept: OBGYN CLINIC | Facility: CLINIC | Age: 48
End: 2023-05-09

## 2023-05-09 VITALS
SYSTOLIC BLOOD PRESSURE: 130 MMHG | WEIGHT: 216 LBS | HEIGHT: 65 IN | BODY MASS INDEX: 35.99 KG/M2 | DIASTOLIC BLOOD PRESSURE: 80 MMHG

## 2023-05-09 DIAGNOSIS — Z12.31 ENCOUNTER FOR SCREENING MAMMOGRAM FOR MALIGNANT NEOPLASM OF BREAST: ICD-10-CM

## 2023-05-09 DIAGNOSIS — Z11.51 SCREENING FOR HUMAN PAPILLOMAVIRUS (HPV): ICD-10-CM

## 2023-05-09 DIAGNOSIS — Z01.419 WELL WOMAN EXAM WITH ROUTINE GYNECOLOGICAL EXAM: Primary | ICD-10-CM

## 2023-05-09 DIAGNOSIS — Z12.4 SCREENING FOR MALIGNANT NEOPLASM OF CERVIX: ICD-10-CM

## 2023-05-09 NOTE — PROGRESS NOTES
ASSESSMENT & PLAN:   Diagnoses and all orders for this visit:    Well woman exam with routine gynecological exam  -     Liquid-based pap, screening    Encounter for screening mammogram for malignant neoplasm of breast  -     Mammo screening bilateral w 3d & cad; Future    Screening for malignant neoplasm of cervix  -     Liquid-based pap, screening    Screening for human papillomavirus (HPV)  -     Liquid-based pap, screening          The following were reviewed in today's visit: ASCCP guidelines, Gardisil vaccination, STD testing breast self exam, mammography screening ordered, exercise and healthy diet  Patient to return to office in yearly for annual exam      All questions have been answered to her satisfaction  CC:  Annual Gynecologic Examination  Chief Complaint   Patient presents with   • Gynecologic Exam     Pt is here for an annual exam and pap smear  Last pap 19 neg pap/neg hpv  mammo 10/22/22  colonoscopy 22 - repeat 5 years       HPI: Reggie Quispe is a 56 y o  Pennye Pleasure who presents for annual gynecologic examination    She has the following concerns:  none      Health Maintenance:    Exercise: intermittently  Breast exams/breast awareness: yes  Diet: well balanced diet  Last mammogram:   Colorectal cancer screenin      Past Medical History:   Diagnosis Date   • Abnormal Pap smear of cervix    • Anaphylaxis due to insect venom 08/10/2022   • Arthritis     It was last year   • COVID-19 2022   • History of colon polyps 2022   • HPV (human papilloma virus) infection    • Hyperlipidemia    • IFG (impaired fasting glucose)    • Internal hemorrhoids 2022   • Kidney stone 2015   • Kidney stones, calcium oxalate 2015   • Obesity    • Pneumonia     Resolved 2017    • Scoliosis    • Tremor     Left Hand Resting Tremor   • Varicella    • Visual impairment     Wear glasses       Past Surgical History:   Procedure Laterality Date   • APPENDECTOMY   2018    Appendix taken out   •  SECTION       x 1 ; Failure to progress   • FL INJECTION LEFT HIP (NON ARTHROGRAM)  10/12/2020   • ME LAPAROSCOPIC APPENDECTOMY N/A 10/14/2018    Procedure: APPENDECTOMY LAPAROSCOPIC;  Surgeon: Raymundo Hernandez MD;  Location: [facility];  Service: General   • WISDOM TOOTH EXTRACTION         Past OB/Gyn History:   Patient's last menstrual period was 2023  Menopausal status: perimenopausal  Menopausal symptoms: None    Last Pap: 2019 : no abnormalities  History of abnormal Pap smear: no    Patient is currently sexually active  STD testing: no  Current contraception: abstinence and condoms      Family History  Family History   Problem Relation Age of Onset   • Hypertension Father    • Alcohol abuse Father    • Kidney disease Father         CKD on ESRD   • Diabetes type II Father 48   • No Known Problems Mother    • Scoliosis Brother    • Hip dysplasia Daughter    • No Known Problems Maternal Grandmother    • No Known Problems Maternal Grandfather    • No Known Problems Paternal Grandmother    • No Known Problems Paternal Grandfather    • No Known Problems Maternal Aunt    • No Known Problems Maternal Aunt    • No Known Problems Paternal Aunt    • No Known Problems Paternal Aunt    • No Known Problems Paternal Aunt    • No Known Problems Paternal Aunt        Family history of uterine or ovarian cancer: no  Family history of breast cancer: no  Family history of colon cancer: no    Social History:  Social History     Socioeconomic History   • Marital status: /Civil Union     Spouse name: Not on file   • Number of children: 1   • Years of education: Not on file   • Highest education level: Not on file   Occupational History   • 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 [employer]   Tobacco Use   • Smoking status: Never   • Smokeless tobacco: Never   Vaping Use   • Vaping Use: Never used   Substance and Sexual Activity   • Alcohol use:  Yes     Alcohol/week: 1 0 "standard drink     Types: 1 Glasses of wine per week     Comment: rare   • Drug use: Never   • Sexual activity: Not Currently     Partners: Male     Birth control/protection: None   Other Topics Concern   • Not on file   Social History Narrative        Lives with , Daughter    1 Child - 1 Daughter    Pending Employment - Substitute  for  [employer] at [employer] Strain: Not on [employer] Insecurity: Not on file   Transportation Needs: Not on file   Physical Activity: Not on file   Stress: Not on file   Social Connections: Not on file   Intimate Partner Violence: Not on file   Housing Stability: Not on file     Domestic violence screen: negative    Allergies: Allergies   Allergen Reactions   • Bee Venom Anaphylaxis       Medications:    Current Outpatient Medications:   •  EPINEPHrine (EPIPEN) 0 3 mg/0 3 mL SOAJ, Inject 0 3 mL (0 3 mg total) into a muscle once for 1 dose, Disp: 0 6 mL, Rfl: 0  •  multivitamin (THERAGRAN) TABS, Take 1 tablet by mouth daily, Disp: , Rfl:     Review of Systems:  Review of Systems   Constitutional: Negative  HENT: Negative  Respiratory: Negative  Cardiovascular: Negative  Gastrointestinal: Negative  Genitourinary: Negative  Neurological: Negative  Psychiatric/Behavioral: Negative  Physical Exam:  /80   Ht 5' 5\" (1 651 m)   Wt 98 kg (216 lb)   LMP 04/01/2023   BMI 35 94 kg/m²    Physical Exam  Constitutional:       Appearance: Normal appearance  Genitourinary:      Bladder and urethral meatus normal       No lesions in the vagina  Right Labia: No rash, tenderness, lesions, skin changes or Bartholin's cyst      Left Labia: No tenderness, lesions, skin changes, Bartholin's cyst or rash  No vaginal erythema, tenderness or bleeding  Right Adnexa: not tender, not full and no mass present  Left Adnexa: not tender, not full and no mass present       Cervix " is parous  No cervical motion tenderness, discharge, lesion or polyp  Uterus is not enlarged, fixed or tender  No uterine mass detected  Urethral meatus caruncle not present  No urethral tenderness or mass present  Breasts:     Right: No swelling, bleeding, inverted nipple, mass, nipple discharge, skin change or tenderness  Left: No swelling, bleeding, inverted nipple, mass, nipple discharge, skin change or tenderness  HENT:      Head: Normocephalic and atraumatic  Eyes:      Extraocular Movements: Extraocular movements intact  Conjunctiva/sclera: Conjunctivae normal       Pupils: Pupils are equal, round, and reactive to light  Cardiovascular:      Rate and Rhythm: Normal rate and regular rhythm  Heart sounds: Normal heart sounds  No murmur heard  Pulmonary:      Effort: Pulmonary effort is normal  No respiratory distress  Breath sounds: Normal breath sounds  No wheezing or rales  Abdominal:      General: There is no distension  Palpations: Abdomen is soft  Tenderness: There is no abdominal tenderness  There is no guarding  Neurological:      General: No focal deficit present  Mental Status: She is alert and oriented to person, place, and time  Skin:     General: Skin is warm and dry  Psychiatric:         Mood and Affect: Mood normal          Behavior: Behavior normal    Vitals and nursing note reviewed

## 2023-05-12 LAB
LAB AP GYN PRIMARY INTERPRETATION: NORMAL
Lab: NORMAL

## 2023-05-15 ENCOUNTER — OFFICE VISIT (OUTPATIENT)
Dept: FAMILY MEDICINE CLINIC | Facility: CLINIC | Age: 48
End: 2023-05-15

## 2023-05-15 VITALS
BODY MASS INDEX: 36.39 KG/M2 | HEIGHT: 65 IN | WEIGHT: 218.4 LBS | OXYGEN SATURATION: 98 % | TEMPERATURE: 97.6 F | HEART RATE: 79 BPM | DIASTOLIC BLOOD PRESSURE: 72 MMHG | SYSTOLIC BLOOD PRESSURE: 114 MMHG | RESPIRATION RATE: 18 BRPM

## 2023-05-15 DIAGNOSIS — H92.12 OTORRHEA OF LEFT EAR: ICD-10-CM

## 2023-05-15 DIAGNOSIS — H69.82 ACUTE DYSFUNCTION OF LEFT EUSTACHIAN TUBE: Primary | ICD-10-CM

## 2023-05-15 DIAGNOSIS — E66.9 CLASS 2 OBESITY WITH BODY MASS INDEX (BMI) OF 36.0 TO 36.9 IN ADULT, UNSPECIFIED OBESITY TYPE, UNSPECIFIED WHETHER SERIOUS COMORBIDITY PRESENT: ICD-10-CM

## 2023-05-15 NOTE — PROGRESS NOTES
Assessment/Plan:  Problem List Items Addressed This Visit        Other    Obesity     Stable  Recommend lifestyle modifications  Patient previously declined GLP-1 agonist           Other Visit Diagnoses     Acute dysfunction of left eustachian tube    -  Primary    Resolved  Normal ear exam today  If recurs, Advise Shnaon Arredondo med sinus rinse kit, Mucinex, Claritin/Zyrtec/Allegra/Xyzal, Flonase / Nasacort nasal spray  Avoid decongestants if you have high blood pressure  Otorrhea of left ear     Resolved  Normal ear exam today  Return if symptoms worsen or fail to improve  Future Appointments   Date Time Provider Michelle Martinez   9/20/2023  1:00 PM Callie Ayala DO [facility]   9/27/2023  2:20 PM Callie Ayala DO  [facility]   5/16/2024  9:30 AM Lori Bergman MD [facility]        Subjective:     Thaddeus Vlila is a 56 y o  female who presents today for a follow-up on her acute medical conditions  HPI:  Chief Complaint   Patient presents with   • Ear Problem     Pt states that Saturday night, 5/13, she heard a popping noise in the L ear and then felt fluid in the L ear  Pt reports she cleaned around the outside of the ear canal and saw blood on the cotton swab  Denies any ear pain and changes in hearing       -- Above per clinical staff and reviewed  --    HPI      Today:      Left Ear Bleeding - Symptoms occurred once 2 days ago  Left Ear Tinnitus - Symptoms occurred once 2 days ago, lasting 5 minutes, no resolved  Denies current ear pain or decreased hearing  The following portions of the patient's history were reviewed and updated as appropriate: allergies, current medications, past family history, past medical history, past social history, past surgical history and problem list       Review of Systems   Constitutional: Negative for appetite change, chills, diaphoresis, fatigue and fever     HENT: Negative for ear "discharge and ear pain  Respiratory: Negative for chest tightness and shortness of breath  Gastrointestinal: Negative for abdominal pain, blood in stool, diarrhea, nausea and vomiting  Genitourinary: Negative for dysuria  Current Outpatient Medications   Medication Sig Dispense Refill   • EPINEPHrine (EPIPEN) 0 3 mg/0 3 mL SOAJ Inject 0 3 mL (0 3 mg total) into a muscle once for 1 dose 0 6 mL 0   • multivitamin (THERAGRAN) TABS Take 1 tablet by mouth daily       No current facility-administered medications for this visit  Objective:  /72   Pulse 79   Temp 97 6 °F (36 4 °C)   Resp 18   Ht 5' 5\" (1 651 m)   Wt 99 1 kg (218 lb 6 4 oz)   SpO2 98%   BMI 36 34 kg/m²    Wt Readings from Last 3 Encounters:   05/15/23 99 1 kg (218 lb 6 4 oz)   05/09/23 98 kg (216 lb)   03/27/23 98 1 kg (216 lb 3 2 oz)      BP Readings from Last 3 Encounters:   05/15/23 114/72   05/09/23 130/80   03/27/23 110/70          Physical Exam  Vitals and nursing note reviewed  Constitutional:       Appearance: Normal appearance  She is well-developed  She is obese  HENT:      Head: Normocephalic and atraumatic  Right Ear: Tympanic membrane, ear canal and external ear normal       Left Ear: Tympanic membrane, ear canal and external ear normal       Nose: Nose normal       Right Sinus: No maxillary sinus tenderness or frontal sinus tenderness  Left Sinus: No maxillary sinus tenderness or frontal sinus tenderness  Mouth/Throat:      Mouth: Mucous membranes are moist       Pharynx: Oropharynx is clear  Uvula midline  Tonsils: No tonsillar exudate  Eyes:      Extraocular Movements: Extraocular movements intact  Conjunctiva/sclera: Conjunctivae normal    Cardiovascular:      Rate and Rhythm: Normal rate and regular rhythm  Pulses: Normal pulses  Heart sounds: Normal heart sounds  Abdominal:      General: There is no distension  Palpations: There is no mass        Tenderness: " There is no abdominal tenderness  There is no guarding or rebound  Musculoskeletal:         General: No swelling or tenderness  Cervical back: Neck supple  Right lower leg: No edema  Left lower leg: No edema  Lymphadenopathy:      Cervical: No cervical adenopathy  Skin:     Findings: No rash  Neurological:      General: No focal deficit present  Mental Status: She is alert and oriented to person, place, and time  Psychiatric:         Mood and Affect: Mood normal          Lab Results:      Lab Results   Component Value Date    WBC 5 18 10/05/2022    HGB 14 1 10/05/2022    HCT 43 3 10/05/2022     10/05/2022    TRIG 71 04/18/2023    HDL 55 04/18/2023    LDLDIRECT 105 (H) 04/18/2023    ALT 10 04/18/2023    AST 15 04/18/2023     06/24/2015    K 4 1 04/18/2023     04/18/2023    CREATININE 0 60 04/18/2023    BUN 17 04/18/2023    CO2 25 04/18/2023    INR 1 01 10/14/2018    GLUF 89 04/18/2023    HGBA1C 5 5 04/18/2023     No results found for: URICACID  Invalid input(s): BASENAME Vitamin D    No results found       POCT Labs

## 2023-05-15 NOTE — PATIENT INSTRUCTIONS
If symptoms recur:    Advise Delbra Liam med sinus rinse kit, Mucinex, Claritin/Zyrtec/Allegra/Xyzal, Flonase / Nasacort nasal spray  Avoid decongestants if you have high blood pressure  Eustachian Tube Dysfunction   AMBULATORY CARE:   Eustachian tube dysfunction (ETD)  is a condition that prevents your eustachian tubes from opening properly  It can also cause them to become blocked  Eustachian tubes connect your middle ear to the back of your nose and throat  These tubes open and allow air to flow in and out when you sneeze, swallow, or yawn  Common signs and symptoms include the following:   Fullness or pressure in your ears    Muffled hearing, or a feeling you are hearing under water or have clogged ears    Pain in one or both ears    Ringing in your ears    Popping, crackling, or clicking feeling in your ears    Trouble keeping your balance    Call your doctor or otolaryngologist if:   Your symptoms do not improve or get worse  You have a fever  You have any hearing loss  You have questions or concerns about your condition or care  Treatment:  ETD may get better on its own without any treatment  If it continues, you may need any of the following:  Swallow, yawn, or chew gum  to help open your eustachian tubes  Your healthcare provider may also recommend you blow with your mouth shut and your nostrils pinched closed  Air pressure devices  push air into your nose and eustachian tubes to help relieve air pressure in your ear  Treatment for allergies  such as decongestants, antihistamines, and nasal steroids may improve ETD  They may help decrease swelling of the eustachian tubes  A myringotomy  is surgery to make a hole in your eardrum  The hole relieves pressure and lets fluid drain from your ear  A pressure equalizing (PE) tube may be used to keep the hole open and to help drain fluid  Tuboplasty  is a procedure to widen your eustachian tubes      Follow up with your doctor or otolaryngologist as directed:  Write down your questions so you remember to ask them during your visits  © Copyright Michael Hameed 2022 Information is for End User's use only and may not be sold, redistributed or otherwise used for commercial purposes  The above information is an  only  It is not intended as medical advice for individual conditions or treatments  Talk to your doctor, nurse or pharmacist before following any medical regimen to see if it is safe and effective for you

## 2023-08-04 DIAGNOSIS — T78.2XXA ANAPHYLAXIS, INITIAL ENCOUNTER: ICD-10-CM

## 2023-08-07 RX ORDER — EPINEPHRINE 0.3 MG/.3ML
0.3 INJECTION SUBCUTANEOUS ONCE
Qty: 0.6 ML | Refills: 0 | Status: SHIPPED | OUTPATIENT
Start: 2023-08-07 | End: 2025-01-01

## 2023-08-07 NOTE — TELEPHONE ENCOUNTER
eRx sent . Please advise patient to schedule overdue Allergist consult. From OV 3/27/23:    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 - Pending appt c Allergist Dr. Anna Marie Martinez 12/22/22 - referred 8/10/22. Vernon Michele not used to use her Epi Pen yet.

## 2023-08-22 ENCOUNTER — LAB (OUTPATIENT)
Dept: LAB | Facility: AMBULARY SURGERY CENTER | Age: 48
End: 2023-08-22
Payer: OTHER GOVERNMENT

## 2023-08-22 DIAGNOSIS — Z13.1 SCREENING FOR DIABETES MELLITUS: ICD-10-CM

## 2023-08-22 DIAGNOSIS — E66.9 CLASS 2 OBESITY WITH BODY MASS INDEX (BMI) OF 35.0 TO 35.9 IN ADULT, UNSPECIFIED OBESITY TYPE, UNSPECIFIED WHETHER SERIOUS COMORBIDITY PRESENT: ICD-10-CM

## 2023-08-22 DIAGNOSIS — E78.5 HYPERLIPIDEMIA, UNSPECIFIED HYPERLIPIDEMIA TYPE: ICD-10-CM

## 2023-08-22 DIAGNOSIS — Z13.6 SCREENING FOR CARDIOVASCULAR CONDITION: ICD-10-CM

## 2023-08-22 DIAGNOSIS — R73.01 IFG (IMPAIRED FASTING GLUCOSE): ICD-10-CM

## 2023-08-22 LAB
ALBUMIN SERPL BCP-MCNC: 4 G/DL (ref 3.5–5)
ALP SERPL-CCNC: 58 U/L (ref 34–104)
ALT SERPL W P-5'-P-CCNC: 14 U/L (ref 7–52)
ANION GAP SERPL CALCULATED.3IONS-SCNC: 10 MMOL/L
AST SERPL W P-5'-P-CCNC: 17 U/L (ref 13–39)
BASOPHILS # BLD AUTO: 0.05 THOUSANDS/ÂΜL (ref 0–0.1)
BASOPHILS NFR BLD AUTO: 1 % (ref 0–1)
BILIRUB SERPL-MCNC: 0.6 MG/DL (ref 0.2–1)
BUN SERPL-MCNC: 12 MG/DL (ref 5–25)
CALCIUM SERPL-MCNC: 9.2 MG/DL (ref 8.4–10.2)
CHLORIDE SERPL-SCNC: 104 MMOL/L (ref 96–108)
CHOLEST SERPL-MCNC: 174 MG/DL
CO2 SERPL-SCNC: 24 MMOL/L (ref 21–32)
CREAT SERPL-MCNC: 0.58 MG/DL (ref 0.6–1.3)
EOSINOPHIL # BLD AUTO: 0.06 THOUSAND/ÂΜL (ref 0–0.61)
EOSINOPHIL NFR BLD AUTO: 1 % (ref 0–6)
ERYTHROCYTE [DISTWIDTH] IN BLOOD BY AUTOMATED COUNT: 13.6 % (ref 11.6–15.1)
EST. AVERAGE GLUCOSE BLD GHB EST-MCNC: 114 MG/DL
GFR SERPL CREATININE-BSD FRML MDRD: 110 ML/MIN/1.73SQ M
GLUCOSE P FAST SERPL-MCNC: 93 MG/DL (ref 65–99)
HBA1C MFR BLD: 5.6 %
HCT VFR BLD AUTO: 42.5 % (ref 34.8–46.1)
HDLC SERPL-MCNC: 60 MG/DL
HGB BLD-MCNC: 14.1 G/DL (ref 11.5–15.4)
IMM GRANULOCYTES # BLD AUTO: 0.01 THOUSAND/UL (ref 0–0.2)
IMM GRANULOCYTES NFR BLD AUTO: 0 % (ref 0–2)
LDLC SERPL CALC-MCNC: 101 MG/DL (ref 0–100)
LDLC SERPL DIRECT ASSAY-MCNC: 111 MG/DL (ref 0–100)
LYMPHOCYTES # BLD AUTO: 2.39 THOUSANDS/ÂΜL (ref 0.6–4.47)
LYMPHOCYTES NFR BLD AUTO: 43 % (ref 14–44)
MCH RBC QN AUTO: 28 PG (ref 26.8–34.3)
MCHC RBC AUTO-ENTMCNC: 33.2 G/DL (ref 31.4–37.4)
MCV RBC AUTO: 85 FL (ref 82–98)
MONOCYTES # BLD AUTO: 0.42 THOUSAND/ÂΜL (ref 0.17–1.22)
MONOCYTES NFR BLD AUTO: 8 % (ref 4–12)
NEUTROPHILS # BLD AUTO: 2.65 THOUSANDS/ÂΜL (ref 1.85–7.62)
NEUTS SEG NFR BLD AUTO: 47 % (ref 43–75)
NONHDLC SERPL-MCNC: 114 MG/DL
NRBC BLD AUTO-RTO: 0 /100 WBCS
PLATELET # BLD AUTO: 416 THOUSANDS/UL (ref 149–390)
PMV BLD AUTO: 9.6 FL (ref 8.9–12.7)
POTASSIUM SERPL-SCNC: 4 MMOL/L (ref 3.5–5.3)
PROT SERPL-MCNC: 7 G/DL (ref 6.4–8.4)
RBC # BLD AUTO: 5.03 MILLION/UL (ref 3.81–5.12)
SODIUM SERPL-SCNC: 138 MMOL/L (ref 135–147)
TRIGL SERPL-MCNC: 66 MG/DL
TSH SERPL DL<=0.05 MIU/L-ACNC: 2.93 UIU/ML (ref 0.45–4.5)
WBC # BLD AUTO: 5.58 THOUSAND/UL (ref 4.31–10.16)

## 2023-08-22 PROCEDURE — 80053 COMPREHEN METABOLIC PANEL: CPT

## 2023-08-22 PROCEDURE — 84443 ASSAY THYROID STIM HORMONE: CPT

## 2023-08-22 PROCEDURE — 80061 LIPID PANEL: CPT

## 2023-08-22 PROCEDURE — 85025 COMPLETE CBC W/AUTO DIFF WBC: CPT

## 2023-08-22 PROCEDURE — 83036 HEMOGLOBIN GLYCOSYLATED A1C: CPT

## 2023-08-22 PROCEDURE — 83721 ASSAY OF BLOOD LIPOPROTEIN: CPT

## 2023-08-22 PROCEDURE — 36415 COLL VENOUS BLD VENIPUNCTURE: CPT

## 2023-09-20 ENCOUNTER — OFFICE VISIT (OUTPATIENT)
Dept: FAMILY MEDICINE CLINIC | Facility: CLINIC | Age: 48
End: 2023-09-20
Payer: OTHER GOVERNMENT

## 2023-09-20 VITALS
TEMPERATURE: 98.1 F | BODY MASS INDEX: 36.09 KG/M2 | OXYGEN SATURATION: 96 % | DIASTOLIC BLOOD PRESSURE: 70 MMHG | HEART RATE: 74 BPM | SYSTOLIC BLOOD PRESSURE: 118 MMHG | HEIGHT: 65 IN | WEIGHT: 216.6 LBS | RESPIRATION RATE: 18 BRPM

## 2023-09-20 DIAGNOSIS — N20.0 KIDNEY STONES, CALCIUM OXALATE: ICD-10-CM

## 2023-09-20 DIAGNOSIS — Z13.828 SCOLIOSIS CONCERN: ICD-10-CM

## 2023-09-20 DIAGNOSIS — T63.481A ANAPHYLAXIS DUE TO INSECT VENOM: ICD-10-CM

## 2023-09-20 DIAGNOSIS — Z00.00 ANNUAL PHYSICAL EXAM: Primary | ICD-10-CM

## 2023-09-20 DIAGNOSIS — M79.10 MYALGIA: ICD-10-CM

## 2023-09-20 DIAGNOSIS — Z86.010 HISTORY OF COLON POLYPS: ICD-10-CM

## 2023-09-20 DIAGNOSIS — M16.32 OSTEOARTHRITIS RESULTING FROM LEFT HIP DYSPLASIA: ICD-10-CM

## 2023-09-20 DIAGNOSIS — E66.9 CLASS 2 OBESITY WITH BODY MASS INDEX (BMI) OF 36.0 TO 36.9 IN ADULT, UNSPECIFIED OBESITY TYPE, UNSPECIFIED WHETHER SERIOUS COMORBIDITY PRESENT: ICD-10-CM

## 2023-09-20 DIAGNOSIS — R73.01 IFG (IMPAIRED FASTING GLUCOSE): ICD-10-CM

## 2023-09-20 DIAGNOSIS — E78.5 HYPERLIPIDEMIA, UNSPECIFIED HYPERLIPIDEMIA TYPE: ICD-10-CM

## 2023-09-20 DIAGNOSIS — R25.1 TREMOR: ICD-10-CM

## 2023-09-20 PROCEDURE — 99396 PREV VISIT EST AGE 40-64: CPT | Performed by: FAMILY MEDICINE

## 2023-09-20 NOTE — ASSESSMENT & PLAN NOTE
Patient declines Metformin XR 500mg 3 pills QD because she is worried about possible kidney damage, and declines GLP-1 agonist.  Recommend lifestyle modifications.

## 2023-09-20 NOTE — ASSESSMENT & PLAN NOTE
Stable withouth statin. Recommend lifestyle modifications.       The 10-year ASCVD risk score (Nick MTZ, et al., 2019) is: 0.6%    Values used to calculate the score:      Age: 53 years      Sex: Female      Is Non- : No      Diabetic: No      Tobacco smoker: No      Systolic Blood Pressure: 960 mmHg      Is BP treated: No      HDL Cholesterol: 60 mg/dL      Total Cholesterol: 174 mg/dL

## 2023-09-20 NOTE — PROGRESS NOTES
Assessment/Plan:  Problem List Items Addressed This Visit        Endocrine    IFG (impaired fasting glucose)     Patient declines Metformin XR 500mg 3 pills QD because she is worried about possible kidney damage, and declines GLP-1 agonist.  Recommend lifestyle modifications. Relevant Orders    Comprehensive metabolic panel    Hemoglobin A1C       Musculoskeletal and Integument    Osteoarthritis resulting from left hip dysplasia     Management per Ortho. Unstable. Genitourinary    Kidney stones, calcium oxalate     Stable. Caution c calcium supplements. Other    Obesity     Stable. Recommend lifestyle modifications. Patient previously declined GLP-1 agonist.           Relevant Orders    TSH, 3rd generation with Free T4 reflex    Vitamin D 25 hydroxy    Hyperlipidemia     Stable withouth statin. Recommend lifestyle modifications. The 10-year ASCVD risk score (Nick MTZ, et al., 2019) is: 0.6%    Values used to calculate the score:      Age: 53 years      Sex: Female      Is Non- : No      Diabetic: No      Tobacco smoker: No      Systolic Blood Pressure: 691 mmHg      Is BP treated: No      HDL Cholesterol: 60 mg/dL      Total Cholesterol: 174 mg/dL           Relevant Orders    CBC and differential    Comprehensive metabolic panel    Lipid panel    TSH, 3rd generation with Free T4 reflex    LDL cholesterol, direct    History of colon polyps     Colonoscopy is up-to-date. Scoliosis concern     Stable. Management per Ortho. F/U PRN. Tremor     Stable s meds. 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 Use Epi Pen PRN. Pending Allergy consult.           Other Visit Diagnoses     Annual physical exam    -  Primary    Health Maintenance   Topic Date Due   • Influenza Vaccine (1) 09/01/2023   • Breast Cancer Screening: Mammogram  10/22/2023   • COVID-19 Vaccine (3 - Pfizer series) 12/20/2023 (Originally 7/17/2021)   • Depression Screening  03/27/2024   • BMI: Followup Plan  03/27/2024   • BMI: Adult  09/20/2024   • Annual Physical  09/20/2024   • Cervical Cancer Screening  05/09/2026   • Colorectal Cancer Screening  05/22/2027   • DTaP,Tdap,and Td Vaccines (2 - Td or Tdap) 09/17/2030   • HIV Screening  Completed   • Hepatitis C Screening  Completed   • Pneumococcal Vaccine: Pediatrics (0 to 5 Years) and At-Risk Patients (6 to 59 Years)  Aged Out   • HIB Vaccine  Aged Out   • IPV Vaccine  Aged Out   • Hepatitis A Vaccine  Aged Out   • Meningococcal ACWY Vaccine  Aged Out   • HPV Vaccine  Aged Out         Myalgia        Relevant Orders    Vitamin D 25 hydroxy           Return in about 6 months (around 3/20/2024) for 6mo - IFG, HL, Labs. Future Appointments   Date Time Provider [id number]  [facility]   9/21/2023 10:45 AM Rodolfo Mohamud DO [facility]   10/24/2023  2:20 PM [facility] 2 [facility] Mamm[facility]   3/21/2024  1:20 PM Jamil Arizmendi DO [facility]   5/16/2024  9:30 AM Lori Fontana MD [facility]        Subjective:     Bismark Aragon is a 53 y.o. female who presents today for a follow-up on her chronic medical conditions. HPI:  Chief Complaint   Patient presents with   • Physical Exam     Annual physical - HL; PRN Labs. No new problems or concerns for PCP today. Pt is having a lot of L hip pain, does have an appt with ortho tomorrow for evaluation. • HM     No additional covid boosters. -- Above per clinical staff and reviewed. --      HPI      Today:      Return in about 6 months (around 9/27/2023) for Physical / 6mo - IFG, HL, Labs. 4mo OV          had a stroke 3/22. He is living at [facility] in [place]. It is unknown when he will return home. She sees him weekly. Tamar Plummer and SNF  involved. Obesity - Watching diet.  No regular exercise.  Walks occasionally.     IFG - New Dx 10/5/22.   She is not taking Metformin XR 500mg 3 pills QD, and she only took 1 pill QD for 1 week and stopped it because people told her it was bad for her kidneys. She previously declined Pre-DM education.        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 - Pending appt c Allergist Dr. Sami Marina 12/22/22 - referred 8/10/22. Jeffrey Rodriguez not used to use her Epi Pen yet.       Hyperlipidemia - No statin previously.         L Hip Pain / Left Hip Dysplasia - Management per Ortho Dr. Carlota Ventura.  F/U PRN - 9/20/23. Leonie Ford states she needs hip replacement at 53yo.  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  She states her hips have never been normal childbirth in 2010.      Scoliosis - Management per Ortho.  F/U PRN.   Denies back pain.  Occasionally has paresthesias mid-thoracic back.       H/O Colon Polyps - Next colonoscopy due 5/22/27.       Reviewed:  Labs 8/22/23, Gyn 5/9/23 , Ortho 11/2/20     Sees Maddy Speaks Dr. Vanessa Cary yearly.  Next appt 5/24. Overdue for Dentist.  Reggy Shorten q2 years. The following portions of the patient's history were reviewed and updated as appropriate: allergies, current medications, past family history, past medical history, past social history, past surgical history and problem list.      Review of Systems   Constitutional: Negative for appetite change, chills, diaphoresis, fatigue and fever. Respiratory: Negative for chest tightness and shortness of breath. Cardiovascular: Negative for chest pain. Gastrointestinal: Negative for abdominal pain, blood in stool, diarrhea, nausea and vomiting. Genitourinary: Negative for dysuria. Musculoskeletal: Positive for arthralgias. Current Outpatient Medications   Medication Sig Dispense Refill   • EPINEPHrine (EPIPEN) 0.3 mg/0.3 mL SOAJ Inject 0.3 mL (0.3 mg total) into a muscle once for 1 dose 0.6 mL 0   • multivitamin (THERAGRAN) TABS Take 1 tablet by mouth daily       No current facility-administered medications for this visit.        Objective:  /70   Pulse 74   Temp 98.1 °F (36.7 °C) Resp 18   Ht 5' 5" (1.651 m)   Wt 98.2 kg (216 lb 9.6 oz)   SpO2 96%   BMI 36.04 kg/m²    Wt Readings from Last 3 Encounters:   09/20/23 98.2 kg (216 lb 9.6 oz)   05/15/23 99.1 kg (218 lb 6.4 oz)   05/09/23 98 kg (216 lb)      BP Readings from Last 3 Encounters:   09/20/23 118/70   05/15/23 114/72   05/09/23 130/80          Physical Exam  Vitals and nursing note reviewed. Constitutional:       Appearance: Normal appearance. She is well-developed. She is obese. HENT:      Head: Normocephalic and atraumatic. Right Ear: Tympanic membrane, ear canal and external ear normal.      Left Ear: Tympanic membrane, ear canal and external ear normal.      Nose: Nose normal.      Right Sinus: No maxillary sinus tenderness or frontal sinus tenderness. Left Sinus: No maxillary sinus tenderness or frontal sinus tenderness. Mouth/Throat:      Mouth: Mucous membranes are moist.      Pharynx: Oropharynx is clear. Uvula midline. Tonsils: No tonsillar exudate. Eyes:      Extraocular Movements: Extraocular movements intact. Conjunctiva/sclera: Conjunctivae normal.      Pupils: Pupils are equal, round, and reactive to light. Cardiovascular:      Rate and Rhythm: Normal rate and regular rhythm. Pulses: Normal pulses. Heart sounds: Normal heart sounds. Pulmonary:      Effort: Pulmonary effort is normal.      Breath sounds: Normal breath sounds. Abdominal:      General: Bowel sounds are normal. There is no distension. Palpations: Abdomen is soft. There is no mass. Tenderness: There is no abdominal tenderness. There is no guarding or rebound. Musculoskeletal:         General: No swelling or tenderness. Cervical back: Neck supple. Right lower leg: No edema. Left lower leg: No edema. Lymphadenopathy:      Cervical: No cervical adenopathy. Skin:     Findings: No rash. Neurological:      General: No focal deficit present.       Mental Status: She is alert and oriented to person, place, and time. Psychiatric:         Mood and Affect: Mood normal.         Behavior: Behavior normal.         Thought Content: Thought content normal.         Judgment: Judgment normal.         Lab Results:      Lab Results   Component Value Date    WBC 5.58 08/22/2023    HGB 14.1 08/22/2023    HCT 42.5 08/22/2023     (H) 08/22/2023    TRIG 66 08/22/2023    HDL 60 08/22/2023    LDLDIRECT 111 (H) 08/22/2023    ALT 14 08/22/2023    AST 17 08/22/2023     06/24/2015    K 4.0 08/22/2023     08/22/2023    CREATININE 0.58 (L) 08/22/2023    BUN 12 08/22/2023    CO2 24 08/22/2023    INR 1.01 10/14/2018    GLUF 93 08/22/2023    HGBA1C 5.6 08/22/2023     No results found for: "URICACID"  Invalid input(s): "BASENAME" Vitamin D    No results found.      POCT Labs

## 2023-09-20 NOTE — PATIENT INSTRUCTIONS
Wellness Visit for Adults   AMBULATORY CARE:   A wellness visit  is when you see your healthcare provider to get screened for health problems. Your healthcare provider will also give you advice on how to stay healthy. Write down your questions so you remember to ask them. Ask your healthcare provider how often you should have a wellness visit. What happens at a wellness visit:  Your healthcare provider will ask about your health, and your family history of health problems. This includes high blood pressure, heart disease, and cancer. He or she will ask if you have symptoms that concern you, if you smoke, and about your mood. You may also be asked about your intake of medicines, supplements, food, and alcohol. Any of the following may be done:  • Your weight  will be checked. Your height may also be checked so your body mass index (BMI) can be calculated. Your BMI shows if you are at a healthy weight. • Your blood pressure  and heart rate will be checked. Your temperature may also be checked. • Blood and urine tests  may be done. Blood tests may be done to check your cholesterol levels. Abnormal cholesterol levels increase your risk for heart disease and stroke. You may also need a blood or urine test to check for diabetes if you are at increased risk. Urine tests may be done to look for signs of an infection or kidney disease. • A physical exam  includes checking your heartbeat and lungs with a stethoscope. Your healthcare provider may also check your skin to look for sun damage. • Screening tests  may be recommended. A screening test is done to check for diseases that may not cause symptoms. The screening tests you may need depend on your age, gender, family history, and lifestyle habits. For example, colorectal screening may be recommended if you are 48years old or older. Screening tests you need if you are a woman:   • A Pap smear  is used to screen for cervical cancer.  Pap smears are usually done every 3 to 5 years depending on your age. You may need them more often if you have had abnormal Pap smear test results in the past. Ask your healthcare provider how often you should have a Pap smear. • A mammogram  is an x-ray of your breasts to screen for breast cancer. Experts recommend mammograms every 2 years starting at age 48 years. You may need a mammogram at age 52 years or younger if you have an increased risk for breast cancer. Talk to your healthcare provider about when you should start having mammograms and how often you need them. Vaccines you may need:   • Get an influenza vaccine  every year. The influenza vaccine protects you from the flu. Several types of viruses cause the flu. The viruses change over time, so new vaccines are made each year. • Get a tetanus-diphtheria (Td) booster vaccine  every 10 years. This vaccine protects you against tetanus and diphtheria. Tetanus is a severe infection that may cause painful muscle spasms and lockjaw. Diphtheria is a severe bacterial infection that causes a thick covering in the back of your mouth and throat. • Get a human papillomavirus (HPV) vaccine  if you are female and aged 23 to 32 or male 23 to 24 and never received it. This vaccine protects you from HPV infection. HPV is the most common infection spread by sexual contact. HPV may also cause vaginal, penile, and anal cancers. • Get a pneumococcal vaccine  if you are aged 72 years or older. The pneumococcal vaccine is an injection given to protect you from pneumococcal disease. Pneumococcal disease is an infection caused by pneumococcal bacteria. The infection may cause pneumonia, meningitis, or an ear infection. • Get a shingles vaccine  if you are 60 or older, even if you have had shingles before. The shingles vaccine is an injection to protect you from the varicella-zoster virus. This is the same virus that causes chickenpox.  Shingles is a painful rash that develops in people who had chickenpox or have been exposed to the virus. How to eat healthy:  My Plate is a model for planning healthy meals. It shows the types and amounts of foods that should go on your plate. Fruits and vegetables make up about half of your plate, and grains and protein make up the other half. A serving of dairy is included on the side of your plate. The amount of calories and serving sizes you need depends on your age, gender, weight, and height. Examples of healthy foods are listed below:  • Eat a variety of vegetables  such as dark green, red, and orange vegetables. You can also include canned vegetables low in sodium (salt) and frozen vegetables without added butter or sauces. • Eat a variety of fresh fruits , canned fruit in 100% juice, frozen fruit, and dried fruit. • Include whole grains. At least half of the grains you eat should be whole grains. Examples include whole-wheat bread, wheat pasta, brown rice, and whole-grain cereals such as oatmeal.    • Eat a variety of protein foods such as seafood (fish and shellfish), lean meat, and poultry without skin (turkey and chicken). Examples of lean meats include pork leg, shoulder, or tenderloin, and beef round, sirloin, tenderloin, and extra lean ground beef. Other protein foods include eggs and egg substitutes, beans, peas, soy products, nuts, and seeds. • Choose low-fat dairy products such as skim or 1% milk or low-fat yogurt, cheese, and cottage cheese. • Limit unhealthy fats  such as butter, hard margarine, and shortening. Exercise:  Exercise at least 30 minutes per day on most days of the week. Some examples of exercise include walking, biking, dancing, and swimming. You can also fit in more physical activity by taking the stairs instead of the elevator or parking farther away from stores. Include muscle strengthening activities 2 days each week. Regular exercise provides many health benefits.  It helps you manage your weight, and decreases your risk for type 2 diabetes, heart disease, stroke, and high blood pressure. Exercise can also help improve your mood. Ask your healthcare provider about the best exercise plan for you. General health and safety guidelines:   • Do not smoke. Nicotine and other chemicals in cigarettes and cigars can cause lung damage. Ask your healthcare provider for information if you currently smoke and need help to quit. E-cigarettes or smokeless tobacco still contain nicotine. Talk to your healthcare provider before you use these products. • Limit alcohol. A drink of alcohol is 12 ounces of beer, 5 ounces of wine, or 1½ ounces of liquor. • Lose weight, if needed. Being overweight increases your risk of certain health conditions. These include heart disease, high blood pressure, type 2 diabetes, and certain types of cancer. • Protect your skin. Do not sunbathe or use tanning beds. Use sunscreen with a SPF 15 or higher. Apply sunscreen at least 15 minutes before you go outside. Reapply sunscreen every 2 hours. Wear protective clothing, hats, and sunglasses when you are outside. • Drive safely. Always wear your seatbelt. Make sure everyone in your car wears a seatbelt. A seatbelt can save your life if you are in an accident. Do not use your cell phone when you are driving. This could distract you and cause an accident. Pull over if you need to make a call or send a text message. • Practice safe sex. Use latex condoms if are sexually active and have more than one partner. Your healthcare provider may recommend screening tests for sexually transmitted infections (STIs). • Wear helmets, lifejackets, and protective gear. Always wear a helmet when you ride a bike or motorcycle, go skiing, or play sports that could cause a head injury. Wear protective equipment when you play sports. Wear a lifejacket when you are on a boat or doing water sports.     © Copyright [employer] 2023 Information is for End User's use only and may not be sold, redistributed or otherwise used for commercial purposes. The above information is an  only. It is not intended as medical advice for individual conditions or treatments. Talk to your doctor, nurse or pharmacist before following any medical regimen to see if it is safe and effective for you. Obesity   AMBULATORY CARE:   Obesity  means your body mass index (BMI) is greater than 30. Your healthcare provider will use your age, height, and weight to measure your BMI. The risks of obesity include  many health problems, including injuries or physical disability. • Diabetes (high blood sugar level)    • High blood pressure or high cholesterol    • Heart disease or heart failure    • Stroke    • Gallbladder or liver disease    • Cancer of the colon, breast, prostate, liver, or kidney    • Sleep apnea    • Arthritis or gout    Screening  is done to check for health conditions before you have signs or symptoms. If you are 28to 79years old, your blood sugar level may be checked every 3 years for signs of prediabetes or diabetes. Your healthcare provider will check your blood pressure at each visit. High blood pressure can lead to a stroke or other problems. Your provider may check for signs of heart disease, cancer, or other health problems. Seek care immediately if:   • You have a severe headache, confusion, or difficulty speaking. • You have weakness on one side of your body. • You have chest pain, sweating, or shortness of breath. Call your doctor if:   • You have symptoms of gallbladder or liver disease, such as pain in your upper abdomen. • You have knee or hip pain and discomfort while walking. • You have symptoms of diabetes, such as intense hunger and thirst, and frequent urination. • You have symptoms of sleep apnea, such as snoring or daytime sleepiness. • You have questions or concerns about your condition or care.     Treatment for obesity  focuses on helping you lose weight to improve your health. Even a small decrease in BMI can reduce the risk for many health problems. Your healthcare provider will help you set a weight-loss goal.  • Lifestyle changes  are the first step in treating obesity. These include making healthy food choices and getting regular physical activity. Your healthcare provider may suggest a weight-loss program that involves coaching, education, and therapy. • Medicine  may help you lose weight when it is used with a healthy foods and physical activity. • Surgery  can help you lose weight if you have obesity along with other health problems. Several types of weight-loss surgery are available. Ask your healthcare provider for more information. Tips for safe weight loss:   • Set small, realistic goals. An example of a small goal is to walk for 20 minutes 5 days a week. Anther goal is to lose 5% of your body weight. • Ask for support. Tell friends, family members, and coworkers about your goals. Ask someone to lose weight with you. You may also want to join a weight-loss support group. • Identify foods or triggers that may cause you to overeat. Remove tempting high-calorie foods from your home and workplace. Place a bowl of fresh fruit on your kitchen counter. If stress causes you to eat, find other ways to cope with stress. A counselor or therapist may be able to help you. • Track your daily calories and activity. Write down what you eat and drink. Also write down how many minutes of physical activity you do each day. • Track your weekly weight. Weigh yourself in the morning, before you eat or drink anything but after you use the bathroom. Use the same scale, in the same place, and in similar clothing each time. Only weigh yourself 1 to 2 times each week, or as directed. You may become discouraged if you weigh yourself every day. Eating changes:   You will need to eat 500 to 1,000 fewer calories each day than you currently eat to lose 1 to 2 pounds a week. The following changes will help you cut calories:  • Eat smaller portions. Use small plates, no larger than 9 inches in diameter. Fill your plate half full of fruits and vegetables. Measure your food using measuring cups until you know what a serving size looks like. • Eat 3 meals and 1 or 2 snacks each day. Plan your meals in advance. Rhina Los and eat at home most of the time. Eat slowly. Do not skip meals. Skipping meals can lead to overeating later in the day. This can make it harder for you to lose weight. Talk with a dietitian to help you make a meal plan and schedule that is right for you. • Eat fruits and vegetables at every meal.  They are low in calories and high in fiber, which makes you feel full. Do not add butter, margarine, or cream sauce to vegetables. Use herbs to season steamed vegetables. • Eat less fat and fewer fried foods. Eat more baked or grilled chicken and fish. These protein sources are lower in calories and fat than red meat. Limit fast food. Dress your salads with olive oil and vinegar instead of bottled dressing. • Limit the amount of sugar you eat. Do not drink sugary beverages. Limit alcohol. Activity changes:  Physical activity is good for your body in many ways. It helps you burn calories and build strong muscles. It decreases stress and depression, and improves your mood. It can also help you sleep better. Talk to your healthcare provider before you begin an exercise program.  • Exercise for at least 30 minutes 5 days a week. Start slowly. Set aside time each day for physical activity that you enjoy and that is convenient for you. It is best to do both weight training and an activity that increases your heart rate, such as walking, bicycling, or swimming. • Find ways to be more active. Do yard work and housecleaning. Walk up the stairs instead of using elevators.  Spend your leisure time going to events that require walking, such as outdoor festivals or fairs. This extra physical activity can help you lose weight and keep it off. Follow up with your doctor as directed: You may need to meet with a dietitian. Write down your questions so you remember to ask them during your visits. © Copyright Nimisha Ray 2023 Information is for End User's use only and may not be sold, redistributed or otherwise used for commercial purposes. The above information is an  only. It is not intended as medical advice for individual conditions or treatments. Talk to your doctor, nurse or pharmacist before following any medical regimen to see if it is safe and effective for you. Cholesterol and Your Health   AMBULATORY CARE:   Cholesterol  is a waxy, fat-like substance. Your body uses cholesterol to make hormones and new cells, and to protect nerves. Cholesterol is made by your body. It also comes from certain foods you eat, such as meat and dairy products. Your healthcare provider can help you set goals for your cholesterol levels. Your provider can help you create a plan to meet your goals. Cholesterol level goals: Your cholesterol level goals depend on your risk for heart disease, your age, and your other health conditions. The following are general guidelines:  • Total cholesterol  includes low-density lipoprotein (LDL), high-density lipoprotein (HDL), and triglyceride levels. The total cholesterol level should be lower than 200 mg/dL and is best at about 150 mg/dL. • LDL cholesterol  is called bad cholesterol  because it forms plaque in your arteries. As plaque builds up, your arteries become narrow, and less blood flows through. When plaque decreases blood flow to your heart, you may have chest pain. If plaque completely blocks an artery that brings blood to your heart, you may have a heart attack. Plaque can break off and form blood clots.  Blood clots may block arteries in your brain and cause a stroke. The level should be less than 130 mg/dL and is best at about 100 mg/dL. • HDL cholesterol  is called good cholesterol  because it helps remove LDL cholesterol from your arteries. It does this by attaching to LDL cholesterol and carrying it to your liver. Your liver breaks down LDL cholesterol so your body can get rid of it. High levels of HDL cholesterol can help prevent a heart attack and stroke. Low levels of HDL cholesterol can increase your risk for heart disease, heart attack, and stroke. The level should be at least 40 mg/dL in males or at least 50 mg/dL in females. • Triglycerides  are a type of fat that store energy from foods you eat. High levels of triglycerides also cause plaque buildup. This can increase your risk for a heart attack or stroke. If your triglyceride level is high, your LDL cholesterol level may also be high. The level should be less than 150 mg/dL. Any of the following can increase your risk for high cholesterol:   • Smoking or drinking large amounts of alcohol    • Having overweight or obesity, or not getting enough exercise    • A medical condition such as hypertension (high blood pressure) or diabetes    • A family history of high cholesterol    • Age older than 72    What you need to know about having your cholesterol levels checked: Adults 21to 39years of age should have their cholesterol levels checked every 4 to 6 years. Adults 45 years or older should have their cholesterol checked every 1 to 2 years. You may need your cholesterol checked more often, or at a younger age, if you have risk factors for heart disease. You may also need to have your cholesterol checked more often if you have other health conditions, such as diabetes. Blood tests are used to check cholesterol levels. Blood tests measure your levels of triglycerides, LDL cholesterol, and HDL cholesterol.   How healthy fats affect your cholesterol levels:  Healthy fats, also called unsaturated fats, help lower LDL cholesterol and triglyceride levels. Healthy fats include the following:  • Monounsaturated fats  are found in foods such as olive oil, canola oil, avocado, nuts, and olives. • Polyunsaturated fats,  such as omega 3 fats, are found in fish, such as salmon, trout, and tuna. They can also be found in plant foods such as flaxseed, walnuts, and soybeans. How unhealthy fats affect your cholesterol levels:  Unhealthy fats increase LDL cholesterol and triglyceride levels. They are found in foods high in cholesterol, saturated fat, and trans fat:  • Cholesterol  is found in eggs, dairy, and meat. • Saturated fat  is found in butter, cheese, ice cream, whole milk, and coconut oil. Saturated fat is also found in meat, such as sausage, hot dogs, and bologna. • Trans fat  is found in liquid oils and is used in fried and baked foods. Foods that contain trans fats include chips, crackers, muffins, sweet rolls, microwave popcorn, and cookies. Treatment  for high cholesterol will also decrease your risk of heart disease, heart attack, and stroke. Treatment may include any of the following:  • Lifestyle changes  may include food, exercise, weight loss, and quitting smoking. You may also need to decrease the amount of alcohol you drink. Your healthcare provider will want you to start with lifestyle changes. Other treatment may be added if lifestyle changes are not enough. Your healthcare provider may recommend you work with a team to manage hyperlipidemia. The team may include medical experts such as a dietitian, an exercise or physical therapist, and a behavior therapist. Your family members may be included in helping you create lifestyle changes. • Medicines  may be given to lower your LDL cholesterol, triglyceride levels, or total cholesterol level. You may need medicines to lower your cholesterol if any of the following is true:    ?  You have a history of stroke, TIA, unstable angina, or a heart attack. ? Your LDL cholesterol level is 190 mg/dL or higher. ? You are age 36 to 76 years, have diabetes or heart disease risk factors, and your LDL cholesterol is 70 mg/dL or higher. • Supplements  include fish oil, red yeast rice, and garlic. Fish oil may help lower your triglyceride and LDL cholesterol levels. It may also increase your HDL cholesterol level. Red yeast rice may help decrease your total cholesterol level and LDL cholesterol level. Garlic may help lower your total cholesterol level. Do not take any supplements without talking to your healthcare provider. Food changes you can make to lower your cholesterol levels:  A dietitian can help you create a healthy eating plan. Your dietitian can show you how to read food labels and choose foods low in saturated fat, trans fats, and cholesterol. • Decrease the total amount of fat you eat. Choose lean meats, fat-free or 1% fat milk, and low-fat dairy products, such as yogurt and cheese. Try to limit or avoid red meats. Limit or do not eat fried foods or baked goods, such as cookies. • Replace unhealthy fats with healthy fats. Cook foods in olive oil or canola oil. Choose soft margarines that are low in saturated fat and trans fat. Seeds, nuts, and avocados are other examples of healthy fats. • Eat foods with omega-3 fats. Examples include salmon, tuna, mackerel, walnuts, and flaxseed. Eat fish 2 times per week. Pregnant women should not eat fish that have high levels of mercury, such as shark, swordfish, and fabiano mackerel. • Increase the amount of high-fiber foods you eat. High-fiber foods can help lower your LDL cholesterol. Aim to get between 20 and 30 grams of fiber each day. Fruits and vegetables are high in fiber. Eat at least 5 servings each day. Other high-fiber foods are whole-grain or whole-wheat breads, pastas, or cereals, and brown rice. Eat 3 ounces of whole-grain foods each day.  Increase fiber slowly. You may have abdominal discomfort, bloating, and gas if you add fiber to your diet too quickly. • Eat healthy protein foods. Examples include low-fat dairy products, skinless chicken and turkey, fish, and nuts. • Limit foods and drinks that are high in sugar. Your dietitian or healthcare provider can help you create daily limits for high-sugar foods and drinks. The limit may be lower if you have diabetes or another health condition. Limits can also help you lose weight if needed. Lifestyle changes you can make to lower your cholesterol levels:   • Maintain a healthy weight. Ask your healthcare provider what a healthy weight is for you. Ask your provider to help you create a weight loss plan if needed. Weight loss can decrease your total cholesterol and triglyceride levels. Weight loss may also help keep your blood pressure at a healthy level. • Be physically active throughout the day. Physical activity, such as exercise, can help lower your total cholesterol level and maintain a healthy weight. Physical activity can also help increase your HDL cholesterol level. Work with your healthcare provider to create an program that is right for you. Get at least 30 to 40 minutes of moderate physical activity most days of the week. Examples of exercise include brisk walking, swimming, or biking. Also include strength training at least 2 times each week. Your healthcare providers can help you create a physical activity plan. • Do not smoke. Nicotine and other chemicals in cigarettes and cigars can raise your cholesterol levels. Ask your healthcare provider for information if you currently smoke and need help to quit. E-cigarettes or smokeless tobacco still contain nicotine. Talk to your healthcare provider before you use these products. • Limit or do not drink alcohol. Alcohol can increase your triglyceride levels. Ask your healthcare provider before you drink alcohol.  Ask how much is okay for you to drink in 24 hours or 1 week. Follow up with your doctor as directed:  Write down your questions so you remember to ask them during your visits. © Copyright Dada Peralta 2023 Information is for End User's use only and may not be sold, redistributed or otherwise used for commercial purposes. The above information is an  only. It is not intended as medical advice for individual conditions or treatments. Talk to your doctor, nurse or pharmacist before following any medical regimen to see if it is safe and effective for you.

## 2023-09-21 ENCOUNTER — OFFICE VISIT (OUTPATIENT)
Dept: OBGYN CLINIC | Facility: CLINIC | Age: 48
End: 2023-09-21
Payer: OTHER GOVERNMENT

## 2023-09-21 ENCOUNTER — APPOINTMENT (OUTPATIENT)
Dept: RADIOLOGY | Facility: AMBULARY SURGERY CENTER | Age: 48
End: 2023-09-21
Attending: STUDENT IN AN ORGANIZED HEALTH CARE EDUCATION/TRAINING PROGRAM
Payer: OTHER GOVERNMENT

## 2023-09-21 VITALS — BODY MASS INDEX: 36.09 KG/M2 | WEIGHT: 216.6 LBS | HEIGHT: 65 IN

## 2023-09-21 DIAGNOSIS — Q65.89 DEVELOPMENTAL DYSPLASIA OF HIP: ICD-10-CM

## 2023-09-21 DIAGNOSIS — M16.0 PRIMARY OSTEOARTHRITIS OF BOTH HIPS: ICD-10-CM

## 2023-09-21 DIAGNOSIS — M25.552 LEFT HIP PAIN: ICD-10-CM

## 2023-09-21 DIAGNOSIS — M25.552 LEFT HIP PAIN: Primary | ICD-10-CM

## 2023-09-21 PROCEDURE — 73502 X-RAY EXAM HIP UNI 2-3 VIEWS: CPT

## 2023-09-21 PROCEDURE — 99214 OFFICE O/P EST MOD 30 MIN: CPT | Performed by: STUDENT IN AN ORGANIZED HEALTH CARE EDUCATION/TRAINING PROGRAM

## 2023-09-21 RX ORDER — MELOXICAM 15 MG/1
15 TABLET ORAL DAILY
Qty: 30 TABLET | Refills: 2 | Status: SHIPPED | OUTPATIENT
Start: 2023-09-21

## 2023-09-21 NOTE — LETTER
September 21, 2023     Patient: Olga Mejia  YOB: 1975  Date of Visit: 9/21/2023      To Whom it May Concern:    Verónica Carmonaudder is under my professional care. Rebekah Dangelo was seen in my office on 9/21/2023. Rebekah Dangelo has significant left hip pain from severe degenerative changes with acute exacerbation. The patient should be allowed to refrain from work activities as her pain is limiting her mobility for the next 2 weeks. To allow for symptom resolution. If you have any questions or concerns, please don't hesitate to call.          Sincerely,          Mora Murphy DO        CC: No Recipients

## 2023-09-21 NOTE — PROGRESS NOTES
Orthopaedics Office Visit -new patient Visit    ASSESSMENT/PLAN:    Assessment:   #1 end-stage osteoarthritis left hip  2. Osteoarthritis right hip  3. Developmental hip dysplasia    Plan:   · X-rays were reviewed with the patient today which demonstrate development of hip dysplasia with subsequent osteoarthritic changes with significant osteophyte formation. Most notably noted on the left hip  · Discussion was had with the patient about operative versus nonoperative management options for hip osteoarthritis. The patient's pain is quite significant at this time and failing anti-inflammatory treatment. The patient would like to proceed with total hip arthroplasty as her left hip is keeping her out from work. She is also unable to get up and down stairs or get into bed in a comfortable position  · Patient was prescribed a prescription for meloxicam 15 mg once daily as needed with food for her left hip pain  · Patient will be weightbearing as tolerated range of motion as tolerated to left lower extremity  · The patient will be referred to Dr. Alejandra Oquendo with adult reconstructive surgery for definitive management  · A note was written for work to allow the patient some symptom resolution with the new medications and to get seen by Dr. Roberts Nettie for definitive planning    To Do Next Visit:  As needed    _____________________________________________________  CHIEF COMPLAINT:  Chief Complaint   Patient presents with   • Left Hip - Pain         SUBJECTIVE:  Aries French is a 53 y.o. female who presents with a longstanding history of bilateral hip pain. The patient states that approximately 2 weeks ago the patient started to have significant left hip pain that was limiting her ability to mobilize. The patient has been taking anti-inflammatories without symptom relief. The patient was told by an outside physician that she had hip arthritis.   X-rays were taken today which demonstrate significant developmental hip dysplasia with osteoarthritis more significantly on the left side. The patient is difficult to examine due to the severity of her pain. The patient states that she is unable to ambulate stairs in her house at this point. She is needs assistance getting changed as she cannot bend her hip far enough to get her pant legs on. She is unable to lay in the bed due to the pain and limit of range of motion. She denies any numbness or paresthesias in the lower extremities.   No history of trauma    PAST MEDICAL HISTORY:  Past Medical History:   Diagnosis Date   • Abnormal Pap smear of cervix    • Anaphylaxis due to insect venom 08/10/2022   • Arthritis     It was last year   • COVID-19 2022   • History of colon polyps 2022   • HPV (human papilloma virus) infection    • Hyperlipidemia    • IFG (impaired fasting glucose)    • Internal hemorrhoids 2022   • Kidney stone 2015   • Kidney stones, calcium oxalate 2015   • Obesity    • Pneumonia     Resolved 2017    • Scoliosis    • Tremor     Left Hand Resting Tremor   • Varicella    • Visual impairment     Wear glasses       PAST SURGICAL HISTORY:  Past Surgical History:   Procedure Laterality Date   • APPENDECTOMY  2018    Appendix taken out   •  SECTION       x 1 ; Failure to progress   • [place] INJECTION LEFT HIP (NON ARTHROGRAM)  10/12/2020   • [place] LAPAROSCOPIC APPENDECTOMY N/A 10/14/2018    Procedure: APPENDECTOMY LAPAROSCOPIC;  Surgeon: Rach Maldonado MD;  [facility];  Service: General   • WISDOM TOOTH EXTRACTION         FAMILY HISTORY:  Family History   Problem Relation Age of Onset   • Hypertension Father    • Alcohol abuse Father    • Kidney disease Father         CKD on ESRD   • Diabetes type II Father 48   • No Known Problems Mother    • Scoliosis Brother    • Hip dysplasia Daughter    • No Known Problems Maternal Grandmother    • No Known Problems Maternal Grandfather    • No Known Problems Paternal Grandmother    • No Known Problems Paternal Grandfather    • No Known Problems Maternal Aunt    • No Known Problems Maternal Aunt    • No Known Problems Paternal Aunt    • No Known Problems Paternal Aunt    • No Known Problems Paternal Aunt    • No Known Problems Paternal Aunt        SOCIAL HISTORY:  Social History     Tobacco Use   • Smoking status: Never   • Smokeless tobacco: Never   Vaping Use   • Vaping Use: Never used   Substance Use Topics   • Alcohol use: Yes     Alcohol/week: 1.0 standard drink of alcohol     Types: 1 Glasses of wine per week     Comment: rare   • Drug use: Never       MEDICATIONS:    Current Outpatient Medications:   •  EPINEPHrine (EPIPEN) 0.3 mg/0.3 mL SOAJ, Inject 0.3 mL (0.3 mg total) into a muscle once for 1 dose, Disp: 0.6 mL, Rfl: 0  •  multivitamin (THERAGRAN) TABS, Take 1 tablet by mouth daily, Disp: , Rfl:     ALLERGIES:  Allergies   Allergen Reactions   • Bee Venom Anaphylaxis       REVIEW OF SYSTEMS:  MSK: Left hip pain  Neuro: No numbness or paresthesias  Pertinent items are otherwise noted in HPI. A comprehensive review of systems was otherwise negative. LABS:  HgA1c:   Lab Results   Component Value Date    HGBA1C 5.6 08/22/2023     BMP:   Lab Results   Component Value Date    GLUCOSE 119 06/24/2015    CALCIUM 9.2 08/22/2023     06/24/2015    K 4.0 08/22/2023    CO2 24 08/22/2023     08/22/2023    BUN 12 08/22/2023    CREATININE 0.58 (L) 08/22/2023     CBC: No components found for: "CBC"    _____________________________________________________  PHYSICAL EXAMINATION:  Vital signs: Ht 5' 5" (1.651 m)   Wt 98.2 kg (216 lb 9.6 oz)   BMI 36.04 kg/m²   General: No acute distress, awake and alert  Psychiatric: Mood and affect appear appropriate  HEENT: Trachea Midline, No torticollis, no apparent facial trauma  Cardiovascular: No audible murmurs;  Extremities appear perfused  Pulmonary: No audible wheezing or stridor  Skin: No open lesions; see further details (if any) below    MUSCULOSKELETAL EXAMINATION:  Extremities: The left lower extremity was exposed inspected. No previous surgical incisions surrounding the left hip. The patient has very limited range of motion. She was unable to get up on the examination table and lay down. An attempt at range of motion was only able to achieve approximately 30 to 40 degrees of flexion before pain became too intense in the groin. The patient has no pain with passive hip abduction but has significant pain with flexion and internal rotation as well as with flexion abduction and external rotation. The patient sensation is intact to light touch in superficial peroneal, deep peroneal, sural, saphenous and plantar nerve distributions. Tibialis anterior, extensor hallux longus, gastrocnemius muscles intact. Limb is well-perfused      _____________________________________________________  STUDIES REVIEWED:  I personally reviewed the images and interpretation is as follows:  X-rays of the patient's pelvis and left hip demonstrate significant osteoarthritis of the left hip with moderate to severe osteoarthritis in the right hip. The patient has x-ray findings consistent with developmental hip dysplasia and a shallow acetabulum. Patient has no acute fractures or dislocations.   Significant scoliotic curve in the lower lumbar spine    PROCEDURES PERFORMED:  Procedures    Charlotte Almaraz DO

## 2023-10-05 ENCOUNTER — OFFICE VISIT (OUTPATIENT)
Dept: OBGYN CLINIC | Facility: MEDICAL CENTER | Age: 48
End: 2023-10-05
Payer: OTHER GOVERNMENT

## 2023-10-05 VITALS
HEART RATE: 80 BPM | HEIGHT: 65 IN | SYSTOLIC BLOOD PRESSURE: 127 MMHG | BODY MASS INDEX: 36.14 KG/M2 | WEIGHT: 216.9 LBS | DIASTOLIC BLOOD PRESSURE: 77 MMHG

## 2023-10-05 DIAGNOSIS — M25.552 LEFT HIP PAIN: ICD-10-CM

## 2023-10-05 DIAGNOSIS — M16.12 PRIMARY OSTEOARTHRITIS OF LEFT HIP: ICD-10-CM

## 2023-10-05 DIAGNOSIS — Q65.89 DEVELOPMENTAL DYSPLASIA OF HIP: Primary | ICD-10-CM

## 2023-10-05 PROCEDURE — 99213 OFFICE O/P EST LOW 20 MIN: CPT | Performed by: STUDENT IN AN ORGANIZED HEALTH CARE EDUCATION/TRAINING PROGRAM

## 2023-10-05 NOTE — PROGRESS NOTES
Hip New Office Note    Assessment:     1. Primary osteoarthritis of left hip    2. Left hip pain    3. Developmental dysplasia of hip        Plan:     Problem List Items Addressed This Visit    None  Visit Diagnoses     Primary osteoarthritis of left hip    -  Primary    Left hip pain        Developmental dysplasia of hip              Findings today are consistent with left hip osteoarthritis 2/2 left hip dysplasia. Imaging and prognosis was reviewed with the patient today. Discussed that she has tried conservative options and has had persistent pain that limits her activities. She is interested in proceeding with surgical intervention for left total hip arthroplasty as soon as possible. She and her mother are requesting a sooner surgical date as they do not want to wait until February 2024 which is my first available. Referral to Dr. Dariana Campos provided. Follow up as needed. Subjective:     Patient ID: Beata Bee is a 53 y.o. female. Chief Complaint:  HPI:  53 y.o. female presents to the office for evaluation of left hip pain. She was referred by Dr. Tee Carrillo. She is experiencing lateral left hip pain with occasional groin pain. Her pain does go down her anterior thigh to her knee. Her pain is worse with activities including ambulation, stairs and getting dressed. She has been taking anti-inflammatories to control her pain. She has been out of work due to the severity of her pain. She denies any numbness or tingling. She is accompanied by her mother today in the office today.     Allergy:  Allergies   Allergen Reactions   • Bee Venom Anaphylaxis     Medications:  all current active meds have been reviewed  Past Medical History:  Past Medical History:   Diagnosis Date   • Abnormal Pap smear of cervix    • Anaphylaxis due to insect venom 08/10/2022   • Arthritis     It was last year   • COVID-19 05/27/2022   • History of colon polyps 05/2022   • HPV (human papilloma virus) infection 2007   • Hyperlipidemia    • IFG (impaired fasting glucose)    • Internal hemorrhoids 2022   • Kidney stone 2015   • Kidney stones, calcium oxalate 2015   • Obesity    • Pneumonia     Resolved 2017    • Scoliosis    • Tremor     Left Hand Resting Tremor   • Varicella    • Visual impairment     Wear glasses     Past Surgical History:  Past Surgical History:   Procedure Laterality Date   • APPENDECTOMY  2018    Appendix taken out   •  SECTION       x 1 ; Failure to progress   • [place] INJECTION LEFT HIP (NON ARTHROGRAM)  10/12/2020   • [place] LAPAROSCOPIC APPENDECTOMY N/A 10/14/2018    Procedure: APPENDECTOMY LAPAROSCOPIC;  Surgeon: Shala Price MD;  [facility];  Service: General   • WISDOM TOOTH EXTRACTION       Family History:  Family History   Problem Relation Age of Onset   • Hypertension Father    • Alcohol abuse Father    • Kidney disease Father         CKD on ESRD   • Diabetes type II Father 48   • No Known Problems Mother    • Scoliosis Brother    • Hip dysplasia Daughter    • No Known Problems Maternal Grandmother    • No Known Problems Maternal Grandfather    • No Known Problems Paternal Grandmother    • No Known Problems Paternal Grandfather    • No Known Problems Maternal Aunt    • No Known Problems Maternal Aunt    • No Known Problems Paternal Aunt    • No Known Problems Paternal Aunt    • No Known Problems Paternal Aunt    • No Known Problems Paternal Aunt      Social History:  Social History     Substance and Sexual Activity   Alcohol Use Yes   • Alcohol/week: 1.0 standard drink of alcohol   • Types: 1 Glasses of wine per week    Comment: rare     Social History     Substance and Sexual Activity   Drug Use Never     Social History     Tobacco Use   Smoking Status Never   Smokeless Tobacco Never           ROS:  General: Per HPI  Skin: Negative, except if noted below  HEENT: Negative  Respiratory: Negative  Cardiovascular: Negative  Gastrointestinal: Negative  Urinary: Negative  Vascular: Negative  Musculoskeletal: Positive per HPI   Neurologic: Positive per HPI  Endocrine: Negative    Objective:  BP Readings from Last 1 Encounters:   10/05/23 127/77      Wt Readings from Last 1 Encounters:   10/05/23 98.4 kg (216 lb 14.4 oz)        Respiratory:   non-labored respirations    Lymphatics:  no palpable lymph nodes    Gait and Station:   antalgic    Neurologic:   Alert and oriented times 3  Patient with normal sensation except as noted below  Deep tendon reflexes 2+ except as noted in MSK exam    Bilateral Lower Extremity:  Left Hip     Inspection: skin intact    Range of Motion: limited with pain    positive log roll    negative Trendelenburg sign    Motor: 5/5 Q/HS/TA/GS/P    Pulses: 2+ DP / 2+ PT    SILT DP/SP/S/S/TN    Right Hip     Inspection: skin intact    Range of Motion: mildly limited with minimal pain    negative log roll    negative Trendelenburg sign    Motor: 5/5 Q/HS/TA/GS/P    Pulses: 2+ DP / 2+ PT    SILT DP/SP/S/S/TN    Imaging:  My interpretation XR AP pelvis/ left hip: severe joint space narrowing, subchondral sclerosis, subchondral cysts, osteophyte formation with underlying dysplasia. No fracture or dislocation. BMI:   Estimated body mass index is 36.09 kg/m² as calculated from the following:    Height as of this encounter: 5' 5" (1.651 m). Weight as of this encounter: 98.4 kg (216 lb 14.4 oz). BSA:   Estimated body surface area is 2.05 meters squared as calculated from the following:    Height as of this encounter: 5' 5" (1.651 m). Weight as of this encounter: 98.4 kg (216 lb 14.4 oz).            Scribe Attestation    I,:  Diane Veloz am acting as a scribe while in the presence of the attending physician.:       I,:  Bear Chowdhury, DO personally performed the services described in this documentation    as scribed in my presence.:

## 2023-10-10 ENCOUNTER — OFFICE VISIT (OUTPATIENT)
Dept: OBGYN CLINIC | Facility: CLINIC | Age: 48
End: 2023-10-10
Payer: OTHER GOVERNMENT

## 2023-10-10 VITALS
DIASTOLIC BLOOD PRESSURE: 80 MMHG | SYSTOLIC BLOOD PRESSURE: 131 MMHG | WEIGHT: 223 LBS | BODY MASS INDEX: 37.15 KG/M2 | HEART RATE: 73 BPM | HEIGHT: 65 IN

## 2023-10-10 DIAGNOSIS — M16.32 OSTEOARTHRITIS RESULTING FROM LEFT HIP DYSPLASIA: Primary | ICD-10-CM

## 2023-10-10 DIAGNOSIS — Q65.89 DEVELOPMENTAL DYSPLASIA OF HIP: ICD-10-CM

## 2023-10-10 DIAGNOSIS — M25.552 LEFT HIP PAIN: ICD-10-CM

## 2023-10-10 DIAGNOSIS — M16.12 PRIMARY OSTEOARTHRITIS OF LEFT HIP: ICD-10-CM

## 2023-10-10 PROCEDURE — 99215 OFFICE O/P EST HI 40 MIN: CPT | Performed by: STUDENT IN AN ORGANIZED HEALTH CARE EDUCATION/TRAINING PROGRAM

## 2023-10-10 RX ORDER — CHLORHEXIDINE GLUCONATE ORAL RINSE 1.2 MG/ML
15 SOLUTION DENTAL ONCE
OUTPATIENT
Start: 2023-10-10 | End: 2023-10-10

## 2023-10-10 RX ORDER — ACETAMINOPHEN 325 MG/1
975 TABLET ORAL ONCE
OUTPATIENT
Start: 2023-10-10 | End: 2023-10-10

## 2023-10-10 RX ORDER — ASCORBIC ACID 500 MG
500 TABLET ORAL 2 TIMES DAILY
Qty: 60 TABLET | Refills: 0 | Status: SHIPPED | OUTPATIENT
Start: 2023-10-10

## 2023-10-10 RX ORDER — FOLIC ACID 1 MG/1
1 TABLET ORAL DAILY
Qty: 30 TABLET | Refills: 0 | Status: SHIPPED | OUTPATIENT
Start: 2023-10-10

## 2023-10-10 RX ORDER — MULTIVIT-MIN/IRON FUM/FOLIC AC 7.5 MG-4
1 TABLET ORAL DAILY
Qty: 30 TABLET | Refills: 0 | Status: SHIPPED | OUTPATIENT
Start: 2023-10-10

## 2023-10-10 RX ORDER — FERROUS SULFATE 324(65)MG
324 TABLET, DELAYED RELEASE (ENTERIC COATED) ORAL EVERY OTHER DAY
Qty: 15 TABLET | Refills: 0 | Status: SHIPPED | OUTPATIENT
Start: 2023-10-10

## 2023-10-10 RX ORDER — CHLORHEXIDINE GLUCONATE 4 G/100ML
SOLUTION TOPICAL DAILY PRN
OUTPATIENT
Start: 2023-10-10

## 2023-10-10 NOTE — ASSESSMENT & PLAN NOTE
Patient is a Middle-Aged (Approx 42-67yo) female with hip pain at rest or at night and addressed modifiable risk factors. Furthermore, they have radiographs which demonstrate severe OA and a physical exam revealing severe range of motion limitation.  Given these findings, I recommend:    Left Total Hip Arthroplasty

## 2023-10-10 NOTE — PROGRESS NOTES
Date: 10/10/23  Cyrus Urias   MRN# [id number]  : 1975      Chief Complaint: Left hip pain and stiffness    Assessment and Plan:    Osteoarthritis resulting from left hip dysplasia  Patient is a Middle-Aged (Approx 42-67yo) female with hip pain at rest or at night and addressed modifiable risk factors. Furthermore, they have radiographs which demonstrate severe OA and a physical exam revealing severe range of motion limitation. Given these findings, I recommend:    Left Total Hip Arthroplasty     TOTAL HIP REPLACEMENT INDICATIONS AND RISKS  We had a lengthy discussion with the patient regarding the potential options for treatment. Based on current presentation, radiographic exam, and lack of sufficient response to nonsurgical management including activity modification, NSAIDs and therapeutic exercise, I would offer treatment in the form of total hip arthroplasty at this time. The potential risks and benefits were discussed in detail. Patient current BMI is Body mass index is 37.11 kg/m²., I have recommended nutritionist and wt loss regimen if BMI over 35. While no guarantees can be made, total hip replacement has a very high success rate in terms of relieving a patient's hip pain and returning them to a more active, independent lifestyle for 10-15 years or more. All surgery carries some risk; for hip replacement, the complication rate is low but may include: death (very rare), infection, bleeding requiring transfusion, blood clots in legs traveling to lungs, nerve and/or blood vessel damage, bone fracture, leg length difference, prosthetic hip dislocation, persistent hip pain and/or stiffness, and repeat surgery(ies). The risk of a major complication is generally 1-2 per 1000 cases. Total hip replacement should only be done if conservative treatment has failed.  The predicted revision rate is approximately 1% per year; in other words, 90% of hip replacements last 10 years or more, 80% last 20 years or more, and so on, assuming no injury. Additionally, we discussed anesthesia related complications which will be discussed in greater detail with the anesthesia team before surgery. The patient voiced their understanding of the surgical plan and potential complications and wishes to proceed with surgery. Subjective:     Hip Pain  Patient complains of left hip pain. This is evaluated as a personal injury. The pain began several months ago. The pain is located diffuse, groin and buttock and is made worse with walking and standing. She describes the symptoms as aching, dull, stabbing and throbbing. Symptoms improve with ice, medication: NSAID, Tylenol used but not effective, physical therapy, avoiding painful activities. The symptoms are worse with activity, stair climbing, getting up from a chair, sleeping, weight bearing. The hip has not given out or felt unstable. Treatment to date has been ice, heat, Tylenol, NSAID's, without significant relief. Patient presented today with her mother.       Allergy:  Allergies   Allergen Reactions   • Bee Venom Anaphylaxis     Medications:  all current active meds have been reviewed  Past Medical History:  Past Medical History:   Diagnosis Date   • Abnormal Pap smear of cervix    • Anaphylaxis due to insect venom 08/10/2022   • Arthritis     It was last year   • COVID-19 2022   • History of colon polyps 2022   • HPV (human papilloma virus) infection    • Hyperlipidemia    • IFG (impaired fasting glucose)    • Internal hemorrhoids 2022   • Kidney stone 2015   • Kidney stones, calcium oxalate 2015   • Obesity    • Pneumonia     Resolved 2017    • Scoliosis    • Tremor     Left Hand Resting Tremor   • Varicella    • Visual impairment     Wear glasses     Past Surgical History:  Past Surgical History:   Procedure Laterality Date   • APPENDECTOMY  2018    Appendix taken out   •  SECTION       x 1 ; Failure to progress   • [place] INJECTION LEFT HIP (NON ARTHROGRAM)  10/12/2020   • [place] LAPAROSCOPIC APPENDECTOMY N/A 10/14/2018    Procedure: APPENDECTOMY LAPAROSCOPIC;  Surgeon: Alan Fan MD;  [facility];  Service: General   • WISDOM TOOTH EXTRACTION       Family History:  Family History   Problem Relation Age of Onset   • Hypertension Father    • Alcohol abuse Father    • Kidney disease Father         CKD on ESRD   • Diabetes type II Father 48   • No Known Problems Mother    • Scoliosis Brother    • Hip dysplasia Daughter    • No Known Problems Maternal Grandmother    • No Known Problems Maternal Grandfather    • No Known Problems Paternal Grandmother    • No Known Problems Paternal Grandfather    • No Known Problems Maternal Aunt    • No Known Problems Maternal Aunt    • No Known Problems Paternal Aunt    • No Known Problems Paternal Aunt    • No Known Problems Paternal Aunt    • No Known Problems Paternal Aunt      Social History:  Social History     Substance and Sexual Activity   Alcohol Use Yes   • Alcohol/week: 1.0 standard drink of alcohol   • Types: 1 Glasses of wine per week    Comment: rare     Social History     Substance and Sexual Activity   Drug Use Never     Social History     Tobacco Use   Smoking Status Never   Smokeless Tobacco Never           ROS:   Review of Systems   All other systems reviewed and are negative. Objective:   BP Readings from Last 1 Encounters:   10/10/23 131/80      Wt Readings from Last 1 Encounters:   10/10/23 101 kg (223 lb)      Pulse Readings from Last 1 Encounters:   10/10/23 73      BMI: Estimated body mass index is 37.11 kg/m² as calculated from the following:    Height as of this encounter: 5' 5" (1.651 m). Weight as of this encounter: 101 kg (223 lb). Physical Exam  Constitutional:       General: She is not in acute distress. HENT:      Head: Normocephalic and atraumatic.    Eyes:      Conjunctiva/sclera: Conjunctivae normal.   Cardiovascular:      Comments: Extremities well perfused   No LE edema    Pulmonary:      Effort: Pulmonary effort is normal.   Neurological:      Mental Status: She is alert. Mental status is at baseline. Gait and Station:   antalgic    Left Hip     Inspection: normal color, temperature, turgor and moisture    Range of Motion: Limited flexion, IR and ER with pain    positive log roll   negative Trendelenburg sign  positive Stinchfield  positive ZENY  positive FADDIR    Motor: 5/5 Q/HS/TA/GS/EHL/FHL    Vascular: Toes WWP with BCR    SILT DP/SP/Roseann/Saph/Tib      Images:    I personally reviewed relevant images in the PACS system and my interpretation is as follows:  X-rays of the left Hip reveal end stage degenerative changes with subchondral sclerosis, joint space narrowing, joint subluxation and osteophyte formation secondary to hip dysplasia.         Giacomo Wu MD  Adult Reconstruction Specialist   [address] [facility]

## 2023-10-13 ENCOUNTER — TELEPHONE (OUTPATIENT)
Dept: OBGYN CLINIC | Facility: HOSPITAL | Age: 48
End: 2023-10-13

## 2023-10-16 ENCOUNTER — TELEPHONE (OUTPATIENT)
Age: 48
End: 2023-10-16

## 2023-10-16 NOTE — TELEPHONE ENCOUNTER
Caller: Patient     Doctor: Keysha Aaron     Reason for call: Non-FMLA paperwork for [employer] [employer] was handed in on 10/10/23 , she understands the TAT for the paperwork but her job is threatening her position. She is asking for this to be done asap .      Please advise      Call back#: [phone]

## 2023-10-16 NOTE — TELEPHONE ENCOUNTER
Preoperative Elective Admission Assessment- spoke to patient     Living Situation:    Who does pt live with: Daughter, 15 yo. What kind of home: multi-level  How do they enter the home: front  How many levels in home: 2 levels  # of steps to enter home: 8 steps outside, with bilateral railings  # of steps to second floor: 7 to 2nd floor   Are there handrails: Yes  Are there landings: Yes  Sleeping arrangement: second floor  Where is Bathroom: Second floor bathroom, step in tub      First Floor Setup: N/A - Bilevel, has to go up or down steps once in the home      DME: frame walker and cane    We discussed clearing pathways in the home and making sure there is accessibly to use the walker, for example, removing throw rugs. Patient's Current Level of Function: Ambulates: Independently and ADLs: Independent    Post-op Caregiver:  Child and parents   Currently receive any HHC/aides/community supports: No     Post-op Transport: relative  To/from hospital: relative, parents   To/from PT 2-3x/week: relative  Uses community transport now: No     Outpatient Physical Therapy Site:  Site: N/A- will be addressed at Postoperative Visit   pre and post-op appts scheduled? No     Medication Management: self  Preferred Pharmacy for Post-op Medications: [address] [facility] [id number]   Blood Management Vitamin Regimen: Pt confirms taking as prescribed  Post-op anticoagulant: to be determined by surgical team postoperatively     DC Plan: Pt plans to be discharged home    Barriers to DC identified preoperatively: none identified    BMI: 37.11    Patient Education:  Pt educated on post-op pain, early mobilization (POD0), LOS goals, OP PT goals, and preoperative bathing. Patient educated that our goal is to appropriately discharge patient based off their post-op function while striving to maintain maximal independence.  The goal is to discharge patient to home and for them to attend outpatient physical therapy.     Assigned to care team? Yes

## 2023-10-16 NOTE — TELEPHONE ENCOUNTER
Caller: Wendy KAHN    Doctor: Lele Brown    Reason for call: she received the completed form, she is the surgery date is wrong, frequent duration is wrong and the pt's first name is wrong.   She is asking if this can be corrected and refaxed to [phone]    Call back#: [phone] ext [phone]

## 2023-10-16 NOTE — TELEPHONE ENCOUNTER
Caller: Patient     Doctor: Kamla Cyr     Reason for call: Her parents are bringing her to her upcoming procedure. Wanting to know if they should stay or if they will be able to leave and come back.      Please advise     Call back#: [phone]

## 2023-10-19 ENCOUNTER — APPOINTMENT (OUTPATIENT)
Dept: LAB | Facility: HOSPITAL | Age: 48
End: 2023-10-19
Payer: OTHER GOVERNMENT

## 2023-10-19 ENCOUNTER — PATIENT MESSAGE (OUTPATIENT)
Dept: FAMILY MEDICINE CLINIC | Facility: CLINIC | Age: 48
End: 2023-10-19

## 2023-10-19 ENCOUNTER — TELEPHONE (OUTPATIENT)
Age: 48
End: 2023-10-19

## 2023-10-19 DIAGNOSIS — M16.12 PRIMARY OSTEOARTHRITIS OF LEFT HIP: ICD-10-CM

## 2023-10-19 DIAGNOSIS — Z01.818 PRE-OPERATIVE CLEARANCE: ICD-10-CM

## 2023-10-19 DIAGNOSIS — Q65.89 DEVELOPMENTAL DYSPLASIA OF HIP: ICD-10-CM

## 2023-10-19 LAB
APTT PPP: 29 SECONDS (ref 23–37)
ATRIAL RATE: 69 BPM
BASOPHILS # BLD AUTO: 0.04 THOUSANDS/ÂΜL (ref 0–0.1)
BASOPHILS NFR BLD AUTO: 1 % (ref 0–1)
EOSINOPHIL # BLD AUTO: 0.04 THOUSAND/ÂΜL (ref 0–0.61)
EOSINOPHIL NFR BLD AUTO: 1 % (ref 0–6)
ERYTHROCYTE [DISTWIDTH] IN BLOOD BY AUTOMATED COUNT: 12.8 % (ref 11.6–15.1)
EST. AVERAGE GLUCOSE BLD GHB EST-MCNC: 117 MG/DL
HBA1C MFR BLD: 5.7 %
HCT VFR BLD AUTO: 41.5 % (ref 34.8–46.1)
HGB BLD-MCNC: 13.4 G/DL (ref 11.5–15.4)
IMM GRANULOCYTES # BLD AUTO: 0.01 THOUSAND/UL (ref 0–0.2)
IMM GRANULOCYTES NFR BLD AUTO: 0 % (ref 0–2)
INR PPP: 1 (ref 0.84–1.19)
LYMPHOCYTES # BLD AUTO: 2.29 THOUSANDS/ÂΜL (ref 0.6–4.47)
LYMPHOCYTES NFR BLD AUTO: 35 % (ref 14–44)
MCH RBC QN AUTO: 28.2 PG (ref 26.8–34.3)
MCHC RBC AUTO-ENTMCNC: 32.3 G/DL (ref 31.4–37.4)
MCV RBC AUTO: 87 FL (ref 82–98)
MONOCYTES # BLD AUTO: 0.43 THOUSAND/ÂΜL (ref 0.17–1.22)
MONOCYTES NFR BLD AUTO: 7 % (ref 4–12)
NEUTROPHILS # BLD AUTO: 3.75 THOUSANDS/ÂΜL (ref 1.85–7.62)
NEUTS SEG NFR BLD AUTO: 56 % (ref 43–75)
NRBC BLD AUTO-RTO: 0 /100 WBCS
P AXIS: 25 DEGREES
PLATELET # BLD AUTO: 364 THOUSANDS/UL (ref 149–390)
PMV BLD AUTO: 9.4 FL (ref 8.9–12.7)
PR INTERVAL: 132 MS
PROTHROMBIN TIME: 13.2 SECONDS (ref 11.6–14.5)
QRS AXIS: -17 DEGREES
QRSD INTERVAL: 86 MS
QT INTERVAL: 386 MS
QTC INTERVAL: 413 MS
RBC # BLD AUTO: 4.75 MILLION/UL (ref 3.81–5.12)
T WAVE AXIS: 2 DEGREES
VENTRICULAR RATE: 69 BPM
WBC # BLD AUTO: 6.56 THOUSAND/UL (ref 4.31–10.16)

## 2023-10-19 PROCEDURE — 93005 ELECTROCARDIOGRAM TRACING: CPT

## 2023-10-19 PROCEDURE — 86900 BLOOD TYPING SEROLOGIC ABO: CPT

## 2023-10-19 PROCEDURE — 85610 PROTHROMBIN TIME: CPT

## 2023-10-19 PROCEDURE — 36415 COLL VENOUS BLD VENIPUNCTURE: CPT

## 2023-10-19 PROCEDURE — 85730 THROMBOPLASTIN TIME PARTIAL: CPT

## 2023-10-19 PROCEDURE — 86850 RBC ANTIBODY SCREEN: CPT

## 2023-10-19 PROCEDURE — 83036 HEMOGLOBIN GLYCOSYLATED A1C: CPT

## 2023-10-19 PROCEDURE — 86901 BLOOD TYPING SEROLOGIC RH(D): CPT

## 2023-10-19 PROCEDURE — 93010 ELECTROCARDIOGRAM REPORT: CPT | Performed by: INTERNAL MEDICINE

## 2023-10-19 PROCEDURE — 85025 COMPLETE CBC W/AUTO DIFF WBC: CPT

## 2023-10-19 NOTE — PROGRESS NOTES
Internal Medicine Pre-Operative Evaluation:     Reason for Visit: Pre-operative Evaluation for Risk Stratification and Optimization    Patient ID: Hamida Wray is a 53 y.o. female. Surgery: Arthroplasty of left Hip  Referring Provider: Dr. Sharee Hernandez      Recommendations to Proceed withSurgery    Patient is considered to be Low risk for Medium risk procedure. After evaluation and discussion with patient with emphasis that all surgery has some degree of inherent risk it is determined this procedure is of acceptable risk  medically. Patient may proceed with planned procedure      Assessment      Primary osteoarthritis left Hip  Failed conservative measures  Electing to undergo total joint arthroplasty    Pre-operative Medical Evaluation for planned surgery  Patient meets preoperative quality goals as noted below  Recommendations as listed in PLAN section below  Contact surgical nurse  navigator with any questions regarding preoperative plan or schedule. Obesity  Recommend ongoing attempts at weight loss  Current BMI 36    Impaired fasting glucose  Hgb A1c 5.7  Recommend following DM diet  Monitor FBS            Patient Active Problem List   Diagnosis    Scoliosis concern    Obesity    Kidney stones, calcium oxalate    Tremor    Hyperlipidemia    Osteoarthritis resulting from left hip dysplasia    History of colon polyps    Anaphylaxis due to insect venom    IFG (impaired fasting glucose)    Preoperative clearance        Plan:     1. Further preoperative workup as follows:   - none no further testing required may proceed with surgery    2. Medication Management/Recommendations:   - hold aspirin 7 days prior to surgery  - avoid use of NSAID such as motrin, advil, aleve for 7 days prior to surgery  - hold all OTC herbal or nutritional supplements 7 days before surgery    3. Patient requires further consultation with:   No Consults Required    4.  Discharge Planning / Barriers to Discharge  none identified - patients has post discharge therapy plan in place, transportation arranged for discharge day, adequate family support at home to assist with discharge to home. Subjective:           History of Present Illness:     Beata Bee is a 53 y.o. female who presents to the office today for a preoperative consultation at the request of surgeon. The patient understands this is an elective procedure and not emergent. They are electing to undergo planned procedure with an understanding that all surgery has inherent risk. They have worked with their surgeon and failed conservative treatment measures. Today they present for preoperative risk assessment and recommendations for optimization in preparation for surgery. Pt seen in surgical optimization center for upcoming proposed surgery. They have failed previous conservative measures and have elected surgical intervention. Pt meets presurgical lab and BMI optimization goals. Upon interview questioning patient is able to perform greater than 4 METs workload in daily life without any reported cardio-pulmonary symptoms. EKG is completed as well with non specific changes noted        ROS: No TIA's or unusual headaches, no dysphagia. No prolonged cough. No dyspnea or chest pain on exertion. No abdominal pain, change in bowel habits, black or bloody stools. No urinary tract or BPH symptoms. Positive reported pain in arthritic joint. Positive difficulty with gait. No skin rashes or issues.             Objective:      /76   Pulse 74   Ht 5' 5" (1.651 m)   Wt 101 kg (222 lb 3.2 oz)   LMP 09/25/2023 (Approximate)   BMI 36.98 kg/m²       General Appearance: no distress, conversive  HEENT: PERRLA, conjuctiva normal; oropharynx clear; mucous membranes moist;   Neck:  Supple, no lymphadenopathy or thyromegaly  Lungs: breath sounds normal, normal respiratory effort, no retractions, expiratory effort normal  CV: normal heart sounds S1/S2, PMI normal ABD: soft non tender, no masses , no hepatic or splenomegaly  EXT: DP pulses intact, no lymphadenopathy, no edema  Skin: normal turgor, normal texture, no rash  Psych: affect normal, mood normal  Neuro: AAOx3        The following portions of the patient's history were reviewed and updated as appropriate: allergies, current medications, past family history, past medical history, past social history, past surgical history and problem list.     Past History:       Past Medical History:   Diagnosis Date    Abnormal Pap smear of cervix     Anaphylaxis due to insect venom 08/10/2022    Arthritis     It was last year    COVID-19 2022    History of colon polyps 2022    HPV (human papilloma virus) infection     Hyperlipidemia     IFG (impaired fasting glucose)     Internal hemorrhoids 2022    denies    Kidney stone 2015    Kidney stones, calcium oxalate 2015    Obesity     Pneumonia     Resolved 2017     Pre-diabetes     Scoliosis     Tremor     Left Hand Resting Tremor    UTI (urinary tract infection)     Varicella     Visual impairment     Wear glasses    Past Surgical History:   Procedure Laterality Date    APPENDECTOMY  10/14/2018    Appendix taken out     SECTION       x 1 ; Failure to progress    COLONOSCOPY      FL INJECTION LEFT HIP (NON ARTHROGRAM)  10/12/2020    MD LAPAROSCOPIC APPENDECTOMY N/A 10/14/2018    Procedure: APPENDECTOMY LAPAROSCOPIC;  Surgeon: Rosalino Duran MD;  [facility];  Service: General    WISDOM TOOTH EXTRACTION            Social History     Tobacco Use    Smoking status: Never    Smokeless tobacco: Never   Vaping Use    Vaping Use: Never used   Substance Use Topics    Alcohol use:  Yes     Alcohol/week: 1.0 standard drink of alcohol     Types: 1 Glasses of wine per week     Comment: rare    Drug use: Never     Family History   Problem Relation Age of Onset    Hypertension Father     Alcohol abuse Father     Kidney disease Father         CKD on ESRD Diabetes type II Father 48    No Known Problems Mother     Scoliosis Brother     Hip dysplasia Daughter     No Known Problems Maternal Grandmother     No Known Problems Maternal Grandfather     No Known Problems Paternal Grandmother     No Known Problems Paternal Grandfather     No Known Problems Maternal Aunt     No Known Problems Maternal Aunt     No Known Problems Paternal Aunt     No Known Problems Paternal Aunt     No Known Problems Paternal Aunt     No Known Problems Paternal Aunt           Allergies: Allergies   Allergen Reactions    Bee Venom Anaphylaxis        Current Medications:     Current Outpatient Medications   Medication Instructions    ascorbic acid (VITAMIN C) 500 mg, Oral, 2 times daily    EPINEPHrine (EPIPEN) 0.3 mg, Intramuscular, Once    ferrous sulfate 324 mg, Oral, Every other day    folic acid (FOLVITE) 1 mg, Oral, Daily    Green Tea, Michelle sinensis, (GREEN TEA PO) 2 tablets, Oral, 2 times daily    meloxicam (MOBIC) 15 mg, Oral, Daily    Multiple Vitamins-Minerals (multivitamin with minerals) tablet 1 tablet, Oral, Daily    NON FORMULARY 2 tablets, Oral, Daily, Age loc meta- OTC supplement             PRE-OP WORKSHEET DATA    Assessment of Pre-Operative Risks     MLJ Quality Hard Stops:  BMI (<40) : Estimated body mass index is 36.98 kg/m² as calculated from the following:    Height as of this encounter: 5' 5" (1.651 m). Weight as of this encounter: 101 kg (222 lb 3.2 oz). Hgb ( >11): Lab Results   Component Value Date    HGB 13.4 10/19/2023     HbA1c (<7.0) :   Lab Results   Component Value Date    HGBA1C 5.7 (H) 10/19/2023     GFR (>60) (Less then 45 = Nephrology consult):  CrCl cannot be calculated (Patient's most recent lab result is older than the maximum 7 days allowed. ).       Active Decompensated Chronic Conditions which would delay surgery  No acutely decompensated medical issues such as recent CVA, MI, new onset arrhythmia, severe aortic stenosis, CHF, uncontrolled COPD       Exercise Capacity: (if less the 4 mets consider functional assessment via cardiac stress testing or consultation)    Able to walk 2 blocks without symptoms?: Yes  Able to walk 1 flights without symptoms?: Yes    Assessment of intra and post operative respiratory, hemodynamic and thrombotic risks     Prior Anesthesia Reactions: No     Personal history of venous thromboembolic disease? No    History of steroid use > 5 mg for >2 weeks within last year? No    Cardiac Risk Estimation: per the Revised Cardiac Risk Index (Circ. 100:1043, 1999),     The patient's risk factors for cardiac complications include :  none    Deven Elliott has a in hospital cardiac risk of RCI RISK CLASS I (0 risk factors, risk of major cardiac compl. appr. 0.5%) based on RCRI calculator          Pre-Op Data Reviewed:       Laboratory Results: I have personally reviewed the pertinent laboratory results/reports     EKG:I have personally reviewed pertinent reports. . I personally reviewed and interpreted available tracings in the electronic medical record    Normal sinus rhythm  Low voltage QRS  Poor anterior R wave progression is noted  Abnormal ECG  When compared with ECG of 03-AUG-2022 14:48,  T wave inversion now evident in Inferior leads  Nonspecific T wave abnormality now evident in Anterior leads  QT has shortened  Confirmed by  Reasoner ([id number]) on 10/19/2023    OLD RECORDS: reviewed old records in the chart review section if EHR on day of visit.     Previous cardiopulmonary studies within the past year:  Echocardiogram: no  Cardiac Catheterization: no  Stress Test: no      Time of visit including pre-visit chart review, visit and post-visit coordination of plan and care , review of pre-surgical lab work, preparation and time spent documenting note in electronic medical record, time spent face-to-face in physical examination answering patient questions by care team 55 minutes             [facility]

## 2023-10-19 NOTE — PATIENT INSTRUCTIONS
Stop all over the counter supplements, herbal, naturopathic  medications for 1 week prior to surgery UNLESS prescribed by your surgeon  Hold NSAIDS (i.e. advil, alleve, motrin, ibuprofen, celebrex) minimum 7 days prior to surgery  Hold Asprin minimum 7 days prior to surgery  Recommend using Tylenol ( acetaminophen ) 500mg every eight hours during the first week post discharge in conjunction with any additional pain medicine prescribed by your surgeon  Use bowel medicines prescribed by your surgeon ( colace) daily post op during the first 1-2 weeks to avoid post operative constipation issues  Call [phone] with any post discharge concerns or medical issues  The morning of surgery take only the following medication with small sip of water: none

## 2023-10-19 NOTE — TELEPHONE ENCOUNTER
Caller: [facility]    Doctor: Idalia Recinos    Reason for call: They cannot do an Anemia panel with reflex unless the doctor orders CBC as well. Per clinical team, they will add the CBC order now.      Call back#:

## 2023-10-20 LAB
ABO GROUP BLD: NORMAL
BLD GP AB SCN SERPL QL: NEGATIVE
RH BLD: NEGATIVE
SPECIMEN EXPIRATION DATE: NORMAL

## 2023-10-20 NOTE — PRE-PROCEDURE INSTRUCTIONS
Pre-Surgery Instructions:   Medication Instructions    ascorbic acid (VITAMIN C) 500 MG tablet Hold day of surgery. EPINEPHrine (EPIPEN) 0.3 mg/0.3 mL SOAJ Uses PRN- OK to take day of surgery    ferrous sulfate 324 (65 Fe) mg Hold day of surgery. folic acid (FOLVITE) 1 mg tablet Hold day of surgery. Green Tea, Michelle sinensis, (GREEN TEA PO) Stop taking 7 days prior to surgery. meloxicam (Mobic) 15 mg tablet Stop taking 3 days prior to surgery. Multiple Vitamins-Minerals (multivitamin with minerals) tablet Hold day of surgery. NON FORMULARY Stop taking 7 days prior to surgery. Ortho bag given to pt via surgeon's office. Contents reviewed. Pt verbalized an understanding. Medication instructions for day surgery reviewed. Please use only a sip of water to take your instructed medications. Avoid all over the counter vitamins, supplements and NSAIDS for one week prior to surgery per anesthesia guidelines. Tylenol is ok to take as needed. You will receive a call one business day prior to surgery with an arrival time and hospital directions. If your surgery is scheduled on a Monday, the hospital will be calling you on the Friday prior to your surgery. If you have not heard from anyone by 8pm, please call the hospital supervisor through the hospital  at [phone]. Gerard Dhillon [phone]). Do not eat or drink anything after midnight the night before your surgery, including candy, mints, lifesavers, or chewing gum. Do not drink alcohol 24hrs before your surgery. Try not to smoke at least 24hrs before your surgery. Follow the pre surgery showering instructions as listed in the [facility] Experience Booklet” or otherwise provided by your surgeon's office. Do not shave the surgical area 24 hours before surgery. Do not apply any lotions, creams, including makeup, cologne, deodorant, or perfumes after showering on the day of your surgery.      No contact lenses, eye make-up, or artificial eyelashes. Remove nail polish, including gel polish, and any artificial, gel, or acrylic nails if possible. Remove all jewelry including rings and body piercing jewelry. Wear causal clothing that is easy to take on and off. Consider your type of surgery. Keep any valuables, jewelry, piercings at home. Please bring any specially ordered equipment (sling, braces) if indicated. Arrange for a responsible person to drive you to and from the hospital on the day of your surgery. Visitor Guidelines discussed. Call the surgeon's office with any new illnesses, exposures, or additional questions prior to surgery. Please reference your [facility] Surgical Experience Booklet” for additional information to prepare for your upcoming surgery.

## 2023-10-24 ENCOUNTER — HOSPITAL ENCOUNTER (OUTPATIENT)
Dept: RADIOLOGY | Age: 48
Discharge: HOME | End: 2023-10-24
Payer: OTHER GOVERNMENT

## 2023-10-24 DIAGNOSIS — Z12.31 ENCOUNTER FOR SCREENING MAMMOGRAM FOR MALIGNANT NEOPLASM OF BREAST: ICD-10-CM

## 2023-10-24 PROCEDURE — 77063 BREAST TOMOSYNTHESIS BI: CPT

## 2023-10-24 PROCEDURE — 77067 SCR MAMMO BI INCL CAD: CPT

## 2023-10-25 ENCOUNTER — OFFICE VISIT (OUTPATIENT)
Age: 48
End: 2023-10-25

## 2023-10-25 VITALS
BODY MASS INDEX: 37.02 KG/M2 | SYSTOLIC BLOOD PRESSURE: 115 MMHG | DIASTOLIC BLOOD PRESSURE: 76 MMHG | HEART RATE: 74 BPM | HEIGHT: 65 IN | WEIGHT: 222.2 LBS

## 2023-10-25 DIAGNOSIS — R73.01 IFG (IMPAIRED FASTING GLUCOSE): ICD-10-CM

## 2023-10-25 DIAGNOSIS — M16.12 PRIMARY OSTEOARTHRITIS OF LEFT HIP: ICD-10-CM

## 2023-10-25 DIAGNOSIS — M16.32 OSTEOARTHRITIS RESULTING FROM LEFT HIP DYSPLASIA: ICD-10-CM

## 2023-10-25 DIAGNOSIS — E66.9 CLASS 2 OBESITY WITH BODY MASS INDEX (BMI) OF 37.0 TO 37.9 IN ADULT, UNSPECIFIED OBESITY TYPE, UNSPECIFIED WHETHER SERIOUS COMORBIDITY PRESENT: ICD-10-CM

## 2023-10-25 DIAGNOSIS — Q65.89 DEVELOPMENTAL DYSPLASIA OF HIP: ICD-10-CM

## 2023-10-25 DIAGNOSIS — Z01.818 PREOPERATIVE CLEARANCE: Primary | ICD-10-CM

## 2023-10-31 ENCOUNTER — ANESTHESIA EVENT (OUTPATIENT)
Dept: PERIOP | Facility: HOSPITAL | Age: 48
End: 2023-10-31
Payer: OTHER GOVERNMENT

## 2023-11-01 ENCOUNTER — ANESTHESIA (OUTPATIENT)
Dept: PERIOP | Facility: HOSPITAL | Age: 48
End: 2023-11-01
Payer: OTHER GOVERNMENT

## 2023-11-01 ENCOUNTER — HOSPITAL ENCOUNTER (OUTPATIENT)
Facility: HOSPITAL | Age: 48
Setting detail: OUTPATIENT SURGERY
Discharge: HOME | End: 2023-11-03
Attending: STUDENT IN AN ORGANIZED HEALTH CARE EDUCATION/TRAINING PROGRAM | Admitting: STUDENT IN AN ORGANIZED HEALTH CARE EDUCATION/TRAINING PROGRAM
Payer: OTHER GOVERNMENT

## 2023-11-01 ENCOUNTER — APPOINTMENT (OUTPATIENT)
Dept: RADIOLOGY | Facility: HOSPITAL | Age: 48
End: 2023-11-01
Payer: OTHER GOVERNMENT

## 2023-11-01 DIAGNOSIS — M16.32 OSTEOARTHRITIS RESULTING FROM LEFT HIP DYSPLASIA: Primary | ICD-10-CM

## 2023-11-01 PROBLEM — M16.12 PRIMARY OSTEOARTHRITIS OF LEFT HIP: Status: ACTIVE | Noted: 2020-09-28

## 2023-11-01 PROBLEM — E66.09 CLASS 2 OBESITY DUE TO EXCESS CALORIES WITHOUT SERIOUS COMORBIDITY WITH BODY MASS INDEX (BMI) OF 36.0 TO 36.9 IN ADULT: Status: ACTIVE | Noted: 2017-07-07

## 2023-11-01 PROBLEM — M41.9 SCOLIOSIS: Status: ACTIVE | Noted: 2023-11-01

## 2023-11-01 PROBLEM — E66.812 CLASS 2 OBESITY DUE TO EXCESS CALORIES WITHOUT SERIOUS COMORBIDITY WITH BODY MASS INDEX (BMI) OF 36.0 TO 36.9 IN ADULT: Status: ACTIVE | Noted: 2017-07-07

## 2023-11-01 LAB
EXT PREGNANCY TEST URINE: NEGATIVE
EXT. CONTROL: NORMAL

## 2023-11-01 PROCEDURE — 97530 THERAPEUTIC ACTIVITIES: CPT

## 2023-11-01 PROCEDURE — 27130 TOTAL HIP ARTHROPLASTY: CPT

## 2023-11-01 PROCEDURE — C1713 ANCHOR/SCREW BN/BN,TIS/BN: HCPCS | Performed by: STUDENT IN AN ORGANIZED HEALTH CARE EDUCATION/TRAINING PROGRAM

## 2023-11-01 PROCEDURE — 97116 GAIT TRAINING THERAPY: CPT

## 2023-11-01 PROCEDURE — 73501 X-RAY EXAM HIP UNI 1 VIEW: CPT

## 2023-11-01 PROCEDURE — 72170 X-RAY EXAM OF PELVIS: CPT

## 2023-11-01 PROCEDURE — 27130 TOTAL HIP ARTHROPLASTY: CPT | Performed by: STUDENT IN AN ORGANIZED HEALTH CARE EDUCATION/TRAINING PROGRAM

## 2023-11-01 PROCEDURE — C1776 JOINT DEVICE (IMPLANTABLE): HCPCS | Performed by: STUDENT IN AN ORGANIZED HEALTH CARE EDUCATION/TRAINING PROGRAM

## 2023-11-01 PROCEDURE — 81025 URINE PREGNANCY TEST: CPT | Performed by: STUDENT IN AN ORGANIZED HEALTH CARE EDUCATION/TRAINING PROGRAM

## 2023-11-01 PROCEDURE — 97167 OT EVAL HIGH COMPLEX 60 MIN: CPT

## 2023-11-01 PROCEDURE — 97535 SELF CARE MNGMENT TRAINING: CPT

## 2023-11-01 PROCEDURE — 97163 PT EVAL HIGH COMPLEX 45 MIN: CPT

## 2023-11-01 DEVICE — IMPLANTABLE DEVICE
Type: IMPLANTABLE DEVICE | Site: HIP | Status: FUNCTIONAL
Brand: G7® DUAL MOBILITY ACETABULAR SYSTEM

## 2023-11-01 DEVICE — IMPLANTABLE DEVICE
Type: IMPLANTABLE DEVICE | Site: HIP | Status: FUNCTIONAL
Brand: VIVACIT-E®

## 2023-11-01 DEVICE — IMPLANTABLE DEVICE
Type: IMPLANTABLE DEVICE | Site: HIP | Status: FUNCTIONAL
Brand: G7® ACETABULAR SYSTEM

## 2023-11-01 DEVICE — ACTIS DUOFIX HIP PROSTHESIS (FEMORAL STEM 12/14 TAPER CEMENTLESS SIZE 3 STD COLLAR)  CE
Type: IMPLANTABLE DEVICE | Site: HIP | Status: FUNCTIONAL
Brand: ACTIS

## 2023-11-01 DEVICE — BIOLOX DELTA CERAMIC FEMORAL HEAD 28MM DIA +1.5 12/14 TAPER
Type: IMPLANTABLE DEVICE | Site: HIP | Status: FUNCTIONAL
Brand: BIOLOX DELTA

## 2023-11-01 RX ORDER — ONDANSETRON 2 MG/ML
4 INJECTION INTRAMUSCULAR; INTRAVENOUS EVERY 6 HOURS PRN
Status: DISCONTINUED | OUTPATIENT
Start: 2023-11-01 | End: 2023-11-03 | Stop reason: HOSPADM

## 2023-11-01 RX ORDER — ONDANSETRON 2 MG/ML
INJECTION INTRAMUSCULAR; INTRAVENOUS AS NEEDED
Status: DISCONTINUED | OUTPATIENT
Start: 2023-11-01 | End: 2023-11-01

## 2023-11-01 RX ORDER — SODIUM CHLORIDE, SODIUM LACTATE, POTASSIUM CHLORIDE, CALCIUM CHLORIDE 600; 310; 30; 20 MG/100ML; MG/100ML; MG/100ML; MG/100ML
1.5 INJECTION, SOLUTION INTRAVENOUS CONTINUOUS
Status: DISCONTINUED | OUTPATIENT
Start: 2023-11-01 | End: 2023-11-03 | Stop reason: HOSPADM

## 2023-11-01 RX ORDER — TRANEXAMIC ACID 100 MG/ML
INJECTION, SOLUTION INTRAVENOUS AS NEEDED
Status: DISCONTINUED | OUTPATIENT
Start: 2023-11-01 | End: 2023-11-01

## 2023-11-01 RX ORDER — SODIUM CHLORIDE 9 MG/ML
125 INJECTION, SOLUTION INTRAVENOUS CONTINUOUS
Status: DISCONTINUED | OUTPATIENT
Start: 2023-11-01 | End: 2023-11-01

## 2023-11-01 RX ORDER — ACETAMINOPHEN 325 MG/1
975 TABLET ORAL ONCE
Status: COMPLETED | OUTPATIENT
Start: 2023-11-01 | End: 2023-11-01

## 2023-11-01 RX ORDER — MEPERIDINE HYDROCHLORIDE 25 MG/ML
12.5 INJECTION INTRAMUSCULAR; INTRAVENOUS; SUBCUTANEOUS ONCE AS NEEDED
Status: DISCONTINUED | OUTPATIENT
Start: 2023-11-01 | End: 2023-11-01 | Stop reason: HOSPADM

## 2023-11-01 RX ORDER — CEFAZOLIN SODIUM 1 G/50ML
1000 SOLUTION INTRAVENOUS EVERY 8 HOURS
Status: COMPLETED | OUTPATIENT
Start: 2023-11-01 | End: 2023-11-02

## 2023-11-01 RX ORDER — SODIUM CHLORIDE 9 MG/ML
INJECTION INTRAVENOUS AS NEEDED
Status: DISCONTINUED | OUTPATIENT
Start: 2023-11-01 | End: 2023-11-01 | Stop reason: HOSPADM

## 2023-11-01 RX ORDER — PROMETHAZINE HYDROCHLORIDE 25 MG/ML
6.25 INJECTION, SOLUTION INTRAMUSCULAR; INTRAVENOUS ONCE AS NEEDED
Status: DISCONTINUED | OUTPATIENT
Start: 2023-11-01 | End: 2023-11-01 | Stop reason: HOSPADM

## 2023-11-01 RX ORDER — OXYCODONE HYDROCHLORIDE 10 MG/1
10 TABLET ORAL EVERY 4 HOURS PRN
Status: DISCONTINUED | OUTPATIENT
Start: 2023-11-01 | End: 2023-11-03 | Stop reason: HOSPADM

## 2023-11-01 RX ORDER — EPINEPHRINE 1 MG/ML
INJECTION, SOLUTION, CONCENTRATE INTRAVENOUS AS NEEDED
Status: DISCONTINUED | OUTPATIENT
Start: 2023-11-01 | End: 2023-11-01 | Stop reason: HOSPADM

## 2023-11-01 RX ORDER — CHLORHEXIDINE GLUCONATE 4 G/100ML
SOLUTION TOPICAL DAILY PRN
Status: DISCONTINUED | OUTPATIENT
Start: 2023-11-01 | End: 2023-11-01 | Stop reason: HOSPADM

## 2023-11-01 RX ORDER — OXYCODONE HYDROCHLORIDE 5 MG/1
5 TABLET ORAL EVERY 4 HOURS PRN
Status: DISCONTINUED | OUTPATIENT
Start: 2023-11-01 | End: 2023-11-03 | Stop reason: HOSPADM

## 2023-11-01 RX ORDER — CALCIUM CARBONATE 500 MG/1
1000 TABLET, CHEWABLE ORAL DAILY PRN
Status: DISCONTINUED | OUTPATIENT
Start: 2023-11-01 | End: 2023-11-03 | Stop reason: HOSPADM

## 2023-11-01 RX ORDER — MIDAZOLAM HYDROCHLORIDE 2 MG/2ML
INJECTION, SOLUTION INTRAMUSCULAR; INTRAVENOUS AS NEEDED
Status: DISCONTINUED | OUTPATIENT
Start: 2023-11-01 | End: 2023-11-01

## 2023-11-01 RX ORDER — ONDANSETRON 2 MG/ML
4 INJECTION INTRAMUSCULAR; INTRAVENOUS ONCE AS NEEDED
Status: DISCONTINUED | OUTPATIENT
Start: 2023-11-01 | End: 2023-11-01 | Stop reason: HOSPADM

## 2023-11-01 RX ORDER — MAGNESIUM HYDROXIDE 1200 MG/15ML
LIQUID ORAL AS NEEDED
Status: DISCONTINUED | OUTPATIENT
Start: 2023-11-01 | End: 2023-11-01 | Stop reason: HOSPADM

## 2023-11-01 RX ORDER — ACETAMINOPHEN 325 MG/1
650 TABLET ORAL EVERY 6 HOURS PRN
Status: DISCONTINUED | OUTPATIENT
Start: 2023-11-01 | End: 2023-11-03 | Stop reason: HOSPADM

## 2023-11-01 RX ORDER — CEFAZOLIN SODIUM 2 G/50ML
2000 SOLUTION INTRAVENOUS ONCE
Status: COMPLETED | OUTPATIENT
Start: 2023-11-01 | End: 2023-11-01

## 2023-11-01 RX ORDER — OXYCODONE HYDROCHLORIDE 5 MG/1
5 TABLET ORAL EVERY 4 HOURS PRN
Qty: 30 TABLET | Refills: 0 | Status: SHIPPED | OUTPATIENT
Start: 2023-11-01

## 2023-11-01 RX ORDER — ROPIVACAINE HYDROCHLORIDE 5 MG/ML
INJECTION, SOLUTION EPIDURAL; INFILTRATION; PERINEURAL AS NEEDED
Status: DISCONTINUED | OUTPATIENT
Start: 2023-11-01 | End: 2023-11-01 | Stop reason: HOSPADM

## 2023-11-01 RX ORDER — BUPIVACAINE HYDROCHLORIDE 7.5 MG/ML
INJECTION, SOLUTION INTRASPINAL AS NEEDED
Status: DISCONTINUED | OUTPATIENT
Start: 2023-11-01 | End: 2023-11-01

## 2023-11-01 RX ORDER — PROPOFOL 10 MG/ML
INJECTION, EMULSION INTRAVENOUS AS NEEDED
Status: DISCONTINUED | OUTPATIENT
Start: 2023-11-01 | End: 2023-11-01

## 2023-11-01 RX ORDER — CHLORHEXIDINE GLUCONATE ORAL RINSE 1.2 MG/ML
15 SOLUTION DENTAL ONCE
Status: COMPLETED | OUTPATIENT
Start: 2023-11-01 | End: 2023-11-01

## 2023-11-01 RX ORDER — FENTANYL CITRATE/PF 50 MCG/ML
50 SYRINGE (ML) INJECTION
Status: COMPLETED | OUTPATIENT
Start: 2023-11-01 | End: 2023-11-01

## 2023-11-01 RX ORDER — VANCOMYCIN HYDROCHLORIDE 1 G/20ML
INJECTION, POWDER, LYOPHILIZED, FOR SOLUTION INTRAVENOUS AS NEEDED
Status: DISCONTINUED | OUTPATIENT
Start: 2023-11-01 | End: 2023-11-01 | Stop reason: HOSPADM

## 2023-11-01 RX ORDER — PROPOFOL 10 MG/ML
INJECTION, EMULSION INTRAVENOUS CONTINUOUS PRN
Status: DISCONTINUED | OUTPATIENT
Start: 2023-11-01 | End: 2023-11-01

## 2023-11-01 RX ORDER — GABAPENTIN 300 MG/1
300 CAPSULE ORAL
Status: DISCONTINUED | OUTPATIENT
Start: 2023-11-01 | End: 2023-11-03 | Stop reason: HOSPADM

## 2023-11-01 RX ORDER — TRANEXAMIC ACID 10 MG/ML
1000 INJECTION, SOLUTION INTRAVENOUS ONCE
Status: DISCONTINUED | OUTPATIENT
Start: 2023-11-01 | End: 2023-11-01 | Stop reason: HOSPADM

## 2023-11-01 RX ORDER — HYDROMORPHONE HCL/PF 1 MG/ML
0.5 SYRINGE (ML) INJECTION
Status: DISCONTINUED | OUTPATIENT
Start: 2023-11-01 | End: 2023-11-01 | Stop reason: HOSPADM

## 2023-11-01 RX ORDER — KETOROLAC TROMETHAMINE 30 MG/ML
INJECTION, SOLUTION INTRAMUSCULAR; INTRAVENOUS AS NEEDED
Status: DISCONTINUED | OUTPATIENT
Start: 2023-11-01 | End: 2023-11-01 | Stop reason: HOSPADM

## 2023-11-01 RX ORDER — MAGNESIUM HYDROXIDE/ALUMINUM HYDROXICE/SIMETHICONE 120; 1200; 1200 MG/30ML; MG/30ML; MG/30ML
30 SUSPENSION ORAL EVERY 6 HOURS PRN
Status: DISCONTINUED | OUTPATIENT
Start: 2023-11-01 | End: 2023-11-03 | Stop reason: HOSPADM

## 2023-11-01 RX ORDER — FENTANYL CITRATE 50 UG/ML
INJECTION, SOLUTION INTRAMUSCULAR; INTRAVENOUS AS NEEDED
Status: DISCONTINUED | OUTPATIENT
Start: 2023-11-01 | End: 2023-11-01

## 2023-11-01 RX ADMIN — FENTANYL CITRATE 50 MCG: 50 INJECTION INTRAMUSCULAR; INTRAVENOUS at 11:15

## 2023-11-01 RX ADMIN — BUPIVACAINE HYDROCHLORIDE IN DEXTROSE 1.6 ML: 7.5 INJECTION, SOLUTION SUBARACHNOID at 11:17

## 2023-11-01 RX ADMIN — CEFAZOLIN SODIUM 1000 MG: 1 SOLUTION INTRAVENOUS at 18:18

## 2023-11-01 RX ADMIN — OXYCODONE HYDROCHLORIDE 5 MG: 5 TABLET ORAL at 15:26

## 2023-11-01 RX ADMIN — FENTANYL CITRATE 50 MCG: 50 INJECTION INTRAMUSCULAR; INTRAVENOUS at 13:55

## 2023-11-01 RX ADMIN — HYDROMORPHONE HYDROCHLORIDE 0.5 MG: 1 INJECTION, SOLUTION INTRAMUSCULAR; INTRAVENOUS; SUBCUTANEOUS at 14:17

## 2023-11-01 RX ADMIN — FENTANYL CITRATE 50 MCG: 50 INJECTION INTRAMUSCULAR; INTRAVENOUS at 14:09

## 2023-11-01 RX ADMIN — FENTANYL CITRATE 50 MCG: 50 INJECTION INTRAMUSCULAR; INTRAVENOUS at 13:34

## 2023-11-01 RX ADMIN — HYDROMORPHONE HYDROCHLORIDE 0.5 MG: 1 INJECTION, SOLUTION INTRAMUSCULAR; INTRAVENOUS; SUBCUTANEOUS at 14:36

## 2023-11-01 RX ADMIN — ACETAMINOPHEN 325MG 975 MG: 325 TABLET ORAL at 08:15

## 2023-11-01 RX ADMIN — CEFAZOLIN SODIUM 2000 MG: 2 SOLUTION INTRAVENOUS at 11:07

## 2023-11-01 RX ADMIN — PROPOFOL 80 MCG/KG/MIN: 10 INJECTION, EMULSION INTRAVENOUS at 11:19

## 2023-11-01 RX ADMIN — TRANEXAMIC ACID 1000 MG: 100 INJECTION, SOLUTION INTRAVENOUS at 13:10

## 2023-11-01 RX ADMIN — TRANEXAMIC ACID 1000 MG: 100 INJECTION, SOLUTION INTRAVENOUS at 11:27

## 2023-11-01 RX ADMIN — FENTANYL CITRATE 50 MCG: 50 INJECTION INTRAMUSCULAR; INTRAVENOUS at 11:11

## 2023-11-01 RX ADMIN — CHLORHEXIDINE GLUCONATE 15 ML: 1.2 RINSE ORAL at 08:15

## 2023-11-01 RX ADMIN — MIDAZOLAM 2 MG: 1 INJECTION INTRAMUSCULAR; INTRAVENOUS at 11:07

## 2023-11-01 RX ADMIN — SODIUM CHLORIDE, SODIUM LACTATE, POTASSIUM CHLORIDE, AND CALCIUM CHLORIDE 1.5 ML/KG/HR: .6; .31; .03; .02 INJECTION, SOLUTION INTRAVENOUS at 17:58

## 2023-11-01 RX ADMIN — SODIUM CHLORIDE: 0.9 INJECTION, SOLUTION INTRAVENOUS at 13:10

## 2023-11-01 RX ADMIN — PROPOFOL 40 MG: 10 INJECTION, EMULSION INTRAVENOUS at 11:19

## 2023-11-01 RX ADMIN — GABAPENTIN 300 MG: 300 CAPSULE ORAL at 22:49

## 2023-11-01 RX ADMIN — ONDANSETRON 4 MG: 2 INJECTION INTRAMUSCULAR; INTRAVENOUS at 13:10

## 2023-11-01 RX ADMIN — SODIUM CHLORIDE 125 ML/HR: 0.9 INJECTION, SOLUTION INTRAVENOUS at 08:15

## 2023-11-01 RX ADMIN — FENTANYL CITRATE 50 MCG: 50 INJECTION INTRAMUSCULAR; INTRAVENOUS at 13:39

## 2023-11-01 NOTE — ANESTHESIA PREPROCEDURE EVALUATION
Procedure:  ARTHROPLASTY HIP TOTAL, LEFT POSTERIOR, SAME DAY DISCHARGE (Left: Hip)    Relevant Problems   CARDIO   (+) Hyperlipidemia      /RENAL   (+) Kidney stones, calcium oxalate      MUSCULOSKELETAL   (+) Osteoarthritis resulting from left hip dysplasia   (+) Scoliosis      Other   (+) Class 2 obesity due to excess calories without serious comorbidity with body mass index (BMI) of 36.0 to 36.9 in adult   (+) Scoliosis concern   (+) Tremor        Physical Exam    Airway    Mallampati score: II  TM Distance: >3 FB  Neck ROM: full     Dental   No notable dental hx     Cardiovascular  Rhythm: regular, Rate: normal, Cardiovascular exam normal    Pulmonary  Pulmonary exam normal Breath sounds clear to auscultation    Other Findings        Anesthesia Plan  ASA Score- 2     Anesthesia Type- spinal with ASA Monitors. Additional Monitors:     Airway Plan:     Comment: Has "30 degree scoliosis"  Has had L/D epidurals and C-S w/o problems. GA prn. Plan Factors-    Chart reviewed. Existing labs reviewed. Patient summary reviewed. Patient is not a current smoker. Patient instructed to abstain from smoking on day of procedure. Patient did not smoke on day of surgery. There is medical exclusion for perioperative obstructive sleep apnea risk education. Induction- intravenous. Postoperative Plan-     Informed Consent- Anesthetic plan and risks discussed with patient, mother and father.

## 2023-11-01 NOTE — OCCUPATIONAL THERAPY NOTE
Occupational Therapy Evaluation + Treatment     Patient Name: Niall Zavala  NJACJ'T Date: 2023  Problem List  Principal Problem:    Primary osteoarthritis of left hip  Active Problems:    Class 2 obesity due to excess calories without serious comorbidity with body mass index (BMI) of 36.0 to 36.9 in adult    Scoliosis    Past Medical History  Past Medical History:   Diagnosis Date    Abnormal Pap smear of cervix     Anaphylaxis due to insect venom 08/10/2022    Arthritis     It was last year    COVID-19 2022    History of colon polyps 2022    HPV (human papilloma virus) infection     Hyperlipidemia     IFG (impaired fasting glucose)     Internal hemorrhoids 2022    denies    Kidney stone 2015    Kidney stones, calcium oxalate 2015    Obesity     Pneumonia     Resolved 2017     Pre-diabetes     Scoliosis     Tremor     Left Hand Resting Tremor    UTI (urinary tract infection)     Varicella     Visual impairment     Wear glasses     Past Surgical History  Past Surgical History:   Procedure Laterality Date    APPENDECTOMY  10/14/2018    Appendix taken out     SECTION       x 1 ; Failure to progress    COLONOSCOPY      FL INJECTION LEFT HIP (NON ARTHROGRAM)  10/12/2020    CA LAPAROSCOPIC APPENDECTOMY N/A 10/14/2018    Procedure: APPENDECTOMY LAPAROSCOPIC;  Surgeon: Torsten Gomez MD;  [facility];  Service: General    WISDOM TOOTH EXTRACTION           23 1625   OT Last Visit   OT Visit Date 23   Note Type   Note type Evaluation  (and treat)   Additional Comments Pt Greeted in supine and agreeable to skilled OT evaluation   Pain Assessment   Pain Assessment Tool FLACC   Pain Rating: FLACC (Rest) - Face 0   Pain Rating: FLACC (Rest) - Legs 0   Pain Rating: FLACC (Rest) - Activity 0   Pain Rating: FLACC (Rest) - Cry 0   Pain Rating: FLACC (Rest) - Consolability 0   Score: FLACC (Rest) 0   Pain Rating: FLACC (Activity) - Face 1   Pain Rating: FLACC (Activity) - Legs 1   Pain Rating: FLACC (Activity) - Activity 1   Pain Rating: FLACC (Activity) - Cry 1   Pain Rating: FLACC (Activity) - Consolability 1   Score: FLACC (Activity) 5   Restrictions/Precautions   Weight Bearing Precautions Per Order No   Other Precautions Fall Risk;Pain;Multiple lines   Home Living   Type of [address] to live on main level with bedroom/bathroom; Two level;Stairs to enter with rails  (8 TONI + 7 steps to main level.)   Bathroom Shower/Tub Tub/shower unit   Bathroom Toilet Raised   Bathroom Accessibility Accessible   Home Equipment Walker;Cane   Prior Function   Level of [place] Independent with ADLs; Independent with functional mobility; Independent with IADLS   Lives With Daughter   Receives Help From Family   IADLs Independent with driving; Independent with meal prep; Independent with medication management   Falls in the last 6 months 0   Comments Prior to admission, pt lives with 15 yo DTR in a 1 level home with TONI. Has a tub shower, standard toilet with riser. I with ADLs, IADLS and mobility. ADL   Where Assessed Edge of bed   Eating Assistance 6  Modified independent   Grooming Assistance 5  Supervision/Setup   UB Bathing Assistance 5  Supervision/Setup   LB Bathing Assistance 4  Minimal Assistance   UB Dressing Assistance 5  Supervision/Setup   LB Dressing Assistance 4  [address]  4  Minimal Assistance   Bed Mobility   Supine to Sit 4  Minimal assistance   Additional items Assist x 1   Additional Comments left seated up in chair   Transfers   Sit to Stand 4  Minimal assistance   Additional items Assist x 1; Increased time required;Verbal cues   Stand to Sit 4  Minimal assistance   Additional items Assist x 1; Increased time required;Verbal cues   Additional Comments cues for hand placement and best tech. Functional Mobility   Functional Mobility 3  Moderate assistance   Additional Comments Ax1, RW. limited by buckling and numbness in LLE. Balance   Static Sitting Fair -   Dynamic Sitting Poor +   Static Standing Poor +   Dynamic Standing Poor   Ambulatory Poor   Activity Tolerance   Activity Tolerance Patient limited by fatigue;Patient limited by pain;Treatment limited secondary to medical complications (Comment)   Medical Staff Made Aware PT Anum   Nurse Made Aware REYNALOD Ravi   RUE Assessment   RUE Assessment WFL   LUE Assessment   LUE Assessment WFL   Hand Function   Gross Motor Coordination Functional   Fine Motor Coordination Functional   Vision-Basic Assessment   Current Vision Wears glasses all the time   Psychosocial   Psychosocial (WDL) WDL   Cognition   Overall Cognitive Status WFL   Arousal/Participation Cooperative   Attention Within functional limits   Orientation Level Oriented X4   Following Commands Follows one step commands with increased time or repetition   Assessment   Limitation Decreased ADL status; Decreased UE strength;Decreased Safe judgement during ADL;Decreased endurance;Decreased self-care trans   Prognosis Good   Assessment Bela Wiseman is a 53 y.o. female seen for OT evaluation s/p admit to [facility] on 11/1/2023 w/ Primary osteoarthritis of left hip. S/p L THR, posterior approach. Comorbidities affecting pt's functional performance at time of assessment include:  OA . Pt with active OT orders and activity orders for Up and OOB as tolerated. Personal factors affecting pt at time of IE include:difficulty performing ADLs, difficulty performing IADLs, difficulty performing transfers/mobility, fall risk , and functional decline . Prior to admission, pt lives with 15 yo DTR in a 1 level home with TONI. Has a tub shower, standard toilet with riser. I with ADLs, IADLS and mobility.  Upon evaluation: Pt currently requires supervision  for UB ADLs, Erik for LB ADLs, Erik for toileting, Erik for bed mobility, Erik for functional transfers, and modAx1 mobility 2* the following deficits impacting occupational performance:weakness, decreased strength , decreased balance, decreased activity tolerance, decreased safety awareness, and orthopedic restrictions. Pt to benefit from continued skilled OT tx while in the hospital to address deficits as defined above and maximize level of functional independence w ADL's and functional mobility. Occupational Performance areas to address include: bathing/shower, toilet hygiene, dressing, functional mobility, and clothing management. From OT standpoint, recommendation at time of d/c would be home with OPPT. OT to follow pt on caseload 3-5x/wk. Goals   Patient Goals to go home   STG Time Frame 3-5   Short Term Goal #1 Pt will improve activity tolerance to G for min 30 min txment sessions for increase engagement in functional tasks   Short Term Goal #2 Pt will complete LB dressing/self care w/ mod I using adaptive device and DME as needed   Short Term Goal  Pt will complete toileting w/ mod I w/ G hygiene/thoroughness using DME as needed   LTG Time Frame 10-14   Long Term Goal #1 Pt will improve functional transfers to Mod I on/off all surfaces using DME as needed w/ G balance/safety   Long Term Goal #2 Pt will improve functional mobility during ADL/IADL/leisure tasks to Mod I using DME as needed w/ G balance/safety   Long Term Goal Pt will participate in simulated IADL management task to increase independence to Mod I w/ G safety and endurance   Plan   Treatment Interventions ADL retraining;Functional transfer training; Endurance training;UE strengthening/ROM; Patient/family training;Neuromuscular reeducation;Equipment evaluation/education; Compensatory technique education; Energy conservation; Activityengagement   Goal Expiration Date 11/14/23   OT Treatment Day 0   OT Frequency 3-5x/wk   Discharge Recommendation   Rehab Resource Intensity Level, OT III (Minimum Resource Intensity)   Additional Comments  The patient's raw score on the AM-PAC Daily Activity Inpatient Short Form is  19 A raw score of greater than or equal to 19 suggests the patient may benefit from discharge to home. Please refer to the recommendation of the Occupational Therapist for safe discharge planning. AM-PAC Daily Activity Inpatient   Lower Body Dressing 2   Bathing 3   Toileting 3   Upper Body Dressing 3   Grooming 4   Eating 4   Daily Activity Raw Score 19   Daily Activity Standardized Score (Calc for Raw Score >=11) 40.22   AM-PAC Applied Cognition Inpatient   Following a Speech/Presentation 4   Understanding Ordinary Conversation 4   Taking Medications 4   Remembering Where Things Are Placed or Put Away 4   Remembering List of 4-5 Errands 4   Taking Care of Complicated Tasks 4   Applied Cognition Raw Score 24   Applied Cognition Standardized Score 62.21   Additional Treatment Session   Start Time 1605   End Time 1625   Treatment Assessment edu and training on a use of LHAE for LB dressing.  completed donning and doffing socks and pants with Erik with reacher and sock aid   Additional Treatment Day [address], OT

## 2023-11-01 NOTE — PLAN OF CARE
Problem: PHYSICAL THERAPY ADULT  Goal: Performs mobility at highest level of function for planned discharge setting. See evaluation for individualized goals. Description: Treatment/Interventions: Functional transfer training, LE strengthening/ROM, Elevations, Therapeutic exercise, Endurance training, Patient/family training, Equipment eval/education, Bed mobility, Gait training, Compensatory technique education, Continued evaluation, Spoke to nursing, OT  Equipment Recommended: Bob Flower (patient has)       See flowsheet documentation for full assessment, interventions and recommendations. Note: Prognosis: Good  Problem List: Decreased strength, Decreased range of motion, Decreased endurance, Impaired balance, Decreased mobility, Impaired sensation, Obesity, Pain  Assessment: Estela Benjamin is a 54 y/o female witih L hip pain who presents for a same day THR. Pt with hx of obesity, hyperlipidemia, scoliosis, pre-diabetes, arthritis. PT consulted post operatively. WBAT. Activity as tolerated. Prior to admission resides with 15 y/o daughter in bilevel home. Has all living on main level with 8+7 TONI. Ambulates without AD at baseline. Currently presents with functional limitations related to impaired sensation LLE, decreased LLE Strength, post operative pain, decreased activity tolerance compared to baseline, decreased balance, functional mobility, impaired posture and locomotion. Mariama needed for bed mobiltiy, transfers. ModA for ambulation short distance OOB to chair. Gait slowed, LLE buckling with modA to stabilize. BP stable:  105/56, OOB in chair 126/63. GIven impairmetns will require skilled PT in order to progress and optimize functional outcomes. The patient's AM-PAC Basic Mobility Inpatient Short Form Raw Score is 15. A Raw score of less than or equal to 16 suggests the patient may benefit from discharge to post-acute rehabilitation services.  Please also refer to the recommendation of the Physical Therapist for safe discharge planning. Plan OPPT when stable for d/c. Will continue to require skilled IPPT to achieve goals for safe discharge home. PT will follow BID per POC. See treatment note below for mobility progression. Barriers to Discharge: Inaccessible home environment, Decreased caregiver support  Barriers to Discharge Comments: stairs  Rehab Resource Intensity Level, PT: III (Minimum Resource Intensity)    See flowsheet documentation for full assessment.

## 2023-11-01 NOTE — ANESTHESIA POSTPROCEDURE EVALUATION
Post-Op Assessment Note    CV Status:  Stable    Pain management: adequate     Mental Status:  Alert and awake   Hydration Status:  Euvolemic   PONV Controlled:  Controlled   Airway Patency:  Patent      Post Op Vitals Reviewed: Yes      Staff: Anesthesiologist         No notable events documented.     BP      Temp      Pulse     Resp      SpO2      /63   Pulse 62   Temp 98.2 °F (36.8 °C)   Resp 12   Ht 5' 5" (1.651 m)   Wt 100 kg (220 lb 7.4 oz)   LMP 09/25/2023 (Approximate)   SpO2 97%   BMI 36.69 kg/m²

## 2023-11-01 NOTE — PLAN OF CARE
Problem: OCCUPATIONAL THERAPY ADULT  Goal: Performs self-care activities at highest level of function for planned discharge setting. See evaluation for individualized goals. Description: Treatment Interventions: ADL retraining, Functional transfer training, Endurance training, UE strengthening/ROM, Patient/family training, Neuromuscular reeducation, Equipment evaluation/education, Compensatory technique education, Energy conservation, Activityengagement          See flowsheet documentation for full assessment, interventions and recommendations. Note: Limitation: Decreased ADL status, Decreased UE strength, Decreased Safe judgement during ADL, Decreased endurance, Decreased self-care trans  Prognosis: Good  Assessment: Severo Bane is a 53 y.o. female seen for OT evaluation s/p admit to [facility] on 11/1/2023 w/ Primary osteoarthritis of left hip. S/p L THR, posterior approach. Comorbidities affecting pt's functional performance at time of assessment include:  OA . Pt with active OT orders and activity orders for Up and OOB as tolerated. Personal factors affecting pt at time of IE include:difficulty performing ADLs, difficulty performing IADLs, difficulty performing transfers/mobility, fall risk , and functional decline . Prior to admission, pt lives with 15 yo DTR in a 1 level home with TONI. Has a tub shower, standard toilet with riser. I with ADLs, IADLS and mobility. Upon evaluation: Pt currently requires supervision  for UB ADLs, Erik for LB ADLs, Erik for toileting, Erik for bed mobility, Erik for functional transfers, and modAx1 mobility 2* the following deficits impacting occupational performance:weakness, decreased strength , decreased balance, decreased activity tolerance, decreased safety awareness, and orthopedic restrictions. Pt to benefit from continued skilled OT tx while in the hospital to address deficits as defined above and maximize level of functional independence w ADL's and functional mobility. Occupational Performance areas to address include: bathing/shower, toilet hygiene, dressing, functional mobility, and clothing management. From OT standpoint, recommendation at time of d/c would be home with OPPT. OT to follow pt on caseload 3-5x/wk.      Rehab Resource Intensity Level, OT: III (Minimum Resource Intensity)

## 2023-11-01 NOTE — ANESTHESIA PROCEDURE NOTES
Spinal Block    Patient location during procedure: OR  Start time: 11/1/2023 11:17 AM  Reason for block: at surgeon's request and primary anesthetic  Staffing  Performed by: Bob Rodriguez CRNA  Authorized by: Evie Henderson DO    Preanesthetic Checklist  Completed: patient identified, IV checked, site marked, risks and benefits discussed, surgical consent, monitors and equipment checked, pre-op evaluation and timeout performed  Spinal Block  Patient position: sitting  Prep: Betadine  Patient monitoring: cardiac monitor, frequent blood pressure checks, continuous pulse ox and heart rate  Approach: midline  Location: L3-4  Injection technique: single-shot  Needle  Needle type: pencil-tip   Needle gauge: 25 G  Needle length: 10 cm  Assessment  Sensory level: T4  Injection Assessment:  negative aspiration for heme, no paresthesia on injection and positive aspiration for clear CSF.   Post-procedure:  adhesive bandage applied, pressure dressing applied, secured with tape, site cleaned and sterile dressing applied

## 2023-11-01 NOTE — PHYSICAL THERAPY NOTE
PT EVALUATION 15:40-16:00  16:00-16:25    53 y.o.    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.89]    Past Medical History:   Diagnosis Date    Abnormal Pap smear of cervix     Anaphylaxis due to insect venom 08/10/2022    Arthritis     It was last year    COVID-19 2022    History of colon polyps 2022    HPV (human papilloma virus) infection     Hyperlipidemia     IFG (impaired fasting glucose)     Internal hemorrhoids 2022    denies    Kidney stone 2015    Kidney stones, calcium oxalate 2015    Obesity     Pneumonia     Resolved 2017     Pre-diabetes     Scoliosis     Tremor     Left Hand Resting Tremor    UTI (urinary tract infection)     Varicella     Visual impairment     Wear glasses         Past Surgical History:   Procedure Laterality Date    APPENDECTOMY  10/14/2018    Appendix taken out     SECTION       x 1 ; Failure to progress    COLONOSCOPY      FL INJECTION LEFT HIP (NON ARTHROGRAM)  10/12/2020    [place] LAPAROSCOPIC APPENDECTOMY N/A 10/14/2018    Procedure: APPENDECTOMY LAPAROSCOPIC;  Surgeon: Kath Davis MD;  Location: [facility];  Service: General    WISDOM TOOTH EXTRACTION          23 1540   PT Last Visit   PT Visit Date 23   Note Type   Note type Evaluation  (and treatment)   Pain Assessment   Pain Location/Orientation Orientation: Left; Location: Hip   Pain Rating: FLACC (Rest) - Face 0   Pain Rating: FLACC (Rest) - Legs 0   Pain Rating: FLACC (Rest) - Activity 0   Pain Rating: FLACC (Rest) - Cry 0   Pain Rating: FLACC (Rest) - Consolability 0   Score: FLACC (Rest) 0   Pain Rating: FLACC (Activity) - Face 1   Pain Rating: FLACC (Activity) - Legs 1   Pain Rating: FLACC (Activity) - Activity 1   Pain Rating: FLACC (Activity) - Cry 1   Pain Rating: FLACC (Activity) - Consolability 1   Score: FLACC (Activity) 5   Restrictions/Precautions   Weight Bearing Precautions Per Order No   Other Precautions Fall Risk;Pain;Multiple lines   Home Living   Type of [address] to live on main level with bedroom/bathroom; Two level;Stairs to enter with rails  (8 TONI with B/L rails, then 7 steps to main level living.)   Bathroom Shower/Tub Tub/shower unit   [employer] Raised  (has riser on his toilet)   Bathroom Accessibility Accessible   Home Equipment Walker;Cane   Additional Comments resides with 15 y/o daughter in bilevel home. Main level living once 8 +7 TONI. Local parents, however notes father is immunocompromised therefore support from parents will be limited. Prior Function   Level of [place] Independent with ADLs; Independent with functional mobility; Independent with IADLS   Lives With Daughter  (15 y/o)   [address] Help From Family   IADLs Independent with driving; Independent with meal prep; Independent with medication management   Falls in the last 6 months 0   Comments I PTA without AD use. General   Additional Pertinent History Pt is 54 y/o female presents for L THR. Posterior approach. NO THR precautions ordered. Family/Caregiver Present No   Cognition   Overall Cognitive Status WFL   Arousal/Participation Arousable   Orientation Level Oriented X4   Following Commands Follows one step commands with increased time or repetition   Comments lethargic post op. Subjective   Subjective Feels groggy, leg still numb. RUE Assessment   RUE Assessment WFL   LUE Assessment   LUE Assessment WFL   RLE Assessment   RLE Assessment WFL   LLE Assessment   LLE Assessment X  (hip <3-/5m otherwise 3+/5)   Coordination   Movements are Fluid and Coordinated 0   Coordination and Movement Description gait ataxia   Sensation X  (impaired sensation post op)   Bed Mobility   Supine to Sit 4  Minimal assistance   Additional items Assist x 1; Increased time required;Verbal cues;LE management   Additional Comments Increased time to complete transitions.  BP supine 105/56, OOB in chair 126/63   Transfers   Sit to Stand 4  Minimal assistance   Additional items Assist x 1; Increased time required;Verbal cues   Stand to Sit 4  Minimal assistance   Additional items Assist x 1; Increased time required;Verbal cues;Armrests   Ambulation/Elevation   Gait pattern Improper Weight shift;Decreased foot clearance   Gait Assistance 3  Moderate assist   Additional items Assist x 1;Verbal cues; Tactile cues   Assistive Device Rolling walker   Distance Amb with RW 3'x1.  + LLE knee buckling with modA to stabilize. Balance   Static Sitting Fair -   Dynamic Sitting Poor +   Static Standing Poor +   Dynamic Standing Poor   Ambulatory Poor   Endurance Deficit   Endurance Deficit Yes   Endurance Deficit Description pain, fatigue, LLE sensory impaired. Activity Tolerance   Activity Tolerance Patient limited by fatigue;Treatment limited secondary to medical complications (Comment); Patient limited by pain   Medical Staff Made Aware NurseRyan. ANICETO-Maureen:Pt seen for co-evaluation/treatment with skilled Occupational Therapist 2* clinically unstable/unpredictable presentation, medical complexity, fall risk, POD#0, sensory changes, functional/physical limitations, impaired functional balance, decreased safety awareness, limited activity tolerance which is decline from PLOF and may impact overall functional mobility/mobility safety. Nurse Made Aware yes   Assessment   Prognosis Good   Problem List Decreased strength;Decreased range of motion;Decreased endurance; Impaired balance;Decreased mobility; Impaired sensation;Obesity;Pain   Assessment Larry Angel is a 52 y/o female witih L hip pain who presents for a same day THR. Pt with hx of obesity, hyperlipidemia, scoliosis, pre-diabetes, arthritis. PT consulted post operatively. WBAT. Activity as tolerated. Prior to admission resides with 15 y/o daughter in bileCount [facility] the [facility] home. Has all living on main level with 8+7 TONI. Ambulates without AD at baseline.  Currently presents with functional limitations related to impaired sensation LLE, decreased LLE Strength, post operative pain, decreased activity tolerance compared to baseline, decreased balance, functional mobility, impaired posture and locomotion. Mariama needed for bed mobiltiy, transfers. ModA for ambulation short distance OOB to chair. Gait slowed, LLE buckling with modA to stabilize. BP stable:  105/56, OOB in chair 126/63. GIven impairmetns will require skilled PT in order to progress and optimize functional outcomes. The patient's AM-PAC Basic Mobility Inpatient Short Form Raw Score is 15. A Raw score of less than or equal to 16 suggests the patient may benefit from discharge to post-acute rehabilitation services. Please also refer to the recommendation of the Physical Therapist for safe discharge planning. Plan OPPT when stable for d/c. Will continue to require skilled IPPT to achieve goals for safe discharge home. PT will follow BID per POC. See treatment note below for mobility progression. Barriers to Discharge Inaccessible home environment;Decreased caregiver support   Barriers to Discharge Comments stairs   Goals   Patient Goals go home when I am safe. STG Expiration Date 11/08/23   Short Term Goal #1 7 days: 1). Pt will perform bed mobility with Pennie demonstrating appropriate technique 100% of the time in order to improve function. 2)  Perform all transfers with Pennie demonstrating safe and appropriate technique 100% of the time in order to improve ability to negotiate safely in home environment. 3) Amb with least restrictive AD > 150'x1 with mod I in order to demonstrate ability to negotiate in home environment. 4)  Improve overall strength and balance 1/2 grade in order to optimize ability to perform functional tasks and reduce fall risk. 5) Increase activity tolerance to 45 minutes in order to improve endurance to functional tasks. 6)  Negotiate stairs using most appropriate technique and S in order to be able to negotiate safely in home environment. 7) PT for ongoing patient and family/caregiver education, DME needs and d/c planning in order to promote highest level of function in least restrictive environment. PT Treatment Day 1   Plan   Treatment/Interventions Functional transfer training;LE strengthening/ROM; Elevations; Therapeutic exercise; Endurance training;Patient/family training;Equipment eval/education; Bed mobility;Gait training; Compensatory technique education;Continued evaluation;Spoke to nursing;OT   PT Frequency Twice a day   Discharge Recommendation   Rehab Resource Intensity Level, PT III (Minimum Resource Intensity)   Equipment Recommended Walker;Cane  (patient has)   AM-PAC Basic Mobility Inpatient   Turning in Flat Bed Without Bedrails 3   Lying on Back to Sitting on Edge of Flat Bed Without Bedrails 3   Moving Bed to Chair 2   Standing Up From Chair Using Arms 3   Walk in Room 2   Climb 3-5 Stairs With Railing 2   Basic Mobility Inpatient Raw Score 15   Basic Mobility Standardized Score 36.97   Highest Level Of Mobility   JH-HLM Goal 4: Move to chair/commode   JH-HLM Achieved 5: Stand (1 or more minutes)   Additional Treatment Session   Start Time 1600   End Time 1625   Treatment Assessment Seen for treatment to progress with mobiltiy and ambulation, provide HEP. Transfers wtih Erik. Amb with RW with min to modA distances of 18 ft. Gait slowed, antalgic, + buckling x 2 with modA to correct and stabilize with RW support. Remained OOB in recliner chair with LE elevation. + nausea-nursing made aware. Provided written HEP and reviewed all ex with return demonstration. Educated on stair management and written handout provided for home use. Review of POC and goals for safe d/c to home. OPPT when IPPT goals met. Equipment Use RW   Additional Treatment Day 1   Exercises   THR Sitting  (issued and reviewed written HEP.)   End of Consult   Patient Position at End of Consult Bedside chair; All needs within reach  (in recliner chair with LE elevation.)   End of Consult Comments /64     Hx/personal factors: TONI home environment, steps within home environment, difficulty performing IADLs, fall risk , functional decline , increased reliance on DME , and Inability to perform current job functions , comorbidities    Examination:decreased strength , decreased LE ROM, joint integrity, decreased balance, decreased activity tolerance, gait deviations, impaired sensation, increased pain, Fluctuating vitals, increased body habitus , impaired posture, and orthopedic restrictions. Clinical: unpredictable Ongoing medical status, Risk for falls, Continuous monitoring, Pain management, Decreased activity tolerance compared to baseline, More restrictive AD use than baseline, and Decline from PLOF. Matilde Alvarado      Complexity: high           Pascual Dudley, PT

## 2023-11-01 NOTE — PLAN OF CARE
Problem: PAIN - ADULT  Goal: Verbalizes/displays adequate comfort level or baseline comfort level  Description: Interventions:  - Encourage patient to monitor pain and request assistance  - Assess pain using appropriate pain scale  - Administer analgesics based on type and severity of pain and evaluate response  - Implement non-pharmacological measures as appropriate and evaluate response  - Consider cultural and social influences on pain and pain management  - Notify physician/advanced practitioner if interventions unsuccessful or patient reports new pain  Outcome: Progressing     Problem: SAFETY ADULT  Goal: Patient will remain free of falls  Description: INTERVENTIONS:  - Educate patient/family on patient safety including physical limitations  - Instruct patient to call for assistance with activity   - Consult OT/PT to assist with strengthening/mobility   - Keep Call bell within reach  - Keep bed low and locked with side rails adjusted as appropriate  - Keep care items and personal belongings within reach  - Initiate and maintain comfort rounds  - Make Fall Risk Sign visible to staff  - Offer Toileting every 2 Hours, in advance of need  - Initiate/Maintain bedalarm  - Obtain necessary fall risk management equipment: walker  - Apply yellow socks and bracelet for high fall risk patients  - Consider moving patient to room near nurses station  Outcome: Progressing     Problem: DISCHARGE PLANNING  Goal: Discharge to home or other facility with appropriate resources  Description: INTERVENTIONS:  - Identify barriers to discharge w/patient and caregiver  - Arrange for needed discharge resources and transportation as appropriate  - Identify discharge learning needs (meds, wound care, etc.)  - Arrange for interpretive services to assist at discharge as needed  - Refer to Case Management Department for coordinating discharge planning if the patient needs post-hospital services based on physician/advanced practitioner order or complex needs related to functional status, cognitive ability, or social support system  Outcome: Progressing

## 2023-11-01 NOTE — DISCHARGE INSTR - AVS FIRST PAGE
Leonardo Wright MD  Adult Reconstruction Specialist   [address] [facility]  Office Phone: [phone]    Discharge Instructions - Hip Replacement        What to Expect/Activity  You are weight bearing as tolerated to your operative leg unless otherwise instructed. Use your walker or crutches as needed. It is important to get up and walk for 10 minutes every hour, if possible. Initial recovery therapy is focused on walking. If in your follow-up a referral to formal physical therapy is required, this will be provided based on your recovery. You may sleep however you would like. Many patients feel more comfortable with a pillow between their legs and find it uncomfortable to lay on the operative side. If you do roll on that side at night you will not damage the replacement. You may use a regular or elevated toilet seat, whichever is more comfortable. We do not routinely require any specific precautions in terms of positioning of your leg and if so this will be taught to you by the physical therapists at the hospital. This means that you can dress as you are comfortable, including shoes and socks. You can bend down carefully to get items from the floor. Swelling and discomfort causes pain. Elevate your surgical leg on 1-2 pillows placed behind your ankle/calf throughout the day, especially when you are laying down. Please use ice packs for 20-30 minutes every 1-2 hours for 48-72 hours on the operative hip. Please make sure there is a barrier directly between your skin and your ice/ice pack. Please use incentive spirometer 10 times per hour while awake     Dressing/Wound Care/Bathing  There is a surgical dressing over your incision that stays in place for 7 days after surgery. You may start showering 5 days after surgery, the surgical dressing will remain in place. Please pat the dressing dry.  If you notice the dressing appears saturated or is starting to come off, please replace with a dry dressing. Keep the dressing on until your follow-up in the office. There may be dried blood around the incision. It is ok to continue showering after removing the dressing but do not scrub the incision. Pat incision dry. Do not place any creams, ointments or gels on or around the incision. No baths, swimming or submerging until cleared     Pain Management/Medications  You may resume your usual medications. Please take the following medications:  Anti-coagulation (blood clot prevention) - Aspirin 81 mg, 1 tablet twice daily for 6 weeks  Pain medication:  Narcotic Medication: Take as directed  NSAID (Anti-inflammatory): Take as directed  Tylenol 1000mg every 8 hours as needed  If you have questions or pain concerns, please contact the office. Pain medication cannot remove all post-operative pain. Follow up/Call if:  The findings of your surgery will be explained to you and your family immediately after surgery. However, in the post-operative period, during recovery from anesthesia you may not fully remember or fully understand what was said. We will go over this again when you return for your post-op appointment.   Please contact Dr. Van Louis office if you experience the following:  Excessive bleeding (bleeding through your dressing)  Fever greater than 101 degrees F after the first 2 days (a slight fever is normal the first few days after surgery)  Persistent nausea or vomiting  Decreased sensation or discoloration of the operative limb  Pain or swelling that is getting worse and not better with medication    Office Contact: [phone]

## 2023-11-01 NOTE — OP NOTE
OPERATIVE REPORT  PATIENT NAME: Shi Shah  : 1975  MRN: [id number]  Pt Location:  [facility] OR ROOM 01    Surgery Date: 2023    Surgeon(s) and Role:     * Nicol Stratton MD - Primary     * Abida Auguste PA-C - Assisting     Preop Diagnosis:  Primary osteoarthritis of left hip [M16.12]  Developmental dysplasia of hip [Q65.89]    Post-Op Diagnosis Codes:     * Primary osteoarthritis of left hip [M16.12]     * Developmental dysplasia of hip [Q65.89]    Procedure(s):  Left - ARTHROPLASTY HIP TOTAL. LEFT POSTERIOR. SAME DAY DISCHARGE    Specimens:  * No specimens in log *    Estimated Blood Loss:   100 mL    Drains:  * No LDAs found *    Anesthesia Type:   Spinal     Operative Indications:  Primary osteoarthritis of left hip [M16.12]  Developmental dysplasia of hip [Q65.89]    Operative Findings:  Full-thickness chondral defects to the acetabulum and femoral head with significant periacetabular osteophytes    Complications:   None      Hip Approach: Posterior    Procedure and Technique:  IMPLANTS:  1. Gael-Biomet G7 cup, 52 mm outer diameter. 2. DePuy Actis size 3 femoral stem. 3. Gael-Biomet DM liner 42mm outer diameter, inner diameter 28mm  4. Ceramic femoral head 28mm +/- 1.5mm  5. 1 x 6.5mm Cancellous Bone Screw, 20mm      INDICATIONS FOR THE PROCEDURE:   Patient is a Middle-Aged (Approx 42-69yo) male with hip pain at rest or at night and identified modifiable risk factors. Furthermore, they have radiographs which demonstrate severe OA and a physical exam revealing severe range of motion limitation. Unfortunately, the patient's hip is no longer relieved with non-surgical modalities. Given these findings, I have offered, and the patient has accepted surgical treatment in the form of left posterior total hip arthroplasty.     The patient understood all surgery carries risks that include, but are not limited to: pain, bleeding, infection, scarring, damage to nerves, arteries, veins, muscles, loss of function, failure of procedure, need for revision due to loosening or instability, prosthetic joint infections, loss of limb, leg length discrepancy, loss of motion, blood clot, pulmonary embolism, heart attack, stroke, and even death. The patient understood these risks and wished to proceed. The consent was signed and placed in the chart. All questions were answered. DESCRIPTION OF OPERATION:     After adequate anesthesia was induced, the patient was placed on the operating room table in the lateral decubitus position on a flat table with the Stulberg hip positioner. Care was taken to pad all bony prominences. An axillary roll was placed. The anterior and posterior padded posts were secured with care to orient the pelvis perpendicular to the floor, tilting the bed as needed. A 10/15 drape was used to jimbo off the caudal aspect of the surgery field. The left hip, thigh, and groin were then prepped and draped in the usual sterile fashion. Perpendicular lines on the skin were placed to aid skin closure. Marie Line was placed on all exposed skin. The borders of the femur shaft and greater trochanter were marked on the [place]. A time out was performed verifying the correct patient, correct limb, and correct procedure and all attendees in the room were in agreement with proceeding. The posterior approach was carried out with a #10 blade scalpel. Dissection was carried down sharply through the skin, subcutaneous tissue, and iliotibial band. The iliotibial band was sharply incised and the gluteus fibers split bluntly proximally. The Charnley retractor was then placed deep to the gluteus sherri fascia. The leg was then placed on the trevino at the foot of the bed to allow for internal rotation of the femur. The piriformis was identified by palpation, and the overlying bursa was removed.  The overlying fascia of the gluteus minimus was then incised with the bovie and a fox elevator was used to separate the muscle from the underlying capsule. A number 9 retractor was then placed into the interval. The piriformis and short external rotators were then released directly of the posterior boarder of the greater trochanter, with a laparotomy sponge assisting dissection and protecting the sciatic nerve. The capsule was then incised with electrocautery in a T-shaped fashion directed toward the piriformis fossa and ending at the cranial aspect of the posterior wall of the acetabulum ensuring passage through the labrum. Dislocation was achieved. A femoral neck osteotomy was then performed in accordance with preoperative templating with an oscillating saw and the femoral head removed. The capsule was then removed from its insertion circumferentially around the base of the femoral neck, using the previously placed number 9 retractor as a guide to not violate the abductors. A curved osteotome was then used to further release the anterior capsule from the anterior femur. The number 9 retractor was then replaced with a snake retractor which was placed over the anterior column in a line directly cranial and not overlying the acetabulum. The pulvinar, and ligamentum teres was then removed with electrocautery. The acetabulum was then reamed with hemispherical reamers along with preoperative templating. A long handled scalpel was then used to excise remaining labrum. The cup was then impacted in place with adequate abduction and anteversion. Any remaining osteophytes surrounding the acetabular shell were removed using a curved osteotome, fox elevator, and bovie. A neutral acetabular liner was then impacted into place and locked into the acetabular metal shell. The acetabular retractors were then removed and a femoral elevator was placed tp present the proximal femur. A box cutting osteotome was used to remove cortical and cancellous bone in line with the planned femoral anteversion.  The femur was then broached sequentially with the manual broach system until we had stable fit in the proximal femur. A trial neck and head were them placed and the hip was carefully reduced. The hip was then pistoned and taken through a range of motion in order to check for impingement and was found to have excellent stability. An intraoperative x-ray was then performed. The leg lengths and offset were compared. The trials stem, neck and head were then removed. The femoral canal was then copiously irrigated and a final stem was implanted along with the final ceramic head. The hip was once again reduced and local anesthetic was infiltrated to the hip capsule and the pericapsular tissues. A posterior capsular repair was then carried out using #5 fiberwire. The wound was then soaked with betadine saline solution and subsequently irrigated with three liters of normal saline. The remaining antibiotic saline was also irrigated into the wound. Vancomycin powder was finally placed in the wound. The iliotibial band and fascia overlaying the gluteus sherri were then closed with #1 interrupted Vicryl, followed by a running #1 barbed suture. The subcutaneous tissues were then closed with buried interrupted 2-0 Vicryl suture. The skin was then closed with staples. A sterile silver impregnated occlusive dressing was applied. Anesthesia was discontinued and the patient was taken to the recovery room in stable condition. Slava Amor PA-c was necessary for safely positioning the patient, prepping and draping, and assisted with wound closure. I was present for the entire procedure.     Patient Disposition:  PACU  and hemodynamically stable              SIGNATURE: Ishmael Anderson MD  DATE: November 1, 2023  TIME: 4:41 PM

## 2023-11-02 LAB
ANION GAP SERPL CALCULATED.3IONS-SCNC: 5 MMOL/L
BUN SERPL-MCNC: 13 MG/DL (ref 5–25)
CALCIUM SERPL-MCNC: 7.9 MG/DL (ref 8.4–10.2)
CHLORIDE SERPL-SCNC: 106 MMOL/L (ref 96–108)
CO2 SERPL-SCNC: 23 MMOL/L (ref 21–32)
CREAT SERPL-MCNC: 0.62 MG/DL (ref 0.6–1.3)
ERYTHROCYTE [DISTWIDTH] IN BLOOD BY AUTOMATED COUNT: 13.2 % (ref 11.6–15.1)
GFR SERPL CREATININE-BSD FRML MDRD: 107 ML/MIN/1.73SQ M
GLUCOSE SERPL-MCNC: 145 MG/DL (ref 65–140)
HCT VFR BLD AUTO: 32.1 % (ref 34.8–46.1)
HGB BLD-MCNC: 10.1 G/DL (ref 11.5–15.4)
MCH RBC QN AUTO: 28.5 PG (ref 26.8–34.3)
MCHC RBC AUTO-ENTMCNC: 31.5 G/DL (ref 31.4–37.4)
MCV RBC AUTO: 91 FL (ref 82–98)
PLATELET # BLD AUTO: 309 THOUSANDS/UL (ref 149–390)
PMV BLD AUTO: 9.7 FL (ref 8.9–12.7)
POTASSIUM SERPL-SCNC: 3.4 MMOL/L (ref 3.5–5.3)
RBC # BLD AUTO: 3.54 MILLION/UL (ref 3.81–5.12)
SODIUM SERPL-SCNC: 134 MMOL/L (ref 135–147)
WBC # BLD AUTO: 7.45 THOUSAND/UL (ref 4.31–10.16)

## 2023-11-02 PROCEDURE — 85027 COMPLETE CBC AUTOMATED: CPT

## 2023-11-02 PROCEDURE — 97116 GAIT TRAINING THERAPY: CPT

## 2023-11-02 PROCEDURE — 97535 SELF CARE MNGMENT TRAINING: CPT

## 2023-11-02 PROCEDURE — 97530 THERAPEUTIC ACTIVITIES: CPT

## 2023-11-02 PROCEDURE — 99024 POSTOP FOLLOW-UP VISIT: CPT

## 2023-11-02 PROCEDURE — 80048 BASIC METABOLIC PNL TOTAL CA: CPT

## 2023-11-02 RX ORDER — ASPIRIN 81 MG/1
81 TABLET, CHEWABLE ORAL 2 TIMES DAILY
Status: DISCONTINUED | OUTPATIENT
Start: 2023-11-02 | End: 2023-11-03 | Stop reason: HOSPADM

## 2023-11-02 RX ADMIN — ASPIRIN 81 MG CHEWABLE TABLET 81 MG: 81 TABLET CHEWABLE at 20:23

## 2023-11-02 RX ADMIN — OXYCODONE HYDROCHLORIDE 10 MG: 10 TABLET ORAL at 14:31

## 2023-11-02 RX ADMIN — SODIUM CHLORIDE, SODIUM LACTATE, POTASSIUM CHLORIDE, AND CALCIUM CHLORIDE 1.5 ML/KG/HR: .6; .31; .03; .02 INJECTION, SOLUTION INTRAVENOUS at 00:54

## 2023-11-02 RX ADMIN — OXYCODONE HYDROCHLORIDE 10 MG: 10 TABLET ORAL at 10:16

## 2023-11-02 RX ADMIN — OXYCODONE HYDROCHLORIDE 10 MG: 10 TABLET ORAL at 05:55

## 2023-11-02 RX ADMIN — MORPHINE SULFATE 2 MG: 2 INJECTION, SOLUTION INTRAMUSCULAR; INTRAVENOUS at 09:13

## 2023-11-02 RX ADMIN — ACETAMINOPHEN 325MG 650 MG: 325 TABLET ORAL at 09:13

## 2023-11-02 RX ADMIN — OXYCODONE HYDROCHLORIDE 10 MG: 10 TABLET ORAL at 20:22

## 2023-11-02 RX ADMIN — GABAPENTIN 300 MG: 300 CAPSULE ORAL at 22:04

## 2023-11-02 RX ADMIN — CEFAZOLIN SODIUM 1000 MG: 1 SOLUTION INTRAVENOUS at 03:06

## 2023-11-02 NOTE — PROGRESS NOTES
Progress Note - Orthopedics   Tony Elliott 53 y.o. female MRN: [id number]  Unit/Bed#: E2 -01      Subjective:    53 y. o.female POD 1 left posterior GI. No acute overnight events, no new complaints. Pain well-controlled at rest.  She denies fevers, chills, headaches, CP, SOB, N/V, or numbness or tingling.      Labs:  0   Lab Value Date/Time    HCT 32.1 (L) 11/02/2023 0449    HCT 41.5 10/19/2023 1155    HCT 42.5 08/22/2023 1207    HCT 43.8 06/24/2015 1328    HGB 10.1 (L) 11/02/2023 0449    HGB 13.4 10/19/2023 1155    HGB 14.1 08/22/2023 1207    HGB 14.7 06/24/2015 1328    INR 1.00 10/19/2023 1155    WBC 7.45 11/02/2023 0449    WBC 6.56 10/19/2023 1155    WBC 5.58 08/22/2023 1207    WBC 6.36 06/24/2015 1328       Meds:    Current Facility-Administered Medications:     acetaminophen (TYLENOL) tablet 650 mg, 650 mg, Oral, Q6H PRN, Ratna Edward PA-C, 650 mg at 11/02/23 0913    aluminum-magnesium hydroxide-simethicone (MAALOX) oral suspension 30 mL, 30 mL, Oral, Q6H PRN, Ratna Edward PA-C    aspirin chewable tablet 81 mg, 81 mg, Oral, BID, Nunu Saumel PA-C    calcium carbonate (TUMS) chewable tablet 1,000 mg, 1,000 mg, Oral, Daily PRN, Ratna Edward PA-C    gabapentin (NEURONTIN) capsule 300 mg, 300 mg, Oral, HS, Ratna Edward PA-C, 300 mg at 11/01/23 2249    lactated ringers infusion, 1.5 mL/kg/hr, Intravenous, Continuous, Ratna Edward PA-C, Last Rate: 150 mL/hr at 11/02/23 0054, 1.5 mL/kg/hr at 11/02/23 0054    morphine injection 2 mg, 2 mg, Intravenous, Q2H PRN, Ratna Aguada, PA-C, 2 mg at 11/02/23 0913    ondansetron (ZOFRAN) injection 4 mg, 4 mg, Intravenous, Q6H PRN, Ratna Aguada, PA-C    oxyCODONE (ROXICODONE) immediate release tablet 10 mg, 10 mg, Oral, Q4H PRN, Ratna Aguada, PA-C, 10 mg at 11/02/23 1431    oxyCODONE (ROXICODONE) IR tablet 5 mg, 5 mg, Oral, Q4H PRN, Ratna Cheyanne, PA-C, 5 mg at 11/01/23 1526    Blood Culture:   No results found for: "BLOODCX"    Wound Culture:   No results found for: "WOUNDCULT"    Ins and Outs:  I/O last 24 hours: In: 3109.6 [P.O.:1020; I.V.:2089.6]  Out: 545 [Urine:445; Blood:100]          Physical:  Vitals:    11/02/23 1101   BP: 138/63   Pulse: (!) 111   Resp: 20   Temp: 97.9 °F (36.6 °C)   SpO2: 96%     Musculoskeletal: left Lower Extremity  Visible skin without significant erythema or ecchymosis. Dressing C/D/I  TTP over the hip  Sensation intact over the toes  Motor intact to +FHL/EHL, +ankle dorsi/plantar flexion  Digits warm and well perfused  Capillary refill < 2 seconds    Assessment:    53 y. o.female POD 1 left posterior GI with Dr. Chip Patino 11/1/2023. Plan:  WBAT LLE without abduction pillow  The dressing may be maintained until the follow-up appointment. Will monitor for ABLA and administer IVF/prbc as indicated for greater than 2 gram Hgb drop or Hgb < 7. Hgb this morning is 10.1. No signs of active bleeding in the operative extremity. Ortho will monitor daily while in house.   PT/OT  Pain control  DVT ppx with aspirin 81 mg BID  Dispo: Ok for discharge from ortho perspective when cleared by PT/OT and the medicine team.    Mary Hackett PA-C

## 2023-11-02 NOTE — PLAN OF CARE
Problem: MOBILITY - ADULT  Goal: Maintain or return to baseline ADL function  Description: INTERVENTIONS:  -  Assess patient's ability to carry out ADLs; assess patient's baseline for ADL function and identify physical deficits which impact ability to perform ADLs (bathing, care of mouth/teeth, toileting, grooming, dressing, etc.)  - Assess/evaluate cause of self-care deficits   - Assess range of motion  - Assess patient's mobility; develop plan if impaired  - Assess patient's need for assistive devices and provide as appropriate  - Encourage maximum independence but intervene and supervise when necessary  - Involve family in performance of ADLs  - Assess for home care needs following discharge   - Consider OT consult to assist with ADL evaluation and planning for discharge  - Provide patient education as appropriate  Outcome: Progressing  Goal: Maintains/Returns to pre admission functional level  Description: INTERVENTIONS:  - Perform BMAT or MOVE assessment daily.   - Set and communicate daily mobility goal to care team and patient/family/caregiver. - Collaborate with rehabilitation services on mobility goals if consulted  - Perform Range of Motion  times a day. - Reposition patient every  hours.   - Dangle patient  times a day  - Stand patient  times a day  - Ambulate patient  times a day  - Out of bed to chair  times a day   - Out of bed for meals  times a day  - Out of bed for toileting  - Record patient progress and toleration of activity level   Outcome: Progressing     Problem: PAIN - ADULT  Goal: Verbalizes/displays adequate comfort level or baseline comfort level  Description: Interventions:  - Encourage patient to monitor pain and request assistance  - Assess pain using appropriate pain scale  - Administer analgesics based on type and severity of pain and evaluate response  - Implement non-pharmacological measures as appropriate and evaluate response  - Consider cultural and social influences on pain and pain management  - Notify physician/advanced practitioner if interventions unsuccessful or patient reports new pain  Outcome: Progressing     Problem: SAFETY ADULT  Goal: Patient will remain free of falls  Description: INTERVENTIONS:  - Educate patient/family on patient safety including physical limitations  - Instruct patient to call for assistance with activity   - Consult OT/PT to assist with strengthening/mobility   - Keep Call bell within reach  - Keep bed low and locked with side rails adjusted as appropriate  - Keep care items and personal belongings within reach  - Initiate and maintain comfort rounds  - Make Fall Risk Sign visible to staff  - Offer Toileting every  Hours, in advance of need  - Initiate/Maintain alarm  - Obtain necessary fall risk management equipment  - Apply yellow socks and bracelet for high fall risk patients  - Consider moving patient to room near nurses station  Outcome: Progressing     Problem: DISCHARGE PLANNING  Goal: Discharge to home or other facility with appropriate resources  Description: INTERVENTIONS:  - Identify barriers to discharge w/patient and caregiver  - Arrange for needed discharge resources and transportation as appropriate  - Identify discharge learning needs (meds, wound care, etc.)  - Arrange for interpretive services to assist at discharge as needed  - Refer to Case Management Department for coordinating discharge planning if the patient needs post-hospital services based on physician/advanced practitioner order or complex needs related to functional status, cognitive ability, or social support system  Outcome: Progressing     Problem: Knowledge Deficit  Goal: Patient/family/caregiver demonstrates understanding of disease process, treatment plan, medications, and discharge instructions  Description: Complete learning assessment and assess knowledge base.   Interventions:  - Provide teaching at level of understanding  - Provide teaching via preferred learning methods  Outcome: Progressing

## 2023-11-02 NOTE — PLAN OF CARE
Problem: OCCUPATIONAL THERAPY ADULT  Goal: Performs self-care activities at highest level of function for planned discharge setting. See evaluation for individualized goals. Description: Treatment Interventions: ADL retraining, Functional transfer training, Endurance training, UE strengthening/ROM, Patient/family training, Neuromuscular reeducation, Equipment evaluation/education, Compensatory technique education, Energy conservation, Activityengagement          See flowsheet documentation for full assessment, interventions and recommendations. Outcome: Progressing  Note: Limitation: Decreased ADL status, Decreased UE strength, Decreased Safe judgement during ADL, Decreased endurance, Decreased self-care trans  Prognosis: Good  Assessment: Pt seen for OT treatment session focusing on functional activity tolerance, bed mobility, ADLs, functional transfers/mobility, ADLs w/ LH AE, and Safe transfer techniques. Pt alert and cooperative throughout session. Pt seated OOB in chair at start of session. Transfers (sit<>stand) completed w/ Supervision w/ verbal cues for safe technique and placement of hands and L LE. Supervision required for functional mobility with Fair- dynamic standing balance demonstrated w/ RW. ADLs completed while seated in chair w/ use of LH AE. Pt w/ fair carryover of education on LH AE from previous session, requiring overall Min A for LB ADLs use of LH AE. Bed mobility (supine>sit) completed w/ Min A with assist for L LE management. Pt reports she purchase leg  to assist w/ L LE management in home environment. Pt lying supine at end of session. Call bell and phone within reach. All needs met and pt reports no further questions for OT at this time. OT to follow pt on caseload.      Rehab Resource Intensity Level, OT: III (Minimum Resource Intensity)

## 2023-11-02 NOTE — OCCUPATIONAL THERAPY NOTE
Occupational Therapy Progress Note     Patient Name: Jo Calle  [id number] Date: 11/2/2023  Problem List  Principal Problem:    Primary osteoarthritis of left hip  Active Problems:    Class 2 obesity due to excess calories without serious comorbidity with body mass index (BMI) of 36.0 to 36.9 in adult    Scoliosis          11/02/23 1108   OT Last Visit   OT Visit Date 11/02/23   Note Type   Note Type Treatment   Pain Assessment   Pain Assessment Tool 0-10   Pain Score 6   Pain Location/Orientation Orientation: Left; Location: Hip   Hospital Pain Intervention(s) Cold applied;Repositioned; Ambulation/increased activity; Emotional support; Rest   Multiple Pain Sites No   Restrictions/Precautions   Weight Bearing Precautions Per Order Yes   LLE Weight Bearing Per Order WBAT   Other Precautions Fall Risk;Pain   ADL   Where Assessed Chair   Grooming Assistance 6  Modified Independent   Grooming Deficit Increased time to complete   UB Dressing Assistance 6  Modified independent   LB Dressing Assistance 4  Minimal Assistance   LB Dressing Deficit Setup;Verbal cueing;Supervision/safety; Increased time to complete; Don/doff R sock; Don/doff L sock; Thread RLE into underwear; Thread LLE into underwear;Pull up over hips;Use of adaptive equipment   Functional Standing Tolerance   Time ~5 mins   Activity Dynamic standing balance activity   Comments Fair- dynamic standing balance w/ RW   Bed Mobility   Supine to Sit Unable to assess   Sit to Supine 4  Minimal assistance   Additional items Assist x 1; Increased time required;Verbal cues;LE management   Additional Comments Pt lying supine at end of session. Call bell and phone within reach. All needs met and pt reports no further questions for OT at this time. Transfers   Sit to Stand 5  Supervision   Additional items Armrests; Increased time required;Verbal cues   Stand to Sit 5  Supervision   Additional items Armrests; Increased time required;Verbal cues   Additional Comments Cues for safe technique and placement of hands and L LE   Functional Mobility   Functional Mobility 5  Supervision   Additional Comments Assist x1   Additional items Rolling walker   Cognition   Overall Cognitive Status WFL   Arousal/Participation Alert; Cooperative   Attention Within functional limits   Orientation Level Oriented X4   Memory Within functional limits   Following Commands Follows one step commands with increased time or repetition   Activity Tolerance   Activity Tolerance Patient tolerated treatment well;Patient limited by pain   Medical Staff Made Aware Kelsi Delgado RN   Assessment   Assessment Pt seen for OT treatment session focusing on functional activity tolerance, bed mobility, ADLs, functional transfers/mobility, ADLs w/ LH AE, and Safe transfer techniques. Pt alert and cooperative throughout session. Pt seated OOB in chair at start of session. Transfers (sit<>stand) completed w/ Supervision w/ verbal cues for safe technique and placement of hands and L LE. Supervision required for functional mobility with Fair- dynamic standing balance demonstrated w/ RW. ADLs completed while seated in chair w/ use of LH AE. Pt w/ fair carryover of education on LH AE from previous session, requiring overall Min A for LB ADLs use of LH AE. Bed mobility (supine>sit) completed w/ Min A with assist for L LE management. Pt reports she purchase leg  to assist w/ L LE management in home environment. Pt lying supine at end of session. Call bell and phone within reach. All needs met and pt reports no further questions for OT at this time. OT to follow pt on caseload. Plan   Treatment Interventions ADL retraining;Functional transfer training; Endurance training;Patient/family training;Equipment evaluation/education; Compensatory technique education; Activityengagement   Goal Expiration Date 11/14/23   OT Treatment Day 1   OT Frequency 3-5x/wk   Discharge Recommendation   Rehab Resource Intensity Level, OT III (Minimum Resource Intensity)   Additional Comments  The patient's raw score on the AM-PAC Daily Activity Inpatient Short Form is 21. A raw score of greater than or equal to 19 suggests the patient may benefit from discharge to home. Please refer to the recommendation of the Occupational Therapist for safe discharge planning.    AM-PAC Daily Activity Inpatient   Lower Body Dressing 3   Bathing 3   Toileting 3   Upper Body Dressing 4   Grooming 4   Eating 4   Daily Activity Raw Score 21   Daily Activity Standardized Score (Calc for Raw Score >=11) 44.27   AM-PAC Applied Cognition Inpatient   Following a Speech/Presentation 4   Understanding Ordinary Conversation 4   Taking Medications 4   Remembering Where Things Are Placed or Put Away 4   Remembering List of 4-5 Errands 4   Taking Care of Complicated Tasks 4   Applied Cognition Raw Score 24   Applied Cognition Standardized Score 62.21       Izabela Roland OTR/L

## 2023-11-02 NOTE — CASE MANAGEMENT
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 08/10/2022    History of colon polyps 2022    Primary osteoarthritis of left hip 2020    Hyperlipidemia     Tremor     Scoliosis concern 2019    Class 2 obesity due to excess calories without serious comorbidity with body mass index (BMI) of 36.0 to 36.9 in adult 2017    Kidney stones, calcium oxalate 2015      LOS (days): 0  Geometric Mean LOS (GMLOS) (days):   Days to GMLOS:     OBJECTIVE:              Current admission status: Outpatient Surgery       Preferred Pharmacy:   [facility] [id number] [facility] [address]  [address]  [address] [facility] [address]  Phone: [phone] Fax: [phone]    Primary Care Provider: Ila Velásquez DO    Primary Insurance:   Secondary Insurance:     ASSESSMENT:  [id number] Ankur Rehman, [address] Representative - Mother   Primary Phone: [phone] (Mobile)                 Advance Directives  Does patient have a [address]?: No  Was patient offered paperwork?: Yes (pt declined)  Does patient currently have a Health Care decision maker?: Yes, please see Health Care Proxy section  Does patient have Advance Directives?: No  Was patient offered paperwork?: Yes (pt declined)  Primary Contact: Cristiana Freitas (mom) [phone]         Readmission Root Cause  30 Day Readmission: No    Patient Information  Admitted from[de-identified] Home  Mental Status: Alert  During Assessment patient was accompanied by: Not accompanied during assessment  Assessment information provided by[de-identified] Patient  Primary Caregiver: Self  Support Systems: Daughter, Parent  [place] of Residence: [facility] [address] [place] [address] do you live in?: [place] entry access options.  Select all that apply.: Stairs  Number of steps to enter home.: One Flight  Do the steps have railings?: Yes  Type of Current Residence: Bi-level  Upon entering residence, is there a bedroom on the main floor (no further steps)?: No  A bedroom is located on the following floor levels of residence (select all that apply):: 2nd Floor  Upon entering residence, is there a bathroom on the main floor (no further steps)?: Yes  Number of steps to 2nd floor from main floor: One Flight  In the last 12 months, was there a time when you were not able to pay the mortgage or rent on time?: No  In the last 12 months, how many places have you lived?: 1  In the last 12 months, was there a time when you did not have a steady place to sleep or slept in a shelter (including now)?: No  Homeless/housing insecurity resource given?: N/A  Living Arrangements: Lives w/ Daughter  Is patient a ?: No    Activities of Daily Living Prior to Admission  Functional Status: Independent  Completes ADLs independently?: Yes  Ambulates independently?: Yes  Does patient use assisted devices?: No  Does patient currently own DME?: Yes  What DME does the patient currently own?: Brian Izquierdo  Does patient have a history of Outpatient Therapy (PT/OT)?: No  Does the patient have a history of Short-Term Rehab?: No  Does patient have a history of HHC?: No  Does patient currently have [facility]?: No         Patient Information Continued  Income Source: Employed  Does patient have prescription coverage?: Yes  Within the past 12 months, you worried that your food would run out before you got the money to buy more.: Never true  Within the past 12 months, the food you bought just didn't last and you didn't have money to get more.: Never true  Food insecurity resource given?: N/A  Does patient receive dialysis treatments?: No  Does patient have a history of substance abuse?: No  Does patient have a history of Mental Health Diagnosis?: No         Means of Transportation  Means of Transport to Appts[de-identified] Drives Self  In the past 12 months, has lack of transportation kept you from medical appointments or from getting medications?: No  In the past 12 months, has lack of transportation kept you from meetings, work, or from getting things needed for daily living?: No  Was application for public transport provided?: N/A        DISCHARGE DETAILS:    Discharge planning discussed with[de-identified] Patient  Freedom of Choice: Yes  Comments - Freedom of Choice: Pt would like home w/ HH  CM contacted family/caregiver?: No- see comments (pt declined)  Were Treatment Team discharge recommendations reviewed with patient/caregiver?: Yes  Did patient/caregiver verbalize understanding of patient care needs?: Yes  Were patient/caregiver advised of the risks associated with not following Treatment Team discharge recommendations?: Yes    Contacts  Patient Contacts: Patient  Relationship to Patient[de-identified] Family  Contact Method:  In Person  Reason/Outcome: Continuity of Care, Discharge [address] [facility]         Is the patient interested in [facility] at discharge?: Yes  [facility] requested[de-identified] Occupational Therapy, Physical [address] Name[de-identified] [facility] External Referral Reason (only applicable if external HHA name selected): Services not provided in network or near patient location  [facility] Provider[de-identified] Referring Provider  Home Health Services Needed[de-identified] Strengthening/Theraputic Exercises to Improve Function, Evaluate Functional Status and Safety, Gait/ADL Training  Homebound Criteria Met[de-identified] Requires the Assistance of Another Person for Safe Ambulation or to Leave the Home, Uses an Assist Device (i.e. cane, walker, etc)  Supporting Clincal Findings[de-identified] Fatigues Easliy in [employer], Limited Endurance    DME Referral Provided  Referral made for DME?: No              Treatment Team Recommendation: Home with [address]  Discharge Destination Plan[de-identified] Home with [address]                                         Additional Comments: CM met w/ pt at bedside to complete assessment. Pt currently lives in a bi-level home w/ her dtr. There is one flight to get into home & one flight to get to the 2nd floor. Pt states she is able to complete her ADL's independently w/ no use of assisted devices. Pt does own a cane & walker she brought in from home. Pt mom Kiara Alexis is her EC. Pt does not have a POA & not interested in paperwork at this time. CM discussed PT/OT's recommendation of HH which pt is agreeable to. Pt also agreeable to blanket referral being sent. CM reserved [facility] for PT/OT. Pt also confirmed her parents can come pick her up once medically cleared for d/c. Pt denied having any questions or concerns at this time. CM to continue to follow for any additional d/c needs.

## 2023-11-02 NOTE — PHYSICAL THERAPY NOTE
Physical Therapy Treatment Note     11/02/23 1559   PT Last Visit   PT Visit Date 11/02/23   Note Type   Note Type BID visit/treatment   Pain Assessment   Pain Assessment Tool 0-10   Pain Score 7   Pain Location/Orientation Orientation: Left; Location: Hip   Precautions   Total Hip Replacement Other (Comment)  (Post. approach however no hip prec. per ortho)   Restrictions/Precautions   Weight Bearing Precautions Per Order Yes   LLE Weight Bearing Per Order WBAT   Other Precautions WBS; Fall Risk;Pain   General   Chart Reviewed Yes   Family/Caregiver Present No   Subjective   Subjective Pt. agreeable to PT   Bed Mobility   Supine to Sit 4  Minimal assistance   Additional items Assist x 1;Bedrails; Increased time required;LE management;Verbal cues; Leg    Sit to Supine 4  Minimal assistance   Additional items Assist x 1;Bedrails; Increased time required;Leg ;Verbal cues;LE management   Transfers   Sit to Stand 5  Supervision   Additional items Armrests; Increased time required;Verbal cues   Stand to Sit 5  Supervision   Additional items Trapeze bar;Verbal cues;Armrests   Stand pivot 5  Supervision   Additional items Increased time required   Toilet transfer 5  Supervision   Additional items Assist x 1; Increased time required;Raised toilet seat;Verbal cues;Armrests   Ambulation/Elevation   Gait pattern Improper Weight shift;Decreased foot clearance;Decreased L stance; Inconsistent memo; Short stride; Excessively slow;Decreased toe off;Decreased heel strike   Gait Assistance 5  Supervision   Additional items Assist x 1;Verbal cues   Assistive Device Rolling walker   Distance 120ft   Stair Management Assistance 5  Supervision   Additional items Assist x 1; Increased time required;Verbal cues   Stair Management Technique Two rails; Alternating pattern; Step to pattern;Nonreciprocal;Foreward   Number of Stairs 5   Balance   Static Sitting Fair +   Dynamic Sitting Fair +   Static Standing Fair   Dynamic Standing Fair - Ambulatory Fair -   Endurance Deficit   Endurance Deficit Yes   Endurance Deficit Description pain   Activity Tolerance   Activity Tolerance Patient tolerated treatment well;Patient limited by pain   Nurse Made Aware yes   Assessment   Prognosis Good   Problem List Decreased strength;Decreased range of motion;Decreased endurance; Impaired balance;Decreased mobility; Decreased coordination;Decreased cognition; Impaired judgement;Pain;Orthopedic restrictions; Obesity   Assessment Pt. progressing well with overall mobility. Increased time and effort needed for all mobility. pt. needed Min A for supine to sit and back trasnfers. Max cues given for techniques. Used bed sheet as sling and bed rails for support. Cues for LE sequencing for stair negotiation. Pt. able to perform pericare in standing and S. Cues for postural correction and compensatory movements given. possible leg length discrepency. Pt. noted to ambulate with hip hike. No LOB noted t/o session. Noted slight trunk rotation while ambulating and given cues to correct it. pt. back in bed post session with all needs within reach and bed alarm enagged. pt. will benefit from further supine to sit and back transfers, gait and stair negotiation training. Will continue to foillow per POC. Reviewed car transfer again this session   Barriers to Discharge None   Goals   Patient Goals None reported   STG Expiration Date 11/08/23   PT Treatment Day 3   Plan   Treatment/Interventions Functional transfer training;Elevations; Spoke to nursing;Gait training;Bed mobility; Equipment eval/education;Patient/family training   Progress Progressing toward goals   PT Frequency Twice a day   Discharge Recommendation   Rehab Resource Intensity Level, PT III (Minimum Resource Intensity)   Equipment Recommended Walker   AM-PAC Basic Mobility Inpatient   Turning in Flat Bed Without Bedrails 3   Lying on Back to Sitting on Edge of Flat Bed Without Bedrails 3   Moving Bed to Chair 3   Standing Up From Chair Using Arms 3   Walk in Room 3   Climb 3-5 Stairs With Railing 3   Basic Mobility Inpatient Raw Score 18   Basic Mobility Standardized Score 41.05   Highest Level Of Mobility   JH-HLM Goal 6: Walk 10 steps or more   JH-HLM Achieved 7: Walk 25 feet or more   End of Consult   Patient Position at End of Consult All needs within reach;Bed/Chair alarm activated;Supine         Princella Cools, PTA    An AM-PAC basic mobility standardized score less than 42.9 suggest the patient may benefit from discharge to post-acute rehab services.

## 2023-11-02 NOTE — RESTORATIVE TECHNICIAN NOTE
Restorative Technician Note      Patient Name: Rose Mary Funes     Restorative Tech Visit Date: 11/02/23  Note Type: Mobility  Patient Position Upon Consult: Bedside chair  Mobility / Activity Provided: fernandez Saez PTA with chair follow  Activity Performed: Ambulated  Assistive Device: Roller walker  Patient Position at End of Consult: Bedside chair;  All needs within reach

## 2023-11-02 NOTE — PLAN OF CARE
Problem: PHYSICAL THERAPY ADULT  Goal: Performs mobility at highest level of function for planned discharge setting. See evaluation for individualized goals. Description: Treatment/Interventions: Functional transfer training, LE strengthening/ROM, Elevations, Therapeutic exercise, Endurance training, Patient/family training, Equipment eval/education, Bed mobility, Gait training, Compensatory technique education, Continued evaluation, Spoke to nursing, OT  Equipment Recommended: Ron Natanael (patient has)       See flowsheet documentation for full assessment, interventions and recommendations. 11/2/2023 1655 by Betty Pederson PTA  Outcome: Progressing  Note: Prognosis: Good  Problem List: Decreased strength, Decreased range of motion, Decreased endurance, Impaired balance, Decreased mobility, Decreased coordination, Decreased cognition, Impaired judgement, Pain, Orthopedic restrictions, Obesity  Assessment: Pt. progressing well with overall mobility. Increased time and effort needed for all mobility. pt. needed Min A for supine to sit and back trasnfers. Max cues given for techniques. Used bed sheet as sling and bed rails for support. Cues for LE sequencing for stair negotiation. Pt. able to perform pericare in standing and S. Cues for postural correction and compensatory movements given. possible leg length discrepency. Pt. noted to ambulate with hip hike. No LOB noted t/o session. Noted slight trunk rotation while ambulating and given cues to correct it. pt. back in bed post session with all needs within reach and bed alarm enagged. pt. will benefit from further supine to sit and back transfers, gait and stair negotiation training. Will continue to foillow per POC. Reviewed car transfer again this session  Barriers to Discharge: None  Barriers to Discharge Comments: stairs  Rehab Resource Intensity Level, PT: III (Minimum Resource Intensity)    See flowsheet documentation for full assessment.      11/2/2023 1250 by Alis Brady PTA  Outcome: Progressing  Note: Prognosis: Good  Problem List: Decreased strength, Decreased range of motion, Decreased endurance, Impaired balance, Decreased mobility, Impaired judgement, Decreased cognition, Pain, Orthopedic restrictions, Obesity  Assessment: Pt. given extensive ed. regarding transfer and ambulation techqniues. Education provided for importance of mobility, activities at home, pain. mgt. prior to therapy, car transfer and safety and precautions. Cues for LE and AD sequencing for ambulation and also demonstration. Pt. reported decreased pain post session and improved gait quality with fluid continuous steps. Pt. seated on chair post session with OTR present in room. Will continue to follow per PT POC. Barriers to Discharge: Inaccessible home environment  Barriers to Discharge Comments: stairs  Rehab Resource Intensity Level, PT: III (Minimum Resource Intensity)    See flowsheet documentation for full assessment.

## 2023-11-02 NOTE — PHYSICAL THERAPY NOTE
Physical Therapy Treatment Note     11/02/23 1029   PT Last Visit   PT Visit Date 11/02/23   Note Type   Note Type Treatment   Pain Assessment   Pain Assessment Tool 0-10   Pain Score   (8/10 pre session and 6/10 post session)   Pain Location/Orientation Location: Hip;Orientation: Left   Precautions   Total Hip Replacement   (Post. approach however no hip prec. per MD)   Restrictions/Precautions   Weight Bearing Precautions Per Order Yes   LLE Weight Bearing Per Order WBAT   Other Precautions Restraints; Fall Risk;Pain   General   Chart Reviewed Yes   Family/Caregiver Present No   Cognition   Overall Cognitive Status WFL   Subjective   Subjective Pt. agreeable to PT   Transfers   Sit to Stand 4  Minimal assistance  (progressed to CS)   Additional items Assist x 1; Increased time required;Verbal cues;Armrests   Stand to Sit 5  Supervision   Additional items Assist x 1; Armrests; Increased time required;Verbal cues   Stand pivot 5  Supervision   Additional items Increased time required;Verbal cues; Assist x 1   Ambulation/Elevation   Gait pattern Improper Weight shift; Antalgic;Decreased foot clearance;Decreased L stance; Excessively slow; Short stride; Step to; Inconsistent emmo;Decreased toe off;Decreased heel strike   Gait Assistance   (CGA/CS)   Additional items Assist x 1;Verbal cues   Assistive Device Rolling walker   Distance 20ft x 2   Balance   Static Sitting Good   Dynamic Sitting Fair   Static Standing Fair   Dynamic Standing Fair -   Ambulatory Fair -   Endurance Deficit   Endurance Deficit Yes   Endurance Deficit Description pain, fatigue   Activity Tolerance   Activity Tolerance Patient tolerated treatment well;Patient limited by pain; Patient limited by fatigue   Nurse Made Aware yes   Assessment   Prognosis Good   Problem List Decreased strength;Decreased range of motion;Decreased endurance; Impaired balance;Decreased mobility; Impaired judgement;Decreased cognition;Pain;Orthopedic restrictions; Obesity Assessment Pt. given extensive ed. regarding transfer and ambulation techqniues. Education provided for importance of mobility, activities at home, pain. mgt. prior to therapy, car transfer and safety and precautions. Cues for LE and AD sequencing for ambulation and also demonstration. Pt. reported decreased pain post session and improved gait quality with fluid continuous steps. Pt. seated on chair post session with OTR present in room. Will continue to follow per PT POC. Barriers to Discharge Inaccessible home environment   Goals   Patient Goals None reported   [facility] Expiration Date 11/08/23   PT Treatment Day 2   Plan   Treatment/Interventions Functional transfer training;Spoke to nursing;Gait training;Equipment eval/education;Patient/family training;OT   Progress Progressing toward goals   PT Frequency Twice a day   Discharge Recommendation   Rehab Resource Intensity Level, PT III (Minimum Resource Intensity)   Equipment Recommended Walker   AM-PAC Basic Mobility Inpatient   Turning in Flat Bed Without Bedrails 3   Lying on Back to Sitting on Edge of Flat Bed Without Bedrails 3   Moving Bed to Chair 3   Standing Up From Chair Using Arms 3   Walk in Room 3   Climb 3-5 Stairs With Railing 3   Basic Mobility Inpatient Raw Score 18   Basic Mobility Standardized Score 41.05   Highest Level Of Mobility   JH-HLM Goal 6: Walk 10 steps or more   JH-HLM Achieved 7: Walk 25 feet or more           Se Garcia PTA    An AM-PAC basic mobility standardized score less than 42.9 suggest the patient may benefit from discharge to post-acute rehab services.

## 2023-11-02 NOTE — PLAN OF CARE
Problem: PHYSICAL THERAPY ADULT  Goal: Performs mobility at highest level of function for planned discharge setting. See evaluation for individualized goals. Description: Treatment/Interventions: Functional transfer training, LE strengthening/ROM, Elevations, Therapeutic exercise, Endurance training, Patient/family training, Equipment eval/education, Bed mobility, Gait training, Compensatory technique education, Continued evaluation, Spoke to nursing, OT  Equipment Recommended: Savannah Felecia (patient has)       See flowsheet documentation for full assessment, interventions and recommendations. Outcome: Progressing  Note: Prognosis: Good  Problem List: Decreased strength, Decreased range of motion, Decreased endurance, Impaired balance, Decreased mobility, Impaired judgement, Decreased cognition, Pain, Orthopedic restrictions, Obesity  Assessment: Pt. given extensive ed. regarding transfer and ambulation techqniues. Education provided for importance of mobility, activities at home, pain. mgt. prior to therapy, car transfer and safety and precautions. Cues for LE and AD sequencing for ambulation and also demonstration. Pt. reported decreased pain post session and improved gait quality with fluid continuous steps. Pt. seated on chair post session with OTR present in room. Will continue to follow per PT POC. Barriers to Discharge: Inaccessible home environment  Barriers to Discharge Comments: stairs  Rehab Resource Intensity Level, PT: III (Minimum Resource Intensity)    See flowsheet documentation for full assessment.

## 2023-11-03 VITALS
DIASTOLIC BLOOD PRESSURE: 75 MMHG | OXYGEN SATURATION: 95 % | BODY MASS INDEX: 29.94 KG/M2 | WEIGHT: 179.68 LBS | TEMPERATURE: 97.8 F | RESPIRATION RATE: 16 BRPM | SYSTOLIC BLOOD PRESSURE: 118 MMHG | HEART RATE: 96 BPM | HEIGHT: 65 IN

## 2023-11-03 LAB
ANION GAP SERPL CALCULATED.3IONS-SCNC: 7 MMOL/L
BUN SERPL-MCNC: 6 MG/DL (ref 5–25)
CALCIUM SERPL-MCNC: 8.1 MG/DL (ref 8.4–10.2)
CHLORIDE SERPL-SCNC: 104 MMOL/L (ref 96–108)
CO2 SERPL-SCNC: 24 MMOL/L (ref 21–32)
CREAT SERPL-MCNC: 0.52 MG/DL (ref 0.6–1.3)
ERYTHROCYTE [DISTWIDTH] IN BLOOD BY AUTOMATED COUNT: 13.2 % (ref 11.6–15.1)
GFR SERPL CREATININE-BSD FRML MDRD: 114 ML/MIN/1.73SQ M
GLUCOSE P FAST SERPL-MCNC: 125 MG/DL (ref 65–99)
GLUCOSE SERPL-MCNC: 125 MG/DL (ref 65–140)
HCT VFR BLD AUTO: 29.7 % (ref 34.8–46.1)
HGB BLD-MCNC: 9.4 G/DL (ref 11.5–15.4)
MCH RBC QN AUTO: 28.1 PG (ref 26.8–34.3)
MCHC RBC AUTO-ENTMCNC: 31.6 G/DL (ref 31.4–37.4)
MCV RBC AUTO: 89 FL (ref 82–98)
PLATELET # BLD AUTO: 305 THOUSANDS/UL (ref 149–390)
PMV BLD AUTO: 10 FL (ref 8.9–12.7)
POTASSIUM SERPL-SCNC: 3.2 MMOL/L (ref 3.5–5.3)
RBC # BLD AUTO: 3.35 MILLION/UL (ref 3.81–5.12)
SODIUM SERPL-SCNC: 135 MMOL/L (ref 135–147)
WBC # BLD AUTO: 8.28 THOUSAND/UL (ref 4.31–10.16)

## 2023-11-03 PROCEDURE — 99024 POSTOP FOLLOW-UP VISIT: CPT | Performed by: PHYSICIAN ASSISTANT

## 2023-11-03 PROCEDURE — 97530 THERAPEUTIC ACTIVITIES: CPT

## 2023-11-03 PROCEDURE — NC001 PR NO CHARGE: Performed by: PHYSICIAN ASSISTANT

## 2023-11-03 PROCEDURE — 80048 BASIC METABOLIC PNL TOTAL CA: CPT

## 2023-11-03 PROCEDURE — 97110 THERAPEUTIC EXERCISES: CPT

## 2023-11-03 PROCEDURE — 85027 COMPLETE CBC AUTOMATED: CPT

## 2023-11-03 PROCEDURE — 97116 GAIT TRAINING THERAPY: CPT

## 2023-11-03 RX ORDER — SENNOSIDES 8.6 MG
1 TABLET ORAL DAILY
Status: DISCONTINUED | OUTPATIENT
Start: 2023-11-04 | End: 2023-11-03 | Stop reason: HOSPADM

## 2023-11-03 RX ORDER — DOCUSATE SODIUM 100 MG/1
100 CAPSULE, LIQUID FILLED ORAL 2 TIMES DAILY
Status: DISCONTINUED | OUTPATIENT
Start: 2023-11-03 | End: 2023-11-03 | Stop reason: HOSPADM

## 2023-11-03 RX ADMIN — DOCUSATE SODIUM 100 MG: 100 CAPSULE, LIQUID FILLED ORAL at 10:30

## 2023-11-03 RX ADMIN — OXYCODONE HYDROCHLORIDE 10 MG: 10 TABLET ORAL at 08:06

## 2023-11-03 RX ADMIN — OXYCODONE HYDROCHLORIDE 10 MG: 10 TABLET ORAL at 14:48

## 2023-11-03 RX ADMIN — ASPIRIN 81 MG CHEWABLE TABLET 81 MG: 81 TABLET CHEWABLE at 08:07

## 2023-11-03 NOTE — DISCHARGE SUMMARY
ORTHOPEDICS DISCHARGE SUMMARY   Alanna Arana 53 y.o. female MRN: [id number]  Unit/Bed#: E2 -01      Attending Physician: Dr. Dayanna Fu    Admitting diagnosis: Primary osteoarthritis of left hip [M16.12]  Developmental dysplasia of hip [Q65.89]    Discharge diagnosis: Primary osteoarthritis of left hip [M16.12]  Developmental dysplasia of hip [Q65.89]    Date of admission: 11/1/2023    Date of discharge: 11/03/23    Procedure: Left posterior total hip arthroplasty    HPI  This is a 53y.o. year old female that presented to the office with signs and symptoms of left hip osteoarthritis. They tried and failed conservative treatment measures and wished to proceed with surgical intervention. The risks, benefits, and complications of the procedure were discussed with the patient and informed consent was obtained. Hospital Course: The patient was admitted to the hospital on 11/1/2023 and underwent an uncomplicated left total hip arthroplasty. They were transferred to the floor after a brief stay in the post-anesthesia care unit. Their pain was well managed with IV and oral pain medications. They began therapy on post operative day #1. ASA BID for DVT ppx was started. On discharge date pt was cleared by PT and the medicine team and determined to be safe for discharge. Daily discussion was had with the patient, nursing staff, orthopaedic team, and family members if present. All questions were answered to the patients satisifaction. 0   Lab Value Date/Time    HGB 9.4 (L) 11/03/2023 0450    HGB 10.1 (L) 11/02/2023 0449    HGB 13.4 10/19/2023 1155    HGB 14.1 08/22/2023 1207    HGB 14.1 10/05/2022 1103    HGB 13.6 09/18/2021 0927    HGB 13.7 09/17/2020 1112    HGB 14.0 10/14/2018 1451    HGB 14.7 06/24/2015 1328           Discharge Instructions: The patient was discharged weight bearing as tolerated to the left lower extremity. ASA 81mg BID x  6 weeks will be continued. Continue PT/OT.  Take pain medications as instructed. Discharge Medications: For the complete list of discharge medications, please refer to the patient's medication reconciliation.

## 2023-11-03 NOTE — PROGRESS NOTES
Orthopedics  Severo Bane 53 y.o. female MRN: [id number]  Unit/Bed#: E2 -01        Subjective:    53 y. o.female seen and examined at the bedside. Reports pain and soreness of the left hip with movement, and anterior thigh soreness. No pain radiating distally, denies any LLE paresthesias. No acute events overnight.           Objective:    Labs:  0   Lab Value Date/Time    HCT 29.7 (L) 11/03/2023 0450    HCT 32.1 (L) 11/02/2023 0449    HCT 41.5 10/19/2023 1155    HCT 43.8 06/24/2015 1328    HGB 9.4 (L) 11/03/2023 0450    HGB 10.1 (L) 11/02/2023 0449    HGB 13.4 10/19/2023 1155    HGB 14.7 06/24/2015 1328    INR 1.00 10/19/2023 1155    WBC 8.28 11/03/2023 0450    WBC 7.45 11/02/2023 0449    WBC 6.56 10/19/2023 1155    WBC 6.36 06/24/2015 1328       Meds:    Current Facility-Administered Medications:     acetaminophen (TYLENOL) tablet 650 mg, 650 mg, Oral, Q6H PRN, Trinidad Mora PA-C, 650 mg at 11/02/23 0913    aluminum-magnesium hydroxide-simethicone (MAALOX) oral suspension 30 mL, 30 mL, Oral, Q6H PRN, Trinidad Mora PA-C    aspirin chewable tablet 81 mg, 81 mg, Oral, BID, Nunu Samuel PA-C, 81 mg at 11/02/23 2023    calcium carbonate (TUMS) chewable tablet 1,000 mg, 1,000 mg, Oral, Daily PRN, Trinidad Mora PA-C    gabapentin (NEURONTIN) capsule 300 mg, 300 mg, Oral, HS, Trinidad Mora PA-C, 300 mg at 11/02/23 2204    lactated ringers infusion, 1.5 mL/kg/hr, Intravenous, Continuous, Trinidad Mora PA-C, Last Rate: 150 mL/hr at 11/02/23 0054, 1.5 mL/kg/hr at 11/02/23 0054    morphine injection 2 mg, 2 mg, Intravenous, Q2H PRN, Trinidad Mora PA-C, 2 mg at 11/02/23 0913    ondansetron (ZOFRAN) injection 4 mg, 4 mg, Intravenous, Q6H PRN, Trinidad Mora PA-C    oxyCODONE (ROXICODONE) immediate release tablet 10 mg, 10 mg, Oral, Q4H PRN, Trinidad Mora PA-C, 10 mg at 11/02/23 2022    oxyCODONE (ROXICODONE) IR tablet 5 mg, 5 mg, Oral, Q4H PRN, Trinidad Mora PA-C, 5 mg at 11/01/23 1526    Blood Culture:   No results found for: "Patiño Cowper"    Wound Culture:   No results found for: "WOUNDCULT"    Ins and Outs:  I/O last 24 hours: In: 600 [P.O.:600]  Out: 1050 [Urine:1050]      Orthopedic Physical Exam:    Vitals:    11/03/23 0713   BP: 121/62   Pulse: 94   Resp: 12   Temp: 98.4 °F (36.9 °C)   SpO2: 94%   Sitting upright in bed, NAD, breathing unlabored. left Lower Extremity extremity:  Dressings C/D/I, no saturation or leaking. No visibile bhaskar-incisional erythema or ecchymosis  + TTP bhaskar-incisional lateral thigh. Thigh soft, no palpable hematoma  + ankle DF/PF, EHL/FHL with 5/5 strength  SILT  No calf tenderness or pitting edema  Foot warm and well perfused    _*_*_*_*_*_*_*_*_*_*_*_*_*_*_*_*_*_*_*_*_*_*_*_*_*_*_*_*_*_*_*_*_*_*_*_*_*_*_*_*_*    Assessment:     53 y. o.female POD 2 s/p left posterior GI by Dr. Emil Vilchis 11/1/2023. Doing well. Plan:    WBAT LLE  Maintain dressing until follow-up. Up and out of bed with assistance as needed. DVT prophylaxis - ASA 81mg BID  Analgesics  PT/OT  Dispo: Ok for discharge from ortho perspective when cleared by medicine and PT/OT. Likely home with home health later today. Patient noted to have acute blood loss anemia due to a drop in Hbg of > 2.0g from preop levels, will monitor vital signs and resuscitate with IV fluids as needed Hgb 9.4 this AM, stable.           Shanti Gonzales PA-C

## 2023-11-03 NOTE — PLAN OF CARE
Problem: PHYSICAL THERAPY ADULT  Goal: Performs mobility at highest level of function for planned discharge setting. See evaluation for individualized goals. Description: Treatment/Interventions: Functional transfer training, LE strengthening/ROM, Elevations, Therapeutic exercise, Endurance training, Patient/family training, Equipment eval/education, Bed mobility, Gait training, Compensatory technique education, Continued evaluation, Spoke to nursing, OT  Equipment Recommended: Chace Galvin (patient has)       See flowsheet documentation for full assessment, interventions and recommendations. Outcome: Adequate for Discharge  Note: Prognosis: Good  Problem List: Decreased strength, Decreased range of motion, Decreased endurance, Impaired balance, Decreased mobility, Obesity, Orthopedic restrictions, Decreased skin integrity, Pain  Assessment: Pt seen for PT treatment session this date with interventions consisting of bed mobility, transfer training, gait training, stair training, and HEP, family ed on stair climbing and guarding techniques and education provided as needed for safety and direction to improve functional mobility, safety awareness, and activity tolerance. Pt agreeable to PT treatment session upon arrival, pt found supine in bed . At end of session, pt left seated out of bed in chair with all needs in reach. In comparison to previous session, pt with improvement in activity tolerance, endurance, standing balance, ambulation distances, ambulatory balance, L le strength, AM- pac score, and functional mobility. Pt  is showing good progress toward PT goals with improvements was noted. Pt  performed supine to sit and sit to supine with supervision, increased time and cues for proper technique and cues to maintain L le in neutral rotation as pt tends to rotate L le inward. Pt  performs with use of sheet to lift l le in and out of bed.   Pt is functioning at supervision for transfers sit <> stand and toilet transfers, cues for operative le placement with stand to sit transfers. Pt progressed with ambulation distances to 140' with supervision and verbal cues for improved quality of gait,  noting improved fluency, step through and improved L heel contact during stance phase. Pt  performs stair climbing with use of b/l rails with supervision and Unilateral rail with crutch with cg-close supervision. Pt  demonstrates proper le sequencing when ascending and descending stairs. Pt's family educated on stair climbing and guarding techniques and provided pt with written instructions. Pt has made adequate gains toward PT goals and is appropriate for d/c to home with OPPT and home with family support at time of d/c in order to maximize pt's functional independence and safety w/ mobility. Pt continues to be functioning below baseline level. PT will continue to see pt while here in order to address the deficits listed above and provide interventions consistent w/ POC in effort to achieve STGs. Pt was given a list of [facility] out pt. facilities and phone numbers to call for OPPT as pt reported wanting to go to [facility] for [employer]. Barriers to Discharge: None  Barriers to Discharge Comments: stairs  Rehab Resource Intensity Level, PT: III (Minimum Resource Intensity)    See flowsheet documentation for full assessment.

## 2023-11-03 NOTE — PLAN OF CARE
Problem: MOBILITY - ADULT  Goal: Maintain or return to baseline ADL function  Description: INTERVENTIONS:  -  Assess patient's ability to carry out ADLs; assess patient's baseline for ADL function and identify physical deficits which impact ability to perform ADLs (bathing, care of mouth/teeth, toileting, grooming, dressing, etc.)  - Assess/evaluate cause of self-care deficits   - Assess range of motion  - Assess patient's mobility; develop plan if impaired  - Assess patient's need for assistive devices and provide as appropriate  - Encourage maximum independence but intervene and supervise when necessary  - Involve family in performance of ADLs  - Assess for home care needs following discharge   - Consider OT consult to assist with ADL evaluation and planning for discharge  - Provide patient education as appropriate  Outcome: Progressing  Goal: Maintains/Returns to pre admission functional level  Description: INTERVENTIONS:  - Perform BMAT or MOVE assessment daily.   - Set and communicate daily mobility goal to care team and patient/family/caregiver. - Collaborate with rehabilitation services on mobility goals if consulted  - Perform Range of Motion 3 times a day. - Reposition patient every 2 hours.   - Dangle patient 3 times a day  - Stand patient 3 times a day  - Ambulate patient 3 times a day  - Out of bed to chair 3 times a day   - Out of bed for meals 3 times a day  - Out of bed for toileting  - Record patient progress and toleration of activity level   Outcome: Progressing     Problem: PAIN - ADULT  Goal: Verbalizes/displays adequate comfort level or baseline comfort level  Description: Interventions:  - Encourage patient to monitor pain and request assistance  - Assess pain using appropriate pain scale  - Administer analgesics based on type and severity of pain and evaluate response  - Implement non-pharmacological measures as appropriate and evaluate response  - Consider cultural and social influences on pain and pain management  - Notify physician/advanced practitioner if interventions unsuccessful or patient reports new pain  Outcome: Progressing     Problem: SAFETY ADULT  Goal: Patient will remain free of falls  Description: INTERVENTIONS:  - Educate patient/family on patient safety including physical limitations  - Instruct patient to call for assistance with activity   - Consult OT/PT to assist with strengthening/mobility   - Keep Call bell within reach  - Keep bed low and locked with side rails adjusted as appropriate  - Keep care items and personal belongings within reach  - Initiate and maintain comfort rounds  - Make Fall Risk Sign visible to staff  - Offer Toileting every 2 Hours, in advance of need  - Initiate/Maintain bed alarm  - Obtain necessary fall risk management equipment:   - Apply yellow socks and bracelet for high fall risk patients  - Consider moving patient to room near nurses station  Outcome: Progressing     Problem: DISCHARGE PLANNING  Goal: Discharge to home or other facility with appropriate resources  Description: INTERVENTIONS:  - Identify barriers to discharge w/patient and caregiver  - Arrange for needed discharge resources and transportation as appropriate  - Identify discharge learning needs (meds, wound care, etc.)  - Arrange for interpretive services to assist at discharge as needed  - Refer to Case Management Department for coordinating discharge planning if the patient needs post-hospital services based on physician/advanced practitioner order or complex needs related to functional status, cognitive ability, or social support system  Outcome: Progressing     Problem: Knowledge Deficit  Goal: Patient/family/caregiver demonstrates understanding of disease process, treatment plan, medications, and discharge instructions  Description: Complete learning assessment and assess knowledge base.   Interventions:  - Provide teaching at level of understanding  - Provide teaching via preferred learning methods  Outcome: Progressing

## 2023-11-03 NOTE — PLAN OF CARE
Problem: MOBILITY - ADULT  Goal: Maintain or return to baseline ADL function  Description: INTERVENTIONS:  -  Assess patient's ability to carry out ADLs; assess patient's baseline for ADL function and identify physical deficits which impact ability to perform ADLs (bathing, care of mouth/teeth, toileting, grooming, dressing, etc.)  - Assess/evaluate cause of self-care deficits   - Assess range of motion  - Assess patient's mobility; develop plan if impaired  - Assess patient's need for assistive devices and provide as appropriate  - Encourage maximum independence but intervene and supervise when necessary  - Involve family in performance of ADLs  - Assess for home care needs following discharge   - Consider OT consult to assist with ADL evaluation and planning for discharge  - Provide patient education as appropriate  Outcome: Progressing     Problem: PAIN - ADULT  Goal: Verbalizes/displays adequate comfort level or baseline comfort level  Description: Interventions:  - Encourage patient to monitor pain and request assistance  - Assess pain using appropriate pain scale  - Administer analgesics based on type and severity of pain and evaluate response  - Implement non-pharmacological measures as appropriate and evaluate response  - Consider cultural and social influences on pain and pain management  - Notify physician/advanced practitioner if interventions unsuccessful or patient reports new pain  Outcome: Progressing     Problem: SAFETY ADULT  Goal: Patient will remain free of falls  Description: INTERVENTIONS:  - Educate patient/family on patient safety including physical limitations  - Instruct patient to call for assistance with activity   - Consult OT/PT to assist with strengthening/mobility   - Keep Call bell within reach  - Keep bed low and locked with side rails adjusted as appropriate  - Keep care items and personal belongings within reach  - Initiate and maintain comfort rounds  - Make Fall Risk Sign visible to staff  - Offer Toileting every 2 Hours, in advance of need    - Obtain necessary fall risk management equipment: non  slip socks, caLL MARINO  - Apply yellow socks and bracelet for high fall risk patients  - Consider moving patient to room near nurses station  Outcome: Progressing

## 2023-11-03 NOTE — PHYSICAL THERAPY NOTE
PHYSICAL THERAPY NOTE          Patient Name: Quinten Lew  [id number] Date: 11/3/2023    11/03/23 1240   Note Type   Note Type Treatment   Pain Assessment   Pain Assessment Tool 0-10   Pain Score 6   Pain Location/Orientation Orientation: Left; Location: Hip   Hospital Pain Intervention(s) Repositioned; Ambulation/increased activity; Emotional support   Precautions   Total Hip Replacement   (Post. approach however no hip prec. per ortho)   Restrictions/Precautions   Weight Bearing Precautions Per Order Yes   LLE Weight Bearing Per Order WBAT   Other Precautions WBS;THR;Fall Risk;Pain   General   Chart Reviewed Yes   Cognition   Overall Cognitive Status WFL   Arousal/Participation Alert; Responsive; Cooperative   Attention Within functional limits   Orientation Level Oriented X4   Memory Within functional limits   Following Commands Follows multistep commands without difficulty   Subjective   Subjective can i use the bathroom before we walk? Bed Mobility   Supine to Sit 5  Supervision   Additional items Assist x 1;HOB elevated; Bedrails; Increased time required;Verbal cues;LE management   Sit to Supine 5  Supervision   Additional items Assist x 1; Increased time required;Leg ;Verbal cues;LE management; Bedrails   Additional Comments pt  perfomred supine<> x2.  with use of sheet tolift L le in and out of bed. increased time to perform and complete with verbal cues. Transfers   Sit to Stand 5  Supervision   Additional items Assist x 1; Armrests; Increased time required;Verbal cues   Stand to Sit 5  Supervision   Additional items Assist x 1; Armrests; Increased time required;Verbal cues   Toilet transfer 5  Supervision   Additional items Assist x 1; Armrests; Increased time required;Verbal cues; Commode   Additional Comments cues for Lle positining and placement with stand to sit. Ambulation/Elevation   Gait pattern Improper Weight shift; Poor UE support; Forward Flexion;Decreased L stance; Antalgic;Decreased foot clearance; Short stride; Excessively slow; Step through pattern;Decreased heel strike   Gait Assistance 5  Supervision   Additional items Assist x 1;Verbal cues   Assistive Device Rolling walker   Distance 140' x1, 10' x4   Stair Management Assistance 5  Supervision   Additional items Assist x 1;Verbal cues; Increased time required  (close supervision)   Stair Management Technique Two rails; One rail R;One rail L;Foreward;Nonreciprocal;With crutches   Number of Stairs 20  (20 steps  (5steps x 4))   Ambulation/Elevation Additional Comments cues for sequencing of le's and cructch, cues for improved quality of gait,   Balance   Static Sitting Good   Dynamic Sitting Fair +   Static Standing Fair +  (w rw)   Dynamic Standing Fair  (w rw)   Ambulatory Fair  (w rw)   Activity Tolerance   Activity Tolerance Patient tolerated treatment well   Nurse Made Aware Rn Curelle   Exercises   THR   (ap, gs,qs)   Balance training  pt  performed static, dynamic standing activities of bhaskar care post toileting, and puslling up undergarments with close supervision- supervision, demonstrating stable static standing balance. pt  demonstrates stable static/ dynamic sitting with lower body dressing   Equipment Use   Comments pt issued written supine HEP for THR reviewed w pt., pt ed on stair climbing with use of rail and crutch, and gaurding techniques with pt and pt.'s parents. pt ed on use of leg  when transfering in and out of bed. assited pt with lower body dressing while seated out of bed in chair. Assessment   Prognosis Good   Problem List Decreased strength;Decreased range of motion;Decreased endurance; Impaired balance;Decreased mobility;Obesity;Orthopedic restrictions;Decreased skin integrity;Pain   Assessment Pt seen for PT treatment session this date with interventions consisting of bed mobility, transfer training, gait training, stair training, and HEP, family ed on stair climbing and guarding techniques and education provided as needed for safety and direction to improve functional mobility, safety awareness, and activity tolerance. Pt agreeable to PT treatment session upon arrival, pt found supine in bed . At end of session, pt left seated out of bed in chair with all needs in reach. In comparison to previous session, pt with improvement in activity tolerance, endurance, standing balance, ambulation distances, ambulatory balance, L le strength, AM- pac score, and functional mobility. Pt  is showing good progress toward PT goals with improvements was noted. Pt  performed supine to sit and sit to supine with supervision, increased time and cues for proper technique and cues to maintain L le in neutral rotation as pt tends to rotate L le inward. Pt  performs with use of sheet to lift l le in and out of bed. Pt is functioning at supervision for transfers sit <> stand and toilet transfers, cues for operative le placement with stand to sit transfers. Pt progressed with ambulation distances to 140' with supervision and verbal cues for improved quality of gait,  noting improved fluency, step through and improved L heel contact during stance phase. Pt  performs stair climbing with use of b/l rails with supervision and Unilateral rail with crutch with cg-close supervision. Pt  demonstrates proper le sequencing when ascending and descending stairs. Pt's family educated on stair climbing and guarding techniques and provided pt with written instructions. Pt has made adequate gains toward PT goals and is appropriate for d/c to home with OPPT and home with family support at time of d/c in order to maximize pt's functional independence and safety w/ mobility. Pt continues to be functioning below baseline level. PT will continue to see pt while here in order to address the deficits listed above and provide interventions consistent w/ POC in effort to achieve STGs.   Pt was given a list of [facility] pt. facilities and phone numbers to call for OPPT as pt reported wanting to go to [facility] [place]. Goals   Patient Goals to be able to walk normal.   STG Expiration Date 11/08/23   PT Treatment Day 4   Plan   Treatment/Interventions Functional transfer training;LE strengthening/ROM; Elevations; Therapeutic exercise; Endurance training;Patient/family training;Equipment eval/education; Bed mobility;Gait training;Spoke to nursing;Family   Progress Improving as expected   PT Frequency Twice a day   Discharge Recommendation   Rehab Resource Intensity Level, PT III (Minimum Resource Intensity)   AM-PAC Basic Mobility Inpatient   Turning in Flat Bed Without Bedrails 3   Lying on Back to Sitting on Edge of Flat Bed Without Bedrails 3   Moving Bed to Chair 4   Standing Up From Chair Using Arms 4   Walk in Room 4   Climb 3-5 Stairs With Railing 3   Basic Mobility Inpatient Raw Score 21   Basic Mobility Standardized Score 45.55   Highest Level Of Mobility   JH-HLM Goal 6: Walk 10 steps or more   JH-HLM Achieved 7: Walk 25 feet or more   Education   Education Provided Mobility training;Home exercise program;Assistive device   Patient Demonstrates acceptance/verbal understanding   End of Consult   Patient Position at End of Consult Bedside chair; All needs within reach   End of Consult Comments pt in stable condition post PT session. 11/03/23 1240   Note Type   Note Type Treatment   Pain Assessment   Pain Assessment Tool 0-10   Pain Score 6   Pain Location/Orientation Orientation: Left; Location: Hip   Hospital Pain Intervention(s) Repositioned; Ambulation/increased activity; Emotional support   Precautions   Total Hip Replacement   (Post. approach however no hip prec. per ortho)   Restrictions/Precautions   Weight Bearing Precautions Per Order Yes   LLE Weight Bearing Per Order WBAT   Other Precautions WBS;THR;Fall Risk;Pain   General   Chart Reviewed Yes   Cognition   Overall Cognitive Status WFL   Arousal/Participation Alert; Responsive; Cooperative   Attention Within functional limits   Orientation Level Oriented X4   Memory Within functional limits   Following Commands Follows multistep commands without difficulty   Subjective   Subjective can i use the bathroom before we walk? Bed Mobility   Supine to Sit 5  Supervision   Additional items Assist x 1;HOB elevated; Bedrails; Increased time required;Verbal cues;LE management   Sit to Supine 5  Supervision   Additional items Assist x 1; Increased time required;Leg ;Verbal cues;LE management; Bedrails   Additional Comments pt  perfomred supine<> x2.  with use of sheet tolift L le in and out of bed. increased time to perform and complete with verbal cues. Transfers   Sit to Stand 5  Supervision   Additional items Assist x 1; Armrests; Increased time required;Verbal cues   Stand to Sit 5  Supervision   Additional items Assist x 1; Armrests; Increased time required;Verbal cues   Toilet transfer 5  Supervision   Additional items Assist x 1; Armrests; Increased time required;Verbal cues; Commode   Additional Comments cues for Lle positining and placement with stand to sit. Ambulation/Elevation   Gait pattern Improper Weight shift; Poor UE support; Forward Flexion;Decreased L stance; Antalgic;Decreased foot clearance; Short stride; Excessively slow; Step through pattern;Decreased heel strike   Gait Assistance 5  Supervision   Additional items Assist x 1;Verbal cues   Assistive Device Rolling walker   Distance 140' x1, 10' x4   Stair Management Assistance 5  Supervision   Additional items Assist x 1;Verbal cues; Increased time required  (close supervision)   Stair Management Technique Two rails; One rail R;One rail L;Foreward;Nonreciprocal;With crutches   Number of Stairs 20  (20 steps  (5steps x 4))   Ambulation/Elevation Additional Comments cues for sequencing of le's and cructch, cues for improved quality of gait,   Balance   Static Sitting Good   Dynamic Sitting Fair +   Static Standing Fair +  (w rw)   Dynamic Standing Fair  (w rw)   Ambulatory Fair  (w rw)   Activity Tolerance   Activity Tolerance Patient tolerated treatment well   Nurse Made Aware Rn Nakul   Exercises   THR   (ap, gs,qs)   Balance training  pt  performed static, dynamic standing activities of bhaskar care post toileting, and puslling up undergarments with close supervision- supervision, demonstrating stable static standing balance. pt  demonstrates stable static/ dynamic sitting with lower body dressing   Equipment Use   Comments pt issued written supine HEP for THR reviewed w pt., pt ed on stair climbing with use of rail and crutch, and gaurding techniques with pt and pt.'s parents. pt ed on use of leg  when transfering in and out of bed. assited pt with lower body dressing while seated out of bed in chair. Assessment   Prognosis Good   Problem List Decreased strength;Decreased range of motion;Decreased endurance; Impaired balance;Decreased mobility;Obesity;Orthopedic restrictions;Decreased skin integrity;Pain   Assessment Pt seen for PT treatment session this date with interventions consisting of bed mobility, transfer training, gait training, stair training, and HEP, family ed on stair climbing and guarding techniques and education provided as needed for safety and direction to improve functional mobility, safety awareness, and activity tolerance. Pt agreeable to PT treatment session upon arrival, pt found supine in bed . At end of session, pt left seated out of bed in chair with all needs in reach. In comparison to previous session, pt with improvement in activity tolerance, endurance, standing balance, ambulation distances, ambulatory balance, L le strength, AM- pac score, and functional mobility. Pt  is showing good progress toward PT goals with improvements was noted.  Pt  performed supine to sit and sit to supine with supervision, increased time and cues for proper technique and cues to maintain L le in neutral rotation as pt tends to rotate L le inward. Pt  performs with use of sheet to lift l le in and out of bed. Pt is functioning at supervision for transfers sit <> stand and toilet transfers, cues for operative le placement with stand to sit transfers. Pt progressed with ambulation distances to 140' with supervision and verbal cues for improved quality of gait,  noting improved fluency, step through and improved L heel contact during stance phase. Pt  performs stair climbing with use of b/l rails with supervision and Unilateral rail with crutch with cg-close supervision. Pt  demonstrates proper le sequencing when ascending and descending stairs. Pt's family educated on stair climbing and guarding techniques and provided pt with written instructions. Pt has made adequate gains toward PT goals and is appropriate for d/c to home with OPPT and home with family support at time of d/c in order to maximize pt's functional independence and safety w/ mobility. Pt continues to be functioning below baseline level. PT will continue to see pt while here in order to address the deficits listed above and provide interventions consistent w/ POC in effort to achieve STGs. Pt was given a list of [facility] pt. facilities and phone numbers to call for OPPT as pt reported wanting to go to [facility] [employer]. Goals   Patient Goals to be able to walk normal.   STG Expiration Date 11/08/23   PT Treatment Day 4   Plan   Treatment/Interventions Functional transfer training;LE strengthening/ROM; Elevations; Therapeutic exercise; Endurance training;Patient/family training;Equipment eval/education; Bed mobility;Gait training;Spoke to nursing;Family   Progress Improving as expected   PT Frequency Twice a day   Discharge Recommendation   Rehab Resource Intensity Level, PT III (Minimum Resource Intensity)   AM-PAC Basic Mobility Inpatient   Turning in Flat Bed Without Bedrails 3   Lying on Back to Sitting on Edge of Flat Bed Without Bedrails 3   Moving Bed to Chair 4   Standing Up From Chair Using Arms 4   Walk in Room 4   Climb 3-5 Stairs With Railing 3   Basic Mobility Inpatient Raw Score 21   Basic Mobility Standardized Score 45.55   Highest Level Of Mobility   -HLM Goal 6: Walk 10 steps or more   JH-HLM Achieved 7: Walk 25 feet or more   Education   Education Provided Mobility training;Home exercise program;Assistive device   Patient Demonstrates acceptance/verbal understanding   End of Consult   Patient Position at End of Consult Bedside chair; All needs within reach   End of Consult Comments pt in stable condition post PT session. Nessa Patterson PTA         The patient's AM-PAC Basic Mobility Inpatient Short Form Raw Score is 21. A raw score greater than 16 suggests the patient may benefit from discharge to home. Please also refer to the recommendation of the Physical Therapist for safe discharge planning.

## 2023-11-06 ENCOUNTER — TELEPHONE (OUTPATIENT)
Dept: OBGYN CLINIC | Facility: HOSPITAL | Age: 48
End: 2023-11-06

## 2023-11-06 NOTE — TELEPHONE ENCOUNTER
Patient contacted for a postoperative follow up assessment. Patient reports doing  "pretty good". Patient states current pain level of a  5/10  when sitting and 6/10 when walking with RW. Patient denies increase in swelling and dressing is clean, dry and intact. Patient is icing the site regularly. We reviewed patients AVS medication list. Patient is taking Tylenol 650mg q6prn, Oxycodone 5mg PRN, ASA 81mg BID, Patient has not yet had a BM but is passing gas. Patient reports attempting fiber and miralax without success. Educated patient to increase water intake and add colace BID to regimen. Patient denies nausea, vomiting, abdominal pain, chest pain, shortness of breath, fever, dizziness and calf pain. Patient states to have a referral to PT and states to have attempted to make an appointment, but was unable to get in until next week and was requesting an earlier appt. I will route this to surgeon for clarification. Pt confirmed post-op appointment with surgeon on 11/15. Patient does not have any other questions or concerns at this time. Pt was encouraged to call with any questions, concerns or issues.

## 2023-11-08 ENCOUNTER — EVALUATION (OUTPATIENT)
Dept: PHYSICAL THERAPY | Facility: REHABILITATION | Age: 48
End: 2023-11-08
Payer: OTHER GOVERNMENT

## 2023-11-08 DIAGNOSIS — M25.552 LEFT HIP PAIN: Primary | ICD-10-CM

## 2023-11-08 PROCEDURE — 97161 PT EVAL LOW COMPLEX 20 MIN: CPT | Performed by: PHYSICAL THERAPIST

## 2023-11-08 NOTE — PROGRESS NOTES
PT Evaluation     Today's date: 2023  Patient name: Niall Zavala  : 1975  MRN: [id number]  Referring provider: Tim Nixon MD  Dx:   Encounter Diagnosis     ICD-10-CM    1. Left hip pain  M25.552                      Assessment  Assessment details: Patient presents to PT with L hip pain s/p GI on . Patient displays decreased hip ROM, hip strength and abnormal gait. These impairments are leading to pain with walking, standing and stair climbing. Patient will benefit from skilled PT to address above impairments and help them return to PLOF. Impairments: abnormal coordination, abnormal gait, abnormal muscle firing, abnormal or restricted ROM, abnormal movement, activity intolerance, impaired balance, impaired physical strength, lacks appropriate home exercise program, pain with function and weight-bearing intolerance  Understanding of Dx/Px/POC: good   Prognosis: good    Goals  STG  1. Patient will display decreased pain to 0-2 in 6 weeks  2. Patient will display hip ROM WNL in 6 weeks  3. Patient will display hip strength WNL in 6 weeks    LTG  1. Patient will be able to stand for 30 minutes without pain in 12 weeks  2. Patient will be able to walk for 30 minutes without pain in 12 weeks  3.  Patient will be able to go up/down 3 flights of stairs without pain in 12 weeks        Plan  Patient would benefit from: PT eval and skilled physical therapy  Planned modality interventions: cryotherapy, electrical stimulation/Liberian stimulation, manual electrical stimulation, TENS and thermotherapy: hydrocollator packs  Planned therapy interventions: activity modification, ADL training, joint mobilization, manual therapy, massage, motor coordination training, neuromuscular re-education, strengthening, stretching, therapeutic activities, therapeutic exercise, therapeutic training, balance, balance/weight bearing training, body mechanics training, flexibility, functional ROM exercises, gait training, graded activity, graded exercise and home exercise program  Frequency: 2x week  Duration in visits: 12  Duration in weeks: 6  Plan of Care beginning date: 2023  Plan of Care expiration date: 2023        Subjective Evaluation    History of Present Illness  Mechanism of injury: Patient had a L GI on . Patient had a walker but it broke so she is using crutches. Patient has stairs to enter home but no stairs when inside home. Patient is denying S&S of DVT and infection. Patient has some pain but it is controlled well with meds.  Patient has follow up appt with surgeon next week          Not a recurrent problem   Quality of life: fair    Patient Goals  Patient goals for therapy: increased motion, improved balance, decreased pain, decreased edema and increased strength    Pain  Current pain rating: 3  At best pain rating: 3  At worst pain ratin  Quality: sharp and tight  Relieving factors: medications and ice  Aggravating factors: stair climbing, walking and standing    Social Support  Steps to enter house: yes    Employment status: not working        Objective     Passive Range of Motion   Left Hip   Flexion: 60 degrees with pain  Extension: 0 degrees with pain  Abduction: 20 degrees with pain    Additional Passive Range of Motion Details  Other motions not assessed due to pain    Strength/Myotome Testing     Left Hip   Planes of Motion   Flexion: 2  Extension: 3  Abduction: 3  External rotation: 3    Ambulation     Comments   Step to gait with B crutches             Precautions: L GI       Manuals             PROM                                                    Neuro Re-Ed             bridges             clamshells             sidesteps                                                                 Ther Ex             Hip abd             Hip ext             Hip flex             Weight shifts                                                                 Ther Activity             Heel raises Step ups             STS             Gait Training                                       Modalities

## 2023-11-13 ENCOUNTER — OFFICE VISIT (OUTPATIENT)
Dept: PHYSICAL THERAPY | Facility: REHABILITATION | Age: 48
End: 2023-11-13
Payer: OTHER GOVERNMENT

## 2023-11-13 DIAGNOSIS — M25.552 LEFT HIP PAIN: Primary | ICD-10-CM

## 2023-11-13 PROCEDURE — 97110 THERAPEUTIC EXERCISES: CPT | Performed by: PHYSICAL THERAPIST

## 2023-11-13 PROCEDURE — 97530 THERAPEUTIC ACTIVITIES: CPT | Performed by: PHYSICAL THERAPIST

## 2023-11-13 PROCEDURE — 97112 NEUROMUSCULAR REEDUCATION: CPT | Performed by: PHYSICAL THERAPIST

## 2023-11-13 NOTE — PROGRESS NOTES
Daily Note     Today's date: 2023  Patient name: Sugar Prather  : 1975  MRN: [id number]  Referring provider: Leonardo Wright MD  Dx:   Encounter Diagnosis     ICD-10-CM    1. Left hip pain  M25.552                      Subjective: patient states she was really swollen last week so she really didn't get to do her exercises      Objective: See treatment diary below      Assessment: Tolerated treatment well. Patient demonstrated fatigue post treatment, exhibited good technique with therapeutic exercises, and would benefit from continued PT Patient with good tolerance to initiation of strengthening. Patient now able to lay supine and place more weight on her L LE      Plan: Continue per plan of care.       Precautions: L GI       Manuals             PROM                                                    Neuro Re-Ed             bridges 3x10            clamshells AROM 3x10            sidesteps                                                                 Ther Ex             Hip abd 2x10            Hip ext 2x10            Hip flex 2x10            Weight shifts                                                                 Ther Activity             Heel raises 2x10            Step ups             STS             Gait Training                                       Modalities

## 2023-11-15 ENCOUNTER — OFFICE VISIT (OUTPATIENT)
Dept: OBGYN CLINIC | Facility: CLINIC | Age: 48
End: 2023-11-15

## 2023-11-15 VITALS
HEIGHT: 65 IN | WEIGHT: 179 LBS | HEART RATE: 101 BPM | BODY MASS INDEX: 29.82 KG/M2 | SYSTOLIC BLOOD PRESSURE: 109 MMHG | DIASTOLIC BLOOD PRESSURE: 77 MMHG

## 2023-11-15 DIAGNOSIS — Z09 POSTOPERATIVE EXAMINATION: Primary | ICD-10-CM

## 2023-11-15 PROBLEM — Z96.642 STATUS POST TOTAL REPLACEMENT OF LEFT HIP: Status: ACTIVE | Noted: 2023-11-15

## 2023-11-15 PROCEDURE — 99024 POSTOP FOLLOW-UP VISIT: CPT | Performed by: STUDENT IN AN ORGANIZED HEALTH CARE EDUCATION/TRAINING PROGRAM

## 2023-11-15 NOTE — ASSESSMENT & PLAN NOTE
Patient doing well postoperatively    -Weightbearing as tolerated left lower extremity  -Begin weaning crutches when able  -Continue Tylenol up to 3000 mg daily  -May use ibuprofen 400 mg twice daily  -Continue physical therapy  -Continue aspirin 81 mg twice daily DVT prophylaxis  -I would like to see the patient back in 1 month with hip and pelvic radiographs

## 2023-11-15 NOTE — PROGRESS NOTES
Date: 11/15/23  Jose Bassett   MRN# [id number]  : 1975      Chief Complaint: Left hip pain    Assessment and Plan:    Status post total replacement of left hip  Patient doing well postoperatively    -Weightbearing as tolerated left lower extremity  -Begin weaning crutches when able  -Continue Tylenol up to 3000 mg daily  -May use ibuprofen 400 mg twice daily  -Continue physical therapy  -Continue aspirin 81 mg twice daily DVT prophylaxis  -I would like to see the patient back in 1 month with hip and pelvic radiographs       Subjective:     HPI:  Date of Surgery: 23    Patient is 2 weeks status post left total hip arthroplasty. Doing well post-operatively and is able to ambulate with an assistive device. Patient has been participating in physical therapy. Pain is well controlled with Tylenol and Oxycontin  No new injuries or complaints    ROS:   Review of Systems   All other systems reviewed and are negative. Objective:   BP Readings from Last 1 Encounters:   11/15/23 109/77      Wt Readings from Last 1 Encounters:   11/15/23 81.2 kg (179 lb)      Pulse Readings from Last 1 Encounters:   11/15/23 101        Physical Exam  Constitutional:       General: She is not in acute distress. HENT:      Head: Normocephalic and atraumatic. Eyes:      Conjunctiva/sclera: Conjunctivae normal.   Cardiovascular:      Comments: Extremities well perfused   No LE edema    Pulmonary:      Effort: Pulmonary effort is normal.   Abdominal:      General: Abdomen is flat. Bowel sounds are normal.   Neurological:      Mental Status: She is alert. Mental status is at baseline. Gait and Station:   antalgic and crutch assisted    left Hip:      Inspection: healing well, staples removed and Steri-Strips placed    ROM: painless passive range of motion    Palpation: Minimal bhaskar-incisional tenderness     Motor: 5/5 EHL/FHL/TA/GS/Qd/Hs    Vascular:  Toes WWP with BCR    Sensory: SILT DP/SP/Roseann/Saph/Tib          Rossy Day MD  Adult Reconstruction Specialist   [address] [facility]

## 2023-11-16 ENCOUNTER — OFFICE VISIT (OUTPATIENT)
Dept: PHYSICAL THERAPY | Facility: REHABILITATION | Age: 48
End: 2023-11-16
Payer: OTHER GOVERNMENT

## 2023-11-16 DIAGNOSIS — M25.552 LEFT HIP PAIN: Primary | ICD-10-CM

## 2023-11-16 PROCEDURE — 97110 THERAPEUTIC EXERCISES: CPT | Performed by: PHYSICAL THERAPIST

## 2023-11-16 PROCEDURE — 97112 NEUROMUSCULAR REEDUCATION: CPT | Performed by: PHYSICAL THERAPIST

## 2023-11-16 PROCEDURE — 97530 THERAPEUTIC ACTIVITIES: CPT | Performed by: PHYSICAL THERAPIST

## 2023-11-16 NOTE — PROGRESS NOTES
Daily Note     Today's date: 2023  Patient name: Sugar Prather  : 1975  MRN: [id number]  Referring provider: Leonardo Wright MD  Dx:   Encounter Diagnosis     ICD-10-CM    1. Left hip pain  M25.552                      Subjective: patient states she saw the dr yesterday and they are pleased with her progress at this point       Objective: See treatment diary below      Assessment: Tolerated treatment well. Patient demonstrated fatigue post treatment, exhibited good technique with therapeutic exercises, and would benefit from continued PT Patient with improving tolerance to strengthening. Patient is walking with increased gait speed today with the [facility]      Plan: Continue per plan of care.       Precautions: L GI       Manuals            PROM                                                    Neuro Re-Ed             bridges 3x10 3x10           clamshells AROM 3x10 Pink 2x10           sidesteps                                                                 Ther Ex             Hip abd 2x10 2x10           Hip ext 2x10 2x10           Hip flex 2x10 2x10           Weight shifts  5"x10           PB rolls  2x10           Bike (ROM)  5 min                                     Ther Activity             Heel raises 2x10 2x10           Step ups             STS             Gait Training                                       Modalities

## 2023-11-20 ENCOUNTER — OFFICE VISIT (OUTPATIENT)
Dept: PHYSICAL THERAPY | Facility: REHABILITATION | Age: 48
End: 2023-11-20
Payer: OTHER GOVERNMENT

## 2023-11-20 DIAGNOSIS — M25.552 LEFT HIP PAIN: Primary | ICD-10-CM

## 2023-11-20 PROCEDURE — 97530 THERAPEUTIC ACTIVITIES: CPT | Performed by: PHYSICAL THERAPIST

## 2023-11-20 PROCEDURE — 97110 THERAPEUTIC EXERCISES: CPT | Performed by: PHYSICAL THERAPIST

## 2023-11-20 PROCEDURE — 97112 NEUROMUSCULAR REEDUCATION: CPT | Performed by: PHYSICAL THERAPIST

## 2023-11-20 NOTE — PROGRESS NOTES
Daily Note     Today's date: 2023  Patient name: Perez Flores  : 1975  MRN: [id number]  Referring provider: Amada Colon MD  Dx:   Encounter Diagnosis     ICD-10-CM    1. Left hip pain  M25.552                      Subjective: patient states she is feeling like she is able to do more throughout the day      Objective: See treatment diary below      Assessment: Tolerated treatment well. Patient demonstrated fatigue post treatment, exhibited good technique with therapeutic exercises, and would benefit from continued PT Patient is doing very well. Strength and motion continue to improve session to session      Plan: Continue per plan of care.       Precautions: L GI       Manuals           PROM                                                    Neuro Re-Ed             bridges 3x10 3x10 3x10          clamshells AROM 3x10 Pink 2x10 Pink 3x10          sidesteps   Pink 3 laps                                                              Ther Ex             Hip abd 2x10 2x10 3x10          Hip ext 2x10 2x10 3x10          Hip flex 2x10 2x10 3x10          Weight shifts  5"x10 10"x5          PB rolls  2x10 3x10          Bike (ROM)  5 min 5 min                                    Ther Activity             Heel raises 2x10 2x10 3x10          Step ups             STS             Gait Training                                       Modalities

## 2023-11-22 ENCOUNTER — OFFICE VISIT (OUTPATIENT)
Dept: PHYSICAL THERAPY | Facility: REHABILITATION | Age: 48
End: 2023-11-22
Payer: OTHER GOVERNMENT

## 2023-11-22 DIAGNOSIS — M25.552 LEFT HIP PAIN: Primary | ICD-10-CM

## 2023-11-22 PROCEDURE — 97112 NEUROMUSCULAR REEDUCATION: CPT | Performed by: PHYSICAL THERAPIST

## 2023-11-22 PROCEDURE — 97530 THERAPEUTIC ACTIVITIES: CPT | Performed by: PHYSICAL THERAPIST

## 2023-11-22 PROCEDURE — 97110 THERAPEUTIC EXERCISES: CPT | Performed by: PHYSICAL THERAPIST

## 2023-11-22 NOTE — PROGRESS NOTES
Daily Note     Today's date: 2023  Patient name: Beata Bee  : 1975  MRN: [id number]  Referring provider: Ishmael Anderson MD  Dx:   Encounter Diagnosis     ICD-10-CM    1. Left hip pain  M25.552                      Subjective: patient states she is walking with a cane today      Objective: See treatment diary below      Assessment: Tolerated treatment well. Patient demonstrated fatigue post treatment, exhibited good technique with therapeutic exercises, and would benefit from continued PT Patient continues to make functional improvements session to session. Patient is now walking with a cane      Plan: Continue per plan of care.       Precautions: L GI       Manuals          PROM                                                    Neuro Re-Ed             bridges 3x10 3x10 3x10 3x10         clamshells AROM 3x10 Pink 2x10 Pink 3x10 Pink 3x10         sidesteps   Pink 3 laps Pink 3 laps                                                             Ther Ex             Hip abd 2x10 2x10 3x10 Pink 3x10         Hip ext 2x10 2x10 3x10 Pink 3x10         Hip flex 2x10 2x10 3x10 Pink 3x10         Weight shifts  5"x10 10"x5 15"x4         PB rolls  2x10 3x10          Bike (ROM)  5 min 5 min 5 min         marches    3x10                      Ther Activity             Heel raises 2x10 2x10 3x10 3x10         Step ups    3x10         STS             Gait Training                                       Modalities

## 2023-11-27 ENCOUNTER — OFFICE VISIT (OUTPATIENT)
Dept: PHYSICAL THERAPY | Facility: REHABILITATION | Age: 48
End: 2023-11-27
Payer: OTHER GOVERNMENT

## 2023-11-27 DIAGNOSIS — Z96.642 STATUS POST TOTAL REPLACEMENT OF LEFT HIP: Primary | ICD-10-CM

## 2023-11-27 DIAGNOSIS — M25.552 LEFT HIP PAIN: ICD-10-CM

## 2023-11-27 PROCEDURE — 97530 THERAPEUTIC ACTIVITIES: CPT | Performed by: PHYSICAL THERAPIST

## 2023-11-27 PROCEDURE — 97110 THERAPEUTIC EXERCISES: CPT | Performed by: PHYSICAL THERAPIST

## 2023-11-27 PROCEDURE — 97112 NEUROMUSCULAR REEDUCATION: CPT | Performed by: PHYSICAL THERAPIST

## 2023-11-27 NOTE — PROGRESS NOTES
Daily Note     Today's date: 2023  Patient name: Renata Parson  : 1975  MRN: [id number]  Referring provider: Jameel Ashton MD  Dx:   Encounter Diagnosis     ICD-10-CM    1. 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 Left hip pain  M25.552                      Subjective: patient states her hip is a little more sore today      Objective: See treatment diary below      Assessment: Tolerated treatment well. Patient demonstrated fatigue post treatment, exhibited good technique with therapeutic exercises, and would benefit from continued PT Modified some of the strengthening today to limit stress on the hip since it was bothering her a little       Plan: Continue per plan of care.       Precautions: L GI       Manuals         PROM                                                    Neuro Re-Ed             bridges 3x10 3x10 3x10 3x10 3x10        clamshells AROM 3x10 Pink 2x10 Pink 3x10 Pink 3x10 Pink 3x10        sidesteps   Pink 3 laps Pink 3 laps 3 laps                                                            Ther Ex             Hip abd 2x10 2x10 3x10 Pink 3x10 3x10        Hip ext 2x10 2x10 3x10 Pink 3x10 3x10        Hip flex 2x10 2x10 3x10 Pink 3x10 3x10        Weight shifts  5"x10 10"x5 15"x4 15"x4        PB rolls  2x10 3x10          Bike (ROM)  5 min 5 min 5 min 5 min        marches    3x10 3x10                     Ther Activity             Heel raises 2x10 2x10 3x10 3x10 3x10        Step ups    3x10 3x10        STS             Gait Training                                       Modalities

## 2023-11-29 ENCOUNTER — OFFICE VISIT (OUTPATIENT)
Dept: PHYSICAL THERAPY | Facility: REHABILITATION | Age: 48
End: 2023-11-29
Payer: OTHER GOVERNMENT

## 2023-11-29 DIAGNOSIS — Z96.642 STATUS POST TOTAL REPLACEMENT OF LEFT HIP: Primary | ICD-10-CM

## 2023-11-29 DIAGNOSIS — M25.552 LEFT HIP PAIN: ICD-10-CM

## 2023-11-29 PROCEDURE — 97112 NEUROMUSCULAR REEDUCATION: CPT | Performed by: PHYSICAL THERAPIST

## 2023-11-29 PROCEDURE — 97530 THERAPEUTIC ACTIVITIES: CPT | Performed by: PHYSICAL THERAPIST

## 2023-11-29 PROCEDURE — 97110 THERAPEUTIC EXERCISES: CPT | Performed by: PHYSICAL THERAPIST

## 2023-11-29 NOTE — PROGRESS NOTES
Daily Note     Today's date: 2023  Patient name: Haresh King  : 1975  MRN: [id number]  Referring provider: Norman Reece MD  Dx:   Encounter Diagnosis     ICD-10-CM    1. 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 Left hip pain  M25.552                      Subjective: patient states her hip is doing better than last session      Objective: See treatment diary below      Assessment: Tolerated treatment well. Patient demonstrated fatigue post treatment, exhibited good technique with therapeutic exercises, and would benefit from continued PT Patient with improving symptoms today. Added back in the exercises that we held last session with good tolerance      Plan: Continue per plan of care.       Precautions: L GI       Manuals        PROM                                                    Neuro Re-Ed             bridges 3x10 3x10 3x10 3x10 3x10 3x10       clamshells AROM 3x10 Pink 2x10 Pink 3x10 Pink 3x10 Pink 3x10 Pink 3x10       sidesteps   Pink 3 laps Pink 3 laps 3 laps Pink 3 laps                                                           Ther Ex             Hip abd 2x10 2x10 3x10 Pink 3x10 3x10 Pink 3x10       Hip ext 2x10 2x10 3x10 Pink 3x10 3x10 Pink 3x10       Hip flex 2x10 2x10 3x10 Pink 3x10 3x10 Pink 3x10       Weight shifts  5"x10 10"x5 15"x4 15"x4 15"x4       PB rolls  2x10 3x10          Bike (ROM)  5 min 5 min 5 min 5 min 5 min       marches    3x10 3x10 3x10       Ball squeeze      3"x20       Ther Activity             Heel raises 2x10 2x10 3x10 3x10 3x10 3x10       Step ups    3x10 3x10 3x10       STS             Gait Training                                       Modalities

## 2023-12-04 ENCOUNTER — OFFICE VISIT (OUTPATIENT)
Dept: PHYSICAL THERAPY | Facility: REHABILITATION | Age: 48
End: 2023-12-04
Payer: OTHER GOVERNMENT

## 2023-12-04 DIAGNOSIS — M25.552 LEFT HIP PAIN: ICD-10-CM

## 2023-12-04 DIAGNOSIS — Z96.642 STATUS POST TOTAL REPLACEMENT OF LEFT HIP: Primary | ICD-10-CM

## 2023-12-04 PROCEDURE — 97112 NEUROMUSCULAR REEDUCATION: CPT | Performed by: PHYSICAL THERAPIST

## 2023-12-04 PROCEDURE — 97110 THERAPEUTIC EXERCISES: CPT | Performed by: PHYSICAL THERAPIST

## 2023-12-04 PROCEDURE — 97530 THERAPEUTIC ACTIVITIES: CPT | Performed by: PHYSICAL THERAPIST

## 2023-12-04 NOTE — PROGRESS NOTES
Daily Note     Today's date: 2023  Patient name: Rose Mary Funes  : 1975  MRN: [id number]  Referring provider: Shanita Polanco MD  Dx:   Encounter Diagnosis     ICD-10-CM    1. 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 Left hip pain  M25.552                      Subjective: patient states she is walking shorter distances without the cane now      Objective: See treatment diary below      Assessment: Tolerated treatment well. Patient demonstrated fatigue post treatment, exhibited good technique with therapeutic exercises, and would benefit from continued PT Patient is progressing well. Patient with improving ROM and strength. Improving gait mechanics noted      Plan: Continue per plan of care.       Precautions: L GI       Manuals       PROM                                                    Neuro Re-Ed             bridges 3x10 3x10 3x10 3x10 3x10 3x10 3x10      clamshells AROM 3x10 Pink 2x10 Pink 3x10 Pink 3x10 Pink 3x10 Pink 3x10 SL 3x10      sidesteps   Pink 3 laps Pink 3 laps 3 laps Pink 3 laps Pink 4 laps                                                          Ther Ex             Hip abd 2x10 2x10 3x10 Pink 3x10 3x10 Pink 3x10 Pink 3x10      Hip ext 2x10 2x10 3x10 Pink 3x10 3x10 Pink 3x10 Pink 3x10      Hip flex 2x10 2x10 3x10 Pink 3x10 3x10 Pink 3x10 Pink 3x10      Weight shifts  5"x10 10"x5 15"x4 15"x4 15"x4 20sx3      PB rolls  2x10 3x10          Bike (ROM)  5 min 5 min 5 min 5 min 5 min 5 min lvl 2      marches    3x10 3x10 3x10 3x10      Ball squeeze      3"x20 3"x20      Ther Activity             Heel raises 2x10 2x10 3x10 3x10 3x10 3x10 3x10      Step ups    3x10 3x10 3x10 3x10      STS       3x10      Gait Training                                       Modalities

## 2023-12-05 ENCOUNTER — TELEPHONE (OUTPATIENT)
Dept: OBGYN CLINIC | Facility: HOSPITAL | Age: 48
End: 2023-12-05

## 2023-12-05 NOTE — TELEPHONE ENCOUNTER
Caller: patient    Doctor: Emil Vilchis    Reason for call: checking to see if disability paperwork was ever completed from October - adv was completed 10/23/23 and faxed directly to [facility] fax #[phone]    Call back#: n/a

## 2023-12-06 ENCOUNTER — OFFICE VISIT (OUTPATIENT)
Dept: PHYSICAL THERAPY | Facility: REHABILITATION | Age: 48
End: 2023-12-06
Payer: OTHER GOVERNMENT

## 2023-12-06 DIAGNOSIS — Z96.642 STATUS POST TOTAL REPLACEMENT OF LEFT HIP: Primary | ICD-10-CM

## 2023-12-06 DIAGNOSIS — M25.552 LEFT HIP PAIN: ICD-10-CM

## 2023-12-06 PROCEDURE — 97110 THERAPEUTIC EXERCISES: CPT | Performed by: PHYSICAL THERAPIST

## 2023-12-06 PROCEDURE — 97112 NEUROMUSCULAR REEDUCATION: CPT | Performed by: PHYSICAL THERAPIST

## 2023-12-06 PROCEDURE — 97530 THERAPEUTIC ACTIVITIES: CPT | Performed by: PHYSICAL THERAPIST

## 2023-12-06 NOTE — PROGRESS NOTES
Daily Note     Today's date: 2023  Patient name: Zena Davis  : 1975  MRN: [id number]  Referring provider: Jadyn Chopra MD  Dx:   Encounter Diagnosis     ICD-10-CM    1. 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 Left hip pain  M25.552                      Subjective: patient states she is now walking mostly without the cane      Objective: See treatment diary below      Assessment: Tolerated treatment well. Patient demonstrated fatigue post treatment, exhibited good technique with therapeutic exercises, and would benefit from continued PT Patient with improving hip strength. Patient continues to have antalgic gait due to lateral hip weakness      Plan: Continue per plan of care.       Precautions: L GI       Manuals      PROM                                                    Neuro Re-Ed             bridges 3x10 3x10 3x10 3x10 3x10 3x10 3x10 3x12     clamshells AROM 3x10 Pink 2x10 Pink 3x10 Pink 3x10 Pink 3x10 Pink 3x10 SL 3x10 SL 3x12     sidesteps   Pink 3 laps Pink 3 laps 3 laps Pink 3 laps Pink 4 laps Pink 4 laps                                                         Ther Ex             Hip abd 2x10 2x10 3x10 Pink 3x10 3x10 Pink 3x10 Pink 3x10 Pink 3x12     Hip ext 2x10 2x10 3x10 Pink 3x10 3x10 Pink 3x10 Pink 3x10 Pink 3x12     Hip flex 2x10 2x10 3x10 Pink 3x10 3x10 Pink 3x10 Pink 3x10 Pink 3x12     Weight shifts  5"x10 10"x5 15"x4 15"x4 15"x4 20sx3 20sx3     PB rolls  2x10 3x10          Bike (ROM)  5 min 5 min 5 min 5 min 5 min 5 min lvl 2 5 min lvl 2     marches    3x10 3x10 3x10 3x10 3x12     Ball squeeze      3"x20 3"x20 3"x20     Ther Activity             Heel raises 2x10 2x10 3x10 3x10 3x10 3x10 3x10 3x12     Step ups    3x10 3x10 3x10 3x10 3x12     STS       3x10 3x12     Gait Training                                       Modalities

## 2023-12-11 ENCOUNTER — OFFICE VISIT (OUTPATIENT)
Dept: PHYSICAL THERAPY | Facility: REHABILITATION | Age: 48
End: 2023-12-11
Payer: OTHER GOVERNMENT

## 2023-12-11 DIAGNOSIS — M25.552 LEFT HIP PAIN: ICD-10-CM

## 2023-12-11 DIAGNOSIS — Z96.642 STATUS POST TOTAL REPLACEMENT OF LEFT HIP: Primary | ICD-10-CM

## 2023-12-11 PROCEDURE — 97112 NEUROMUSCULAR REEDUCATION: CPT | Performed by: PHYSICAL THERAPIST

## 2023-12-11 PROCEDURE — 97530 THERAPEUTIC ACTIVITIES: CPT | Performed by: PHYSICAL THERAPIST

## 2023-12-11 PROCEDURE — 97110 THERAPEUTIC EXERCISES: CPT | Performed by: PHYSICAL THERAPIST

## 2023-12-11 NOTE — PROGRESS NOTES
Daily Note     Today's date: 2023  Patient name: Ean Mckeno  : 1975  MRN: [id number]  Referring provider: Ilana Wiley MD  Dx:   Encounter Diagnosis     ICD-10-CM    1. 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 Left hip pain  M25.552                      Subjective: patient states she was getting some weird pains on Friday but they went away      Objective: See treatment diary below      Assessment: Tolerated treatment well. Patient demonstrated fatigue post treatment, exhibited good technique with therapeutic exercises, and would benefit from continued PT Patient with some hip flexor irritability. Held all exercises that focused on hip flexors      Plan: Continue per plan of care.       Precautions: L GI       Manuals     PROM                                                    Neuro Re-Ed             bridges 3x10 3x10 3x10 3x10 3x10 3x10 3x10 3x12 3x12    clamshells AROM 3x10 Pink 2x10 Pink 3x10 Pink 3x10 Pink 3x10 Pink 3x10 SL 3x10 SL 3x12 SL 3x12    sidesteps   Pink 3 laps Pink 3 laps 3 laps Pink 3 laps Pink 4 laps Pink 4 laps Pink 4 laps                                                        Ther Ex             Hip abd 2x10 2x10 3x10 Pink 3x10 3x10 Pink 3x10 Pink 3x10 Pink 3x12 Pink 3x12    Hip ext 2x10 2x10 3x10 Pink 3x10 3x10 Pink 3x10 Pink 3x10 Pink 3x12 Pink 3x12    Hip flex 2x10 2x10 3x10 Pink 3x10 3x10 Pink 3x10 Pink 3x10 Pink 3x12 held    Weight shifts  5"x10 10"x5 15"x4 15"x4 15"x4 20sx3 20sx3 20sx3    PB rolls  2x10 3x10          Bike (ROM)  5 min 5 min 5 min 5 min 5 min 5 min lvl 2 5 min lvl 2 5 min lvl 2    marches    3x10 3x10 3x10 3x10 3x12 held    Ball squeeze      3"x20 3"x20 3"x20 3"x20    Ther Activity             Heel raises 2x10 2x10 3x10 3x10 3x10 3x10 3x10 3x12 3x12    Step ups    3x10 3x10 3x10 3x10 3x12 3x12    STS       3x10 3x12 3x12    Gait Training Modalities

## 2023-12-13 ENCOUNTER — OFFICE VISIT (OUTPATIENT)
Dept: PHYSICAL THERAPY | Facility: REHABILITATION | Age: 48
End: 2023-12-13
Payer: OTHER GOVERNMENT

## 2023-12-13 DIAGNOSIS — Z96.642 STATUS POST TOTAL REPLACEMENT OF LEFT HIP: Primary | ICD-10-CM

## 2023-12-13 DIAGNOSIS — M25.552 LEFT HIP PAIN: ICD-10-CM

## 2023-12-13 PROCEDURE — 97530 THERAPEUTIC ACTIVITIES: CPT | Performed by: PHYSICAL THERAPIST

## 2023-12-13 PROCEDURE — 97110 THERAPEUTIC EXERCISES: CPT | Performed by: PHYSICAL THERAPIST

## 2023-12-13 PROCEDURE — 97112 NEUROMUSCULAR REEDUCATION: CPT | Performed by: PHYSICAL THERAPIST

## 2023-12-13 NOTE — PROGRESS NOTES
Daily Note     Today's date: 2023  Patient name: Tom Josue  : 1975  MRN: [id number]  Referring provider: Shaista Shea MD  Dx:   Encounter Diagnosis     ICD-10-CM    1. 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 Left hip pain  M25.552                      Subjective: patient states the hip is less sore today      Objective: See treatment diary below      Assessment: Tolerated treatment well. Patient demonstrated fatigue post treatment, exhibited good technique with therapeutic exercises, and would benefit from continued PT Reintroduced hip flexion exercises back into plan after holding on all of them last session due to irritability. Patient tolerated them better today      Plan: Continue per plan of care.       Precautions: L GI       Manuals    PROM                                                    Neuro Re-Ed             bridges 3x10 3x10 3x10 3x10 3x10 3x10 3x10 3x12 3x12 3x10   clamshells AROM 3x10 Pink 2x10 Pink 3x10 Pink 3x10 Pink 3x10 Pink 3x10 SL 3x10 SL 3x12 SL 3x12 SL 3x10   sidesteps   Pink 3 laps Pink 3 laps 3 laps Pink 3 laps Pink 4 laps Pink 4 laps Pink 4 laps Pink 4 laps                                                       Ther Ex             Hip abd 2x10 2x10 3x10 Pink 3x10 3x10 Pink 3x10 Pink 3x10 Pink 3x12 Pink 3x12 Pink 3x10   Hip ext 2x10 2x10 3x10 Pink 3x10 3x10 Pink 3x10 Pink 3x10 Pink 3x12 Pink 3x12 Pink 3x10   Hip flex 2x10 2x10 3x10 Pink 3x10 3x10 Pink 3x10 Pink 3x10 Pink 3x12 held Pink 3x10   Weight shifts  5"x10 10"x5 15"x4 15"x4 15"x4 20sx3 20sx3 20sx3 20sx3   PB rolls  2x10 3x10          Bike (ROM)  5 min 5 min 5 min 5 min 5 min 5 min lvl 2 5 min lvl 2 5 min lvl 2 5 min   marches    3x10 3x10 3x10 3x10 3x12 held held   Ball squeeze      3"x20 3"x20 3"x20 3"x20 3"x20   Ther Activity             Heel raises 2x10 2x10 3x10 3x10 3x10 3x10 3x10 3x12 3x12 3x10   Step ups    3x10 3x10 3x10 3x10 3x12 3x12 3x10   STS       3x10 3x12 3x12 3x10   Gait Training                                       Modalities

## 2023-12-14 ENCOUNTER — OFFICE VISIT (OUTPATIENT)
Dept: OBGYN CLINIC | Facility: CLINIC | Age: 48
End: 2023-12-14

## 2023-12-14 ENCOUNTER — APPOINTMENT (OUTPATIENT)
Dept: RADIOLOGY | Age: 48
End: 2023-12-14
Payer: OTHER GOVERNMENT

## 2023-12-14 VITALS
SYSTOLIC BLOOD PRESSURE: 108 MMHG | DIASTOLIC BLOOD PRESSURE: 77 MMHG | BODY MASS INDEX: 29.66 KG/M2 | HEIGHT: 65 IN | HEART RATE: 87 BPM | WEIGHT: 178 LBS

## 2023-12-14 DIAGNOSIS — Z47.1 AFTERCARE FOLLOWING LEFT HIP JOINT REPLACEMENT SURGERY: ICD-10-CM

## 2023-12-14 DIAGNOSIS — Z96.642 STATUS POST TOTAL REPLACEMENT OF LEFT HIP: ICD-10-CM

## 2023-12-14 DIAGNOSIS — Z96.642 STATUS POST TOTAL HIP REPLACEMENT, LEFT: Primary | ICD-10-CM

## 2023-12-14 DIAGNOSIS — Z96.642 AFTERCARE FOLLOWING LEFT HIP JOINT REPLACEMENT SURGERY: ICD-10-CM

## 2023-12-14 DIAGNOSIS — Z96.642 STATUS POST TOTAL HIP REPLACEMENT, LEFT: ICD-10-CM

## 2023-12-14 PROCEDURE — 99024 POSTOP FOLLOW-UP VISIT: CPT | Performed by: STUDENT IN AN ORGANIZED HEALTH CARE EDUCATION/TRAINING PROGRAM

## 2023-12-14 PROCEDURE — 73502 X-RAY EXAM HIP UNI 2-3 VIEWS: CPT

## 2023-12-15 NOTE — ASSESSMENT & PLAN NOTE
Patient 6 weeks status post left total hip replacement.   Doing well    -Weightbearing as tolerated left lower extremity  -Continue physical therapy  -May discontinue aspirin 81 mg twice daily for DVT prophylaxis  -P.o. meloxicam and Tylenol as needed for any discomfort  -Now like to see patient back in 6 weeks for continued evaluation has RW, rollator, W/C at home

## 2023-12-15 NOTE — PROGRESS NOTES
Date: 23  Zena Davis   MRN# [id number]  : 1975      Chief Complaint: Status post left total hip    Assessment and Plan:    Status post total replacement of left hip  Patient 6 weeks status post left total hip replacement. Doing well    -Weightbearing as tolerated left lower extremity  -Continue physical therapy  -May discontinue aspirin 81 mg twice daily for DVT prophylaxis  -P.o. meloxicam and Tylenol as needed for any discomfort  -Now like to see patient back in 6 weeks for continued evaluation       Subjective:     HPI:  Date of Surgery:     Patient is 6 weeks status post left total hip arthroplasty. Doing well post-operatively and is able to ambulate without an assistive device. Patient has been participating in physical therapy. Pain is well controlled with Tylenol and Meloxicam  No new injuries or complaints    ROS:   Review of Systems   All other systems reviewed and are negative. Objective:   BP Readings from Last 1 Encounters:   23 108/77      Wt Readings from Last 1 Encounters:   23 80.7 kg (178 lb)      Pulse Readings from Last 1 Encounters:   23 87        Physical Exam  Constitutional:       General: She is not in acute distress. HENT:      Head: Normocephalic and atraumatic. Eyes:      Conjunctiva/sclera: Conjunctivae normal.   Cardiovascular:      Comments: Extremities well perfused   No LE edema    Pulmonary:      Effort: Pulmonary effort is normal.   Abdominal:      General: Abdomen is flat. Bowel sounds are normal.   Neurological:      Mental Status: She is alert. Mental status is at baseline. Gait and Station:   antalgic    left Hip:      Inspection: Well-healed incision    ROM: Painless passive range of motion in all planeswithout pain    Palpation: No ttp     Motor: 5/5 EHL/FHL/TA/GS/Qd/Hs    Vascular:  Toes WWP with BCR    Sensory: SILT DP/SP/Roseann/Saph/Tib      Images:    I personally reviewed relevant images in the PACS system and my interpretation is as follows:  X-rays of the left Hip reveal a total hip replacement in appropriate alignment with no evidence of component migration        Norman Reece MD  Adult Reconstruction Specialist   [facility]

## 2023-12-18 ENCOUNTER — OFFICE VISIT (OUTPATIENT)
Dept: PHYSICAL THERAPY | Facility: REHABILITATION | Age: 48
End: 2023-12-18
Payer: OTHER GOVERNMENT

## 2023-12-18 DIAGNOSIS — Z96.642 STATUS POST TOTAL REPLACEMENT OF LEFT HIP: Primary | ICD-10-CM

## 2023-12-18 DIAGNOSIS — M25.552 LEFT HIP PAIN: ICD-10-CM

## 2023-12-18 PROCEDURE — 97110 THERAPEUTIC EXERCISES: CPT | Performed by: PHYSICAL THERAPIST

## 2023-12-18 PROCEDURE — 97530 THERAPEUTIC ACTIVITIES: CPT | Performed by: PHYSICAL THERAPIST

## 2023-12-18 PROCEDURE — 97112 NEUROMUSCULAR REEDUCATION: CPT | Performed by: PHYSICAL THERAPIST

## 2023-12-18 NOTE — PROGRESS NOTES
"Daily Note     Today's date: 2023  Patient name: Suzie Burrell  : 1975  MRN: [id number]  Referring provider: Abhi Edmond MD  Dx:   Encounter Diagnosis     ICD-10-CM    1. 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 Left hip pain  M25.552                      Subjective: patient states she saw the doctor and they were happy with his progress      Objective: See treatment diary below      Assessment: Tolerated treatment well. Patient demonstrated fatigue post treatment, exhibited good technique with therapeutic exercises, and would benefit from continued PT Patient with improving hip strength and ROM. Patient is doing well but she continues to have an antalgic gait due to lateral hip weakness      Plan: Continue per plan of care.      Precautions: L GI       Manuals    PROM                                                        Neuro Re-Ed              bridges 3x10 3x10 3x10 3x10 3x10 3x10 3x10 3x12 3x12 3x10 3x10   clamshells AROM 3x10 Pink 2x10 Pink 3x10 Pink 3x10 Pink 3x10 Pink 3x10 SL 3x10 SL 3x12 SL 3x12 SL 3x10 SL 3x10   sidesteps   Pink 3 laps Pink 3 laps 3 laps Pink 3 laps Pink 4 laps Pink 4 laps Pink 4 laps Pink 4 laps Pink 4 laps                                                           Ther Ex              Hip abd 2x10 2x10 3x10 Pink 3x10 3x10 Pink 3x10 Pink 3x10 Pink 3x12 Pink 3x12 Pink 3x10 Pink 3x10   Hip ext 2x10 2x10 3x10 Pink 3x10 3x10 Pink 3x10 Pink 3x10 Pink 3x12 Pink 3x12 Pink 3x10 Pink 3x10   Hip flex 2x10 2x10 3x10 Pink 3x10 3x10 Pink 3x10 Pink 3x10 Pink 3x12 held Pink 3x10 Pink 3x10   Weight shifts  5\"x10 10\"x5 15\"x4 15\"x4 15\"x4 20sx3 20sx3 20sx3 20sx3 20s x3   PB rolls  2x10 3x10           Bike (ROM)  5 min 5 min 5 min 5 min 5 min 5 min lvl 2 5 min lvl 2 5 min lvl 2 5 min 5 min lvl 2   marches    3x10 3x10 3x10 3x10 3x12 held held 3x10   Ball squeeze      3\"x20 3\"x20 3\"x20 3\"x20 3\"x20 3\"x30 "   Ther Activity              Heel raises 2x10 2x10 3x10 3x10 3x10 3x10 3x10 3x12 3x12 3x10 3x10   Step ups    3x10 3x10 3x10 3x10 3x12 3x12 3x10 3x10   STS       3x10 3x12 3x12 3x10 3x10   Gait Training                                          Modalities

## 2023-12-22 ENCOUNTER — OFFICE VISIT (OUTPATIENT)
Dept: PHYSICAL THERAPY | Facility: REHABILITATION | Age: 48
End: 2023-12-22
Payer: OTHER GOVERNMENT

## 2023-12-22 DIAGNOSIS — Z96.642 STATUS POST TOTAL REPLACEMENT OF LEFT HIP: Primary | ICD-10-CM

## 2023-12-22 DIAGNOSIS — M25.552 LEFT HIP PAIN: ICD-10-CM

## 2023-12-22 PROCEDURE — 97112 NEUROMUSCULAR REEDUCATION: CPT | Performed by: PHYSICAL THERAPIST

## 2023-12-22 PROCEDURE — 97530 THERAPEUTIC ACTIVITIES: CPT | Performed by: PHYSICAL THERAPIST

## 2023-12-22 PROCEDURE — 97110 THERAPEUTIC EXERCISES: CPT | Performed by: PHYSICAL THERAPIST

## 2023-12-22 NOTE — PROGRESS NOTES
"Daily Note     Today's date: 2023  Patient name: Suzie Burrell  : 1975  MRN: [id number]  Referring provider: Abhi Edmond MD  Dx:   Encounter Diagnosis     ICD-10-CM    1. 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 Left hip pain  M25.552                      Subjective: patient states she is still getting random pains but they mostly come and go      Objective: See treatment diary below      Assessment: Tolerated treatment well. Patient demonstrated fatigue post treatment, exhibited good technique with therapeutic exercises, and would benefit from continued PT Patient with improving hip ROM and strength. Hip flexor continues to give her a little bit of an issue but its tolerance is improving      Plan: Continue per plan of care.      Precautions: L GI       Manuals    PROM                                                            Neuro Re-Ed               bridges 3x10 3x10 3x10 3x10 3x10 3x10 3x10 3x12 3x12 3x10 3x10 3x12   clamshells AROM 3x10 Pink 2x10 Pink 3x10 Pink 3x10 Pink 3x10 Pink 3x10 SL 3x10 SL 3x12 SL 3x12 SL 3x10 SL 3x10 SL 3x12   sidesteps   Pink 3 laps Pink 3 laps 3 laps Pink 3 laps Pink 4 laps Pink 4 laps Pink 4 laps Pink 4 laps Pink 4 laps Pink 4 laps                                                               Ther Ex               Hip abd 2x10 2x10 3x10 Pink 3x10 3x10 Pink 3x10 Pink 3x10 Pink 3x12 Pink 3x12 Pink 3x10 Pink 3x10 Pink 3x12   Hip ext 2x10 2x10 3x10 Pink 3x10 3x10 Pink 3x10 Pink 3x10 Pink 3x12 Pink 3x12 Pink 3x10 Pink 3x10 Pink 3x12   Hip flex 2x10 2x10 3x10 Pink 3x10 3x10 Pink 3x10 Pink 3x10 Pink 3x12 held Pink 3x10 Pink 3x10 Pink 3x12   Weight shifts  5\"x10 10\"x5 15\"x4 15\"x4 15\"x4 20sx3 20sx3 20sx3 20sx3 20s x3 20sx3   PB rolls  2x10 3x10            Bike (ROM)  5 min 5 min 5 min 5 min 5 min 5 min lvl 2 5 min lvl 2 5 min lvl 2 5 min 5 min lvl 2 5 min lvl 2       3x10 " "3x10 3x10 3x10 3x12 held held 3x10 3x12   Ball squeeze      3\"x20 3\"x20 3\"x20 3\"x20 3\"x20 3\"x30 3\" 3x12   Ther Activity               Heel raises 2x10 2x10 3x10 3x10 3x10 3x10 3x10 3x12 3x12 3x10 3x10 3x12   Step ups    3x10 3x10 3x10 3x10 3x12 3x12 3x10 3x10 3x12   STS       3x10 3x12 3x12 3x10 3x10 3x12   Gait Training                                             Modalities                                                                        "

## 2023-12-27 ENCOUNTER — OFFICE VISIT (OUTPATIENT)
Dept: PHYSICAL THERAPY | Facility: REHABILITATION | Age: 48
End: 2023-12-27
Payer: OTHER GOVERNMENT

## 2023-12-27 DIAGNOSIS — M25.552 LEFT HIP PAIN: ICD-10-CM

## 2023-12-27 DIAGNOSIS — Z96.642 STATUS POST TOTAL REPLACEMENT OF LEFT HIP: Primary | ICD-10-CM

## 2023-12-27 PROCEDURE — 97110 THERAPEUTIC EXERCISES: CPT | Performed by: PHYSICAL THERAPIST

## 2023-12-27 PROCEDURE — 97112 NEUROMUSCULAR REEDUCATION: CPT | Performed by: PHYSICAL THERAPIST

## 2023-12-27 PROCEDURE — 97530 THERAPEUTIC ACTIVITIES: CPT | Performed by: PHYSICAL THERAPIST

## 2023-12-27 NOTE — PROGRESS NOTES
"Daily Note     Today's date: 2023  Patient name: Suzie Burrell  : 1975  MRN: [id number]  Referring provider: Abhi Edmond MD  Dx:   Encounter Diagnosis     ICD-10-CM    1. 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 Left hip pain  M25.552                      Subjective: patient states she fell over the weekend and landed on the hip but there has been no increase in pain      Objective: See treatment diary below      Assessment: Tolerated treatment well. Patient demonstrated fatigue post treatment, exhibited good technique with therapeutic exercises, and would benefit from continued PT Patient had a fall over the weekend. Hip seems to have tolerated it well. Patient continues to be able to progress strengthening      Plan: Continue per plan of care.      Precautions: L GI       Manuals    PROM                                                                Neuro Re-Ed                bridges 3x10 3x10 3x10 3x10 3x10 3x10 3x10 3x12 3x12 3x10 3x10 3x12 3x12   clamshells AROM 3x10 Pink 2x10 Pink 3x10 Pink 3x10 Pink 3x10 Pink 3x10 SL 3x10 SL 3x12 SL 3x12 SL 3x10 SL 3x10 SL 3x12 SL 3x12   sidesteps   Pink 3 laps Pink 3 laps 3 laps Pink 3 laps Pink 4 laps Pink 4 laps Pink 4 laps Pink 4 laps Pink 4 laps Pink 4 laps Pink 5 laps                                                                   Ther Ex                Hip abd 2x10 2x10 3x10 Pink 3x10 3x10 Pink 3x10 Pink 3x10 Pink 3x12 Pink 3x12 Pink 3x10 Pink 3x10 Pink 3x12 Pink 3x12   Hip ext 2x10 2x10 3x10 Pink 3x10 3x10 Pink 3x10 Pink 3x10 Pink 3x12 Pink 3x12 Pink 3x10 Pink 3x10 Pink 3x12 Pink 3x12   Hip flex 2x10 2x10 3x10 Pink 3x10 3x10 Pink 3x10 Pink 3x10 Pink 3x12 held Pink 3x10 Pink 3x10 Pink 3x12 Pink 3x12   Weight shifts  5\"x10 10\"x5 15\"x4 15\"x4 15\"x4 20sx3 20sx3 20sx3 20sx3 20s x3 20sx3 25sx3   PB rolls  2x10 3x10             Bike (ROM)  5 min 5 min 5 " "min 5 min 5 min 5 min lvl 2 5 min lvl 2 5 min lvl 2 5 min 5 min lvl 2 5 min lvl 2 5 min lvl 2   marches    3x10 3x10 3x10 3x10 3x12 held held 3x10 3x12 3x12   Leg press             75# 3x12   Ball squeeze      3\"x20 3\"x20 3\"x20 3\"x20 3\"x20 3\"x30 3\" 3x12 3x12   Ther Activity                Heel raises 2x10 2x10 3x10 3x10 3x10 3x10 3x10 3x12 3x12 3x10 3x10 3x12 3x12   Step ups    3x10 3x10 3x10 3x10 3x12 3x12 3x10 3x10 3x12 3x12   STS       3x10 3x12 3x12 3x10 3x10 3x12 3x12   Gait Training                                                Modalities                                                                             "

## 2023-12-29 ENCOUNTER — OFFICE VISIT (OUTPATIENT)
Dept: PHYSICAL THERAPY | Facility: REHABILITATION | Age: 48
End: 2023-12-29
Payer: OTHER GOVERNMENT

## 2023-12-29 DIAGNOSIS — M25.552 LEFT HIP PAIN: ICD-10-CM

## 2023-12-29 DIAGNOSIS — Z96.642 STATUS POST TOTAL REPLACEMENT OF LEFT HIP: Primary | ICD-10-CM

## 2023-12-29 PROCEDURE — 97530 THERAPEUTIC ACTIVITIES: CPT | Performed by: PHYSICAL THERAPIST

## 2023-12-29 PROCEDURE — 97112 NEUROMUSCULAR REEDUCATION: CPT | Performed by: PHYSICAL THERAPIST

## 2023-12-29 PROCEDURE — 97110 THERAPEUTIC EXERCISES: CPT | Performed by: PHYSICAL THERAPIST

## 2023-12-29 NOTE — PROGRESS NOTES
"Daily Note     Today's date: 2023  Patient name: Suzie Burrell  : 1975  MRN: [id number]  Referring provider: Abhi Edmond MD  Dx:   Encounter Diagnosis     ICD-10-CM    1. 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 Left hip pain  M25.552                      Subjective: patient states she is able to walk a little faster now      Objective: See treatment diary below      Assessment: Tolerated treatment well. Patient demonstrated fatigue post treatment, exhibited good technique with therapeutic exercises, and would benefit from continued PT Patient with improving hip strength. Mild limp still noted due to lateral hip weakness      Plan: Continue per plan of care.      Precautions: L GI       Manuals    PROM                                                                    Neuro Re-Ed                 bridges 3x10 3x10 3x10 3x10 3x10 3x10 3x10 3x12 3x12 3x10 3x10 3x12 3x12 3x12   clamshells AROM 3x10 Pink 2x10 Pink 3x10 Pink 3x10 Pink 3x10 Pink 3x10 SL 3x10 SL 3x12 SL 3x12 SL 3x10 SL 3x10 SL 3x12 SL 3x12 SL 3x12   sidesteps   Pink 3 laps Pink 3 laps 3 laps Pink 3 laps Pink 4 laps Pink 4 laps Pink 4 laps Pink 4 laps Pink 4 laps Pink 4 laps Pink 5 laps Grn 5 laps                                                                       Ther Ex                 Hip abd 2x10 2x10 3x10 Pink 3x10 3x10 Pink 3x10 Pink 3x10 Pink 3x12 Pink 3x12 Pink 3x10 Pink 3x10 Pink 3x12 Pink 3x12 Grn 3x12   Hip ext 2x10 2x10 3x10 Pink 3x10 3x10 Pink 3x10 Pink 3x10 Pink 3x12 Pink 3x12 Pink 3x10 Pink 3x10 Pink 3x12 Pink 3x12 Grn 3x12   Hip flex 2x10 2x10 3x10 Pink 3x10 3x10 Pink 3x10 Pink 3x10 Pink 3x12 held Pink 3x10 Pink 3x10 Pink 3x12 Pink 3x12 Grn 3x12   Weight shifts  5\"x10 10\"x5 15\"x4 15\"x4 15\"x4 20sx3 20sx3 20sx3 20sx3 20s x3 20sx3 25sx3 30sx3   PB rolls  2x10 3x10              Bike (ROM)  5 min 5 min 5 min 5 min 5 min 5 " "min lvl 2 5 min lvl 2 5 min lvl 2 5 min 5 min lvl 2 5 min lvl 2 5 min lvl 2 5 min lvl 3   marches    3x10 3x10 3x10 3x10 3x12 held held 3x10 3x12 3x12 3x12   Leg press             75# 3x12 75# 3x12   Ball squeeze      3\"x20 3\"x20 3\"x20 3\"x20 3\"x20 3\"x30 3\" 3x12 3x12 3x12   Ther Activity                 Heel raises 2x10 2x10 3x10 3x10 3x10 3x10 3x10 3x12 3x12 3x10 3x10 3x12 3x12 3x12   Step ups    3x10 3x10 3x10 3x10 3x12 3x12 3x10 3x10 3x12 3x12 3x12   STS       3x10 3x12 3x12 3x10 3x10 3x12 3x12 3x12   Gait Training                                                   Modalities                                                                                  "

## 2024-01-03 ENCOUNTER — OFFICE VISIT (OUTPATIENT)
Dept: PHYSICAL THERAPY | Facility: REHABILITATION | Age: 49
End: 2024-01-03
Payer: OTHER GOVERNMENT

## 2024-01-03 DIAGNOSIS — M25.552 LEFT HIP PAIN: ICD-10-CM

## 2024-01-03 DIAGNOSIS — Z96.642 STATUS POST TOTAL REPLACEMENT OF LEFT HIP: Primary | ICD-10-CM

## 2024-01-03 PROCEDURE — 97112 NEUROMUSCULAR REEDUCATION: CPT

## 2024-01-03 PROCEDURE — 97530 THERAPEUTIC ACTIVITIES: CPT

## 2024-01-03 PROCEDURE — 97110 THERAPEUTIC EXERCISES: CPT

## 2024-01-03 NOTE — PROGRESS NOTES
Daily Note     Today's date: 1/3/2024  Patient name: Suzie Burrell  : 1975  MRN: [id number]  Referring provider: Abhi Edmond MD  Dx:   Encounter Diagnosis     ICD-10-CM    1. 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 Left hip pain  M25.552           Start Time: 0848  Stop Time: 928  Total time in clinic (min): 40 minutes    Subjective: Patient reports having more ease with functional tasks such as ascending stairs and walking. Patient denies pain/soreness pre-tx.       Objective: See treatment diary below      Assessment: Patient tolerated treatment session well. Continued following prescribed POC and patient able to complete all TE without incident. Gradually increased reps this session to facilitate improved strength within LLE, especially hip abductors. Patient exhibited an appropriate amount of fatigue as expected. Provided occasional cuing t/o visit to avoid compensatory techniques and to reduce speed in which exercises were performed with good carryover noted. Patient would benefit from continued strengthening to restore level of function.         Plan: Continue per POC. Increase reps/resistance as tolerated.      Precautions: L GI       Manuals 11/13 11/16 11/20 11/22 11/27 11/29 12/4 12/6 12/11 12/13 12/18 12/22 12/27 12/29 1/3   PROM                                                                        Neuro Re-Ed                  bridges 3x10 3x10 3x10 3x10 3x10 3x10 3x10 3x12 3x12 3x10 3x10 3x12 3x12 3x12 3x15   clamshells AROM 3x10 Pink 2x10 Pink 3x10 Pink 3x10 Pink 3x10 Pink 3x10 SL 3x10 SL 3x12 SL 3x12 SL 3x10 SL 3x10 SL 3x12 SL 3x12 SL 3x12 SL 3x15   sidesteps   Pink 3 laps Pink 3 laps 3 laps Pink 3 laps Pink 4 laps Pink 4 laps Pink 4 laps Pink 4 laps Pink 4 laps Pink 4 laps Pink 5 laps Grn 5 laps Grn 5 laps                                                                            Ther Ex                  Hip abd 2x10 2x10 3x10 Pink 3x10 3x10 Pink 3x10 Pink  "3x10 Pink 3x12 Pink 3x12 Pink 3x10 Pink 3x10 Pink 3x12 Pink 3x12 Grn 3x12 Grn 3x15   Hip ext 2x10 2x10 3x10 Pink 3x10 3x10 Pink 3x10 Pink 3x10 Pink 3x12 Pink 3x12 Pink 3x10 Pink 3x10 Pink 3x12 Pink 3x12 Grn 3x12 Grn 3x15   Hip flex 2x10 2x10 3x10 Pink 3x10 3x10 Pink 3x10 Pink 3x10 Pink 3x12 held Pink 3x10 Pink 3x10 Pink 3x12 Pink 3x12 Grn 3x12 Grn 3x15   Weight shifts  5\"x10 10\"x5 15\"x4 15\"x4 15\"x4 20sx3 20sx3 20sx3 20sx3 20s x3 20sx3 25sx3 30sx3 30sx3   PB rolls  2x10 3x10               Bike (ROM)  5 min 5 min 5 min 5 min 5 min 5 min lvl 2 5 min lvl 2 5 min lvl 2 5 min 5 min lvl 2 5 min lvl 2 5 min lvl 2 5 min lvl 3 5 min lvl 3   marches    3x10 3x10 3x10 3x10 3x12 held held 3x10 3x12 3x12 3x12 3x15   Leg press             75# 3x12 75# 3x12 75# 3x12   Ball squeeze      3\"x20 3\"x20 3\"x20 3\"x20 3\"x20 3\"x30 3\" 3x12 3x12 3x12 3\" 3x15   Ther Activity                  Heel raises 2x10 2x10 3x10 3x10 3x10 3x10 3x10 3x12 3x12 3x10 3x10 3x12 3x12 3x12 3x15   Step ups    3x10 3x10 3x10 3x10 3x12 3x12 3x10 3x10 3x12 3x12 3x12 3x15   STS       3x10 3x12 3x12 3x10 3x10 3x12 3x12 3x12 3x15   Gait Training                                                      Modalities                                                                                       "

## 2024-01-04 ENCOUNTER — OFFICE VISIT (OUTPATIENT)
Dept: PHYSICAL THERAPY | Facility: REHABILITATION | Age: 49
End: 2024-01-04
Payer: OTHER GOVERNMENT

## 2024-01-04 DIAGNOSIS — M25.552 LEFT HIP PAIN: ICD-10-CM

## 2024-01-04 DIAGNOSIS — Z96.642 STATUS POST TOTAL REPLACEMENT OF LEFT HIP: Primary | ICD-10-CM

## 2024-01-04 PROCEDURE — 97110 THERAPEUTIC EXERCISES: CPT

## 2024-01-04 PROCEDURE — 97112 NEUROMUSCULAR REEDUCATION: CPT

## 2024-01-04 PROCEDURE — 97530 THERAPEUTIC ACTIVITIES: CPT

## 2024-01-04 NOTE — PROGRESS NOTES
Daily Note     Today's date: 2024  Patient name: Suzie Burrell  : 1975  MRN: [id number]  Referring provider: Abhi Edmond MD  Dx:   Encounter Diagnosis     ICD-10-CM    1. 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 Left hip pain  M25.552                      Subjective: Pt reports she is doing well, a little sore from last visit, but nothing out of the ordinary.       Objective: See treatment diary below      Assessment: Tolerated treatment well. Pt performed all exercises without issue, continue to progress to tolerance. Pt required occasional verbal cueing for correct form and speed with exercises, tended to speed up and lose form through sets. Pt would benefit from continued focus on strengthening and functional activity exercises. Patient demonstrated fatigue post treatment, exhibited good technique with therapeutic exercises, and would benefit from continued PT      Plan: Continue per plan of care.      Precautions: L GI       Manuals 11/13 11/16 11/20 11/22 11/27 11/29 12/4 12/6 12/11 12/13 12/18 12/22 12/27 12/29 1/3 1/4   PROM                                                                            Neuro Re-Ed                   bridges 3x10 3x10 3x10 3x10 3x10 3x10 3x10 3x12 3x12 3x10 3x10 3x12 3x12 3x12 3x15 3x15   clamshells AROM 3x10 Pink 2x10 Pink 3x10 Pink 3x10 Pink 3x10 Pink 3x10 SL 3x10 SL 3x12 SL 3x12 SL 3x10 SL 3x10 SL 3x12 SL 3x12 SL 3x12 SL 3x15 SL 3x15   sidesteps   Pink 3 laps Pink 3 laps 3 laps Pink 3 laps Pink 4 laps Pink 4 laps Pink 4 laps Pink 4 laps Pink 4 laps Pink 4 laps Pink 5 laps Grn 5 laps Grn 5 laps  Grn 5 laps                                                                               Ther Ex                   Hip abd 2x10 2x10 3x10 Pink 3x10 3x10 Pink 3x10 Pink 3x10 Pink 3x12 Pink 3x12 Pink 3x10 Pink 3x10 Pink 3x12 Pink 3x12 Grn 3x12 Grn 3x15 Grn 3x15   Hip ext 2x10 2x10 3x10 Pink 3x10 3x10 Pink 3x10 Pink 3x10 Pink 3x12 Pink 3x12 Pink  "3x10 Pink 3x10 Pink 3x12 Pink 3x12 Grn 3x12 Grn 3x15 Grn 3x15   Hip flex 2x10 2x10 3x10 Pink 3x10 3x10 Pink 3x10 Pink 3x10 Pink 3x12 held Pink 3x10 Pink 3x10 Pink 3x12 Pink 3x12 Grn 3x12 Grn 3x15 Grn 3x15   Weight shifts  5\"x10 10\"x5 15\"x4 15\"x4 15\"x4 20sx3 20sx3 20sx3 20sx3 20s x3 20sx3 25sx3 30sx3 30sx3 30sx3   PB rolls  2x10 3x10                Bike (ROM)  5 min 5 min 5 min 5 min 5 min 5 min lvl 2 5 min lvl 2 5 min lvl 2 5 min 5 min lvl 2 5 min lvl 2 5 min lvl 2 5 min lvl 3 5 min lvl 3 5 min lvl 3   marches    3x10 3x10 3x10 3x10 3x12 held held 3x10 3x12 3x12 3x12 3x15    Leg press             75# 3x12 75# 3x12 75# 3x12 75# 1x15  SL 65# 2x10   Ball squeeze      3\"x20 3\"x20 3\"x20 3\"x20 3\"x20 3\"x30 3\" 3x12 3x12 3x12 3\" 3x15 3\" 3x15   Ther Activity                   Heel raises 2x10 2x10 3x10 3x10 3x10 3x10 3x10 3x12 3x12 3x10 3x10 3x12 3x12 3x12 3x15 3x15   Step ups    3x10 3x10 3x10 3x10 3x12 3x12 3x10 3x10 3x12 3x12 3x12 3x15 3x15   STS       3x10 3x12 3x12 3x10 3x10 3x12 3x12 3x12 3x15 3x15   Gait Training                                                         Modalities                                                                                            "

## 2024-01-09 ENCOUNTER — OFFICE VISIT (OUTPATIENT)
Dept: PHYSICAL THERAPY | Facility: REHABILITATION | Age: 49
End: 2024-01-09
Payer: OTHER GOVERNMENT

## 2024-01-09 DIAGNOSIS — M25.552 LEFT HIP PAIN: ICD-10-CM

## 2024-01-09 DIAGNOSIS — Z96.642 STATUS POST TOTAL REPLACEMENT OF LEFT HIP: Primary | ICD-10-CM

## 2024-01-09 PROCEDURE — 97530 THERAPEUTIC ACTIVITIES: CPT | Performed by: PHYSICAL THERAPIST

## 2024-01-09 PROCEDURE — 97112 NEUROMUSCULAR REEDUCATION: CPT | Performed by: PHYSICAL THERAPIST

## 2024-01-09 PROCEDURE — 97110 THERAPEUTIC EXERCISES: CPT | Performed by: PHYSICAL THERAPIST

## 2024-01-09 NOTE — PROGRESS NOTES
Daily Note     Today's date: 2024  Patient name: Suzie Burrell  : 1975  MRN: [id number]  Referring provider: Abhi Edmond MD  Dx:   Encounter Diagnosis     ICD-10-CM    1. 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 Left hip pain  M25.552                      Subjective: patient states her daughter moved her leg a little further than it was used to and now she has a little discomfort but it isn't too bad      Objective: See treatment diary below      Assessment: Tolerated treatment well. Patient demonstrated fatigue post treatment, exhibited good technique with therapeutic exercises, and would benefit from continued PT Patient continues to progress well. Patient continues to have mild limp but overall that is improving      Plan: Continue per plan of care.      Precautions: L GI       Manuals 11/13 11/16 11/20 11/22 11/27 11/29 12/4 12/6 12/11 12/13 12/18 12/22 12/27 12/29 1/3 1/4 1/9   PROM                                                                                Neuro Re-Ed                    bridges 3x10 3x10 3x10 3x10 3x10 3x10 3x10 3x12 3x12 3x10 3x10 3x12 3x12 3x12 3x15 3x15 3x15   clamshells AROM 3x10 Pink 2x10 Pink 3x10 Pink 3x10 Pink 3x10 Pink 3x10 SL 3x10 SL 3x12 SL 3x12 SL 3x10 SL 3x10 SL 3x12 SL 3x12 SL 3x12 SL 3x15 SL 3x15 SL 3x15   sidesteps   Pink 3 laps Pink 3 laps 3 laps Pink 3 laps Pink 4 laps Pink 4 laps Pink 4 laps Pink 4 laps Pink 4 laps Pink 4 laps Pink 5 laps Grn 5 laps Grn 5 laps  Grn 5 laps Grn 5 laps                                                                                   Ther Ex                    Hip abd 2x10 2x10 3x10 Pink 3x10 3x10 Pink 3x10 Pink 3x10 Pink 3x12 Pink 3x12 Pink 3x10 Pink 3x10 Pink 3x12 Pink 3x12 Grn 3x12 Grn 3x15 Grn 3x15 Grn 3x15   Hip ext 2x10 2x10 3x10 Pink 3x10 3x10 Pink 3x10 Pink 3x10 Pink 3x12 Pink 3x12 Pink 3x10 Pink 3x10 Pink 3x12 Pink 3x12 Grn 3x12 Grn 3x15 Grn 3x15 Grn 3x15   Hip flex 2x10 2x10 3x10 Pink 3x10  "3x10 Pink 3x10 Pink 3x10 Pink 3x12 held Pink 3x10 Pink 3x10 Pink 3x12 Pink 3x12 Grn 3x12 Grn 3x15 Grn 3x15 Grn 3x15   Weight shifts  5\"x10 10\"x5 15\"x4 15\"x4 15\"x4 20sx3 20sx3 20sx3 20sx3 20s x3 20sx3 25sx3 30sx3 30sx3 30sx3    PB rolls  2x10 3x10                 Bike (ROM)  5 min 5 min 5 min 5 min 5 min 5 min lvl 2 5 min lvl 2 5 min lvl 2 5 min 5 min lvl 2 5 min lvl 2 5 min lvl 2 5 min lvl 3 5 min lvl 3 5 min lvl 3 5 min lvl 3   marches    3x10 3x10 3x10 3x10 3x12 held held 3x10 3x12 3x12 3x12 3x15     Leg press             75# 3x12 75# 3x12 75# 3x12 75# 1x15  SL 65# 2x10 65# 3x10 SL   Ball squeeze      3\"x20 3\"x20 3\"x20 3\"x20 3\"x20 3\"x30 3\" 3x12 3x12 3x12 3\" 3x15 3\" 3x15 3\" 3x15   Ther Activity                    Heel raises 2x10 2x10 3x10 3x10 3x10 3x10 3x10 3x12 3x12 3x10 3x10 3x12 3x12 3x12 3x15 3x15 3x15   Step ups    3x10 3x10 3x10 3x10 3x12 3x12 3x10 3x10 3x12 3x12 3x12 3x15 3x15 3x15   STS       3x10 3x12 3x12 3x10 3x10 3x12 3x12 3x12 3x15 3x15 3x15   Gait Training                                                            Modalities                                                                                                 "

## 2024-01-11 ENCOUNTER — OFFICE VISIT (OUTPATIENT)
Dept: PHYSICAL THERAPY | Facility: REHABILITATION | Age: 49
End: 2024-01-11
Payer: OTHER GOVERNMENT

## 2024-01-11 DIAGNOSIS — M25.552 LEFT HIP PAIN: ICD-10-CM

## 2024-01-11 DIAGNOSIS — Z96.642 STATUS POST TOTAL REPLACEMENT OF LEFT HIP: Primary | ICD-10-CM

## 2024-01-11 PROCEDURE — 97530 THERAPEUTIC ACTIVITIES: CPT

## 2024-01-11 PROCEDURE — 97110 THERAPEUTIC EXERCISES: CPT

## 2024-01-11 PROCEDURE — 97112 NEUROMUSCULAR REEDUCATION: CPT

## 2024-01-11 NOTE — PROGRESS NOTES
"Daily Note     Today's date: 2024  Patient name: Suzie Burrell  : 1975  MRN: [id number]  Referring provider: Abhi Edmond MD  Dx:   Encounter Diagnosis     ICD-10-CM    1. 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 Left hip pain  M25.552                      Subjective: Patient noted no new complaints.       Objective: See treatment diary below      Assessment: Tolerated treatment fair. Patient demonstrated fatigue post treatment, exhibited good technique with therapeutic exercises, and would benefit from continued PT. Patient was able to perform added balance exercises, tandem and SLS with No HHA. Challenged with SLS but able to perform.       Plan: Continue per plan of care.      Precautions: L GI       Manuals 1/11  11/20 11/22 11/27 11/29 12/4 12/6 12/11 12/13 12/18 12/22 12/27 12/29 1/3 1/4 1/9   PROM                                                                                Neuro Re-Ed                    bridges 3 x 15  3x10 3x10 3x10 3x10 3x10 3x12 3x12 3x10 3x10 3x12 3x12 3x12 3x15 3x15 3x15   clamshells SL 3 x 15  Pink 3x10 Pink 3x10 Pink 3x10 Pink 3x10 SL 3x10 SL 3x12 SL 3x12 SL 3x10 SL 3x10 SL 3x12 SL 3x12 SL 3x12 SL 3x15 SL 3x15 SL 3x15   sidesteps nv  Pink 3 laps Pink 3 laps 3 laps Pink 3 laps Pink 4 laps Pink 4 laps Pink 4 laps Pink 4 laps Pink 4 laps Pink 4 laps Pink 5 laps Grn 5 laps Grn 5 laps  Grn 5 laps Grn 5 laps   SLS 15\" x 4                   Tandem 20\" x 2 ea                                                           Ther Ex                    Hip abd Grn 3 x 15  3x10 Pink 3x10 3x10 Pink 3x10 Pink 3x10 Pink 3x12 Pink 3x12 Pink 3x10 Pink 3x10 Pink 3x12 Pink 3x12 Grn 3x12 Grn 3x15 Grn 3x15 Grn 3x15   Hip ext Grn 3 x 15  3x10 Pink 3x10 3x10 Pink 3x10 Pink 3x10 Pink 3x12 Pink 3x12 Pink 3x10 Pink 3x10 Pink 3x12 Pink 3x12 Grn 3x12 Grn 3x15 Grn 3x15 Grn 3x15   Hip flex Grn 3 x 15  3x10 Pink 3x10 3x10 Pink 3x10 Pink 3x10 Pink 3x12 held Pink 3x10 Pink 3x10 " "Pink 3x12 Pink 3x12 Grn 3x12 Grn 3x15 Grn 3x15 Grn 3x15   Weight shifts   10\"x5 15\"x4 15\"x4 15\"x4 20sx3 20sx3 20sx3 20sx3 20s x3 20sx3 25sx3 30sx3 30sx3 30sx3    PB rolls   3x10                 Bike (ROM) 5min lvl 3   5 min 5 min 5 min 5 min 5 min lvl 2 5 min lvl 2 5 min lvl 2 5 min 5 min lvl 2 5 min lvl 2 5 min lvl 2 5 min lvl 3 5 min lvl 3 5 min lvl 3 5 min lvl 3   marches 3 x 15   3x10 3x10 3x10 3x10 3x12 held held 3x10 3x12 3x12 3x12 3x15     Leg press 65#   3 x 10            75# 3x12 75# 3x12 75# 3x12 75# 1x15  SL 65# 2x10 65# 3x10 SL   Ball squeeze 3\"   3 x 15     3\"x20 3\"x20 3\"x20 3\"x20 3\"x20 3\"x30 3\" 3x12 3x12 3x12 3\" 3x15 3\" 3x15 3\" 3x15   Ther Activity                    Heel raises 3 x 15  3x10 3x10 3x10 3x10 3x10 3x12 3x12 3x10 3x10 3x12 3x12 3x12 3x15 3x15 3x15   Step ups 3 x 15   3x10 3x10 3x10 3x10 3x12 3x12 3x10 3x10 3x12 3x12 3x12 3x15 3x15 3x15   STS 3 x 15      3x10 3x12 3x12 3x10 3x10 3x12 3x12 3x12 3x15 3x15 3x15   Gait Training                                                            Modalities                                                                                                   "

## 2024-01-17 ENCOUNTER — OFFICE VISIT (OUTPATIENT)
Dept: PHYSICAL THERAPY | Facility: REHABILITATION | Age: 49
End: 2024-01-17
Payer: OTHER GOVERNMENT

## 2024-01-17 DIAGNOSIS — M25.552 LEFT HIP PAIN: ICD-10-CM

## 2024-01-17 DIAGNOSIS — Z96.642 STATUS POST TOTAL REPLACEMENT OF LEFT HIP: Primary | ICD-10-CM

## 2024-01-17 PROCEDURE — 97112 NEUROMUSCULAR REEDUCATION: CPT | Performed by: PHYSICAL THERAPIST

## 2024-01-17 PROCEDURE — 97110 THERAPEUTIC EXERCISES: CPT | Performed by: PHYSICAL THERAPIST

## 2024-01-17 PROCEDURE — 97530 THERAPEUTIC ACTIVITIES: CPT | Performed by: PHYSICAL THERAPIST

## 2024-01-17 NOTE — PROGRESS NOTES
"Daily Note     Today's date: 2024  Patient name: Suzie Burrell  : 1975  MRN: [id number]  Referring provider: Abhi Edmond MD  Dx:   Encounter Diagnosis     ICD-10-CM    1. 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 Left hip pain  M25.552                      Subjective: patient states her hip is getting stronger week to week      Objective: See treatment diary below      Assessment: Tolerated treatment well. Patient demonstrated fatigue post treatment, exhibited good technique with therapeutic exercises, and would benefit from continued PT Patient is doing well. She is progressing every week. Patient with continual antalgic gait due to lateral hip weakness      Plan: Continue per plan of care.      Precautions: L GI       Manuals 1/11 1/17 11/20 11/22 11/27 11/29 12/4 12/6 12/11 12/13 12/18 12/22 12/27 12/29 1/3 1/4 1/9   PROM                                                                                Neuro Re-Ed                    bridges 3 x 15 3x15 3x10 3x10 3x10 3x10 3x10 3x12 3x12 3x10 3x10 3x12 3x12 3x12 3x15 3x15 3x15   clamshells SL 3 x 15 SL 3x15 Pink 3x10 Pink 3x10 Pink 3x10 Pink 3x10 SL 3x10 SL 3x12 SL 3x12 SL 3x10 SL 3x10 SL 3x12 SL 3x12 SL 3x12 SL 3x15 SL 3x15 SL 3x15   sidesteps nv Blue 5 laps Pink 3 laps Pink 3 laps 3 laps Pink 3 laps Pink 4 laps Pink 4 laps Pink 4 laps Pink 4 laps Pink 4 laps Pink 4 laps Pink 5 laps Grn 5 laps Grn 5 laps  Grn 5 laps Grn 5 laps   SLS 15\" x 4 20sx3                  Tandem 20\" x 2 ea 20sx3                                                          Ther Ex                    Hip abd Grn 3 x 15 Blue 3x15 3x10 Pink 3x10 3x10 Pink 3x10 Pink 3x10 Pink 3x12 Pink 3x12 Pink 3x10 Pink 3x10 Pink 3x12 Pink 3x12 Grn 3x12 Grn 3x15 Grn 3x15 Grn 3x15   Hip ext Grn 3 x 15 Blue 3x15 3x10 Pink 3x10 3x10 Pink 3x10 Pink 3x10 Pink 3x12 Pink 3x12 Pink 3x10 Pink 3x10 Pink 3x12 Pink 3x12 Grn 3x12 Grn 3x15 Grn 3x15 Grn 3x15   Hip flex Grn 3 x 15 Blue " "3x15 3x10 Pink 3x10 3x10 Pink 3x10 Pink 3x10 Pink 3x12 held Pink 3x10 Pink 3x10 Pink 3x12 Pink 3x12 Grn 3x12 Grn 3x15 Grn 3x15 Grn 3x15   Weight shifts   10\"x5 15\"x4 15\"x4 15\"x4 20sx3 20sx3 20sx3 20sx3 20s x3 20sx3 25sx3 30sx3 30sx3 30sx3    PB rolls   3x10                 Bike (ROM) 5min lvl 3  5 min lvl 3 5 min 5 min 5 min 5 min 5 min lvl 2 5 min lvl 2 5 min lvl 2 5 min 5 min lvl 2 5 min lvl 2 5 min lvl 2 5 min lvl 3 5 min lvl 3 5 min lvl 3 5 min lvl 3   marches 3 x 15 3x15  3x10 3x10 3x10 3x10 3x12 held held 3x10 3x12 3x12 3x12 3x15     Leg press 65#   3 x 10 75# 3x10           75# 3x12 75# 3x12 75# 3x12 75# 1x15  SL 65# 2x10 65# 3x10 SL   Ball squeeze 3\"   3 x 15 3\" 3x15    3\"x20 3\"x20 3\"x20 3\"x20 3\"x20 3\"x30 3\" 3x12 3x12 3x12 3\" 3x15 3\" 3x15 3\" 3x15   Ther Activity                    Heel raises 3 x 15 3x15 3x10 3x10 3x10 3x10 3x10 3x12 3x12 3x10 3x10 3x12 3x12 3x12 3x15 3x15 3x15   Step ups 3 x 15 3x15  3x10 3x10 3x10 3x10 3x12 3x12 3x10 3x10 3x12 3x12 3x12 3x15 3x15 3x15   STS 3 x 15 3x15     3x10 3x12 3x12 3x10 3x10 3x12 3x12 3x12 3x15 3x15 3x15   Gait Training                                                            Modalities                                                                                                     "

## 2024-01-18 ENCOUNTER — OFFICE VISIT (OUTPATIENT)
Dept: PHYSICAL THERAPY | Facility: REHABILITATION | Age: 49
End: 2024-01-18
Payer: OTHER GOVERNMENT

## 2024-01-18 DIAGNOSIS — M25.552 LEFT HIP PAIN: ICD-10-CM

## 2024-01-18 DIAGNOSIS — Z96.642 STATUS POST TOTAL REPLACEMENT OF LEFT HIP: Primary | ICD-10-CM

## 2024-01-18 PROCEDURE — 97110 THERAPEUTIC EXERCISES: CPT

## 2024-01-18 PROCEDURE — 97530 THERAPEUTIC ACTIVITIES: CPT

## 2024-01-18 PROCEDURE — 97112 NEUROMUSCULAR REEDUCATION: CPT

## 2024-01-18 NOTE — PROGRESS NOTES
"Daily Note     Today's date: 2024  Patient name: Suzie Burrell  : 1975  MRN: [id number]  Referring provider: Abhi Edmond MD  Dx:   Encounter Diagnosis     ICD-10-CM    1. 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 Left hip pain  M25.552           Start Time: 1400  Stop Time: 1445  Total time in clinic (min): 45 minutes    Subjective: Patient states that she is feeling a bit sore today after her session yesterday, but feels good enough to do everything today.       Objective: See treatment diary below      Assessment: Tolerated treatment well. Maintained the same protocol as her last session due to back to back appointments. Tolerated today's session well with no reports of pain. Patient demonstrated fatigue post treatment, exhibited good technique with therapeutic exercises, and would benefit from continued PT.      Plan: Continue per plan of care.  Progress treatment as tolerated.       Precautions: L GI       Manuals 1/11 1/17 1/18 11/22 11/27 11/29 12/4 12/6 12/11 12/13 12/18 12/22 12/27 12/29 1/3 1/4 1/9   PROM                                                                                Neuro Re-Ed                    bridges 3 x 15 3x15 3x15 3x10 3x10 3x10 3x10 3x12 3x12 3x10 3x10 3x12 3x12 3x12 3x15 3x15 3x15   clamshells SL 3 x 15 SL 3x15 SL 3x15 Pink 3x10 Pink 3x10 Pink 3x10 SL 3x10 SL 3x12 SL 3x12 SL 3x10 SL 3x10 SL 3x12 SL 3x12 SL 3x12 SL 3x15 SL 3x15 SL 3x15   sidesteps nv Blue 5 laps nv Pink 3 laps 3 laps Pink 3 laps Pink 4 laps Pink 4 laps Pink 4 laps Pink 4 laps Pink 4 laps Pink 4 laps Pink 5 laps Grn 5 laps Grn 5 laps  Grn 5 laps Grn 5 laps   SLS 15\" x 4 20sx3 20\"x3                 Tandem 20\" x 2 ea 20sx3 20\"x3                                                         Ther Ex                    Hip abd Grn 3 x 15 Blue 3x15 Blue 3x15 Pink 3x10 3x10 Pink 3x10 Pink 3x10 Pink 3x12 Pink 3x12 Pink 3x10 Pink 3x10 Pink 3x12 Pink 3x12 Grn 3x12 Grn 3x15 Grn 3x15 Grn 3x15 " "  Hip ext Grn 3 x 15 Blue 3x15 Blue 3x15 Pink 3x10 3x10 Pink 3x10 Pink 3x10 Pink 3x12 Pink 3x12 Pink 3x10 Pink 3x10 Pink 3x12 Pink 3x12 Grn 3x12 Grn 3x15 Grn 3x15 Grn 3x15   Hip flex Grn 3 x 15 Blue 3x15 Blue 3x15 Pink 3x10 3x10 Pink 3x10 Pink 3x10 Pink 3x12 held Pink 3x10 Pink 3x10 Pink 3x12 Pink 3x12 Grn 3x12 Grn 3x15 Grn 3x15 Grn 3x15   Weight shifts    15\"x4 15\"x4 15\"x4 20sx3 20sx3 20sx3 20sx3 20s x3 20sx3 25sx3 30sx3 30sx3 30sx3    PB rolls                    Bike (ROM) 5min lvl 3  5 min lvl 3 5 min lvl 3 5 min 5 min 5 min 5 min lvl 2 5 min lvl 2 5 min lvl 2 5 min 5 min lvl 2 5 min lvl 2 5 min lvl 2 5 min lvl 3 5 min lvl 3 5 min lvl 3 5 min lvl 3   marches 3 x 15 3x15 3x15 3x10 3x10 3x10 3x10 3x12 held held 3x10 3x12 3x12 3x12 3x15     Leg press 65#   3 x 10 75# 3x10 75# 3x10          75# 3x12 75# 3x12 75# 3x12 75# 1x15  SL 65# 2x10 65# 3x10 SL   Ball squeeze 3\"   3 x 15 3\" 3x15 3\" 3x15   3\"x20 3\"x20 3\"x20 3\"x20 3\"x20 3\"x30 3\" 3x12 3x12 3x12 3\" 3x15 3\" 3x15 3\" 3x15   Ther Activity                    Heel raises 3 x 15 3x15 3x15 3x10 3x10 3x10 3x10 3x12 3x12 3x10 3x10 3x12 3x12 3x12 3x15 3x15 3x15   Step ups 3 x 15 3x15 3x15 3x10 3x10 3x10 3x10 3x12 3x12 3x10 3x10 3x12 3x12 3x12 3x15 3x15 3x15   STS 3 x 15 3x15 3x15    3x10 3x12 3x12 3x10 3x10 3x12 3x12 3x12 3x15 3x15 3x15   Gait Training                                                            Modalities                                                                                                       "

## 2024-01-19 ENCOUNTER — APPOINTMENT (OUTPATIENT)
Dept: PHYSICAL THERAPY | Facility: REHABILITATION | Age: 49
End: 2024-01-19
Payer: OTHER GOVERNMENT

## 2024-01-23 ENCOUNTER — OFFICE VISIT (OUTPATIENT)
Dept: PHYSICAL THERAPY | Facility: REHABILITATION | Age: 49
End: 2024-01-23
Payer: OTHER GOVERNMENT

## 2024-01-23 DIAGNOSIS — Z96.642 STATUS POST TOTAL REPLACEMENT OF LEFT HIP: Primary | ICD-10-CM

## 2024-01-23 DIAGNOSIS — M25.552 LEFT HIP PAIN: ICD-10-CM

## 2024-01-23 PROCEDURE — 97110 THERAPEUTIC EXERCISES: CPT | Performed by: PHYSICAL THERAPIST

## 2024-01-23 PROCEDURE — 97530 THERAPEUTIC ACTIVITIES: CPT | Performed by: PHYSICAL THERAPIST

## 2024-01-23 PROCEDURE — 97112 NEUROMUSCULAR REEDUCATION: CPT | Performed by: PHYSICAL THERAPIST

## 2024-01-23 NOTE — PROGRESS NOTES
"Daily Note     Today's date: 2024  Patient name: Suzie Burrell  : 1975  MRN: [id number]  Referring provider: Abhi Edmond MD  Dx:   Encounter Diagnosis     ICD-10-CM    1. 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 Left hip pain  M25.552                      Subjective: patient states her hip is getting stronger       Objective: See treatment diary below      Assessment: Tolerated treatment well. Patient demonstrated fatigue post treatment, exhibited good technique with therapeutic exercises, and would benefit from continued PT Patient continues to progress well. She continues to have a mild limp due to lateral hip weakness but it is improving. Improving stair climbing ability      Plan: Continue per plan of care.      Precautions: L GI       Manuals 1/11 1/17 1/18 1/23 11/22 11/27 11/29 12/4 12/6 12/11 12/13 12/18 12/22 12/27 12/29 1/3 1/4 1/9   PROM                                                                                    Neuro Re-Ed                     bridges 3 x 15 3x15 3x15 3x15 3x10 3x10 3x10 3x10 3x12 3x12 3x10 3x10 3x12 3x12 3x12 3x15 3x15 3x15   clamshells SL 3 x 15 SL 3x15 SL 3x15 SL 3x15 Pink 3x10 Pink 3x10 Pink 3x10 SL 3x10 SL 3x12 SL 3x12 SL 3x10 SL 3x10 SL 3x12 SL 3x12 SL 3x12 SL 3x15 SL 3x15 SL 3x15   sidesteps nv Blue 5 laps nv Blue 3x15 Pink 3 laps 3 laps Pink 3 laps Pink 4 laps Pink 4 laps Pink 4 laps Pink 4 laps Pink 4 laps Pink 4 laps Pink 5 laps Grn 5 laps Grn 5 laps  Grn 5 laps Grn 5 laps   SLS 15\" x 4 20sx3 20\"x3 20sx3                 Tandem 20\" x 2 ea 20sx3 20\"x3 20sx3                                                           Ther Ex                     Hip abd Grn 3 x 15 Blue 3x15 Blue 3x15 Blue 3x15 Pink 3x10 3x10 Pink 3x10 Pink 3x10 Pink 3x12 Pink 3x12 Pink 3x10 Pink 3x10 Pink 3x12 Pink 3x12 Grn 3x12 Grn 3x15 Grn 3x15 Grn 3x15   Hip ext Grn 3 x 15 Blue 3x15 Blue 3x15 Blue 3x15 Pink 3x10 3x10 Pink 3x10 Pink 3x10 Pink 3x12 Pink 3x12 Pink " "3x10 Pink 3x10 Pink 3x12 Pink 3x12 Grn 3x12 Grn 3x15 Grn 3x15 Grn 3x15   Hip flex Grn 3 x 15 Blue 3x15 Blue 3x15 Blue 3x15 Pink 3x10 3x10 Pink 3x10 Pink 3x10 Pink 3x12 held Pink 3x10 Pink 3x10 Pink 3x12 Pink 3x12 Grn 3x12 Grn 3x15 Grn 3x15 Grn 3x15   Weight shifts     15\"x4 15\"x4 15\"x4 20sx3 20sx3 20sx3 20sx3 20s x3 20sx3 25sx3 30sx3 30sx3 30sx3    PB rolls                     Bike (ROM) 5min lvl 3  5 min lvl 3 5 min lvl 3 5 min lvl 3 5 min 5 min 5 min 5 min lvl 2 5 min lvl 2 5 min lvl 2 5 min 5 min lvl 2 5 min lvl 2 5 min lvl 2 5 min lvl 3 5 min lvl 3 5 min lvl 3 5 min lvl 3   marches 3 x 15 3x15 3x15 3x15 3x10 3x10 3x10 3x10 3x12 held held 3x10 3x12 3x12 3x12 3x15     Leg press 65#   3 x 10 75# 3x10 75# 3x10 75# 3x10          75# 3x12 75# 3x12 75# 3x12 75# 1x15  SL 65# 2x10 65# 3x10 SL   Ball squeeze 3\"   3 x 15 3\" 3x15 3\" 3x15 3\" 3x15   3\"x20 3\"x20 3\"x20 3\"x20 3\"x20 3\"x30 3\" 3x12 3x12 3x12 3\" 3x15 3\" 3x15 3\" 3x15   Ther Activity                     Heel raises 3 x 15 3x15 3x15 3x15 3x10 3x10 3x10 3x10 3x12 3x12 3x10 3x10 3x12 3x12 3x12 3x15 3x15 3x15   Step ups 3 x 15 3x15 3x15 3x15 3x10 3x10 3x10 3x10 3x12 3x12 3x10 3x10 3x12 3x12 3x12 3x15 3x15 3x15   STS 3 x 15 3x15 3x15 3x15    3x10 3x12 3x12 3x10 3x10 3x12 3x12 3x12 3x15 3x15 3x15   Gait Training                                                               Modalities                                                                                                            "

## 2024-01-25 ENCOUNTER — OFFICE VISIT (OUTPATIENT)
Dept: OBGYN CLINIC | Facility: CLINIC | Age: 49
End: 2024-01-25

## 2024-01-25 VITALS
HEIGHT: 65 IN | SYSTOLIC BLOOD PRESSURE: 106 MMHG | DIASTOLIC BLOOD PRESSURE: 74 MMHG | HEART RATE: 78 BPM | WEIGHT: 178 LBS | BODY MASS INDEX: 29.66 KG/M2

## 2024-01-25 DIAGNOSIS — Z96.642 STATUS POST TOTAL REPLACEMENT OF LEFT HIP: Primary | ICD-10-CM

## 2024-01-25 PROBLEM — M16.12 PRIMARY OSTEOARTHRITIS OF LEFT HIP: Status: RESOLVED | Noted: 2020-09-28 | Resolved: 2024-01-25

## 2024-01-25 PROCEDURE — 99024 POSTOP FOLLOW-UP VISIT: CPT | Performed by: STUDENT IN AN ORGANIZED HEALTH CARE EDUCATION/TRAINING PROGRAM

## 2024-01-25 NOTE — LETTER
January 25, 2024     Patient: Suzie Burrell  YOB: 1975  Date of Visit: 1/25/2024      To Whom it May Concern:    Suzie Burrell is under my professional care. Suzie was seen in my office on 1/25/2024. Suzie may return to work on 2/5/24 with no restrictions .    If you have any questions or concerns, please don't hesitate to call.         Sincerely,          Abhi Edmond MD        CC: No Recipients

## 2024-01-26 ENCOUNTER — OFFICE VISIT (OUTPATIENT)
Dept: PHYSICAL THERAPY | Facility: REHABILITATION | Age: 49
End: 2024-01-26
Payer: OTHER GOVERNMENT

## 2024-01-26 DIAGNOSIS — Z96.642 STATUS POST TOTAL REPLACEMENT OF LEFT HIP: Primary | ICD-10-CM

## 2024-01-26 DIAGNOSIS — M25.552 LEFT HIP PAIN: ICD-10-CM

## 2024-01-26 PROCEDURE — 97110 THERAPEUTIC EXERCISES: CPT | Performed by: PHYSICAL THERAPIST

## 2024-01-26 PROCEDURE — 97112 NEUROMUSCULAR REEDUCATION: CPT | Performed by: PHYSICAL THERAPIST

## 2024-01-26 PROCEDURE — 97530 THERAPEUTIC ACTIVITIES: CPT | Performed by: PHYSICAL THERAPIST

## 2024-01-26 NOTE — PROGRESS NOTES
"Daily Note     Today's date: 2024  Patient name: Suzie Burrell  : 1975  MRN: [id number]  Referring provider: Abhi Edmond MD  Dx:   Encounter Diagnosis     ICD-10-CM    1. 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 Left hip pain  M25.552                      Subjective: patient states she went to the dr and she is cleared to go back to work [employer]      Objective: See treatment diary below      Assessment: Tolerated treatment well. Patient demonstrated fatigue post treatment, exhibited good technique with therapeutic exercises, and would benefit from continued PT Patient with improving hip strength. Patient should be ready to return to work  as dr suggested       Plan: Continue per plan of care.      Precautions: L GI       Manuals 1/11 1/17 1/18 1/23 1/26 11/22 11/27 11/29 12/4 12/6 12/11 12/13 12/18 12/22 12/27 12/29 1/3 1/4 1/9   PROM                                                                                        Neuro Re-Ed                      bridges 3 x 15 3x15 3x15 3x15 3x15 3x10 3x10 3x10 3x10 3x12 3x12 3x10 3x10 3x12 3x12 3x12 3x15 3x15 3x15   clamshells SL 3 x 15 SL 3x15 SL 3x15 SL 3x15 SL 3x15 Pink 3x10 Pink 3x10 Pink 3x10 SL 3x10 SL 3x12 SL 3x12 SL 3x10 SL 3x10 SL 3x12 SL 3x12 SL 3x12 SL 3x15 SL 3x15 SL 3x15   sidesteps nv Blue 5 laps nv Blue 3x15 Blue 3x15 Pink 3 laps 3 laps Pink 3 laps Pink 4 laps Pink 4 laps Pink 4 laps Pink 4 laps Pink 4 laps Pink 4 laps Pink 5 laps Grn 5 laps Grn 5 laps  Grn 5 laps Grn 5 laps   SLS 15\" x 4 20sx3 20\"x3 20sx3 20sx3                 Tandem 20\" x 2 ea 20sx3 20\"x3 20sx3 20sx3                                                             Ther Ex                      Hip abd Grn 3 x 15 Blue 3x15 Blue 3x15 Blue 3x15 Blue 3x15 Pink 3x10 3x10 Pink 3x10 Pink 3x10 Pink 3x12 Pink 3x12 Pink 3x10 Pink 3x10 Pink 3x12 Pink 3x12 Grn 3x12 Grn 3x15 Grn 3x15 Grn 3x15   Hip ext Grn 3 x 15 Blue 3x15 Blue 3x15 Blue 3x15 Blue 3x15 Pink 3x10 " "3x10 Pink 3x10 Pink 3x10 Pink 3x12 Pink 3x12 Pink 3x10 Pink 3x10 Pink 3x12 Pink 3x12 Grn 3x12 Grn 3x15 Grn 3x15 Grn 3x15   Hip flex Grn 3 x 15 Blue 3x15 Blue 3x15 Blue 3x15 Blue 3x15 Pink 3x10 3x10 Pink 3x10 Pink 3x10 Pink 3x12 held Pink 3x10 Pink 3x10 Pink 3x12 Pink 3x12 Grn 3x12 Grn 3x15 Grn 3x15 Grn 3x15   Weight shifts      15\"x4 15\"x4 15\"x4 20sx3 20sx3 20sx3 20sx3 20s x3 20sx3 25sx3 30sx3 30sx3 30sx3    PB rolls                      Bike (ROM) 5min lvl 3  5 min lvl 3 5 min lvl 3 5 min lvl 3 5 min lvl 4 5 min 5 min 5 min 5 min lvl 2 5 min lvl 2 5 min lvl 2 5 min 5 min lvl 2 5 min lvl 2 5 min lvl 2 5 min lvl 3 5 min lvl 3 5 min lvl 3 5 min lvl 3   marches 3 x 15 3x15 3x15 3x15 3x15 3x10 3x10 3x10 3x10 3x12 held held 3x10 3x12 3x12 3x12 3x15     Leg press 65#   3 x 10 75# 3x10 75# 3x10 75# 3x10 75# 3x12          75# 3x12 75# 3x12 75# 3x12 75# 1x15  SL 65# 2x10 65# 3x10 SL   Ball squeeze 3\"   3 x 15 3\" 3x15 3\" 3x15 3\" 3x15 3\"3x15   3\"x20 3\"x20 3\"x20 3\"x20 3\"x20 3\"x30 3\" 3x12 3x12 3x12 3\" 3x15 3\" 3x15 3\" 3x15   Ther Activity                      Heel raises 3 x 15 3x15 3x15 3x15 3x15 3x10 3x10 3x10 3x10 3x12 3x12 3x10 3x10 3x12 3x12 3x12 3x15 3x15 3x15   Step ups 3 x 15 3x15 3x15 3x15 3x15 3x10 3x10 3x10 3x10 3x12 3x12 3x10 3x10 3x12 3x12 3x12 3x15 3x15 3x15   STS 3 x 15 3x15 3x15 3x15 3x15    3x10 3x12 3x12 3x10 3x10 3x12 3x12 3x12 3x15 3x15 3x15   Gait Training                                                                  Modalities                                                                                                                 "

## 2024-01-26 NOTE — ASSESSMENT & PLAN NOTE
Patient 3 months status post left total hip replacement.  Doing well     -Weightbearing as tolerated left lower extremity  -Continue physical therapy  -P.o. meloxicam and Tylenol as needed for any discomfort  -Work note provided.  She may return to work with no restrictions  -I would like to see patient back for her 1 year follow-up with repeat x-rays

## 2024-01-26 NOTE — PROGRESS NOTES
Date: 24  Suzie Burrell   MRN# [id number]  : 1975      Chief Complaint: Post op Total Hip Arthroplasty    Assessment and Plan:    Status post total replacement of left hip  Patient 3 months status post left total hip replacement.  Doing well     -Weightbearing as tolerated left lower extremity  -Continue physical therapy  -P.o. meloxicam and Tylenol as needed for any discomfort  -Work note provided.  She may return to work with no restrictions  -I would like to see patient back for her 1 year follow-up with repeat x-rays       Subjective:     Patient is 3 months s/p left primary total hip arthroplasty.    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    Arthritis     It was last year    COVID-19 2022    History of colon polyps 2022    HPV (human papilloma virus) infection     Hyperlipidemia     IFG (impaired fasting glucose)     Internal hemorrhoids 2022    denies    Kidney stone 2015    Kidney stones, calcium oxalate 2015    Obesity     Pneumonia     Resolved 2017     Pre-diabetes     Scoliosis     Tremor     Left Hand Resting Tremor    UTI (urinary tract infection)     Varicella     Visual impairment     Wear glasses     Past Surgical History:  Past Surgical History:   Procedure Laterality Date    APPENDECTOMY  10/14/2018    Appendix taken out     SECTION       x 1 ; Failure to progress    COLONOSCOPY      FL INJECTION LEFT HIP (NON ARTHROGRAM)  10/12/2020    GA ARTHRP ACETBLR/PROX FEM PROSTC AGRFT/ALGRFT Left 2023    Procedure: ARTHROPLASTY HIP TOTAL, LEFT POSTERIOR, SAME DAY DISCHARGE;  Surgeon: Abhi  MD Feli;  Location: [facility];  Service: Orthopedics    CO LAPAROSCOPIC APPENDECTOMY N/A 10/14/2018    Procedure: APPENDECTOMY LAPAROSCOPIC;  Surgeon: Zohaib Schwarz MD;  Location: [facility];  Service: General    WISDOM TOOTH EXTRACTION       Family History:  Family History   Problem Relation Age of Onset    Hypertension Father     Alcohol abuse Father     Kidney disease Father         CKD on ESRD    Diabetes type II Father 50    No Known Problems Mother     Scoliosis Brother     Hip dysplasia Daughter     No Known Problems Maternal Grandmother     No Known Problems Maternal Grandfather     No Known Problems Paternal Grandmother     No Known Problems Paternal Grandfather     No Known Problems Maternal Aunt     No Known Problems Maternal Aunt     No Known Problems Paternal Aunt     No Known Problems Paternal Aunt     No Known Problems Paternal Aunt     No Known Problems Paternal Aunt      Social History:  Social History     Substance and Sexual Activity   Alcohol Use Yes    Alcohol/week: 1.0 standard drink of alcohol    Types: 1 Glasses of wine per week    Comment: rare     Social History     Substance and Sexual Activity   Drug Use Never     Social History     Tobacco Use   Smoking Status Never   Smokeless Tobacco Never             Review of Systems:  General- denies fever/chills  HEENT- denies hearing loss or sore throat  Eyes- denies eye pain or visual disturbances, denies red eyes  Respiratory- denies cough or SOB  Cardio- denies chest pain or palpitations  GI- denies abdominal pain  Endocrine- denies urinary frequency  Urinary- denies pain with urination  Musculoskeletal- Negative except noted above  Skin- denies rashes or wounds  Neurological- denies dizziness or headache  Psychiatric- denies anxiety or difficulty concentrating    Objective:   BP Readings from Last 1 Encounters:   01/25/24 106/74      Wt Readings from Last 1 Encounters:   01/25/24 80.7 kg (178 lb)      Pulse Readings from Last 1  "Encounters:   01/25/24 78        BMI: Estimated body mass index is 29.62 kg/m² as calculated from the following:    Height as of this encounter: 5' 5\" (1.651 m).    Weight as of this encounter: 80.7 kg (178 lb).    Physical Exam  /74   Pulse 78   Ht 5' 5\" (1.651 m)   Wt 80.7 kg (178 lb)   LMP 01/24/2024 (Exact Date)   BMI 29.62 kg/m²   General/Constitutional: No apparent distress: well-nourished and well developed.  Eyes: normal ocular motion  Cardio: RRR, Normal S1S2, No m/r/g.   Lymphatic: No appreciable lymphadenopathy  Respiratory: Non-labored breathing, CTA b/l no w/c/r  Vascular: No edema, swelling or tenderness, except as noted in detailed exam. Extremities well perfused. No LE edema  Integumentary: No impressive skin lesions present, except as noted in detailed exam.  Neuro: No ataxia or tremors noted  Psych: Normal mood and affect, oriented to person, place and time. Appropriate affect.  Musculoskeletal: Normal, except as noted in detailed exam and in HPI.    Gait and Station:   normal    left Hip Examination  Incision: Well-healed  Erythema: Absent  Ecchymosis: Absent  Hip ROM:    painless  Flexion: 90 degrees.   External rotation: 30 degrees.   Internal rotation: 20  deg.   Abduction: 30 degrees.   Motor: 5/5 EHL/FHL/TA/GS/QD/HS  Neurovascular exam is intact.   No evidence of calf tightness or posterior cords    Images:    No new radiographs obtained during this visit    Scribe Attestation      I,:   am acting as a scribe while in the presence of the attending physician.:       I,:   personally performed the services described in this documentation    as scribed in my presence.:               Abhi Edmond MD  Adult Reconstruction Specialist   [facility]   "

## 2024-01-30 ENCOUNTER — OFFICE VISIT (OUTPATIENT)
Dept: PHYSICAL THERAPY | Facility: REHABILITATION | Age: 49
End: 2024-01-30
Payer: OTHER GOVERNMENT

## 2024-01-30 DIAGNOSIS — M25.552 LEFT HIP PAIN: ICD-10-CM

## 2024-01-30 DIAGNOSIS — Z96.642 STATUS POST TOTAL REPLACEMENT OF LEFT HIP: Primary | ICD-10-CM

## 2024-01-30 PROCEDURE — 97110 THERAPEUTIC EXERCISES: CPT

## 2024-01-30 PROCEDURE — 97530 THERAPEUTIC ACTIVITIES: CPT

## 2024-01-30 PROCEDURE — 97112 NEUROMUSCULAR REEDUCATION: CPT

## 2024-01-30 NOTE — PROGRESS NOTES
"Daily Note     Today's date: 2024  Patient name: Suzie Burrell  : 1975  MRN: [id number]  Referring provider: Abhi Edmond MD  Dx:   Encounter Diagnosis     ICD-10-CM    1. 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       Objective: See treatment diary below    EDU on edema control with elevating LLE above heart level, the use of CP.     Assessment: Session was modified to patients tolerance, with a positive response. Generalized muscle fatigue noted with all tasks today. Suzie would benefit from continued PT to promote hip mobility and strength.       Plan: Continue per plan of care.       Precautions: L GI       Manuals 1/11 1/17 1/18 1/23 1/26 1/30  11/29 12/4 12/6 12/11 12/13 12/18 12/22 12/27 12/29 1/3 1/4 1/9   PROM                                                                                        Neuro Re-Ed                      bridges 3 x 15 3x15 3x15 3x15 3x15 3x15  3x10 3x10 3x12 3x12 3x10 3x10 3x12 3x12 3x12 3x15 3x15 3x15   clamshells SL 3 x 15 SL 3x15 SL 3x15 SL 3x15 SL 3x15 SL 3x15  Pink 3x10 SL 3x10 SL 3x12 SL 3x12 SL 3x10 SL 3x10 SL 3x12 SL 3x12 SL 3x12 SL 3x15 SL 3x15 SL 3x15   sidesteps nv Blue 5 laps nv Blue 3x15 Blue 3x15   Pink 3 laps Pink 4 laps Pink 4 laps Pink 4 laps Pink 4 laps Pink 4 laps Pink 4 laps Pink 5 laps Grn 5 laps Grn 5 laps  Grn 5 laps Grn 5 laps   SLS 15\" x 4 20sx3 20\"x3 20sx3 20sx3                 Tandem 20\" x 2 ea 20sx3 20\"x3 20sx3 20sx3                                                             Ther Ex                      Hip abd Grn 3 x 15 Blue 3x15 Blue 3x15 Blue 3x15 Blue 3x15 Blue 3x10  Pink 3x10 Pink 3x10 Pink 3x12 Pink 3x12 Pink 3x10 Pink 3x10 Pink 3x12 Pink 3x12 Grn 3x12 Grn 3x15 Grn 3x15 Grn 3x15   Hip ext Grn 3 x 15 Blue 3x15 Blue 3x15 Blue 3x15 " "Blue 3x15 Blue 3x10  Pink 3x10 Pink 3x10 Pink 3x12 Pink 3x12 Pink 3x10 Pink 3x10 Pink 3x12 Pink 3x12 Grn 3x12 Grn 3x15 Grn 3x15 Grn 3x15   Hip flex Grn 3 x 15 Blue 3x15 Blue 3x15 Blue 3x15 Blue 3x15 Blue 3x10  Pink 3x10 Pink 3x10 Pink 3x12 held Pink 3x10 Pink 3x10 Pink 3x12 Pink 3x12 Grn 3x12 Grn 3x15 Grn 3x15 Grn 3x15   Weight shifts        15\"x4 20sx3 20sx3 20sx3 20sx3 20s x3 20sx3 25sx3 30sx3 30sx3 30sx3    PB rolls                      Bike (ROM) 5min lvl 3  5 min lvl 3 5 min lvl 3 5 min lvl 3 5 min lvl 4 5 min lvl 2  5 min 5 min lvl 2 5 min lvl 2 5 min lvl 2 5 min 5 min lvl 2 5 min lvl 2 5 min lvl 2 5 min lvl 3 5 min lvl 3 5 min lvl 3 5 min lvl 3   marches 3 x 15 3x15 3x15 3x15 3x15 nv  3x10 3x10 3x12 held held 3x10 3x12 3x12 3x12 3x15     Leg press 65#   3 x 10 75# 3x10 75# 3x10 75# 3x10 75# 3x12 65# 3x10         75# 3x12 75# 3x12 75# 3x12 75# 1x15  SL 65# 2x10 65# 3x10 SL   Ball squeeze 3\"   3 x 15 3\" 3x15 3\" 3x15 3\" 3x15 3\"3x15 3\"3x15  3\"x20 3\"x20 3\"x20 3\"x20 3\"x20 3\"x30 3\" 3x12 3x12 3x12 3\" 3x15 3\" 3x15 3\" 3x15   Ther Activity                      Heel raises 3 x 15 3x15 3x15 3x15 3x15 3x10  3x10 3x10 3x12 3x12 3x10 3x10 3x12 3x12 3x12 3x15 3x15 3x15   Step ups 3 x 15 3x15 3x15 3x15 3x15 3x10  3x10 3x10 3x12 3x12 3x10 3x10 3x12 3x12 3x12 3x15 3x15 3x15   STS 3 x 15 3x15 3x15 3x15 3x15 nv   3x10 3x12 3x12 3x10 3x10 3x12 3x12 3x12 3x15 3x15 3x15   Gait Training                                                                  Modalities                                                                                                                   "

## 2024-02-01 ENCOUNTER — OFFICE VISIT (OUTPATIENT)
Dept: PHYSICAL THERAPY | Facility: REHABILITATION | Age: 49
End: 2024-02-01
Payer: OTHER GOVERNMENT

## 2024-02-01 DIAGNOSIS — Z96.642 STATUS POST TOTAL REPLACEMENT OF LEFT HIP: Primary | ICD-10-CM

## 2024-02-01 DIAGNOSIS — M25.552 LEFT HIP PAIN: ICD-10-CM

## 2024-02-01 PROCEDURE — 97110 THERAPEUTIC EXERCISES: CPT | Performed by: PHYSICAL THERAPIST

## 2024-02-01 PROCEDURE — 97530 THERAPEUTIC ACTIVITIES: CPT | Performed by: PHYSICAL THERAPIST

## 2024-02-01 PROCEDURE — 97112 NEUROMUSCULAR REEDUCATION: CPT | Performed by: PHYSICAL THERAPIST

## 2024-02-01 NOTE — PROGRESS NOTES
"Daily Note     Today's date: 2024  Patient name: Suzie Burrell  : 1975  MRN: [id number]  Referring provider: Abhi Edmond MD  Dx:   Encounter Diagnosis     ICD-10-CM    1. 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 Left hip pain  M25.552                      Subjective: patient states her hip is feeling good today. She is returning to work next week      Objective: See treatment diary below      Assessment: Tolerated treatment well. Patient demonstrated fatigue post treatment, exhibited good technique with therapeutic exercises, and would benefit from continued PT Patient is doing well. She will return to work next week and will check in if needed      Plan: Continue per plan of care.      Precautions: L GI       Manuals 1/11 1/17 1/18 1/23 1/26 1/30 2/1 11/29 12/4 12/6 12/11 12/13 12/18 12/22 12/27 12/29 1/3 1/4 1/9   PROM                                                                                        Neuro Re-Ed                      bridges 3 x 15 3x15 3x15 3x15 3x15 3x15 3x15 3x10 3x10 3x12 3x12 3x10 3x10 3x12 3x12 3x12 3x15 3x15 3x15   clamshells SL 3 x 15 SL 3x15 SL 3x15 SL 3x15 SL 3x15 SL 3x15 SL pink 3x15 Pink 3x10 SL 3x10 SL 3x12 SL 3x12 SL 3x10 SL 3x10 SL 3x12 SL 3x12 SL 3x12 SL 3x15 SL 3x15 SL 3x15   sidesteps nv Blue 5 laps nv Blue 3x15 Blue 3x15  Blue 3x15 Pink 3 laps Pink 4 laps Pink 4 laps Pink 4 laps Pink 4 laps Pink 4 laps Pink 4 laps Pink 5 laps Grn 5 laps Grn 5 laps  Grn 5 laps Grn 5 laps   SLS 15\" x 4 20sx3 20\"x3 20sx3 20sx3  20sx3               Tandem 20\" x 2 ea 20sx3 20\"x3 20sx3 20sx3                                                             Ther Ex                      Hip abd Grn 3 x 15 Blue 3x15 Blue 3x15 Blue 3x15 Blue 3x15 Blue 3x10 Blue 3x15 Pink 3x10 Pink 3x10 Pink 3x12 Pink 3x12 Pink 3x10 Pink 3x10 Pink 3x12 Pink 3x12 Grn 3x12 Grn 3x15 Grn 3x15 Grn 3x15   Hip ext Grn 3 x 15 Blue 3x15 Blue 3x15 Blue 3x15 Blue 3x15 Blue 3x10 Blue 3x15 Pink " "3x10 Pink 3x10 Pink 3x12 Pink 3x12 Pink 3x10 Pink 3x10 Pink 3x12 Pink 3x12 Grn 3x12 Grn 3x15 Grn 3x15 Grn 3x15   Hip flex Grn 3 x 15 Blue 3x15 Blue 3x15 Blue 3x15 Blue 3x15 Blue 3x10 Blue 3x15 Pink 3x10 Pink 3x10 Pink 3x12 held Pink 3x10 Pink 3x10 Pink 3x12 Pink 3x12 Grn 3x12 Grn 3x15 Grn 3x15 Grn 3x15   Weight shifts        15\"x4 20sx3 20sx3 20sx3 20sx3 20s x3 20sx3 25sx3 30sx3 30sx3 30sx3    PB rolls                      Bike (ROM) 5min lvl 3  5 min lvl 3 5 min lvl 3 5 min lvl 3 5 min lvl 4 5 min lvl 2 5 min lvl 4 5 min 5 min lvl 2 5 min lvl 2 5 min lvl 2 5 min 5 min lvl 2 5 min lvl 2 5 min lvl 2 5 min lvl 3 5 min lvl 3 5 min lvl 3 5 min lvl 3   marches 3 x 15 3x15 3x15 3x15 3x15 nv 3x15 3x10 3x10 3x12 held held 3x10 3x12 3x12 3x12 3x15     Leg press 65#   3 x 10 75# 3x10 75# 3x10 75# 3x10 75# 3x12 65# 3x10 75# 3x15        75# 3x12 75# 3x12 75# 3x12 75# 1x15  SL 65# 2x10 65# 3x10 SL   Ball squeeze 3\"   3 x 15 3\" 3x15 3\" 3x15 3\" 3x15 3\"3x15 3\"3x15 3\" 3x15 3\"x20 3\"x20 3\"x20 3\"x20 3\"x20 3\"x30 3\" 3x12 3x12 3x12 3\" 3x15 3\" 3x15 3\" 3x15   Ther Activity                      Heel raises 3 x 15 3x15 3x15 3x15 3x15 3x10 3x15 3x10 3x10 3x12 3x12 3x10 3x10 3x12 3x12 3x12 3x15 3x15 3x15   Step ups 3 x 15 3x15 3x15 3x15 3x15 3x10 3x15 3x10 3x10 3x12 3x12 3x10 3x10 3x12 3x12 3x12 3x15 3x15 3x15   STS 3 x 15 3x15 3x15 3x15 3x15 nv 3x15  3x10 3x12 3x12 3x10 3x10 3x12 3x12 3x12 3x15 3x15 3x15   Gait Training                                                                  Modalities                                                                                                                     "

## 2024-02-21 PROBLEM — Z13.828 SCOLIOSIS CONCERN: Status: RESOLVED | Noted: 2019-06-12 | Resolved: 2024-02-21

## 2024-03-01 ENCOUNTER — TELEPHONE (OUTPATIENT)
Dept: OBGYN CLINIC | Facility: HOSPITAL | Age: 49
End: 2024-03-01

## 2024-03-01 NOTE — TELEPHONE ENCOUNTER
Feli     Pt would like to inform you that she almost lost her balance with the R Hip.   - was walking on even ground, no pain. States it happens while working       Callback: [phone]

## 2024-03-07 ENCOUNTER — OFFICE VISIT (OUTPATIENT)
Dept: OBGYN CLINIC | Facility: CLINIC | Age: 49
End: 2024-03-07
Payer: OTHER GOVERNMENT

## 2024-03-07 ENCOUNTER — APPOINTMENT (OUTPATIENT)
Dept: RADIOLOGY | Age: 49
End: 2024-03-07
Payer: OTHER GOVERNMENT

## 2024-03-07 VITALS
SYSTOLIC BLOOD PRESSURE: 106 MMHG | HEIGHT: 65 IN | DIASTOLIC BLOOD PRESSURE: 74 MMHG | HEART RATE: 83 BPM | BODY MASS INDEX: 29.66 KG/M2 | WEIGHT: 178 LBS

## 2024-03-07 DIAGNOSIS — Q65.89 DEVELOPMENTAL DYSPLASIA OF HIP: ICD-10-CM

## 2024-03-07 DIAGNOSIS — R26.9 GAIT ABNORMALITY: ICD-10-CM

## 2024-03-07 DIAGNOSIS — M70.61 GREATER TROCHANTERIC BURSITIS OF RIGHT HIP: ICD-10-CM

## 2024-03-07 DIAGNOSIS — M25.552 LEFT HIP PAIN: ICD-10-CM

## 2024-03-07 DIAGNOSIS — M16.0 PRIMARY OSTEOARTHRITIS OF BOTH HIPS: ICD-10-CM

## 2024-03-07 DIAGNOSIS — M25.551 PAIN OF RIGHT HIP: ICD-10-CM

## 2024-03-07 DIAGNOSIS — M25.551 PAIN OF RIGHT HIP: Primary | ICD-10-CM

## 2024-03-07 DIAGNOSIS — M41.50 DEGENERATIVE SCOLIOSIS IN ADULT PATIENT: ICD-10-CM

## 2024-03-07 PROBLEM — M16.11 PRIMARY OSTEOARTHRITIS OF ONE HIP, RIGHT: Status: ACTIVE | Noted: 2024-03-07

## 2024-03-07 PROCEDURE — 73502 X-RAY EXAM HIP UNI 2-3 VIEWS: CPT

## 2024-03-07 PROCEDURE — 99214 OFFICE O/P EST MOD 30 MIN: CPT | Performed by: STUDENT IN AN ORGANIZED HEALTH CARE EDUCATION/TRAINING PROGRAM

## 2024-03-07 RX ORDER — MELOXICAM 15 MG/1
15 TABLET ORAL DAILY
Qty: 30 TABLET | Refills: 2 | Status: SHIPPED | OUTPATIENT
Start: 2024-03-07

## 2024-03-07 NOTE — PROGRESS NOTES
Date: 24  Suzie Burrell   MRN# [id number]  : 1975      Chief Complaint: right hip pain    Assessment and Plan:    Primary osteoarthritis of one hip, right  Patient has a history, physical examination and radiographic evaluation consistent with moderate to severe osteoarthritis of the right hip    -WBAT  -Activity modification to limit strain or impact on the joint  -meloxicam 15mg daily as needed  -Tylenol 1000mg up to three times daily as needed. Do not exceed 3000mg daily  -Supervised physical therapy. Script provided   -Home exercise program directed by PT  -Cane or walker recommended to offload joint  -Corticosteroid injection was offered and accepted. Patient will be scheduled for FL guided CSI ot the hip  -Patient may follow up in 3 month(s) for further evaluation and treatment     Degenerative scoliosis in adult patient  Patient is a history, physical examination and radiographic evaluation consistent with degenerative scoliosis of the lumbar spine with radiculopathy.    -Physical examination reveals persistent left hip weakness, right-sided radicular pain and a wide-based gait.  -While I do believe some of her symptoms are stemming from right hip osteoarthritis, I do believe her degenerative scoliosis contributing.  -Referral made to spine and pain for further workup and treatment       Subjective:     Hip Pain  Patient complains of right hip pain. This is evaluated as a personal injury. The pain began several years ago. The pain is located lateral and is made worse with walking, standing, sitting, and sleeping on it.  She describes the symptoms as sharp and throbbing. Symptoms improve with rest. The symptoms are worse with activity, stair climbing, getting up from a chair. The hip has not given out or felt unstable. She denies any previous treatment.     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 08/10/2022    Arthritis     It was last year    COVID-19 2022    History of colon polyps 2022    HPV (human papilloma virus) infection     Hyperlipidemia     IFG (impaired fasting glucose)     Internal hemorrhoids 2022    denies    Kidney stone 2015    Kidney stones, calcium oxalate 2015    Obesity     Pneumonia     Resolved 2017     Pre-diabetes     Scoliosis     Tremor     Left Hand Resting Tremor    UTI (urinary tract infection)     Varicella     Visual impairment     Wear glasses     Past Surgical History:  Past Surgical History:   Procedure Laterality Date    APPENDECTOMY  10/14/2018    Appendix taken out     SECTION       x 1 ; Failure to progress    COLONOSCOPY      FL INJECTION LEFT HIP (NON ARTHROGRAM)  10/12/2020    TN ARTHRP ACETBLR/PROX FEM PROSTC AGRFT/ALGRFT Left 2023    Procedure: ARTHROPLASTY HIP TOTAL, LEFT POSTERIOR, SAME DAY DISCHARGE;  Surgeon: Abhi Edmond MD;  Location: [facility];  Service: Orthopedics    TN LAPAROSCOPIC APPENDECTOMY N/A 10/14/2018    Procedure: APPENDECTOMY LAPAROSCOPIC;  Surgeon: Zohaib Schwarz MD;  Location: [facility];  Service: General    WISDOM TOOTH EXTRACTION       Family History:  Family History   Problem Relation Age of Onset    Hypertension Father     Alcohol abuse Father     Kidney disease Father         CKD on ESRD    Diabetes type II Father 50    No Known Problems Mother     Scoliosis Brother     Hip dysplasia Daughter     No Known Problems Maternal Grandmother     No Known Problems Maternal Grandfather     No Known Problems Paternal Grandmother     No Known Problems Paternal Grandfather     No Known Problems Maternal Aunt     No Known Problems Maternal Aunt     No Known Problems Paternal Aunt     No Known Problems Paternal Aunt      "No Known Problems Paternal Aunt     No Known Problems Paternal Aunt      Social History:  Social History     Substance and Sexual Activity   Alcohol Use Yes    Alcohol/week: 1.0 standard drink of alcohol    Types: 1 Glasses of wine per week    Comment: rare     Social History     Substance and Sexual Activity   Drug Use Never     Social History     Tobacco Use   Smoking Status Never   Smokeless Tobacco Never           ROS:   Review of Systems   Constitutional:  Negative for chills and fever.   HENT:  Negative for ear pain and sore throat.    Eyes:  Negative for pain and visual disturbance.   Respiratory:  Negative for cough and shortness of breath.    Cardiovascular:  Negative for chest pain and palpitations.   Gastrointestinal:  Negative for abdominal pain and vomiting.   Genitourinary:  Negative for dysuria and hematuria.   Musculoskeletal:  Negative for arthralgias and back pain.   Skin:  Negative for color change and rash.   Neurological:  Negative for seizures and syncope.   All other systems reviewed and are negative.       Objective:   BP Readings from Last 1 Encounters:   03/07/24 106/74      Wt Readings from Last 1 Encounters:   03/07/24 80.7 kg (178 lb)      Pulse Readings from Last 1 Encounters:   03/07/24 83      BMI: Estimated body mass index is 29.62 kg/m² as calculated from the following:    Height as of this encounter: 5' 5\" (1.651 m).    Weight as of this encounter: 80.7 kg (178 lb).      Physical Exam  Constitutional:       Appearance: Normal appearance.   HENT:      Head: Normocephalic.   Pulmonary:      Breath sounds: Normal breath sounds.   Neurological:      Mental Status: She is alert and oriented to person, place, and time.   Psychiatric:         Behavior: Behavior normal.         Thought Content: Thought content normal.         Judgment: Judgment normal.          Gait and Station:   antalgic and stiff-legged    Right Hip     Inspection: normal color, temperature, turgor and moisture    Range " of Motion: limited with pain    Motor: 5/5 Q/HS/TA/GS/EHL/FHL, 4/5 hip flexion    Vascular: Toes WWP with BCR    SILT DP/SP/Roseann/Saph/Tib    Images:    I personally reviewed relevant images in the PACS system and my interpretation is as follows:  X-rays of the right Hip reveal severe degenerative changes with subchondral sclerosis, joint space narrowing, joint subluxation, and osteophyte formation secondary to hip dysplasia     I have personally seen and examined the patient, utilizing [employer] a Certified Athletic Trainer for assistance with documentation.  The entire visit including physical exam and formulation/discussion of plan was performed by me.     Abhi Edmond MD  Adult Reconstruction Specialist   [facility]

## 2024-03-08 NOTE — ASSESSMENT & PLAN NOTE
Patient has a history, physical examination and radiographic evaluation consistent with moderate to severe osteoarthritis of the right hip    -WBAT  -Activity modification to limit strain or impact on the joint  -meloxicam 15mg daily as needed  -Tylenol 1000mg up to three times daily as needed. Do not exceed 3000mg daily  -Supervised physical therapy. Script provided   -Home exercise program directed by PT  -Cane or walker recommended to offload joint  -Corticosteroid injection was offered and accepted. Patient will be scheduled for FL guided CSI ot the hip  -Patient may follow up in 3 month(s) for further evaluation and treatment

## 2024-03-08 NOTE — ASSESSMENT & PLAN NOTE
Patient is a history, physical examination and radiographic evaluation consistent with degenerative scoliosis of the lumbar spine with radiculopathy.    -Physical examination reveals persistent left hip weakness, right-sided radicular pain and a wide-based gait.  -While I do believe some of her symptoms are stemming from right hip osteoarthritis, I do believe her degenerative scoliosis contributing.  -Referral made to spine and pain for further workup and treatment

## 2024-03-15 ENCOUNTER — HOSPITAL ENCOUNTER (OUTPATIENT)
Dept: RADIOLOGY | Facility: HOSPITAL | Age: 49
Discharge: HOME | End: 2024-03-15
Attending: STUDENT IN AN ORGANIZED HEALTH CARE EDUCATION/TRAINING PROGRAM
Payer: OTHER GOVERNMENT

## 2024-03-15 DIAGNOSIS — M16.0 PRIMARY OSTEOARTHRITIS OF BOTH HIPS: ICD-10-CM

## 2024-03-15 PROCEDURE — 77002 NEEDLE LOCALIZATION BY XRAY: CPT

## 2024-03-15 RX ORDER — METHYLPREDNISOLONE ACETATE 80 MG/ML
80 INJECTION, SUSPENSION INTRA-ARTICULAR; INTRALESIONAL; INTRAMUSCULAR; SOFT TISSUE
Status: COMPLETED | OUTPATIENT
Start: 2024-03-15 | End: 2024-03-15

## 2024-03-15 RX ORDER — LIDOCAINE HYDROCHLORIDE 10 MG/ML
5 INJECTION, SOLUTION EPIDURAL; INFILTRATION; INTRACAUDAL; PERINEURAL
Status: COMPLETED | OUTPATIENT
Start: 2024-03-15 | End: 2024-03-15

## 2024-03-15 RX ORDER — ROPIVACAINE HYDROCHLORIDE 2 MG/ML
10 INJECTION, SOLUTION EPIDURAL; INFILTRATION; PERINEURAL ONCE
Status: COMPLETED | OUTPATIENT
Start: 2024-03-15 | End: 2024-03-15

## 2024-03-15 RX ADMIN — METHYLPREDNISOLONE ACETATE 80 MG: 80 INJECTION, SUSPENSION INTRA-ARTICULAR; INTRALESIONAL; INTRAMUSCULAR; SOFT TISSUE at 15:15

## 2024-03-15 RX ADMIN — ROPIVACAINE HYDROCHLORIDE 3 ML: 2 INJECTION, SOLUTION EPIDURAL; INFILTRATION at 15:15

## 2024-03-15 RX ADMIN — LIDOCAINE HYDROCHLORIDE 5 ML: 10 INJECTION, SOLUTION EPIDURAL; INFILTRATION; INTRACAUDAL; PERINEURAL at 15:15

## 2024-03-15 RX ADMIN — IOHEXOL 2 ML: 300 INJECTION, SOLUTION INTRAVENOUS at 15:15

## 2024-03-16 ENCOUNTER — TELEPHONE (OUTPATIENT)
Dept: OBGYN CLINIC | Facility: HOSPITAL | Age: 49
End: 2024-03-16

## 2024-03-16 NOTE — TELEPHONE ENCOUNTER
Caller: Patient    Doctor: Feli    Reason for call: Patient is asking if she should continue to take mobic 15 mg after her right hip injections 3/15/24. Please advise.    Call back#: [phone]

## 2024-03-20 ENCOUNTER — LAB (OUTPATIENT)
Dept: LAB | Facility: AMBULARY SURGERY CENTER | Age: 49
End: 2024-03-20
Payer: OTHER GOVERNMENT

## 2024-03-20 DIAGNOSIS — E66.9 CLASS 2 OBESITY WITH BODY MASS INDEX (BMI) OF 36.0 TO 36.9 IN ADULT, UNSPECIFIED OBESITY TYPE, UNSPECIFIED WHETHER SERIOUS COMORBIDITY PRESENT: ICD-10-CM

## 2024-03-20 DIAGNOSIS — E78.5 HYPERLIPIDEMIA, UNSPECIFIED HYPERLIPIDEMIA TYPE: ICD-10-CM

## 2024-03-20 DIAGNOSIS — R73.01 IFG (IMPAIRED FASTING GLUCOSE): ICD-10-CM

## 2024-03-20 DIAGNOSIS — M79.10 MYALGIA: ICD-10-CM

## 2024-03-20 LAB
25(OH)D3 SERPL-MCNC: 42.1 NG/ML (ref 30–100)
ALBUMIN SERPL BCP-MCNC: 4 G/DL (ref 3.5–5)
ALP SERPL-CCNC: 57 U/L (ref 34–104)
ALT SERPL W P-5'-P-CCNC: 15 U/L (ref 7–52)
ANION GAP SERPL CALCULATED.3IONS-SCNC: 10 MMOL/L (ref 4–13)
AST SERPL W P-5'-P-CCNC: 19 U/L (ref 13–39)
BASOPHILS # BLD AUTO: 0.05 THOUSANDS/ÂΜL (ref 0–0.1)
BASOPHILS NFR BLD AUTO: 1 % (ref 0–1)
BILIRUB SERPL-MCNC: 0.4 MG/DL (ref 0.2–1)
BUN SERPL-MCNC: 27 MG/DL (ref 5–25)
CALCIUM SERPL-MCNC: 8.8 MG/DL (ref 8.4–10.2)
CHLORIDE SERPL-SCNC: 102 MMOL/L (ref 96–108)
CHOLEST SERPL-MCNC: 146 MG/DL
CO2 SERPL-SCNC: 25 MMOL/L (ref 21–32)
CREAT SERPL-MCNC: 0.5 MG/DL (ref 0.6–1.3)
EOSINOPHIL # BLD AUTO: 0.09 THOUSAND/ÂΜL (ref 0–0.61)
EOSINOPHIL NFR BLD AUTO: 2 % (ref 0–6)
ERYTHROCYTE [DISTWIDTH] IN BLOOD BY AUTOMATED COUNT: 14.5 % (ref 11.6–15.1)
EST. AVERAGE GLUCOSE BLD GHB EST-MCNC: 120 MG/DL
GFR SERPL CREATININE-BSD FRML MDRD: 115 ML/MIN/1.73SQ M
GLUCOSE P FAST SERPL-MCNC: 79 MG/DL (ref 65–99)
HBA1C MFR BLD: 5.8 %
HCT VFR BLD AUTO: 40.4 % (ref 34.8–46.1)
HDLC SERPL-MCNC: 60 MG/DL
HGB BLD-MCNC: 12.9 G/DL (ref 11.5–15.4)
IMM GRANULOCYTES # BLD AUTO: 0.01 THOUSAND/UL (ref 0–0.2)
IMM GRANULOCYTES NFR BLD AUTO: 0 % (ref 0–2)
LDLC SERPL CALC-MCNC: 72 MG/DL (ref 0–100)
LDLC SERPL DIRECT ASSAY-MCNC: 83 MG/DL (ref 0–100)
LYMPHOCYTES # BLD AUTO: 1.83 THOUSANDS/ÂΜL (ref 0.6–4.47)
LYMPHOCYTES NFR BLD AUTO: 37 % (ref 14–44)
MCH RBC QN AUTO: 27.9 PG (ref 26.8–34.3)
MCHC RBC AUTO-ENTMCNC: 31.9 G/DL (ref 31.4–37.4)
MCV RBC AUTO: 87 FL (ref 82–98)
MONOCYTES # BLD AUTO: 0.59 THOUSAND/ÂΜL (ref 0.17–1.22)
MONOCYTES NFR BLD AUTO: 12 % (ref 4–12)
NEUTROPHILS # BLD AUTO: 2.45 THOUSANDS/ÂΜL (ref 1.85–7.62)
NEUTS SEG NFR BLD AUTO: 48 % (ref 43–75)
NONHDLC SERPL-MCNC: 86 MG/DL
NRBC BLD AUTO-RTO: 0 /100 WBCS
PLATELET # BLD AUTO: 375 THOUSANDS/UL (ref 149–390)
PMV BLD AUTO: 9.7 FL (ref 8.9–12.7)
POTASSIUM SERPL-SCNC: 4.1 MMOL/L (ref 3.5–5.3)
PROT SERPL-MCNC: 6.5 G/DL (ref 6.4–8.4)
RBC # BLD AUTO: 4.63 MILLION/UL (ref 3.81–5.12)
SODIUM SERPL-SCNC: 137 MMOL/L (ref 135–147)
TRIGL SERPL-MCNC: 72 MG/DL
TSH SERPL DL<=0.05 MIU/L-ACNC: 2.23 UIU/ML (ref 0.45–4.5)
WBC # BLD AUTO: 5.02 THOUSAND/UL (ref 4.31–10.16)

## 2024-03-20 PROCEDURE — 36415 COLL VENOUS BLD VENIPUNCTURE: CPT

## 2024-03-20 PROCEDURE — 80061 LIPID PANEL: CPT

## 2024-03-20 PROCEDURE — 83036 HEMOGLOBIN GLYCOSYLATED A1C: CPT

## 2024-03-20 PROCEDURE — 83721 ASSAY OF BLOOD LIPOPROTEIN: CPT

## 2024-03-20 PROCEDURE — 84443 ASSAY THYROID STIM HORMONE: CPT

## 2024-03-20 PROCEDURE — 80053 COMPREHEN METABOLIC PANEL: CPT

## 2024-03-20 PROCEDURE — 82306 VITAMIN D 25 HYDROXY: CPT

## 2024-03-20 PROCEDURE — 85025 COMPLETE CBC W/AUTO DIFF WBC: CPT

## 2024-03-21 NOTE — RESULT ENCOUNTER NOTE
Stable labs - will review with patient at upcoming appointment.    IFG - To consider Metformin XR?

## 2024-03-25 ENCOUNTER — OFFICE VISIT (OUTPATIENT)
Dept: FAMILY MEDICINE CLINIC | Facility: CLINIC | Age: 49
End: 2024-03-25
Payer: OTHER GOVERNMENT

## 2024-03-25 VITALS
RESPIRATION RATE: 16 BRPM | SYSTOLIC BLOOD PRESSURE: 120 MMHG | TEMPERATURE: 98.2 F | BODY MASS INDEX: 35.99 KG/M2 | HEART RATE: 83 BPM | DIASTOLIC BLOOD PRESSURE: 72 MMHG | OXYGEN SATURATION: 98 % | HEIGHT: 65 IN | WEIGHT: 216 LBS

## 2024-03-25 DIAGNOSIS — R25.1 TREMOR: ICD-10-CM

## 2024-03-25 DIAGNOSIS — E78.5 HYPERLIPIDEMIA, UNSPECIFIED HYPERLIPIDEMIA TYPE: ICD-10-CM

## 2024-03-25 DIAGNOSIS — T63.481A ANAPHYLAXIS DUE TO INSECT VENOM: ICD-10-CM

## 2024-03-25 DIAGNOSIS — Z13.1 SCREENING FOR DIABETES MELLITUS: ICD-10-CM

## 2024-03-25 DIAGNOSIS — Z13.6 SCREENING FOR CARDIOVASCULAR CONDITION: ICD-10-CM

## 2024-03-25 DIAGNOSIS — N20.0 KIDNEY STONES, CALCIUM OXALATE: ICD-10-CM

## 2024-03-25 DIAGNOSIS — E66.09 CLASS 2 OBESITY DUE TO EXCESS CALORIES WITHOUT SERIOUS COMORBIDITY WITH BODY MASS INDEX (BMI) OF 35.0 TO 35.9 IN ADULT: ICD-10-CM

## 2024-03-25 DIAGNOSIS — R73.01 IFG (IMPAIRED FASTING GLUCOSE): Primary | ICD-10-CM

## 2024-03-25 DIAGNOSIS — E66.9 OBESITY (BMI 35.0-39.9 WITHOUT COMORBIDITY): ICD-10-CM

## 2024-03-25 DIAGNOSIS — Z86.010 HISTORY OF COLON POLYPS: ICD-10-CM

## 2024-03-25 PROCEDURE — 99214 OFFICE O/P EST MOD 30 MIN: CPT | Performed by: FAMILY MEDICINE

## 2024-03-25 NOTE — PROGRESS NOTES
Assessment/Plan:  Problem List Items Addressed This Visit        Endocrine    IFG (impaired fasting glucose) - Primary     Patient declines Metformin XR 500mg 3 pills QD because she is worried about possible kidney damage, and declines GLP-1 agonist.  Recommend lifestyle modifications.          Relevant Orders    Comprehensive metabolic panel    Hemoglobin A1C       Genitourinary    Kidney stones, calcium oxalate     Stable.  Caution c calcium supplements.              Neurology/Sleep    Tremor     Stable s meds.              Other    Class 2 obesity due to excess calories without serious comorbidity with body mass index (BMI) of 35.0 to 35.9 in adult     Worsening.  Recommend lifestyle modifications.  Patient previously declined GLP-1 agonist.           Relevant Orders    TSH, 3rd generation with Free T4 reflex    Hyperlipidemia     Stable without statin.  Recommend lifestyle modifications.      The 10-year ASCVD risk score (Nick MTZ, et al., 2019) is: 0.5%    Values used to calculate the score:      Age: 48 years      Sex: Female      Is Non- : No      Diabetic: No      Tobacco smoker: No      Systolic Blood Pressure: 120 mmHg      Is BP treated: No      HDL Cholesterol: 60 mg/dL      Total Cholesterol: 146 mg/dL           Relevant Orders    CBC and differential    Comprehensive metabolic panel    Lipid panel    TSH, 3rd generation with Free T4 reflex    LDL cholesterol, direct    History of colon polyps     Colonoscopy is up-to-date.         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  Use Epi Pen PRN.  Pending Allergy consult.          Other Visit Diagnoses     Obesity (BMI 35.0-39.9 without comorbidity)        Relevant Orders    TSH, 3rd generation with Free T4 reflex    BMI 35.0-35.9,adult        Relevant Orders    TSH, 3rd generation with Free T4 reflex    Screening for diabetes mellitus        Relevant Orders    Hemoglobin A1C    Screening for cardiovascular condition        Relevant  Orders    CBC and differential    Comprehensive metabolic panel    Lipid panel    LDL cholesterol, direct             Return in about 6 months (around 9/25/2024) for Physical / 6mo - IFG, HL, Labs.      Future Appointments   Date Time Provider Department Center   4/1/2024  7:45 AM Getachew Benavidez, PT [facility]   4/8/2024  2:45 PM Getachew Benavidez, PT [facility]   4/15/2024  2:45 PM Getachew Benavidez, PT [facility]   6/6/2024  2:45 PM MD BRAD [facility]   6/7/2024 11:00 AM Lori Alonso MD [facility]   9/23/2024  2:20 PM Jennifer Fall,  [facility]   10/29/2024  2:20 PM BE [facility] 1 BE [facility]   11/1/2024  2:00 PM Abhi Edmond MD [facility]        Subjective:     Suzie is a 48 y.o. female who presents today for a follow-up on her chronic medical conditions.        HPI:  Chief Complaint   Patient presents with   • Follow-up     6 mo f/u. No new problems or concerns at this time.      -- Above per clinical staff and reviewed. --      HPI      Today:      Return in about 6 months (around 3/20/2024) for 6mo - IFG, HL, Labs.     6mo OV          had a stroke 3/22.  [facility] is [facility] at [facility].  It is unknown when he will return home.  She sees him weekly.  [facility]  involved.      Obesity - Watching diet.  No regular exercise.  Walks occasionally.  Will be starting PT 3/24.     IFG - New Dx 10/5/22.  She is not taking Metformin XR 500mg 3 pills QD, and she only took 1 pill QD for 1 week and stopped it because people told her it was bad for her kidneys.  She previously declined Pre-DM education.        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 - Pending appt c Allergist Dr. Del Castillo 12/22/22 - referred 8/10/22.  Has not used to use her Epi Pen yet.       Hyperlipidemia - No statin previously.         B/L Hip Pain / B/L Hip Dysplasia - Management per Ortho Dr. Edmond - Next  appt 6/24, 11/24.  She states she needs hip replacement at 51yo.  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  She states her hips have never been normal childbirth in 2010.  S/p Left TKR 11/1/23.  On Mobic 15mg QD and Tylenol PRN.        Scoliosis - Management per Ortho.  F/U PRN as referred to Spine & Pain - patient cancelled appt.  Denies back pain.  Occasionally has paresthesias mid-thoracic back.       H/O Colon Polyps - Next colonoscopy due 5/22/27.       Reviewed:  Labs 3/20/24, Gyn 5/9/23, Ortho 3/7/24     Sees Gyn [facility] Dr. Alonso yearly.  Next appt 6/24.    PHQ-2/9 Depression Screening    Little interest or pleasure in doing things: 0 - not at all  Feeling down, depressed, or hopeless: 0 - not at all  PHQ-2 Score: 0  PHQ-2 Interpretation: Negative depression screen           The following portions of the patient's history were reviewed and updated as appropriate: allergies, current medications, past family history, past medical history, past social history, past surgical history and problem list.      Review of Systems   Constitutional:  Negative for appetite change, chills, diaphoresis, fatigue and fever.   Respiratory:  Negative for chest tightness and shortness of breath.    Cardiovascular:  Negative for chest pain.   Gastrointestinal:  Negative for abdominal pain, blood in stool, diarrhea, nausea and vomiting.   Genitourinary:  Negative for dysuria.   Musculoskeletal:  Positive for arthralgias.        Current Outpatient Medications   Medication Sig Dispense Refill   • Green Tea, Michelle sinensis, (GREEN TEA PO) Take 2 tablets by mouth 2 (two) times a day     • meloxicam (Mobic) 15 mg tablet Take 1 tablet (15 mg total) by mouth daily 30 tablet 2   • Multiple Vitamins-Minerals (multivitamin with minerals) tablet Take 1 tablet by mouth daily 30 tablet 0   • NON FORMULARY Take 2 tablets by mouth in the morning Age loc meta- OTC supplement     • EPINEPHrine (EPIPEN) 0.3 mg/0.3 mL SOAJ Inject 0.3 mL  "(0.3 mg total) into a muscle once for 1 dose (Patient not taking: Reported on 3/25/2024) 0.6 mL 0     No current facility-administered medications for this visit.       Objective:  /72   Pulse 83   Temp 98.2 °F (36.8 °C)   Resp 16   Ht 5' 5\" (1.651 m)   Wt 98 kg (216 lb)   SpO2 98%   BMI 35.94 kg/m²    Wt Readings from Last 3 Encounters:   03/25/24 98 kg (216 lb)   03/07/24 80.7 kg (178 lb)   01/25/24 80.7 kg (178 lb)      BP Readings from Last 3 Encounters:   03/25/24 120/72   03/07/24 106/74   01/25/24 106/74          Physical Exam  Vitals and nursing note reviewed.   Constitutional:       Appearance: Normal appearance. She is well-developed. She is obese.   HENT:      Head: Normocephalic and atraumatic.   Eyes:      Conjunctiva/sclera: Conjunctivae normal.   Neck:      Thyroid: No thyromegaly.   Cardiovascular:      Rate and Rhythm: Normal rate and regular rhythm.      Pulses: Normal pulses.      Heart sounds: Normal heart sounds.   Pulmonary:      Effort: Pulmonary effort is normal.      Breath sounds: Normal breath sounds.   Abdominal:      General: Bowel sounds are normal. There is no distension.      Palpations: Abdomen is soft. There is no mass.      Tenderness: There is no abdominal tenderness. There is no guarding or rebound.   Musculoskeletal:         General: No swelling or tenderness.      Cervical back: Neck supple.      Right lower leg: No edema.      Left lower leg: No edema.   Lymphadenopathy:      Cervical: No cervical adenopathy.   Neurological:      General: No focal deficit present.      Mental Status: She is alert and oriented to person, place, and time.   Psychiatric:         Mood and Affect: Mood normal.         Behavior: Behavior normal.         Thought Content: Thought content normal.         Judgment: Judgment normal.         Lab Results:      Lab Results   Component Value Date    WBC 5.02 03/20/2024    HGB 12.9 03/20/2024    HCT 40.4 03/20/2024     03/20/2024    TRIG " "72 03/20/2024    HDL 60 03/20/2024    LDLDIRECT 83 03/20/2024    ALT 15 03/20/2024    AST 19 03/20/2024     06/24/2015    K 4.1 03/20/2024     03/20/2024    CREATININE 0.50 (L) 03/20/2024    BUN 27 (H) 03/20/2024    CO2 25 03/20/2024    INR 1.00 10/19/2023    GLUF 79 03/20/2024    HGBA1C 5.8 (H) 03/20/2024     No results found for: \"URICACID\"  Invalid input(s): \"BASENAME\" Vitamin D    FL guided needle plac bx/asp/inj    Result Date: 3/15/2024  Narrative: RIGHT HIP INJECTION INDICATION:   M16.0: Bilateral primary osteoarthritis of hip. COMPARISON: Pelvic radiograph 3/7/2024 IMAGES: 2 FLUOROSCOPY TIME:   24.9 seconds FINDINGS: After the risks and benefits of the procedure were thoroughly explained, informed consent was obtained. The patient verbalized expressed understanding of the above risks and wished to proceed with the procedure. Final standard \"time-out\" procedure performed. The patient was prepped and draped in the usual sterile fashion. 1% lidocaine solution was utilized for local anesthesia.  Intermittent fluoroscopy was utilized for placement a 20 gauge 3.5 inch spinal needle within the right hip joint.  After positioning was confirmed with 2 mL of Omnipaque 300, the following medication was injected: 1 mL 80 mg/mL Depomedrol and 2 mL 0.2% Ropivacaine The patient tolerated the procedure well.  There were no complications. I asked the patient to call us with any questions, concerns, or acute problems. The patient expressed understanding of the above.     Impression: Technically successful right hip anesthetic and steroid injection. ATTESTATION I, the attending radiologist, was present for the entire procedure. Guidance images were reviewed and I am in agreement with the findings on the report. Attending physician: Bradley Kocher. Advanced practitioner: Ariane Hussein. Resident:  Aubrey Parsons. Workstation performed: [id number]     XR hip/pelv 2-3 vws right if performed    Result Date: " 3/7/2024  Narrative: RIGHT HIP INDICATION:   Pain in right hip. COMPARISON: 12/14/2023 VIEWS:  XR HIP/PELV 2-3 VWS RIGHT W PELVIS IF PERFORMED Images: 3 FINDINGS: There is no acute fracture or dislocation. Mild right hip osteoarthritis with stable left total hip arthroplasty. No lytic or blastic osseous lesion. Soft tissues are unremarkable. Degenerative changes visualized lower lumbar spine.     Impression: No acute osseous abnormality. Degenerative and postsurgical changes as described. Electronically signed: 03/07/2024 03:27 PM Levar Grijalva, MPH,MD       POCT Labs      BMI Counseling: Body mass index is 35.94 kg/m². The BMI is above normal. Exercise recommendations include exercising 3-5 times per week.     Depression Screening and Follow-up Plan: Patient was screened for depression during today's encounter. They screened negative with a PHQ-2 score of 0.                  BMI Counseling: Body mass index is 35.94 kg/m². The BMI is above normal. Nutrition recommendations include 3-5 servings of fruits/vegetables daily. Exercise recommendations include exercising 3-5 times per week.

## 2024-03-25 NOTE — PATIENT INSTRUCTIONS
Vitamin D - Recommend start multivitamin and over-the-counter vitamin D3 1000 - 3000 International Units daily.      Obesity   AMBULATORY CARE:   Obesity  means your body mass index (BMI) is greater than 30. Your healthcare provider will use your age, height, and weight to measure your BMI.  The risks of obesity include  many health problems, including injuries or physical disability.  Diabetes (high blood sugar level)    High blood pressure or high cholesterol    Heart disease or heart failure    Stroke    Gallbladder or liver disease    Cancer of the colon, breast, prostate, liver, or kidney    Sleep apnea    Arthritis or gout    Screening  is done to check for health conditions before you have signs or symptoms. If you are 35 to 70 years old, your blood sugar level may be checked every 3 years for signs of prediabetes or diabetes. Your healthcare provider will check your blood pressure at each visit. High blood pressure can lead to a stroke or other problems. Your provider may check for signs of heart disease, cancer, or other health problems.  Seek care immediately if:   You have a severe headache, confusion, or difficulty speaking.    You have weakness on one side of your body.    You have chest pain, sweating, or shortness of breath.    Call your doctor if:   You have symptoms of gallbladder or liver disease, such as pain in your upper abdomen.    You have knee or hip pain and discomfort while walking.    You have symptoms of diabetes, such as intense hunger and thirst, and frequent urination.    You have symptoms of sleep apnea, such as snoring or daytime sleepiness.    You have questions or concerns about your condition or care.    Treatment for obesity  focuses on helping you lose weight to improve your health. Even a small decrease in BMI can reduce the risk for many health problems. Your healthcare provider will help you set a weight-loss goal.  Lifestyle changes  are the first step in treating obesity.  These include making healthy food choices and getting regular physical activity. Your healthcare provider may suggest a weight-loss program that involves coaching, education, and therapy.    Medicine  may help you lose weight when it is used with a healthy foods and physical activity.    Surgery  can help you lose weight if you have obesity along with other health problems. Several types of weight-loss surgery are available. Ask your healthcare provider for more information.    Tips for safe weight loss:   Set small, realistic goals.  An example of a small goal is to walk for 20 minutes 5 days a week. Anther goal is to lose 5% of your body weight.    Ask for support.  Tell friends, family members, and coworkers about your goals. Ask someone to lose weight with you. You may also want to join a weight-loss support group.    Identify foods or triggers that may cause you to overeat.  Remove tempting high-calorie foods from your home and workplace. Place a bowl of fresh fruit on your kitchen counter. If stress causes you to eat, find other ways to cope with stress. A counselor or therapist may be able to help you.    Track your daily calories and activity.  Write down what you eat and drink. Also write down how many minutes of physical activity you do each day.     Track your weekly weight.  Weigh yourself in the morning, before you eat or drink anything but after you use the bathroom. Use the same scale, in the same place, and in similar clothing each time. Only weigh yourself 1 to 2 times each week, or as directed. You may become discouraged if you weigh yourself every day.    Eating changes:  You will need to eat 500 to 1,000 fewer calories each day than you currently eat to lose 1 to 2 pounds a week. The following changes will help you cut calories:  Eat smaller portions.  Use small plates, no larger than 9 inches in diameter. Fill your plate half full of fruits and vegetables. Measure your food using measuring cups  until you know what a serving size looks like.         Eat 3 meals and 1 or 2 snacks each day.  Plan your meals in advance. Cook and eat at home most of the time. Eat slowly. Do not skip meals. Skipping meals can lead to overeating later in the day. This can make it harder for you to lose weight. Talk with a dietitian to help you make a meal plan and schedule that is right for you.    Eat fruits and vegetables at every meal.  They are low in calories and high in fiber, which makes you feel full. Do not add butter, margarine, or cream sauce to vegetables. Use herbs to season steamed vegetables.    Eat less fat and fewer fried foods.  Eat more baked or grilled chicken and fish. These protein sources are lower in calories and fat than red meat. Limit fast food. Dress your salads with olive oil and vinegar instead of bottled dressing.    Limit the amount of sugar you eat.  Do not drink sugary beverages. Limit alcohol.       Activity changes:  Physical activity is good for your body in many ways. It helps you burn calories and build strong muscles. It decreases stress and depression, and improves your mood. It can also help you sleep better. Talk to your healthcare provider before you begin an exercise program.  Exercise for at least 30 minutes 5 days a week.  Start slowly. Set aside time each day for physical activity that you enjoy and that is convenient for you. It is best to do both weight training and an activity that increases your heart rate, such as walking, bicycling, or swimming.            Find ways to be more active.  Do yard work and housecleaning. Walk up the stairs instead of using elevators. Spend your leisure time going to events that require walking, such as outdoor festivals or fairs. This extra physical activity can help you lose weight and keep it off.       Follow up with your doctor as directed:  You may need to meet with a dietitian. Write down your questions so you remember to ask them during  your visits.  © Copyright [employer] 2023 Information is for End User's use only and may not be sold, redistributed or otherwise used for commercial purposes.  The above information is an  only. It is not intended as medical advice for individual conditions or treatments. Talk to your doctor, nurse or pharmacist before following any medical regimen to see if it is safe and effective for you.

## 2024-03-28 ENCOUNTER — EVALUATION (OUTPATIENT)
Dept: PHYSICAL THERAPY | Facility: REHABILITATION | Age: 49
End: 2024-03-28
Payer: OTHER GOVERNMENT

## 2024-03-28 DIAGNOSIS — M25.551 PAIN OF RIGHT HIP: ICD-10-CM

## 2024-03-28 DIAGNOSIS — M70.61 GREATER TROCHANTERIC BURSITIS OF RIGHT HIP: ICD-10-CM

## 2024-03-28 DIAGNOSIS — M16.0 PRIMARY OSTEOARTHRITIS OF BOTH HIPS: ICD-10-CM

## 2024-03-28 DIAGNOSIS — R26.9 GAIT ABNORMALITY: ICD-10-CM

## 2024-03-28 PROCEDURE — 97162 PT EVAL MOD COMPLEX 30 MIN: CPT | Performed by: PHYSICAL THERAPIST

## 2024-03-28 PROCEDURE — 97110 THERAPEUTIC EXERCISES: CPT | Performed by: PHYSICAL THERAPIST

## 2024-03-28 NOTE — PROGRESS NOTES
PT Evaluation     Today's date: 3/28/2024  Patient name: Suzie Burrell  : 1975  MRN: [id number]  Referring provider: Abhi Edmond MD  Dx:   Encounter Diagnosis     ICD-10-CM    1. Pain of right hip  M25.551 Ambulatory Referral to Physical Therapy      2. Primary osteoarthritis of both hips  M16.0 Ambulatory Referral to Physical Therapy      3. Gait abnormality  R26.9 Ambulatory Referral to Physical Therapy      4. Greater trochanteric bursitis of right hip  M70.61 Ambulatory Referral to Physical Therapy                     Assessment  Assessment details: Patient presents to PT with R hip pain. Patient displays decreased hip ROM, hip strength and abnormal gait. These impairments are leading to pain with walking, standing and stairs. Patient will benefit from skilled PT to address above impairments and help them return to PLOF.  Impairments: abnormal coordination, abnormal gait, abnormal muscle firing, abnormal or restricted ROM, abnormal movement, activity intolerance, impaired balance, impaired physical strength, lacks appropriate home exercise program, pain with function and weight-bearing intolerance  Barriers to therapy: Prior GI  Understanding of Dx/Px/POC: good   Prognosis: good    Goals  STG  1. Patient will display decreased pain to 0-2 in 6 weeks  2. Patient will display hip ROM WNL in 6 weeks  3. Patient will display hip strength WNL in 6 weeks    LTG  1. Patient will be able to stand for 30 minutes without pain in 12 weeks  2. Patient will be able to walk for 30 minutes without pain in 12 weeks  3. Patient will be able to go up/down 3 flights of stairs without pain in 12 weeks        Plan  Patient would benefit from: PT eval and skilled physical therapy  Planned modality interventions: cryotherapy, electrical stimulation/Marshallese stimulation, manual electrical stimulation, TENS and thermotherapy: hydrocollator packs  Planned therapy interventions: activity modification, ADL training, joint  mobilization, manual therapy, massage, motor coordination training, neuromuscular re-education, strengthening, stretching, therapeutic activities, therapeutic exercise, therapeutic training, balance, balance/weight bearing training, body mechanics training, flexibility, functional ROM exercises, gait training, graded activity, graded exercise and home exercise program  Frequency: 2x week  Duration in visits: 12  Duration in weeks: 6  Plan of Care beginning date: 3/28/2024  Plan of Care expiration date: 2024        Subjective Evaluation    History of Present Illness  Mechanism of injury: Patient states on  her R hip started to bother her. She was feeling it in her groin. She had an injection 2 weeks ago and is taking mobil which seems to be helping. Patient now has mild pain in her hip with walking, stairs and squatting. Patient is still able to work. Patient had L GI last year. Patient denies symptoms into her LE          Not a recurrent problem   Quality of life: good    Patient Goals  Patient goals for therapy: increased motion, decreased pain, improved balance, decreased edema and increased strength    Pain  Current pain ratin  At best pain ratin  At worst pain ratin  Quality: sharp and tight  Relieving factors: medications  Aggravating factors: stair climbing, walking and standing    Social Support  Steps to enter house: yes    Employment status: working        Objective     Passive Range of Motion     Right Hip   Flexion: 100 degrees   Abduction: 20 degrees with pain  External rotation (90/90): 40 degrees with pain  Internal rotation (90/90): 20 degrees with pain    Strength/Myotome Testing     Right Hip   Planes of Motion   Extension: 4  Abduction: 4  External rotation: 4    Tests     Right Hip   Positive ZENY and JOSE ANTONIO.              Precautions: L GI      Manuals                                                                 Neuro Re-Ed             walt gunn              sidesteps                                                                 Ther Ex             Hip abd             Hip ext                                                                                           Ther Activity                                       Gait Training                                       Modalities

## 2024-03-29 NOTE — ASSESSMENT & PLAN NOTE
Stable without statin.  Recommend lifestyle modifications.      The 10-year ASCVD risk score (Ncik MTZ, et al., 2019) is: 0.5%    Values used to calculate the score:      Age: 48 years      Sex: Female      Is Non- : No      Diabetic: No      Tobacco smoker: No      Systolic Blood Pressure: 120 mmHg      Is BP treated: No      HDL Cholesterol: 60 mg/dL      Total Cholesterol: 146 mg/dL

## 2024-04-01 ENCOUNTER — OFFICE VISIT (OUTPATIENT)
Dept: PHYSICAL THERAPY | Facility: REHABILITATION | Age: 49
End: 2024-04-01
Payer: OTHER GOVERNMENT

## 2024-04-01 DIAGNOSIS — M25.551 PAIN OF RIGHT HIP: Primary | ICD-10-CM

## 2024-04-01 DIAGNOSIS — M70.61 GREATER TROCHANTERIC BURSITIS OF RIGHT HIP: ICD-10-CM

## 2024-04-01 DIAGNOSIS — M16.0 PRIMARY OSTEOARTHRITIS OF BOTH HIPS: ICD-10-CM

## 2024-04-01 DIAGNOSIS — R26.9 GAIT ABNORMALITY: ICD-10-CM

## 2024-04-01 PROCEDURE — 97110 THERAPEUTIC EXERCISES: CPT | Performed by: PHYSICAL THERAPIST

## 2024-04-01 PROCEDURE — 97530 THERAPEUTIC ACTIVITIES: CPT | Performed by: PHYSICAL THERAPIST

## 2024-04-01 PROCEDURE — 97112 NEUROMUSCULAR REEDUCATION: CPT | Performed by: PHYSICAL THERAPIST

## 2024-04-01 NOTE — PROGRESS NOTES
Daily Note     Today's date: 2024  Patient name: Suzie Burrell  : 1975  MRN: [id number]  Referring provider: Abhi Edmond MD  Dx:   Encounter Diagnosis     ICD-10-CM    1. Pain of right hip  M25.551       2. Primary osteoarthritis of both hips  M16.0       3. Gait abnormality  R26.9       4. Greater trochanteric bursitis of right hip  M70.61                      Subjective: patient states her hip is a little stiff today but no pain      Objective: See treatment diary below      Assessment: Tolerated treatment well. Patient demonstrated fatigue post treatment, exhibited good technique with therapeutic exercises, and would benefit from continued PT Patient's hip is doing well. She is tolerating all exercises well      Plan: Continue per plan of care.      Precautions: L GI      Manuals                                                                 Neuro Re-Ed             bridges 3x10            clamshells 3x10            sidesteps Pink 3x10                                                                Ther Ex             Hip abd 3x10            Hip ext 3x10            Piriformis s 10sx5                                                                             Ther Activity             Step up 3x10                         Gait Training                                       Modalities

## 2024-04-08 ENCOUNTER — OFFICE VISIT (OUTPATIENT)
Dept: PHYSICAL THERAPY | Facility: REHABILITATION | Age: 49
End: 2024-04-08
Payer: OTHER GOVERNMENT

## 2024-04-08 DIAGNOSIS — M70.61 GREATER TROCHANTERIC BURSITIS OF RIGHT HIP: ICD-10-CM

## 2024-04-08 DIAGNOSIS — R26.9 GAIT ABNORMALITY: ICD-10-CM

## 2024-04-08 DIAGNOSIS — M25.551 PAIN OF RIGHT HIP: Primary | ICD-10-CM

## 2024-04-08 DIAGNOSIS — M16.0 PRIMARY OSTEOARTHRITIS OF BOTH HIPS: ICD-10-CM

## 2024-04-08 PROCEDURE — 97530 THERAPEUTIC ACTIVITIES: CPT | Performed by: PHYSICAL THERAPIST

## 2024-04-08 PROCEDURE — 97112 NEUROMUSCULAR REEDUCATION: CPT | Performed by: PHYSICAL THERAPIST

## 2024-04-08 PROCEDURE — 97110 THERAPEUTIC EXERCISES: CPT | Performed by: PHYSICAL THERAPIST

## 2024-04-08 NOTE — PROGRESS NOTES
Daily Note     Today's date: 2024  Patient name: Suzie Burrell  : 1975  MRN: [id number]  Referring provider: Abhi Edmond MD  Dx:   Encounter Diagnosis     ICD-10-CM    1. Pain of right hip  M25.551       2. Primary osteoarthritis of both hips  M16.0       3. Gait abnormality  R26.9       4. Greater trochanteric bursitis of right hip  M70.61                      Subjective: patient states the hip continues to improve. She is noticing her foot is more forward with walking      Objective: See treatment diary below      Assessment: Tolerated treatment well. Patient demonstrated fatigue post treatment, exhibited good technique with therapeutic exercises, and would benefit from continued PT Patient with improving symptoms. Patient doing well and will be assessed for dc next session      Plan: Continue per plan of care.      Precautions: L GI      Manuals                                                                Neuro Re-Ed             bridges 3x10 3x10           clamshells 3x10 3x10           sidesteps Pink 3x10 Pink 3x10                                                               Ther Ex             Hip abd 3x10 Pink 3x10           Hip ext 3x10 Pink 3x10           Piriformis s 10sx5                                                                             Ther Activity             Step up 3x10 3x10                        Gait Training                                       Modalities

## 2024-04-15 ENCOUNTER — OFFICE VISIT (OUTPATIENT)
Dept: PHYSICAL THERAPY | Facility: REHABILITATION | Age: 49
End: 2024-04-15
Payer: OTHER GOVERNMENT

## 2024-04-15 DIAGNOSIS — R26.9 GAIT ABNORMALITY: ICD-10-CM

## 2024-04-15 DIAGNOSIS — M25.551 PAIN OF RIGHT HIP: Primary | ICD-10-CM

## 2024-04-15 DIAGNOSIS — M16.0 PRIMARY OSTEOARTHRITIS OF BOTH HIPS: ICD-10-CM

## 2024-04-15 DIAGNOSIS — M70.61 GREATER TROCHANTERIC BURSITIS OF RIGHT HIP: ICD-10-CM

## 2024-04-15 PROCEDURE — 97112 NEUROMUSCULAR REEDUCATION: CPT | Performed by: PHYSICAL THERAPIST

## 2024-04-15 PROCEDURE — 97110 THERAPEUTIC EXERCISES: CPT | Performed by: PHYSICAL THERAPIST

## 2024-04-15 PROCEDURE — 97530 THERAPEUTIC ACTIVITIES: CPT | Performed by: PHYSICAL THERAPIST

## 2024-04-15 NOTE — PROGRESS NOTES
Daily Note     Today's date: 4/15/2024  Patient name: Suzie Burrell  : 1975  MRN: [id number]  Referring provider: Abhi Edmond MD  Dx:   Encounter Diagnosis     ICD-10-CM    1. Pain of right hip  M25.551       2. Primary osteoarthritis of both hips  M16.0       3. Gait abnormality  R26.9       4. Greater trochanteric bursitis of right hip  M70.61                      Subjective: patient states the hip continues to do well      Objective: See treatment diary below      Assessment: Tolerated treatment well. Patient demonstrated fatigue post treatment, exhibited good technique with therapeutic exercises, and would benefit from continued PT Patient is doing very well. Patient will continue her exercises at home and will check in if needed      Plan: Continue per plan of care.      Precautions: L GI      Manuals 4/1 4/8 4/15                                                              Neuro Re-Ed             bridges 3x10 3x10 3x10          clamshells 3x10 3x10 3x10          sidesteps Pink 3x10 Pink 3x10 Pinkm 3x10                                                              Ther Ex             Hip abd 3x10 Pink 3x10 Pink 3x10          Hip ext 3x10 Pink 3x10 Pink 3x10          Piriformis s 10sx5                                                                             Ther Activity             Step up 3x10 3x10 3x10                       Gait Training                                       Modalities

## 2024-06-07 ENCOUNTER — ANNUAL EXAM (OUTPATIENT)
Dept: OBGYN CLINIC | Facility: CLINIC | Age: 49
End: 2024-06-07
Payer: OTHER GOVERNMENT

## 2024-06-07 VITALS
HEIGHT: 65 IN | SYSTOLIC BLOOD PRESSURE: 122 MMHG | WEIGHT: 221 LBS | BODY MASS INDEX: 36.82 KG/M2 | DIASTOLIC BLOOD PRESSURE: 80 MMHG

## 2024-06-07 DIAGNOSIS — B35.1 NAIL FUNGUS: ICD-10-CM

## 2024-06-07 DIAGNOSIS — Z12.31 ENCOUNTER FOR SCREENING MAMMOGRAM FOR MALIGNANT NEOPLASM OF BREAST: ICD-10-CM

## 2024-06-07 DIAGNOSIS — Z01.419 WOMEN'S ANNUAL ROUTINE GYNECOLOGICAL EXAMINATION: Primary | ICD-10-CM

## 2024-06-07 PROBLEM — Z01.818 PREOPERATIVE CLEARANCE: Status: RESOLVED | Noted: 2023-10-19 | Resolved: 2024-06-07

## 2024-06-07 PROCEDURE — 99396 PREV VISIT EST AGE 40-64: CPT | Performed by: OBSTETRICS & GYNECOLOGY

## 2024-06-07 NOTE — PROGRESS NOTES
ASSESSMENT & PLAN:   Diagnoses and all orders for this visit:    Women's annual routine gynecological examination    Encounter for screening mammogram for malignant neoplasm of breast  -     Mammo screening bilateral w 3d & cad; Future    Nail fungus  -     Ambulatory Referral to Podiatry; Future          The following were reviewed in today's visit: ASCCP guidelines, Gardisil vaccination, STD testing breast self exam, mammography screening ordered, menopause, exercise, and healthy diet.    Patient to return to office in yearly for annual exam.     All questions have been answered to her satisfaction.        CC:  Annual Gynecologic Examination  Chief Complaint   Patient presents with    Gynecologic Exam     Last pap 2023 neg pap/ neg hpv   mammo 10/24/2023 negative   colonoscopy 22 - repeat 5 years       HPI: Suzie Burrell is a 48 y.o.  who presents for annual gynecologic examination.  She has the following concerns:  referral to podiatry      Health Maintenance:    Exercise: intermittently  Breast exams/breast awareness: yes  Last mammogram:   Colorectal cancer screening: uptodate    Past Medical History:   Diagnosis Date    Abnormal Pap smear of cervix     Anaphylaxis due to insect venom 08/10/2022    Arthritis     It was last year    COVID-19 2022    History of colon polyps 2022    HPV (human papilloma virus) infection     Hyperlipidemia     IFG (impaired fasting glucose)     Internal hemorrhoids 2022    denies    Kidney stone 2015    Kidney stones, calcium oxalate 2015    Obesity     Pneumonia     Resolved 2017     Pre-diabetes     Scoliosis     Tremor     Left Hand Resting Tremor    UTI (urinary tract infection)     Varicella     Visual impairment     Wear glasses       Past Surgical History:   Procedure Laterality Date    APPENDECTOMY  10/14/2018    Appendix taken out     SECTION       x 1 ; Failure to progress    COLONOSCOPY      FL GUIDED NEEDLE PLAC  BX/ASP/INJ  3/15/2024    FL INJECTION LEFT HIP (NON ARTHROGRAM)  10/12/2020    WI ARTHRP ACETBLR/PROX FEM PROSTC AGRFT/ALGRFT Left 11/1/2023    Procedure: ARTHROPLASTY HIP TOTAL, LEFT POSTERIOR, SAME DAY DISCHARGE;  Surgeon: Abhi Edmond MD;  Location: [facility];  Service: Orthopedics    WI LAPAROSCOPIC APPENDECTOMY N/A 10/14/2018    Procedure: APPENDECTOMY LAPAROSCOPIC;  Surgeon: Zohaib Schwarz MD;  Location: [facility];  Service: General    WISDOM TOOTH EXTRACTION         Past OB/Gyn History:   Patient's last menstrual period was 05/26/2024 (exact date).    Menopausal status: perimenopausal  Menopausal symptoms: None    Last Pap: 2023 : no abnormalities  History of abnormal Pap smear: no    Patient is not currently sexually active.   STD testing: no  Current contraception: abstinence      Family History  Family History   Problem Relation Age of Onset    Hypertension Father     Alcohol abuse Father     Kidney disease Father         CKD on ESRD    Diabetes type II Father 50    No Known Problems Mother     Scoliosis Brother     Hip dysplasia Daughter     No Known Problems Maternal Grandmother     No Known Problems Maternal Grandfather     No Known Problems Paternal Grandmother     No Known Problems Paternal Grandfather     No Known Problems Maternal Aunt     No Known Problems Maternal Aunt     No Known Problems Paternal Aunt     No Known Problems Paternal Aunt     No Known Problems Paternal Aunt     No Known Problems Paternal Aunt        Family history of uterine or ovarian cancer: no  Family history of breast cancer: no  Family history of colon cancer: no    Social History:  Social History     Socioeconomic History    Marital status: /Civil Union     Spouse name: Not on file    Number of children: 1    Years of education: Not on file    Highest education level: Not on file   Occupational History    Occupation: Substitute  for [employer]   Tobacco Use    Smoking status: Never     Smokeless tobacco: Never   Vaping Use    Vaping status: Never Used   Substance and Sexual Activity    Alcohol use: Yes     Alcohol/week: 1.0 standard drink of alcohol     Types: 1 Glasses of wine per week     Comment: rare    Drug use: Never    Sexual activity: Not Currently     Partners: Female     Birth control/protection: Abstinence, Condom Male, None   Other Topics Concern    Not on file   Social History Narrative        Lives with , Daughter    1 Child - 1 Daughter    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 [employer]     Social Determinants of Health     Financial Resource Strain: Not on file   Food Insecurity: No Food Insecurity (11/2/2023)    Hunger Vital Sign     Worried About Running Out of Food in the Last Year: Never true     Ran Out of Food in the Last Year: Never true   Transportation Needs: No Transportation Needs (11/2/2023)    PRAPARE - Transportation     Lack of Transportation (Medical): No     Lack of Transportation (Non-Medical): No   Physical Activity: Not on file   Stress: Not on file   Social Connections: Not on file   Intimate Partner Violence: Not on file   Housing Stability: Low Risk  (11/2/2023)    Housing Stability Vital Sign     Unable to Pay for Housing in the Last Year: No     Number of Places Lived in the Last Year: 1     Unstable Housing in the Last Year: No     Domestic violence screen: negative    Allergies:  Allergies   Allergen Reactions    Bee Venom Anaphylaxis       Medications:    Current Outpatient Medications:     Green Tea, Michelle sinensis, (GREEN TEA PO), Take 2 tablets by mouth 2 (two) times a day, Disp: , Rfl:     meloxicam (Mobic) 15 mg tablet, Take 1 tablet (15 mg total) by mouth daily, Disp: 30 tablet, Rfl: 2    Multiple Vitamins-Minerals (multivitamin with minerals) tablet, Take 1 tablet by mouth daily, Disp: 30 tablet, Rfl: 0    NON FORMULARY, Take 2 tablets by mouth in the morning Age loc meta- OTC supplement, Disp: , Rfl:      "EPINEPHrine (EPIPEN) 0.3 mg/0.3 mL SOAJ, Inject 0.3 mL (0.3 mg total) into a muscle once for 1 dose (Patient not taking: Reported on 3/25/2024), Disp: 0.6 mL, Rfl: 0    Review of Systems:  Review of Systems   Constitutional: Negative.    HENT: Negative.     Respiratory: Negative.     Cardiovascular: Negative.    Gastrointestinal: Negative.    Genitourinary: Negative.    Neurological: Negative.    Psychiatric/Behavioral: Negative.           Physical Exam:  /80 (BP Location: Left arm, Patient Position: Sitting, Cuff Size: Standard)   Ht 5' 5\" (1.651 m)   Wt 100 kg (221 lb)   LMP 05/26/2024 (Exact Date)   BMI 36.78 kg/m²    Physical Exam  Constitutional:       Appearance: Normal appearance.   Genitourinary:      Bladder and urethral meatus normal.      No lesions in the vagina.      Right Labia: No rash, tenderness, lesions, skin changes or Bartholin's cyst.     Left Labia: No tenderness, lesions, skin changes, Bartholin's cyst or rash.     No vaginal erythema, tenderness or bleeding.        Right Adnexa: not tender, not full and no mass present.     Left Adnexa: not tender, not full and no mass present.     Cervix is parous.      No cervical motion tenderness, discharge, lesion or polyp.      Uterus is not enlarged, fixed or tender.      No uterine mass detected.     Urethral meatus caruncle not present.     No urethral tenderness or mass present.   Breasts:     Right: No swelling, bleeding, inverted nipple, mass, nipple discharge, skin change or tenderness.      Left: No swelling, bleeding, inverted nipple, mass, nipple discharge, skin change or tenderness.   HENT:      Head: Normocephalic and atraumatic.   Eyes:      Extraocular Movements: Extraocular movements intact.      Conjunctiva/sclera: Conjunctivae normal.      Pupils: Pupils are equal, round, and reactive to light.   Cardiovascular:      Rate and Rhythm: Normal rate and regular rhythm.      Heart sounds: Normal heart sounds. No murmur " heard.  Pulmonary:      Effort: Pulmonary effort is normal. No respiratory distress.      Breath sounds: Normal breath sounds. No wheezing or rales.   Abdominal:      General: There is no distension.      Palpations: Abdomen is soft.      Tenderness: There is no abdominal tenderness. There is no guarding.   Neurological:      General: No focal deficit present.      Mental Status: She is alert and oriented to person, place, and time.   Skin:     General: Skin is warm and dry.   Psychiatric:         Mood and Affect: Mood normal.         Behavior: Behavior normal.   Vitals and nursing note reviewed.

## 2024-06-11 ENCOUNTER — OFFICE VISIT (OUTPATIENT)
Dept: OBGYN CLINIC | Facility: CLINIC | Age: 49
End: 2024-06-11
Payer: OTHER GOVERNMENT

## 2024-06-11 VITALS
HEIGHT: 65 IN | DIASTOLIC BLOOD PRESSURE: 81 MMHG | WEIGHT: 220 LBS | SYSTOLIC BLOOD PRESSURE: 118 MMHG | HEART RATE: 82 BPM | BODY MASS INDEX: 36.65 KG/M2

## 2024-06-11 DIAGNOSIS — M25.551 PAIN OF RIGHT HIP: ICD-10-CM

## 2024-06-11 DIAGNOSIS — M16.11 PRIMARY OSTEOARTHRITIS OF ONE HIP, RIGHT: Primary | ICD-10-CM

## 2024-06-11 PROCEDURE — 99213 OFFICE O/P EST LOW 20 MIN: CPT | Performed by: STUDENT IN AN ORGANIZED HEALTH CARE EDUCATION/TRAINING PROGRAM

## 2024-06-11 NOTE — ASSESSMENT & PLAN NOTE
Patient has a history, physical examination and radiographic evaluation consistent with moderate to severe osteoarthritis of the right hip. She understands that because she is experienced good relief with the CSI and recent PT we will hold off on PT. I encouraged her to keep up with her HEP and other modalities to help aid in pain relief. She understands if or when her pain worsens, we can further discuss surgical intervention or she can try a repeat injection.    The patient verbalized understanding of exam findings and treatment plan. We engaged in the shared decision-making process and treatment options were discussed at length with the patient. Surgical and conservative management discussed today along with risks and benefits. Patient was agreeable with the plan and all questions were answered to satisfaction.

## 2024-06-11 NOTE — PROGRESS NOTES
Date: 24  Suzie Burrell   MRN# [id number]  : 1975      Chief Complaint: Right Hip Pain    Assessment and Plan:    Primary osteoarthritis of one hip, right      Primary osteoarthritis of one hip, right  Patient has a history, physical examination and radiographic evaluation consistent with moderate to severe osteoarthritis of the right hip. She understands that because she is experienced good relief with the CSI and recent PT we will hold off on PT. I encouraged her to keep up with her HEP and other modalities to help aid in pain relief. She understands if or when her pain worsens, we can further discuss surgical intervention or she can try a repeat injection.    The patient verbalized understanding of exam findings and treatment plan. We engaged in the shared decision-making process and treatment options were discussed at length with the patient. Surgical and conservative management discussed today along with risks and benefits. Patient was agreeable with the plan and all questions were answered to satisfaction.        Subjective:   Suzie Burrell is a 48 y.o. female who is being seen in follow-up for Right hip pain. Patient states that her pain has improved some since her prior visit. She did get a right hip CSI on 03/15/24 which did offer her some relief in symptoms and notes improvement today. She has completed her PT and admits to not keeping up with her HEP. She does use oral anti-inflammatories regularly to help with pain relief. She works in food services at the school and is worried about returning this coming fall and being in pain again.  Pain has mildly improved. No other orthopedic complaints or concerns.     History of L GI on 2023 done by myself       Prior treatment:  NSAIDs Yes    Physical Therapy Yes   Cortisone Injections Yes     Allergy:  Allergies   Allergen Reactions    Bee Venom Anaphylaxis     Medications:  All current active meds have been reviewed   Past Medical  History:  Past Medical History:   Diagnosis Date    Abnormal Pap smear of cervix     Anaphylaxis due to insect venom 08/10/2022    Arthritis     It was last year    COVID-19 2022    History of colon polyps 2022    HPV (human papilloma virus) infection     Hyperlipidemia     IFG (impaired fasting glucose)     Internal hemorrhoids 2022    denies    Kidney stone 2015    Kidney stones, calcium oxalate 2015    Obesity     Pneumonia     Resolved 2017     Pre-diabetes     Scoliosis     Tremor     Left Hand Resting Tremor    UTI (urinary tract infection)     Varicella     Visual impairment     Wear glasses     Past Surgical History:  Past Surgical History:   Procedure Laterality Date    APPENDECTOMY  10/14/2018    Appendix taken out     SECTION       x 1 ; Failure to progress    COLONOSCOPY      FL GUIDED NEEDLE PLAC BX/ASP/INJ  3/15/2024    FL INJECTION LEFT HIP (NON ARTHROGRAM)  10/12/2020    MT ARTHRP ACETBLR/PROX FEM PROSTC AGRFT/ALGRFT Left 2023    Procedure: ARTHROPLASTY HIP TOTAL, LEFT POSTERIOR, SAME DAY DISCHARGE;  Surgeon: Abhi Edmond MD;  Location: [facility];  Service: Orthopedics    MT LAPAROSCOPIC APPENDECTOMY N/A 10/14/2018    Procedure: APPENDECTOMY LAPAROSCOPIC;  Surgeon: Zohaib Schwarz MD;  Location: [facility];  Service: General    WISDOM TOOTH EXTRACTION       Family History:  Family History   Problem Relation Age of Onset    Hypertension Father     Alcohol abuse Father     Kidney disease Father         CKD on ESRD    Diabetes type II Father 50    No Known Problems Mother     Scoliosis Brother     Hip dysplasia Daughter     No Known Problems Maternal Grandmother     No Known Problems Maternal Grandfather     No Known Problems Paternal Grandmother     No Known Problems Paternal Grandfather     No Known Problems Maternal Aunt     No Known Problems Maternal Aunt     No Known Problems Paternal Aunt     No Known Problems Paternal Aunt     No Known Problems Paternal  "Aunt     No Known Problems Paternal Aunt      Social History:  Social History     Substance and Sexual Activity   Alcohol Use Yes    Alcohol/week: 1.0 standard drink of alcohol    Types: 1 Glasses of wine per week    Comment: rare     Social History     Substance and Sexual Activity   Drug Use Never     Social History     Tobacco Use   Smoking Status Never   Smokeless Tobacco Never           Review of Systems:  General- denies fever/chills  HEENT- denies hearing loss or sore throat  Eyes- denies eye pain or visual disturbances, denies red eyes  Respiratory- denies cough or SOB  Cardio- denies chest pain or palpitations  GI- denies abdominal pain  Endocrine- denies urinary frequency  Urinary- denies pain with urination  Musculoskeletal- Negative except noted above  Skin- denies rashes or wounds  Neurological- denies dizziness or headache  Psychiatric- denies anxiety or difficulty concentrating    Objective:   BP Readings from Last 1 Encounters:   06/11/24 118/81      Wt Readings from Last 1 Encounters:   06/11/24 99.8 kg (220 lb)      Pulse Readings from Last 1 Encounters:   06/11/24 82        BMI: Estimated body mass index is 36.61 kg/m² as calculated from the following:    Height as of this encounter: 5' 5\" (1.651 m).    Weight as of this encounter: 99.8 kg (220 lb).    Physical Exam  /81   Pulse 82   Ht 5' 5\" (1.651 m)   Wt 99.8 kg (220 lb)   LMP 05/26/2024 (Exact Date)   BMI 36.61 kg/m²   General/Constitutional: No apparent distress: well-nourished and well developed.  Eyes: normal ocular motion  Cardio: RRR, Normal S1S2, No m/r/g.   Lymphatic: No appreciable lymphadenopathy  Respiratory: Non-labored breathing, CTA b/l no w/c/r  Vascular: No edema, swelling or tenderness, except as noted in detailed exam. Extremities well perfused. No LE edema  Integumentary: No impressive skin lesions present, except as noted in detailed exam.  Neuro: No ataxia or tremors noted  Psych: Normal mood and affect, oriented " to person, place and time. Appropriate affect.  Musculoskeletal: Normal, except as noted in detailed exam and in HPI.    Gait and Station:   antalgic    Right Hip Exam    Inspection: normal color, temperature, turgor and moisture    Range of Motion: limited with pain  pain  Positive Stinchfield  Positive FADDIR  Negative Trendelenburg  Negative ZENY    Motor: 5/5 Q/HS/TA/GS/EHL/FHL    Vascular: Toes WWP with BCR    SILT DP/SP/Roseann/Saph/Tib      Images:  I personally reviewed relevant images in the PACS system and my interpretation is as follows:    I personally reviewed relevant images in the PACS system and my interpretation is as follows:  X-rays of the right Hip reveal severe degenerative changes with subchondral sclerosis, joint space narrowing, joint subluxation, and osteophyte formation secondary to hip dysplasia       Scribe Attestation      I,:   am acting as a scribe while in the presence of the attending physician.:       I,:   personally performed the services described in this documentation    as scribed in my presence.:               Abhi Edmond MD  Adult Reconstruction Specialist   [facility]

## 2024-07-17 ENCOUNTER — OFFICE VISIT (OUTPATIENT)
Dept: PODIATRY | Facility: CLINIC | Age: 49
End: 2024-07-17
Payer: OTHER GOVERNMENT

## 2024-07-17 VITALS
SYSTOLIC BLOOD PRESSURE: 120 MMHG | HEIGHT: 65 IN | RESPIRATION RATE: 18 BRPM | HEART RATE: 82 BPM | DIASTOLIC BLOOD PRESSURE: 80 MMHG | BODY MASS INDEX: 36.61 KG/M2

## 2024-07-17 DIAGNOSIS — B35.1 NAIL FUNGUS: Primary | ICD-10-CM

## 2024-07-17 PROCEDURE — 99203 OFFICE O/P NEW LOW 30 MIN: CPT | Performed by: PODIATRIST

## 2024-07-17 RX ORDER — TERBINAFINE HYDROCHLORIDE 250 MG/1
250 TABLET ORAL DAILY
Qty: 42 TABLET | Refills: 0 | Status: SHIPPED | OUTPATIENT
Start: 2024-07-17 | End: 2024-08-28

## 2024-07-17 NOTE — PROGRESS NOTES
"Ambulatory Visit  Name: Suzie Burrell      : 1975      MRN: [id number]  Encounter Provider: Vikas Simons DPM  Encounter Date: 2024   Encounter department: [facility] PODIATRY [facility]    Assessment & Plan   1. Nail fungus  -     Ambulatory Referral to Podiatry  -     terbinafine (LamISIL) 250 mg tablet; Take 1 tablet (250 mg total) by mouth daily take 1 pill daily for 2 weeks (14 days) then stop for 2 weeks. Repeat this cycle two more times.  Diagnosis and options discussed with patient  Patient agreeable to the plan as stated below    Discussed oral lamisil vs topical treatments for fungal toenails. Patient clearly told to abstain from any alcohol use while taking oral lamisil (terbinafine). Call with any GI distress or unusual side effects while taking this medication. Will pulse dose the medication to reduce hepatic risk.     Reviewed bloodwork, last CMP did not show any liver concerns. Patient instructed not to drink any alcohol while taking this medication    Patient to take 1 pill daily for 2 weeks (14 days) then stop for 2 weeks. Repeat this cycle two more times.         History of Present Illness     Suzie Burrell is a 49 y.o. female who presents with toenail fungus. She thinks she got it from her . She bought a topical off [employer] but it didn't do anything for a year. IT seems to be spreading.     Review of Systems  As stated in HPI, otherwise normal    Medical History Reviewed by provider this encounter:  Tobacco  Allergies  Meds  Problems  Med Hx  Surg Hx  Fam Hx        Objective     /80   Pulse 82   Resp 18   Ht 5' 5\" (1.651 m)   BMI 36.61 kg/m²     Physical Exam  Vitals reviewed.   Constitutional:       Appearance: She is obese.   Cardiovascular:      Rate and Rhythm: Normal rate.      Pulses: Normal pulses.           Dorsalis pedis pulses are 2+ on the right side and 2+ on the left side.        Posterior tibial pulses are 2+ on the right side and 2+ " on the left side.   Pulmonary:      Effort: Pulmonary effort is normal. No respiratory distress.   Musculoskeletal:         General: No deformity.   Feet:      Right foot:      Toenail Condition: Right toenails are abnormally thick. Fungal disease present.     Left foot:      Toenail Condition: Left toenails are abnormally thick. Fungal disease present.  Skin:     General: Skin is warm.      Findings: No erythema or rash.   Neurological:      Mental Status: She is alert.      Sensory: No sensory deficit.       Administrative Statements       Comprehensive metabolic panel  Order: [id number]   Status: Final result       Visible to patient: Yes (seen)       Next appt: 09/23/2024 at 02:20 PM in Family Medicine (Jennifer Fall DO)       Dx: IFG (impaired fasting glucose); Hyper...    1 Result Note            Component  Ref Range & Units 3/20/24  9:39 AM 11/3/23  4:50 AM 11/2/23  4:49 AM 8/22/23 12:07 PM 4/18/23  9:44 AM 10/5/22 11:03 AM 9/18/21  9:27 AM   Sodium  135 - 147 mmol/L 137 135 134 Low  138 138 137 141 R   Potassium  3.5 - 5.3 mmol/L 4.1 3.2 Low  3.4 Low  4.0 4.1 3.7 4.0   Chloride  96 - 108 mmol/L 102 104 106 104 106 108 110 High  R   CO2  21 - 32 mmol/L 25 24 23 24 25 25 28   ANION GAP  4 - 13 mmol/L 10 7 R 5 R 10 R 7 4 3 Low    BUN  5 - 25 mg/dL 27 High  6 13 12 17 13 16   Creatinine  0.60 - 1.30 mg/dL 0.50 Low  0.52 Low  CM 0.62 CM 0.58 Low  CM 0.60 CM 0.64 CM 0.51 Low  CM   Comment: Standardized to IDMS reference method   Glucose, Fasting  65 - 99 mg/dL 79 125 High   93 89 94 CM 89 CM   Calcium  8.4 - 10.2 mg/dL 8.8 8.1 Low  7.9 Low  9.2 9.2 8.7 R 8.7 R   AST  13 - 39 U/L 19   17 15 18 R, CM 16 R, CM   ALT  7 - 52 U/L 15   14 CM 10 CM 26 R, CM 27 R, CM   Comment: Specimen collection should occur prior to Sulfasalazine administration due to the potential for falsely depressed results.   Alkaline Phosphatase  34 - 104 U/L 57   58 55 61 R 61 R   Total Protein  6.4 - 8.4 g/dL 6.5   7.0 7.1 7.3 6.9 R    Albumin  3.5 - 5.0 g/dL 4.0   4.0 4.0 3.5 3.4 Low    Total Bilirubin  0.20 - 1.00 mg/dL 0.40   0.60 CM 0.62 CM 0.63 CM 0.32 CM   Comment: Use of this assay is not recommended for patients undergoing treatment with eltrombopag due to the potential for falsely elevated results.  N-acetyl-p-benzoquinone imine (metabolite of Acetaminophen) will generate erroneously low results in samples for patients that have taken an overdose of Acetaminophen.   eGFR  ml/min/1.73sq m 115 114 107 110 109 107 116

## 2024-08-01 DIAGNOSIS — T78.2XXA ANAPHYLAXIS, INITIAL ENCOUNTER: ICD-10-CM

## 2024-08-02 RX ORDER — EPINEPHRINE 0.3 MG/.3ML
0.3 INJECTION SUBCUTANEOUS ONCE
Qty: 0.6 ML | Refills: 1 | Status: SHIPPED | OUTPATIENT
Start: 2024-08-02 | End: 2024-08-02

## 2024-08-15 ENCOUNTER — TELEPHONE (OUTPATIENT)
Dept: OBGYN CLINIC | Facility: HOSPITAL | Age: 49
End: 2024-08-15

## 2024-08-15 DIAGNOSIS — M16.11 PRIMARY OSTEOARTHRITIS OF ONE HIP, RIGHT: Primary | ICD-10-CM

## 2024-08-15 NOTE — TELEPHONE ENCOUNTER
Caller: Patient    Doctor: Feli    Reason for call: Patient would like to get another FL Guided Needle Injection for her rt hip  (last one 3/15/24). 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 Also she would like to get a refill of the Meloxicam that she took back in March after the injection as well. Thank you.     [id number] - [place], [place]  [facility]     Call back#: [phone]

## 2024-08-28 ENCOUNTER — HOSPITAL ENCOUNTER (OUTPATIENT)
Dept: RADIOLOGY | Facility: HOSPITAL | Age: 49
Discharge: HOME | End: 2024-08-28
Attending: STUDENT IN AN ORGANIZED HEALTH CARE EDUCATION/TRAINING PROGRAM | Admitting: RADIOLOGY
Payer: OTHER GOVERNMENT

## 2024-08-28 DIAGNOSIS — M16.11 PRIMARY OSTEOARTHRITIS OF ONE HIP, RIGHT: ICD-10-CM

## 2024-08-28 PROCEDURE — 20610 DRAIN/INJ JOINT/BURSA W/O US: CPT

## 2024-08-28 PROCEDURE — 77002 NEEDLE LOCALIZATION BY XRAY: CPT

## 2024-08-28 RX ORDER — ROPIVACAINE HYDROCHLORIDE 2 MG/ML
10 INJECTION, SOLUTION EPIDURAL; INFILTRATION; PERINEURAL ONCE
Status: COMPLETED | OUTPATIENT
Start: 2024-08-28 | End: 2024-08-28

## 2024-08-28 RX ORDER — METHYLPREDNISOLONE ACETATE 80 MG/ML
80 INJECTION, SUSPENSION INTRA-ARTICULAR; INTRALESIONAL; INTRAMUSCULAR; SOFT TISSUE
Status: COMPLETED | OUTPATIENT
Start: 2024-08-28 | End: 2024-08-28

## 2024-08-28 RX ADMIN — IOHEXOL 2 ML: 300 INJECTION, SOLUTION INTRAVENOUS at 14:15

## 2024-08-28 RX ADMIN — ROPIVACAINE HYDROCHLORIDE 2 ML: 2 INJECTION, SOLUTION EPIDURAL; INFILTRATION at 14:15

## 2024-08-28 RX ADMIN — METHYLPREDNISOLONE ACETATE 80 MG: 80 INJECTION, SUSPENSION INTRA-ARTICULAR; INTRALESIONAL; INTRAMUSCULAR; SOFT TISSUE at 14:15

## 2024-08-30 ENCOUNTER — TELEPHONE (OUTPATIENT)
Age: 49
End: 2024-08-30

## 2024-08-30 NOTE — TELEPHONE ENCOUNTER
Caller: Eliel    Doctor: Anna    Reason for call: Had a hip  Injection on Wednesday, was pain free Wednesday and Thursday , woke up this morning and she is having quit a bit of pain. Would like to know what she is able to take - Advil/ Tylenol. Patient is asking if a my chart message can be sent if she is not available due to work     Call back#: [phone]

## 2024-09-10 ENCOUNTER — TELEMEDICINE (OUTPATIENT)
Dept: FAMILY MEDICINE CLINIC | Facility: CLINIC | Age: 49
End: 2024-09-10
Payer: OTHER GOVERNMENT

## 2024-09-10 VITALS — BODY MASS INDEX: 36.61 KG/M2 | WEIGHT: 220 LBS

## 2024-09-10 DIAGNOSIS — U07.1 COVID-19: Primary | ICD-10-CM

## 2024-09-10 PROCEDURE — 99213 OFFICE O/P EST LOW 20 MIN: CPT | Performed by: FAMILY MEDICINE

## 2024-09-10 RX ORDER — NIRMATRELVIR AND RITONAVIR 300-100 MG
3 KIT ORAL 2 TIMES DAILY
Qty: 30 TABLET | Refills: 0 | Status: SHIPPED | OUTPATIENT
Start: 2024-09-10 | End: 2024-09-15

## 2024-09-10 NOTE — PROGRESS NOTES
COVID-19 Outpatient Progress Note  Name: Suzie Burrell      : 1975      MRN: [id number]  Encounter Provider: Princess Smith DO  Encounter Date: 9/10/2024   Encounter department: [facility]    Assessment & Plan  COVID-19    Orders:    nirmatrelvir & ritonavir (Paxlovid, 300/100,) tablet therapy pack; Take 3 tablets by mouth 2 (two) times a day for 5 days Take 2 nirmatrelvir tablets + 1 ritonavir tablet together per dose    Disposition:   Rest. Fluids. Tylenol or Ibuprofen as appropriate.   Home COVID test +. Symptomatic treatment.   Agrees to COVID.    If vaccinated and positive will need to isolate for 5 days since start of symptoms, then mask for the next 5 days.   Do not return to school or work until you are fever free for 24 hours without  fever-reducing medications.  If not feeling well enough to return to work after 5 days we can extend work note.   If negative call us if symptoms worsen or do no improve by 10 days.       COVID GUIDELINES PER THE CDC AS OF 2021:  -If You Test Positive for COVID-19 (Isolate)  Everyone, regardless of vaccination status.  Stay home for 5 days .  If you have no symptoms or your symptoms are resolving after 5 days, you can leave your house.  Continue to wear a mask around others for 5 additional days.  If you have a fever, continue to stay home until your fever resolves.    -If You Were Exposed to Someone with COVID-19  If you:  Have been boosted OR Completed the primary series of Pfizer or Moderna vaccine within the last 6 months OR Completed the primary series of J&J vaccine within the last 2 months - You do not need to Quarantine   Wear a mask around others for 10 days.  Test on day 5, if possible.  If you develop symptoms get a test and stay home for 5 days from the start of the symptoms.    If you:  Completed the primary series of Pfizer or Moderna vaccine over 6 months ago and are not boosted OR Completed the primary series of J&J  over 2 months ago and are not boosted OR Are unvaccinated - you should Quarantine  Stay home for 5 days. After that continue to wear a mask around others for 5 additional days.  If you can't quarantine you must wear a mask for 10 days.  Test on day 5 if possible.  If you develop symptoms get a test and stay home for 5 days from the start of the symptoms.    From the CDC website as of 12/27/2021 CDC Updates and Shortens Recommended Isolation and Quarantine Period for General Population  CDC Online Newsroom  CDC             Paxlovid shared decision making statement for use in those with eGFR <30: Reviewed the recent evidence/studies showing that in patients with an eGFR below 30 do well tolerating Paxlovid with adverse event rates similar to the general population; this is true even for patients on dialysis. Risks and benefits of treatment were reviewed with patient and after discussion, patient agreed to move forward to Paxlovid prescription. All patients questions were answered.     I have spent a total time of 10 minutes on the day of the encounter for this patient including discussing prognosis, risks and benefits of treatment options, instructions for management, importance of treatment compliance and risk factor reductions.          Encounter provider: Princess Smith DO     Provider located at: [facility]  [address] [address] [phone]     Recent Visits  No visits were found meeting these conditions.  Showing recent visits within past 7 days and meeting all other requirements  Today's Visits  Date Type Provider Dept   09/10/24 Telemedicine Princess Smith DO [facility]   Showing today's visits and meeting all other requirements  Future Appointments  No visits were found meeting these conditions.  Showing future appointments within next 150 days and meeting all other requirements    History of Present Illness      This virtual  check-in was done via [employer] Embedded and patient was informed that this is a secure, HIPAA-compliant platform. She agrees to proceed.    Patient agrees to participate in a virtual check in via telephone or video visit instead of presenting to the office to address urgent/immediate medical needs. Patient is aware this is a billable service. She acknowledged consent and understanding of privacy and security of the video platform. The patient has agreed to participate and understands they can discontinue the visit at any time.    After connecting through [employer], the patient was identified by name and date of birth. Suzie Burrell was informed that this was a telemedicine visit and that the exam was being conducted confidentially over secure lines. My office door was closed. No one else was in the room. Suzie Burrell acknowledged consent and understanding of privacy and security of the telemedicine visit. I informed the patient that I have reviewed her record in Epic and presented the opportunity for her to ask any questions regarding the visit today. The patient agreed to participate.     Verification of patient location:  Patient is located in the following state in which I hold an active license: [place]    Subjective:   Suzie Burrell is a 49 y.o. female who has been screened for COVID-19. Patient's symptoms include fatigue, nasal congestion, rhinorrhea and cough (mild). Patient denies fever, chills, nausea, vomiting, diarrhea, myalgias and headaches.         COVID-19 vaccination status: Fully vaccinated (primary series)    Was visiting  in the nursing home - COVID there  She thought she was getting a cold   + last night   Her symptoms started yesterday with runny nose. Some congestion.   Nyquil   She had covid once before       Lab Results   Component Value Date    SARSCOV2 Negative 05/17/2021    SARSCOVAGH Positive (Patient Reported) (A) 05/28/2022       Review of Systems   Constitutional:  Positive for fatigue.  Negative for chills and fever.   HENT:  Positive for congestion and rhinorrhea.    Respiratory:  Positive for cough (mild).    Gastrointestinal:  Negative for diarrhea, nausea and vomiting.   Musculoskeletal:  Negative for myalgias.   Neurological:  Negative for headaches.     Objective     Wt 99.8 kg (220 lb)   BMI 36.61 kg/m²     Physical Exam  HENT:      Head: Normocephalic.      Right Ear: External ear normal.      Left Ear: External ear normal.      Nose: Nose normal.   Eyes:      Extraocular Movements: Extraocular movements intact.   Pulmonary:      Effort: Pulmonary effort is normal. No respiratory distress.   Musculoskeletal:      Cervical back: No rigidity.   Skin:     Findings: No rash (on face).   Neurological:      Mental Status: She is alert and oriented to person, place, and time.   Psychiatric:         Mood and Affect: Mood normal.         Behavior: Behavior normal.         Thought Content: Thought content normal.         Judgment: Judgment normal.

## 2024-09-14 ENCOUNTER — LAB (OUTPATIENT)
Dept: LAB | Facility: CLINIC | Age: 49
End: 2024-09-14
Payer: OTHER GOVERNMENT

## 2024-09-14 DIAGNOSIS — Z13.1 SCREENING FOR DIABETES MELLITUS: ICD-10-CM

## 2024-09-14 DIAGNOSIS — Z13.6 SCREENING FOR CARDIOVASCULAR CONDITION: ICD-10-CM

## 2024-09-14 DIAGNOSIS — E66.09 CLASS 2 OBESITY DUE TO EXCESS CALORIES WITHOUT SERIOUS COMORBIDITY WITH BODY MASS INDEX (BMI) OF 35.0 TO 35.9 IN ADULT: ICD-10-CM

## 2024-09-14 DIAGNOSIS — E66.9 OBESITY (BMI 35.0-39.9 WITHOUT COMORBIDITY): ICD-10-CM

## 2024-09-14 DIAGNOSIS — E78.5 HYPERLIPIDEMIA, UNSPECIFIED HYPERLIPIDEMIA TYPE: ICD-10-CM

## 2024-09-14 DIAGNOSIS — R73.01 IFG (IMPAIRED FASTING GLUCOSE): ICD-10-CM

## 2024-09-14 LAB
ALBUMIN SERPL BCG-MCNC: 4.2 G/DL (ref 3.5–5)
ALP SERPL-CCNC: 64 U/L (ref 34–104)
ALT SERPL W P-5'-P-CCNC: 12 U/L (ref 7–52)
ANION GAP SERPL CALCULATED.3IONS-SCNC: 8 MMOL/L (ref 4–13)
AST SERPL W P-5'-P-CCNC: 14 U/L (ref 13–39)
BASOPHILS # BLD AUTO: 0.05 THOUSANDS/ΜL (ref 0–0.1)
BASOPHILS NFR BLD AUTO: 1 % (ref 0–1)
BILIRUB SERPL-MCNC: 0.59 MG/DL (ref 0.2–1)
BUN SERPL-MCNC: 17 MG/DL (ref 5–25)
CALCIUM SERPL-MCNC: 9.2 MG/DL (ref 8.4–10.2)
CHLORIDE SERPL-SCNC: 104 MMOL/L (ref 96–108)
CHOLEST SERPL-MCNC: 180 MG/DL
CO2 SERPL-SCNC: 26 MMOL/L (ref 21–32)
CREAT SERPL-MCNC: 0.59 MG/DL (ref 0.6–1.3)
EOSINOPHIL # BLD AUTO: 0.09 THOUSAND/ΜL (ref 0–0.61)
EOSINOPHIL NFR BLD AUTO: 1 % (ref 0–6)
ERYTHROCYTE [DISTWIDTH] IN BLOOD BY AUTOMATED COUNT: 13.5 % (ref 11.6–15.1)
EST. AVERAGE GLUCOSE BLD GHB EST-MCNC: 117 MG/DL
GFR SERPL CREATININE-BSD FRML MDRD: 108 ML/MIN/1.73SQ M
GLUCOSE P FAST SERPL-MCNC: 98 MG/DL (ref 65–99)
HBA1C MFR BLD: 5.7 %
HCT VFR BLD AUTO: 43.5 % (ref 34.8–46.1)
HDLC SERPL-MCNC: 63 MG/DL
HGB BLD-MCNC: 14.2 G/DL (ref 11.5–15.4)
IMM GRANULOCYTES # BLD AUTO: 0.01 THOUSAND/UL (ref 0–0.2)
IMM GRANULOCYTES NFR BLD AUTO: 0 % (ref 0–2)
LDLC SERPL CALC-MCNC: 95 MG/DL (ref 0–100)
LDLC SERPL DIRECT ASSAY-MCNC: 108 MG/DL (ref 0–100)
LYMPHOCYTES # BLD AUTO: 2.16 THOUSANDS/ΜL (ref 0.6–4.47)
LYMPHOCYTES NFR BLD AUTO: 34 % (ref 14–44)
MCH RBC QN AUTO: 28.7 PG (ref 26.8–34.3)
MCHC RBC AUTO-ENTMCNC: 32.6 G/DL (ref 31.4–37.4)
MCV RBC AUTO: 88 FL (ref 82–98)
MONOCYTES # BLD AUTO: 0.47 THOUSAND/ΜL (ref 0.17–1.22)
MONOCYTES NFR BLD AUTO: 7 % (ref 4–12)
NEUTROPHILS # BLD AUTO: 3.59 THOUSANDS/ΜL (ref 1.85–7.62)
NEUTS SEG NFR BLD AUTO: 57 % (ref 43–75)
NONHDLC SERPL-MCNC: 117 MG/DL
NRBC BLD AUTO-RTO: 0 /100 WBCS
PLATELET # BLD AUTO: 386 THOUSANDS/UL (ref 149–390)
PMV BLD AUTO: 9.4 FL (ref 8.9–12.7)
POTASSIUM SERPL-SCNC: 3.8 MMOL/L (ref 3.5–5.3)
PROT SERPL-MCNC: 7.2 G/DL (ref 6.4–8.4)
RBC # BLD AUTO: 4.94 MILLION/UL (ref 3.81–5.12)
SODIUM SERPL-SCNC: 138 MMOL/L (ref 135–147)
TRIGL SERPL-MCNC: 108 MG/DL
TSH SERPL DL<=0.05 MIU/L-ACNC: 3.02 UIU/ML (ref 0.45–4.5)
WBC # BLD AUTO: 6.37 THOUSAND/UL (ref 4.31–10.16)

## 2024-09-14 PROCEDURE — 85025 COMPLETE CBC W/AUTO DIFF WBC: CPT

## 2024-09-14 PROCEDURE — 83721 ASSAY OF BLOOD LIPOPROTEIN: CPT

## 2024-09-14 PROCEDURE — 80053 COMPREHEN METABOLIC PANEL: CPT

## 2024-09-14 PROCEDURE — 84443 ASSAY THYROID STIM HORMONE: CPT

## 2024-09-14 PROCEDURE — 80061 LIPID PANEL: CPT

## 2024-09-14 PROCEDURE — 36415 COLL VENOUS BLD VENIPUNCTURE: CPT

## 2024-09-14 PROCEDURE — 83036 HEMOGLOBIN GLYCOSYLATED A1C: CPT

## 2024-09-17 NOTE — RESULT ENCOUNTER NOTE
Stable labs - will review with patient at upcoming appointment.    IFG - Stable.  To consider Metformin XR?     Hyperlipidemia - Recommend lifestyle modifications.    The 10-year ASCVD risk score (Nick MTZ, et al., 2019) is: 0.7%    Values used to calculate the score:      Age: 49 years      Sex: Female      Is Non- : No      Diabetic: No      Tobacco smoker: No      Systolic Blood Pressure: 120 mmHg      Is BP treated: No      HDL Cholesterol: 63 mg/dL      Total Cholesterol: 180 mg/dL

## 2024-09-23 ENCOUNTER — OFFICE VISIT (OUTPATIENT)
Dept: FAMILY MEDICINE CLINIC | Facility: CLINIC | Age: 49
End: 2024-09-23
Payer: OTHER GOVERNMENT

## 2024-09-23 VITALS
TEMPERATURE: 97.6 F | WEIGHT: 223.4 LBS | SYSTOLIC BLOOD PRESSURE: 96 MMHG | OXYGEN SATURATION: 97 % | RESPIRATION RATE: 18 BRPM | DIASTOLIC BLOOD PRESSURE: 56 MMHG | HEART RATE: 76 BPM | BODY MASS INDEX: 37.22 KG/M2 | HEIGHT: 65 IN

## 2024-09-23 DIAGNOSIS — E66.09 CLASS 2 OBESITY DUE TO EXCESS CALORIES WITHOUT SERIOUS COMORBIDITY WITH BODY MASS INDEX (BMI) OF 37.0 TO 37.9 IN ADULT: ICD-10-CM

## 2024-09-23 DIAGNOSIS — E66.812 CLASS 2 OBESITY DUE TO EXCESS CALORIES WITHOUT SERIOUS COMORBIDITY WITH BODY MASS INDEX (BMI) OF 37.0 TO 37.9 IN ADULT: ICD-10-CM

## 2024-09-23 DIAGNOSIS — Z86.0100 HISTORY OF COLON POLYPS: ICD-10-CM

## 2024-09-23 DIAGNOSIS — E78.5 HYPERLIPIDEMIA, UNSPECIFIED HYPERLIPIDEMIA TYPE: ICD-10-CM

## 2024-09-23 DIAGNOSIS — T78.2XXA ANAPHYLAXIS DUE TO INSECT VENOM: ICD-10-CM

## 2024-09-23 DIAGNOSIS — N20.0 KIDNEY STONES, CALCIUM OXALATE: ICD-10-CM

## 2024-09-23 DIAGNOSIS — T63.481A ANAPHYLAXIS DUE TO INSECT VENOM: ICD-10-CM

## 2024-09-23 DIAGNOSIS — Z00.00 ANNUAL PHYSICAL EXAM: Primary | ICD-10-CM

## 2024-09-23 DIAGNOSIS — Z86.010 HISTORY OF COLON POLYPS: ICD-10-CM

## 2024-09-23 DIAGNOSIS — R73.01 IFG (IMPAIRED FASTING GLUCOSE): ICD-10-CM

## 2024-09-23 PROCEDURE — 99396 PREV VISIT EST AGE 40-64: CPT | Performed by: FAMILY MEDICINE

## 2024-09-23 NOTE — PATIENT INSTRUCTIONS
"  Your insurance may not cover the cost of your Vitamin D blood test, which is approximately $65-70.  Please notify the lab prior to blood draw if you would like to decline this test.        Patient Education     Routine physical for adults   The Basics   Written by the doctors and editors at [facility]   What is a physical? -- A physical is a routine visit, or \"check-up,\" with your doctor. You might also hear it called a \"wellness visit\" or \"preventive visit.\"  During each visit, the doctor will:   Ask about your physical and mental health   Ask about your habits, behaviors, and lifestyle   Do an exam   Give you vaccines if needed   Talk to you about any medicines you take   Give advice about your health   Answer your questions  Getting regular check-ups is an important part of taking care of your health. It can help your doctor find and treat any problems you have. But it's also important for preventing health problems.  A routine physical is different from a \"sick visit.\" A sick visit is when you see a doctor because of a health concern or problem. Since physicals are scheduled ahead of time, you can think about what you want to ask the doctor.  How often should I get a physical? -- It depends on your age and health. In general, for people age 21 years and older:   If you are younger than 50 years, you might be able to get a physical every 3 years.   If you are 50 years or older, your doctor might recommend a physical every year.  If you have an ongoing health condition, like diabetes or high blood pressure, your doctor will probably want to see you more often.  What happens during a physical? -- In general, each visit will include:   Physical exam - The doctor or nurse will check your height, weight, heart rate, and blood pressure. They will also look at your eyes and ears. They will ask about how you are feeling and whether you have any symptoms that bother you.   Medicines - It's a good idea to bring a list of " "all the medicines you take to each doctor visit. Your doctor will talk to you about your medicines and answer any questions. Tell them if you are having any side effects that bother you. You should also tell them if you are having trouble paying for any of your medicines.   Habits and behaviors - This includes:   Your diet   Your exercise habits   Whether you smoke, drink alcohol, or use drugs   Whether you are sexually active   Whether you feel safe at home  Your doctor will talk to you about things you can do to improve your health and lower your risk of health problems. They will also offer help and support. For example, if you want to quit smoking, they can give you advice and might prescribe medicines. If you want to improve your diet or get more physical activity, they can help you with this, too.   Lab tests, if needed - The tests you get will depend on your age and situation. For example, your doctor might want to check your:   Cholesterol   Blood sugar   Iron level   Vaccines - The recommended vaccines will depend on your age, health, and what vaccines you already had. Vaccines are very important because they can prevent certain serious or deadly infections.   Discussion of screening - \"Screening\" means checking for diseases or other health problems before they cause symptoms. Your doctor can recommend screening based on your age, risk, and preferences. This might include tests to check for:   Cancer, such as breast, prostate, cervical, ovarian, colorectal, prostate, lung, or skin cancer   Sexually transmitted infections, such as chlamydia and gonorrhea   Mental health conditions like depression and anxiety  Your doctor will talk to you about the different types of screening tests. They can help you decide which screenings to have. They can also explain what the results might mean.   Answering questions - The physical is a good time to ask the doctor or nurse questions about your health. If needed, they can " refer you to other doctors or specialists, too.  Adults older than 65 years often need other care, too. As you get older, your doctor will talk to you about:   How to prevent falling at home   Hearing or vision tests   Memory testing   How to take your medicines safely   Making sure that you have the help and support you need at home  All topics are updated as new evidence becomes available and our peer review process is complete.  This topic retrieved from [employer] on: May 02, 2024.  Topic 332036 Version 1.0  Release: 32.4.3 - C32.122  © 2024 UpToDate, Inc. and/or its affiliates. All rights reserved.  Consumer Information Use and Disclaimer   Disclaimer: This generalized information is a limited summary of diagnosis, treatment, and/or medication information. It is not meant to be comprehensive and should be used as a tool to help the user understand and/or assess potential diagnostic and treatment options. It does NOT include all information about conditions, treatments, medications, side effects, or risks that may apply to a specific patient. It is not intended to be medical advice or a substitute for the medical advice, diagnosis, or treatment of a health care provider based on the health care provider's examination and assessment of a patient's specific and unique circumstances. Patients must speak with a health care provider for complete information about their health, medical questions, and treatment options, including any risks or benefits regarding use of medications. This information does not endorse any treatments or medications as safe, effective, or approved for treating a specific patient. UpToDate, Inc. and its affiliates disclaim any warranty or liability relating to this information or the use thereof.The use of this information is governed by the Terms of Use, available at https://www.woltersSolavistauwer.com/en/know/clinical-effectiveness-terms. 2024© UpToDate, Inc. and its affiliates and/or licensors.  "All rights reserved.  Copyright   © 2024 UpToDate, Inc. and/or its affiliates. All rights reserved.    Patient Education     High cholesterol   The Basics   Written by the doctors and editors at [facility]   What is cholesterol? -- Cholesterol is a substance found in blood. Everyone has some. It is needed for good health. But people sometimes have too much cholesterol.  Compared with people with normal cholesterol, people with high cholesterol have a higher risk of heart attack, stroke, and other health problems. The higher your cholesterol, the higher your risk of these problems.  Are there different types of cholesterol? -- Yes, there are a few different types. If you get a cholesterol test, you might hear your doctor or nurse talk about:   Total cholesterol   LDL cholesterol - Some people call this the \"bad\" cholesterol. That's because having high LDL levels raises your risk of heart attack, stroke, and other health problems.   HDL cholesterol - Some people call this the \"good\" cholesterol. That's because people with high HDL levels tend to have a lower risk of heart attack, stroke, and other health problems.   Non-HDL cholesterol - Non-HDL cholesterol is your total cholesterol minus your HDL cholesterol.   Triglycerides - Triglycerides are not cholesterol. They are another type of fat. But they often get measured when cholesterol is measured. (Having high triglycerides also seems to increase the risk of heart attack and stroke.)  What should my numbers be? -- Ask your doctor or nurse what your numbers should be. Different people need different goals. If you live outside of the [place], see the table (table 1).  In general, people who do not already have heart disease should aim for:   Total cholesterol below 200   LDL cholesterol below 130, or much lower if they are at risk of heart attack or stroke   HDL cholesterol above 60   Non-HDL cholesterol below 160, or lower if they are at risk of heart attack or stroke   " "Triglycerides below 150  Remember, though, that many people who cannot meet these goals still have a low risk of heart attack and stroke.  What should I do if I have high cholesterol? -- Ask your doctor what your overall risk of heart attack and stroke is. Just having high cholesterol is not always a reason to worry. Having high cholesterol is just one of many things that can increase your risk of heart attack and stroke.  Other things that increase your risk include:   Smoking   High blood pressure   Having a parent or sibling who got heart disease at a young age - Young, in this case, means younger than 55 for males and younger than 65 for females.   A diet that is not heart healthy - A \"heart-healthy\" diet includes lots of fruits and vegetables, fiber, and healthy fats (like those found in fish, nuts, and certain oils). It also means limiting sugar and unhealthy fats.   Older age  If you are at high risk of heart attack and stroke, having high cholesterol is a problem. But if you are at low risk, high cholesterol might not need treatment.  Should I take medicine to lower cholesterol? -- Not everyone who has high cholesterol needs medicines. Your doctor or nurse will decide if you need them based on your age, family history, and other health concerns.  There are many different medicines used to lower cholesterol (table 2). Some help your body make less cholesterol. Some keep your body from absorbing cholesterol from foods. Some help your body get rid of cholesterol faster. The medicines most often used to treat high cholesterol are called \"statins.\"  You should probably take a statin if you:   Already had a heart attack or stroke   Have known heart disease   Have diabetes   Have a condition called \"peripheral artery disease,\" which makes it painful to walk, and happens when the arteries in your legs get clogged with fatty deposits   Have an \"abdominal aortic aneurysm,\" which is a widening of the main artery in the " "belly  Most people with any of the conditions listed above should take a statin no matter what their cholesterol level is. If your doctor or nurse prescribes a statin, it's important to keep taking it. The medicine might not make you feel any different. But it can help prevent heart attack, stroke, and death.  If your doctor or nurse recommends taking medicine to help lower your cholesterol, make sure that you know what it is called. Follow all the instructions for how to take it. For example, some medicines work better when you take them in the evening. Some need to be taken with food.  Tell your doctor or nurse if your medicine causes any side effects that bother you. They might be able to switch you to a different medicine.  Can I lower my cholesterol without medicines? -- Yes. You can help lower your cholesterol by doing these things:   You can lower your LDL, or \"bad,\" cholesterol by avoiding red meat, butter, fried foods, cheese, and other foods that have a lot of saturated fat.   You can lower triglycerides by avoiding sugary foods, fried foods, and excess alcohol.   If you have excess weight, it can help to lose weight. Your doctor or nurse can help you do this in a healthy way.   Try to get regular physical activity. Even gentle forms of exercise, like walking, are good for your health.  Even if these steps don't change your cholesterol very much, they can improve your health in many other ways.  All topics are updated as new evidence becomes available and our peer review process is complete.  This topic retrieved from [employer] on: Mar 01, 2024.  Topic 53287 Version 25.0  Release: 32.2.4 - C32.59  © 2024 UpToDate, Inc. and/or its affiliates. All rights reserved.  table 1: Cholesterol and triglyceride measurements in the [place] and elsewhere     Measurement used within the [place] Milligrams/deciliter (mg/dL)  Measurement used most places outside of the [place] Millimoles/liter (mmol/Liter)     Level to aim for  Level to " aim for    Total cholesterol  Below 200 Below 5.17   LDL cholesterol  Below 130, or much lower if at risk of heart attack and stroke Below 3.36, or much lower if at risk of heart attack and stroke   HDL cholesterol  Above 60 Above 1.55   Triglycerides  Below 150 Below 1.7   Cholesterol is measured differently in the [place] than it is in most other countries. This table shows values used within and outside of the [place]. It includes the cholesterol and triglyceride levels that most people who do not have heart disease should aim for.  Graphic 21241 Version 5.0  table 2: Lipid-lowering medicines  Generic name  Brand name    Statins    Atorvastatin Lipitor   Fluvastatin Lescol, Lescol XL   Lovastatin Mevacor, Altoprev   Pitavastatin Livalo   Pravastatin Pravachol   Rosuvastatin Crestor   Simvastatin Zocor   PCSK9 inhibitors    Alirocumab Praluent   Evolocumab Repatha, Repatha SureClick   Cholesterol absorption inhibitors    Ezetimibe Zetia   Bile acid sequestrants    Cholestyramine Prevalite, Questran, Questran Light   Colesevelam Welchol   Colestipol Colestid   Niacin (nicotinic acid)    Niacin immediate release     Niacin extended release Niaspan   Fibrates    Fenofibrate Fenoglide, Tricor, Triglide, others   Gemfibrozil Lopid   Brand names listed are for medicines available in the [place] and some other countries.  Graphic 17943 Version 7.0  Consumer Information Use and Disclaimer   Disclaimer: This generalized information is a limited summary of diagnosis, treatment, and/or medication information. It is not meant to be comprehensive and should be used as a tool to help the user understand and/or assess potential diagnostic and treatment options. It does NOT include all information about conditions, treatments, medications, side effects, or risks that may apply to a specific patient. It is not intended to be medical advice or a substitute for the medical advice, diagnosis, or treatment of a health care provider based on the  health care provider's examination and assessment of a patient's specific and unique circumstances. Patients must speak with a health care provider for complete information about their health, medical questions, and treatment options, including any risks or benefits regarding use of medications. This information does not endorse any treatments or medications as safe, effective, or approved for treating a specific patient. UpToDate, Inc. and its affiliates disclaim any warranty or liability relating to this information or the use thereof.The use of this information is governed by the Terms of Use, available at https://www.woltersTamir Biotechnologyuwer.com/en/know/clinical-effectiveness-terms. 2024© UpToDate, Inc. and its affiliates and/or licensors. All rights reserved.  Copyright   © 2024 UpToDate, Inc. and/or its affiliates. All rights reserved.

## 2024-09-23 NOTE — PROGRESS NOTES
Assessment/Plan:  Problem List Items Addressed This Visit          Endocrine    IFG (impaired fasting glucose)     Stable.  Patient declines Metformin XR 500mg 3 pills QD because she is worried about possible kidney damage, and declines GLP-1 agonist.  Recommend lifestyle modifications.          Relevant Orders    Comprehensive metabolic panel    Hemoglobin A1C       Genitourinary    Kidney stones, calcium oxalate     Stable.  Caution c calcium supplements.              Other    Class 2 obesity due to excess calories without serious comorbidity with body mass index (BMI) of 37.0 to 37.9 in adult     Stable.  Recommend lifestyle modifications.  Patient previously declined GLP-1 agonist.           Relevant Orders    Vitamin D 25 hydroxy    Hyperlipidemia     Stable without statin.  Recommend lifestyle modifications.      The 10-year ASCVD risk score (Nick MTZ, et al., 2019) is: 0.4%    Values used to calculate the score:      Age: 49 years      Sex: Female      Is Non- : No      Diabetic: No      Tobacco smoker: No      Systolic Blood Pressure: 96 mmHg      Is BP treated: No      HDL Cholesterol: 63 mg/dL      Total Cholesterol: 180 mg/dL           Relevant Orders    Comprehensive metabolic panel    Lipid panel    TSH, 3rd generation with Free T4 reflex    LDL cholesterol, direct    History of colon polyps     Colonoscopy is up-to-date.         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  Use Epi Pen PRN.  Pending Allergy consult.            Other Visit Diagnoses       Annual physical exam    -  Primary    Health Maintenance   Topic Date Due    PT PLAN OF CARE  04/27/2024    Influenza Vaccine (1) 09/01/2024    Breast Cancer Screening: Mammogram  10/24/2024    COVID-19 Vaccine (3 - 2023-24 season) 12/23/2024 (Originally 9/1/2024)    Depression Screening  09/23/2025    Annual Physical  09/23/2025    Zoster Vaccine (1 of 2) 06/27/2026    Colorectal Cancer Screening  05/22/2027    Cervical Cancer  Screening  05/09/2028    DTaP,Tdap,and Td Vaccines (2 - Td or Tdap) 09/17/2030    RSV Vaccine Age 60+ Years (1 - 1-dose 60+ series) 06/27/2036    HIV Screening  Completed    Hepatitis C Screening  Completed    RSV Vaccine age 0-20 Months  Aged Out    Pneumococcal Vaccine: Pediatrics (0 to 5 Years) and At-Risk Patients (6 to 64 Years)  Aged Out    HIB Vaccine  Aged Out    IPV Vaccine  Aged Out    Hepatitis A Vaccine  Aged Out    Meningococcal ACWY Vaccine  Aged Out    HPV Vaccine  Aged Out                  Return in about 6 months (around 3/23/2025) for 6mo - IFG, HL, Labs.      Future Appointments   Date Time Provider Department [place]   10/29/2024  2:20 PM BE [facility] 1 [facility] [place]   11/1/2024  2:00 PM Abhi Edmond MD [facility]   3/24/2025  2:20 PM Jennifer Fall DO [facility]   6/13/2025 11:00 AM Lori Alonso MD  [facility]        Subjective:     Suzie is a 49 y.o. female who presents today for a follow-up on her chronic medical conditions.        HPI:  Chief Complaint   Patient presents with    Physical Exam     Physical / 6mo - IFG, HL, Labs. No new problems or concerns at this time.      -- Above per clinical staff and reviewed. --      HPI      Today:      Return in about 6 months (around 9/25/2024) for Physical / 6mo - IFG, HL, Labs       6mo OV          had a stroke 3/22 and has left hemiparalysis.  He is living at [facility] in [facility].  It is unknown when he will return home.  She sees him weekly.  [facility]  involved.      Obesity - Watching diet.  No regular exercise.  Walks occasionally.       IFG - New Dx 10/5/22.  She is not taking Metformin XR 500mg 3 pills QD, and she only took 1 pill QD for 1 week and stopped it because people told her it was bad for her kidneys.  She previously declined Pre-DM education.        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 - Pending appt c Allergist Dr. Del Castillo 12/22/22 - referred  8/10/22.  Has not used to use her Epi Pen yet.       Hyperlipidemia - No statin previously.         B/L Hip Pain / B/L Hip Dysplasia - Management per Ortho Dr. Edmond - Next appt 11/24.  She states she needs hip replacement at 51yo.  She states her hips have never been normal childbirth in 2010.  S/p Left THR 11/1/23.  S/p CSI.  Not using Mobic 15mg QD and Tylenol PRN.        Scoliosis - Management per Ortho.  F/U PRN as referred to Spine [facility] Pain - patient cancelled appt.  Denies back pain.  Occasionally has paresthesias mid-thoracic back.       H/O Colon Polyps - Next colonoscopy due 5/22/27.       Reviewed:  Labs 3/20/24, Gyn 6/7/24, Ortho 6/11/24     Sees Gyn [facility] Dr. Alonso yearly.  Next appt 6/25.    Overdue for Dentist.  Sees Optho q2 years.      PHQ-2/9 Depression Screening    Little interest or pleasure in doing things: 0 - not at all  Feeling down, depressed, or hopeless: 0 - not at all  PHQ-2 Score: 0  PHQ-2 Interpretation: Negative depression screen             The following portions of the patient's history were reviewed and updated as appropriate: allergies, current medications, past family history, past medical history, past social history, past surgical history and problem list.      Review of Systems   Constitutional:  Negative for appetite change, chills, diaphoresis, fatigue and fever.   Respiratory:  Negative for chest tightness and shortness of breath.    Cardiovascular:  Negative for chest pain.   Gastrointestinal:  Negative for abdominal pain, blood in stool, diarrhea, nausea and vomiting.   Genitourinary:  Negative for dysuria.   Musculoskeletal:  Negative for arthralgias.        Current Outpatient Medications   Medication Sig Dispense Refill    Cholecalciferol (VITAMIN D3 GUMMIES ADULT PO) Take 2 each by mouth in the morning      DIHYDROBERBERINE PO Take 100 mg by mouth in the morning For glucose management      EPINEPHrine (EPIPEN) 0.3 mg/0.3 mL SOAJ Inject  "0.3 mL (0.3 mg total) into a muscle once for 1 dose 0.6 mL 1    Green Tea, Michelle sinensis, (GREEN TEA PO) Take 2 tablets by mouth 2 (two) times a day      Multiple Vitamins-Minerals (multivitamin with minerals) tablet Take 1 tablet by mouth daily 30 tablet 0    NON FORMULARY Take 2 tablets by mouth in the morning Age Loc meta- OTC supplement       No current facility-administered medications for this visit.       Objective:  BP 96/56   Pulse 76   Temp 97.6 °F (36.4 °C)   Resp 18   Ht 5' 5\" (1.651 m)   Wt 101 kg (223 lb 6.4 oz)   SpO2 97%   BMI 37.18 kg/m²    Wt Readings from Last 3 Encounters:   09/23/24 101 kg (223 lb 6.4 oz)   09/10/24 99.8 kg (220 lb)   06/11/24 99.8 kg (220 lb)      BP Readings from Last 3 Encounters:   09/23/24 96/56   07/17/24 120/80   06/11/24 118/81          Physical Exam  Vitals and nursing note reviewed.   Constitutional:       Appearance: Normal appearance. She is well-developed. She is obese.   HENT:      Head: Normocephalic and atraumatic.      Right Ear: Tympanic membrane, ear canal and external ear normal.      Left Ear: Tympanic membrane, ear canal and external ear normal.      Nose: Nose normal.      Right Sinus: No maxillary sinus tenderness or frontal sinus tenderness.      Left Sinus: No maxillary sinus tenderness or frontal sinus tenderness.      Mouth/Throat:      Mouth: Mucous membranes are moist.      Pharynx: Oropharynx is clear. Uvula midline.      Tonsils: No tonsillar exudate.   Eyes:      Extraocular Movements: Extraocular movements intact.      Conjunctiva/sclera: Conjunctivae normal.      Pupils: Pupils are equal, round, and reactive to light.   Cardiovascular:      Rate and Rhythm: Normal rate and regular rhythm.      Pulses: Normal pulses.      Heart sounds: Normal heart sounds.   Pulmonary:      Effort: Pulmonary effort is normal.      Breath sounds: Normal breath sounds.   Abdominal:      General: Bowel sounds are normal. There is no distension.      " "Palpations: Abdomen is soft. There is no mass.      Tenderness: There is no abdominal tenderness. There is no guarding or rebound.   Musculoskeletal:         General: No swelling or tenderness.      Cervical back: Neck supple.      Right lower leg: No edema.      Left lower leg: No edema.   Lymphadenopathy:      Cervical: No cervical adenopathy.   Skin:     Findings: No rash.   Neurological:      General: No focal deficit present.      Mental Status: She is alert and oriented to person, place, and time.   Psychiatric:         Mood and Affect: Mood normal.         Behavior: Behavior normal.         Thought Content: Thought content normal.         Judgment: Judgment normal.         Lab Results:      Lab Results   Component Value Date    WBC 6.37 09/14/2024    HGB 14.2 09/14/2024    HCT 43.5 09/14/2024     09/14/2024    TRIG 108 09/14/2024    HDL 63 09/14/2024    LDLDIRECT 108 (H) 09/14/2024    ALT 12 09/14/2024    AST 14 09/14/2024     06/24/2015    K 3.8 09/14/2024     09/14/2024    CREATININE 0.59 (L) 09/14/2024    BUN 17 09/14/2024    CO2 26 09/14/2024    INR 1.00 10/19/2023    GLUF 98 09/14/2024    HGBA1C 5.7 (H) 09/14/2024     No results found for: \"URICACID\"  Invalid input(s): \"BASENAME\" Vitamin D    FL guided needle plac bx/asp/inj    Result Date: 8/28/2024  Narrative: RIGHT HIP INJECTION INDICATION:   M16.11: Unilateral primary osteoarthritis, right hip. COMPARISON: 3/15/2024 fluoroscopic images IMAGES:  3 FLUOROSCOPY TIME:   20.9 seconds FINDINGS: After the risks and benefits of the procedure were thoroughly explained, informed consent was obtained. The patient verbalized expressed understanding of the above risks and wished to proceed with the procedure. Final standard \"time-out\" procedure performed. The patient was prepped and draped in the usual sterile fashion. 1% lidocaine solution was utilized for local anesthesia. Intermittent fluoroscopy was utilized for placement a 20 gauge 3.5 " inch spinal needle within the right hip joint.  After positioning  was confirmed with 3 mL of Omnipaque 300, the following medication was injected: 1 mL 80 mg/mL Depomedrol and 2 mL 0.2% Ropivacaine The patient tolerated the procedure well.  There were no complications. I asked the patient to call us with any questions, concerns, or acute problems. The patient expressed understanding of the above.     Impression: Technically successful right hip anesthetic and steroid injection. ATTESTATION I, the attending radiologist, was present for the entire procedure. Guidance images were reviewed and I am in agreement with the findings on the report. Attending physician: Vikas Espana. Advanced practitioner: Ariane Hussein. Resident: Adam Martinez. Workstation performed: [id number]        POCT Labs        Depression Screening and Follow-up Plan: Patient was screened for depression during today's encounter. They screened negative with a PHQ-2 score of 0.

## 2024-09-26 NOTE — ASSESSMENT & PLAN NOTE
Stable without statin.  Recommend lifestyle modifications.      The 10-year ASCVD risk score (Nick MTZ, et al., 2019) is: 0.4%    Values used to calculate the score:      Age: 49 years      Sex: Female      Is Non- : No      Diabetic: No      Tobacco smoker: No      Systolic Blood Pressure: 96 mmHg      Is BP treated: No      HDL Cholesterol: 63 mg/dL      Total Cholesterol: 180 mg/dL

## 2024-09-26 NOTE — ASSESSMENT & PLAN NOTE
Stable.  Patient declines Metformin XR 500mg 3 pills QD because she is worried about possible kidney damage, and declines GLP-1 agonist.  Recommend lifestyle modifications.

## 2024-10-29 ENCOUNTER — HOSPITAL ENCOUNTER (OUTPATIENT)
Dept: RADIOLOGY | Age: 49
Discharge: HOME | End: 2024-10-29
Payer: OTHER GOVERNMENT

## 2024-10-29 DIAGNOSIS — Z12.31 ENCOUNTER FOR SCREENING MAMMOGRAM FOR MALIGNANT NEOPLASM OF BREAST: ICD-10-CM

## 2024-10-29 PROCEDURE — 77063 BREAST TOMOSYNTHESIS BI: CPT

## 2024-10-29 PROCEDURE — 77067 SCR MAMMO BI INCL CAD: CPT

## 2024-11-01 ENCOUNTER — APPOINTMENT (OUTPATIENT)
Dept: RADIOLOGY | Age: 49
End: 2024-11-01
Payer: OTHER GOVERNMENT

## 2024-11-01 ENCOUNTER — OFFICE VISIT (OUTPATIENT)
Dept: OBGYN CLINIC | Facility: CLINIC | Age: 49
End: 2024-11-01
Payer: OTHER GOVERNMENT

## 2024-11-01 VITALS
BODY MASS INDEX: 37.15 KG/M2 | HEART RATE: 78 BPM | WEIGHT: 223 LBS | DIASTOLIC BLOOD PRESSURE: 73 MMHG | HEIGHT: 65 IN | SYSTOLIC BLOOD PRESSURE: 114 MMHG

## 2024-11-01 DIAGNOSIS — Z96.642 STATUS POST TOTAL REPLACEMENT OF LEFT HIP: ICD-10-CM

## 2024-11-01 DIAGNOSIS — M16.11 PRIMARY OSTEOARTHRITIS OF ONE HIP, RIGHT: ICD-10-CM

## 2024-11-01 DIAGNOSIS — Z96.642 STATUS POST TOTAL REPLACEMENT OF LEFT HIP: Primary | ICD-10-CM

## 2024-11-01 PROCEDURE — 99213 OFFICE O/P EST LOW 20 MIN: CPT | Performed by: STUDENT IN AN ORGANIZED HEALTH CARE EDUCATION/TRAINING PROGRAM

## 2024-11-01 PROCEDURE — 73502 X-RAY EXAM HIP UNI 2-3 VIEWS: CPT

## 2024-11-01 NOTE — PROGRESS NOTES
Date: 24  Suzie Burrell   MRN# [id number]  : 1975      Chief Complaint: Post op Left Total Hip Arthroplasty     Assessment and Plan:    Status post total replacement of left hip  Patient is 1 year s/p left total hip arthroplasty  - WBAT LLE   - Tylenol and Celebrex for pain control prn  - No antibiotic dental prophylaxis is necessary  - No restrictions from our standpoint  - RTC in 3-5 years. left hip xrays needed      Primary osteoarthritis of one hip, right  Patient continues to have relief from her CSI fl inj. She will call our office if/when she is interested in additional injection.          Subjective:   Suzie Burrell is a 49 y.o. female who is being seen in follow-up for above procedure. When we last saw she we recommended WBAT in sneaker.  Pain is well controlled and the patient has successfully transitioned to OTC pain medicines.    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 08/10/2022    Arthritis     It was last year    COVID-19 2022    History of colon polyps 2022    HPV (human papilloma virus) infection     Hyperlipidemia     IFG (impaired fasting glucose)     Internal hemorrhoids 2022    denies    Kidney stone 2015    Kidney stones, calcium oxalate 2015    Obesity     Pneumonia     Resolved 2017     Pre-diabetes     Scoliosis     Tremor     Left Hand Resting Tremor    UTI (urinary tract infection)     Varicella     Visual impairment     Wear glasses     Past Surgical History:  Past Surgical History:   Procedure Laterality Date    APPENDECTOMY  10/14/2018    Appendix taken out     SECTION       x  1 ; Failure to progress    COLONOSCOPY      FL GUIDED NEEDLE PLAC BX/ASP/INJ  3/15/2024    FL GUIDED NEEDLE PLAC BX/ASP/INJ  8/28/2024    FL INJECTION LEFT HIP (NON ARTHROGRAM)  10/12/2020    NM ARTHRP ACETBLR/PROX FEM PROSTC AGRFT/ALGRFT Left 11/1/2023    Procedure: ARTHROPLASTY HIP TOTAL, LEFT POSTERIOR, SAME DAY DISCHARGE;  Surgeon: Abhi Edmond MD;  Location: [facility];  Service: Orthopedics    NM LAPAROSCOPIC APPENDECTOMY N/A 10/14/2018    Procedure: APPENDECTOMY LAPAROSCOPIC;  Surgeon: Zohaib Schwarz MD;  Location: [facility];  Service: General    WISDOM TOOTH EXTRACTION       Family History:  Family History   Problem Relation Age of Onset    Hypertension Father     Alcohol abuse Father     Kidney disease Father         CKD on ESRD    Diabetes type II Father 50    No Known Problems Mother     Scoliosis Brother     Hip dysplasia Daughter     No Known Problems Maternal Grandmother     No Known Problems Maternal Grandfather     No Known Problems Paternal Grandmother     No Known Problems Paternal Grandfather     No Known Problems Maternal Aunt     No Known Problems Maternal Aunt     No Known Problems Paternal Aunt     No Known Problems Paternal Aunt     No Known Problems Paternal Aunt     No Known Problems Paternal Aunt      Social History:  Social History     Substance and Sexual Activity   Alcohol Use Yes    Alcohol/week: 1.0 standard drink of alcohol    Types: 1 Glasses of wine per week    Comment: rare     Social History     Substance and Sexual Activity   Drug Use Never     Social History     Tobacco Use   Smoking Status Never    Passive exposure: Never   Smokeless Tobacco Never             Review of Systems:  General- denies fever/chills  HEENT- denies hearing loss or sore throat  Eyes- denies eye pain or visual disturbances, denies red eyes  Respiratory- denies cough or SOB  Cardio- denies chest pain or palpitations  GI- denies abdominal pain  Endocrine- denies urinary frequency  Urinary- denies pain  "with urination  Musculoskeletal- Negative except noted above  Skin- denies rashes or wounds  Neurological- denies dizziness or headache  Psychiatric- denies anxiety or difficulty concentrating    Objective:   BP Readings from Last 1 Encounters:   11/01/24 114/73      Wt Readings from Last 1 Encounters:   11/01/24 101 kg (223 lb)      Pulse Readings from Last 1 Encounters:   11/01/24 78        BMI: Estimated body mass index is 37.11 kg/m² as calculated from the following:    Height as of this encounter: 5' 5\" (1.651 m).    Weight as of this encounter: 101 kg (223 lb).    Physical Exam  /73   Pulse 78   Ht 5' 5\" (1.651 m)   Wt 101 kg (223 lb)   BMI 37.11 kg/m²   General/Constitutional: No apparent distress: well-nourished and well developed.  Eyes: normal ocular motion  Cardio: RRR, Normal S1S2, No m/r/g.   Lymphatic: No appreciable lymphadenopathy  Respiratory: Non-labored breathing, CTA b/l no w/c/r  Vascular: No edema, swelling or tenderness, except as noted in detailed exam. Extremities well perfused. No LE edema  Integumentary: No impressive skin lesions present, except as noted in detailed exam.  Neuro: No ataxia or tremors noted  Psych: Normal mood and affect, oriented to person, place and time. Appropriate affect.  Musculoskeletal: Normal, except as noted in detailed exam and in HPI.    Gait and Station:   normal    left Hip Examination  Incision: clean, dry, and intact  Erythema: Absent  Ecchymosis: Absent  Wound edges: fully healed  Hip ROM: Full  Motor: 5/5 EHL/FHL/TA/GS/QD/HS  Neurovascular exam is intact.   No evidence of calf tightness or posterior cords    Images:  I personally reviewed relevant images in the PACS system and my interpretation is as follows:  X-rays of the left Hip reveal a total hip arthroplasty in appropriate alignment without evidence of dislocation, migration, wear or loosening.      Abhi Edmond MD  Adult Reconstruction Specialist   [facility] " Network

## 2024-11-01 NOTE — ASSESSMENT & PLAN NOTE
Patient is 1 year s/p left total hip arthroplasty  - WBAT LLE   - Tylenol and Celebrex for pain control prn  - No antibiotic dental prophylaxis is necessary  - No restrictions from our standpoint  - RTC in 3-5 years. left hip xrays needed

## 2024-11-01 NOTE — ASSESSMENT & PLAN NOTE
Patient continues to have relief from her CSI fl inj. She will call our office if/when she is interested in additional injection.

## 2024-12-19 ENCOUNTER — APPOINTMENT (OUTPATIENT)
Dept: RADIOLOGY | Age: 49
End: 2024-12-19
Payer: OTHER GOVERNMENT

## 2024-12-19 ENCOUNTER — OFFICE VISIT (OUTPATIENT)
Dept: OBGYN CLINIC | Facility: CLINIC | Age: 49
End: 2024-12-19
Payer: OTHER GOVERNMENT

## 2024-12-19 VITALS
WEIGHT: 228 LBS | HEART RATE: 80 BPM | DIASTOLIC BLOOD PRESSURE: 78 MMHG | HEIGHT: 65 IN | SYSTOLIC BLOOD PRESSURE: 118 MMHG | BODY MASS INDEX: 37.99 KG/M2

## 2024-12-19 DIAGNOSIS — M70.62 GREATER TROCHANTERIC BURSITIS OF LEFT HIP: ICD-10-CM

## 2024-12-19 DIAGNOSIS — Z96.642 STATUS POST TOTAL REPLACEMENT OF LEFT HIP: Primary | ICD-10-CM

## 2024-12-19 DIAGNOSIS — Z96.642 STATUS POST TOTAL REPLACEMENT OF LEFT HIP: ICD-10-CM

## 2024-12-19 PROCEDURE — 73502 X-RAY EXAM HIP UNI 2-3 VIEWS: CPT

## 2024-12-19 PROCEDURE — 99214 OFFICE O/P EST MOD 30 MIN: CPT | Performed by: STUDENT IN AN ORGANIZED HEALTH CARE EDUCATION/TRAINING PROGRAM

## 2024-12-19 RX ORDER — MELOXICAM 15 MG/1
15 TABLET ORAL DAILY
Qty: 30 TABLET | Refills: 1 | Status: SHIPPED | OUTPATIENT
Start: 2024-12-19 | End: 2025-02-17

## 2024-12-19 NOTE — PROGRESS NOTES
Date: 24  Suzie Burrell   MRN# [id number]  : 1975      Chief Complaint: Post op Left  primary Total Hip Arthroplasty    Assessment and Plan:    Greater trochanteric bursitis of left hip  -WBAT  -Activity modification to limit strain or impact on the joint  -Patient informed that this is a self-limiting condition which will improve with the correct rehab and rest  -Mobic 15mg  as needed  -Tylenol 1000mg up to three times daily as needed. Do not exceed 3000mg daily  -Supervised physical therapy. Script provided   -Home exercise program directed by PT  -Weight loss discussed   -Cane or walker recommended to offload joint  -Patient may follow up prn for further evaluation and treatment         Subjective:   Patient is  13.5 months  s/p left primary total hip arthroplasty. Patient follows up today for pain in her left hip. She denies any injury or fall to the hip. Her pain is described to be mainly lateral. She has pain laying on her left side. Pain began on 24 when she was using the stairs to get into her house. She claims she felt unsteady since.     Date of procedure: 23  Doing well, no new problems  Pain progressively improving  Improved swelling  Ambulating with no assistive device    Allergy:  Allergies   Allergen Reactions    Bee Venom Anaphylaxis     Medications:  all current active meds have been reviewed  Past Medical History:  Past Medical History:   Diagnosis Date    Abnormal Pap smear of cervix     Anaphylaxis due to insect venom 08/10/2022    Arthritis     It was last year    COVID-19 2022    History of colon polyps 2022    HPV (human papilloma virus) infection     Hyperlipidemia     IFG (impaired fasting glucose)     Internal hemorrhoids 2022    denies    Kidney stone 2015    Kidney stones, calcium oxalate 2015    Obesity     Pneumonia     Resolved 2017     Pre-diabetes     Scoliosis     Tremor     Left Hand Resting Tremor    UTI (urinary tract  infection)     Varicella     Visual impairment     Wear glasses     Past Surgical History:  Past Surgical History:   Procedure Laterality Date    APPENDECTOMY  10/14/2018    Appendix taken out     SECTION       x 1 ; Failure to progress    COLONOSCOPY      FL GUIDED NEEDLE PLAC BX/ASP/INJ  3/15/2024    FL GUIDED NEEDLE PLAC BX/ASP/INJ  2024    FL INJECTION LEFT HIP (NON ARTHROGRAM)  10/12/2020    TN ARTHRP ACETBLR/PROX FEM PROSTC AGRFT/ALGRFT Left 2023    Procedure: ARTHROPLASTY HIP TOTAL, LEFT POSTERIOR, SAME DAY DISCHARGE;  Surgeon: Abhi Edmond MD;  Location: [facility];  Service: Orthopedics    TN LAPAROSCOPIC APPENDECTOMY N/A 10/14/2018    Procedure: APPENDECTOMY LAPAROSCOPIC;  Surgeon: Zohaib Schwarz MD;  Location: [facility];  Service: General    WISDOM TOOTH EXTRACTION       Family History:  Family History   Problem Relation Age of Onset    Hypertension Father     Alcohol abuse Father     Kidney disease Father         CKD on ESRD    Diabetes type II Father 50    No Known Problems Mother     Scoliosis Brother     Hip dysplasia Daughter     No Known Problems Maternal Grandmother     No Known Problems Maternal Grandfather     No Known Problems Paternal Grandmother     No Known Problems Paternal Grandfather     No Known Problems Maternal Aunt     No Known Problems Maternal Aunt     No Known Problems Paternal Aunt     No Known Problems Paternal Aunt     No Known Problems Paternal Aunt     No Known Problems Paternal Aunt      Social History:  Social History     Substance and Sexual Activity   Alcohol Use Yes    Alcohol/week: 1.0 standard drink of alcohol    Types: 1 Glasses of wine per week    Comment: rare     Social History     Substance and Sexual Activity   Drug Use Never     Social History     Tobacco Use   Smoking Status Never    Passive exposure: Never   Smokeless Tobacco Never             Review of Systems:  General- denies fever/chills  HEENT- denies hearing loss or sore  "throat  Eyes- denies eye pain or visual disturbances, denies red eyes  Respiratory- denies cough or SOB  Cardio- denies chest pain or palpitations  GI- denies abdominal pain  Endocrine- denies urinary frequency  Urinary- denies pain with urination  Musculoskeletal- Negative except noted above  Skin- denies rashes or wounds  Neurological- denies dizziness or headache  Psychiatric- denies anxiety or difficulty concentrating    Objective:   BP Readings from Last 1 Encounters:   12/19/24 118/78      Wt Readings from Last 1 Encounters:   12/19/24 103 kg (228 lb)      Pulse Readings from Last 1 Encounters:   12/19/24 80        BMI: Estimated body mass index is 37.94 kg/m² as calculated from the following:    Height as of this encounter: 5' 5\" (1.651 m).    Weight as of this encounter: 103 kg (228 lb).    Physical Exam  /78 (BP Location: Left arm, Patient Position: Sitting, Cuff Size: Large)   Pulse 80   Ht 5' 5\" (1.651 m) Comment: verbal  Wt 103 kg (228 lb)   BMI 37.94 kg/m²   General/Constitutional: No apparent distress: well-nourished and well developed.  Eyes: normal ocular motion  Cardio: RRR, Normal S1S2, No m/r/g.   Lymphatic: No appreciable lymphadenopathy  Respiratory: Non-labored breathing, CTA b/l no w/c/r  Vascular: No edema, swelling or tenderness, except as noted in detailed exam. Extremities well perfused. No LE edema  Integumentary: No impressive skin lesions present, except as noted in detailed exam.  Neuro: No ataxia or tremors noted  Psych: Normal mood and affect, oriented to person, place and time. Appropriate affect.  Musculoskeletal: Normal, except as noted in detailed exam and in HPI.    Gait and Station:   antalgic    left Hip Examination  Incision: clean, dry, and intact  Erythema: Absent  Ecchymosis: Absent  Wound edges: well approximated incision  Wound drainage: None: wound tissue dry  Hip ROM:    TTP Left greater trochanter   painless  Flexion: 120 degrees.   External rotation: 40 " degrees.   Internal rotation: 50  deg.   Abduction: 50 degrees.   Motor: 5/5 EHL/FHL/TA/GS/QD/HS  Neurovascular exam is intact.   No evidence of calf tightness or posterior cords    Images:    I personally reviewed relevant images in the PACS system and my interpretation is as follows:  X-rays of the left Hip reveal a total hip arthroplasty in appropriate alignment without evidence of dislocation, migration, wear or loosening.      Scribe Attestation      I,:  Cezar Dodge am acting as a scribe while in the presence of the attending physician.:       I,:  Abhi Edmond MD personally performed the services described in this documentation    as scribed in my presence.:               Abhi Edmond MD  Adult Reconstruction Specialist   [facility]

## 2024-12-19 NOTE — ASSESSMENT & PLAN NOTE
-WBAT  -Activity modification to limit strain or impact on the joint  -Patient informed that this is a self-limiting condition which will improve with the correct rehab and rest  -Mobic 15mg  as needed  -Tylenol 1000mg up to three times daily as needed. Do not exceed 3000mg daily  -Supervised physical therapy. Script provided   -Home exercise program directed by PT  -Weight loss discussed   -Cane or walker recommended to offload joint  -Patient may follow up prn for further evaluation and treatment

## 2024-12-31 ENCOUNTER — TELEPHONE (OUTPATIENT)
Age: 49
End: 2024-12-31

## 2024-12-31 NOTE — TELEPHONE ENCOUNTER
Caller: patient    Doctor: lacie    Reason for call: Patient would like to get another FL Guided needle injection for her R hip, last injection was 08/08/2024.  Please advise     Call back#: [phone]

## 2025-01-02 DIAGNOSIS — M16.11 PRIMARY OSTEOARTHRITIS OF ONE HIP, RIGHT: Primary | ICD-10-CM

## 2025-01-02 NOTE — TELEPHONE ENCOUNTER
Called and left detailed message for patient stating FL injection has been scheduled for 1/8/25 at 1pm at [facility]. Provided ph# for central scheduling if needed [phone]

## 2025-01-06 ENCOUNTER — EVALUATION (OUTPATIENT)
Dept: PHYSICAL THERAPY | Facility: REHABILITATION | Age: 50
End: 2025-01-06
Payer: OTHER GOVERNMENT

## 2025-01-06 DIAGNOSIS — M25.552 LEFT HIP PAIN: Primary | ICD-10-CM

## 2025-01-06 DIAGNOSIS — M70.62 GREATER TROCHANTERIC BURSITIS OF LEFT HIP: ICD-10-CM

## 2025-01-06 PROCEDURE — 97162 PT EVAL MOD COMPLEX 30 MIN: CPT | Performed by: PHYSICAL THERAPIST

## 2025-01-06 NOTE — PROGRESS NOTES
PT Evaluation     Today's date: 2025  Patient name: Suzie Burrell  : 1975  MRN: [id number]  Referring provider: Abhi Edmond MD  Dx:   Encounter Diagnosis     ICD-10-CM    1. Left hip pain  M25.552       2. Greater trochanteric bursitis of left hip  M70.62 Ambulatory Referral to Physical Therapy                     Assessment  Impairments: abnormal coordination, abnormal gait, abnormal muscle firing, abnormal or restricted ROM, abnormal movement, activity intolerance, impaired balance, impaired physical strength, lacks appropriate home exercise program, pain with function and weight-bearing intolerance    Assessment details: Patient presents to PT with L hip pain consistent with bursitis. Patient displays decreased hip ROM, hip strength and abnormal gait. These impairments are leading to pain with walking, standing and stars. Patient will benefit from skilled PT to address above impairments and help them return to PLOF.  Barriers to therapy: R GI last year  Understanding of Dx/Px/POC: good     Prognosis: good    Goals  STG  1. Patient will display decreased pain to 0-2 in 6 weeks  2. Patient will display hip ROM WNL in 6 weeks  3. Patient will display hip strength WNL in 6 weeks    LTG  1. Patient will be able to stand for 30 minutes without pain in 12 weeks  2. Patient will be able to walk for 30 minutes without pain in 12 weeks  3. Patient will be able to go up/down 3 flights of stairs without pain in 12 weeks        Plan  Patient would benefit from: PT eval and skilled physical therapy  Planned modality interventions: cryotherapy, electrical stimulation/[place] stimulation, manual electrical stimulation, TENS and thermotherapy: hydrocollator packs    Planned therapy interventions: activity modification, ADL training, joint mobilization, manual therapy, massage, motor coordination training, neuromuscular re-education, strengthening, stretching, therapeutic activities, therapeutic exercise,  therapeutic training, balance, balance/weight bearing training, body mechanics training, flexibility, functional ROM exercises, gait training, graded activity, graded exercise and home exercise program    Frequency: 2x week  Duration in weeks: 6  Plan of Care beginning date: 2025  Plan of Care expiration date: 2025        Subjective Evaluation    History of Present Illness  Mechanism of injury: Patient states a couple of weeks ago she was walking uphill when she felt a sharp pain on the outside of the L hip. Patient went to the dr. They did an x-ray which was negative. Patient's pain is on the outside of her hip. She is having issues laying on her side. She is also having pain with walking and stairs  Quality of life: fair    Patient Goals  Patient goals for therapy: decreased edema, decreased pain, improved balance, increased motion and increased strength    Pain  Current pain ratin  At best pain ratin  At worst pain ratin  Quality: sharp and tight  Relieving factors: medications  Aggravating factors: stair climbing, walking and standing    Social Support  Steps to enter house: yes    Employment status: working    Diagnostic Tests  X-ray: normal        Objective     Passive Range of Motion   Left Hip   Flexion: 90 degrees with pain  Abduction: 25 degrees with pain  External rotation (90/90): 30 degrees with pain  Internal rotation (90/90): 10 degrees with pain    Strength/Myotome Testing     Left Hip   Planes of Motion   Extension: 3+  Abduction: 3+  External rotation: 3+    Tests     Left Hip   Positive ZENY.   Negative FADIR.     Ambulation     Comments   SPC for ambulation  Trendelenberg gait    General Comments:      Hip Comments   L greater trochanter TTP             Precautions: L GI       Manuals                                                                 Neuro Re-Ed                                                                                                        Ther Ex                                                                                                                      Ther Activity                                       Gait Training                                       Modalities

## 2025-01-08 ENCOUNTER — APPOINTMENT (OUTPATIENT)
Dept: PHYSICAL THERAPY | Facility: REHABILITATION | Age: 50
End: 2025-01-08
Payer: OTHER GOVERNMENT

## 2025-01-08 ENCOUNTER — HOSPITAL ENCOUNTER (OUTPATIENT)
Dept: RADIOLOGY | Facility: HOSPITAL | Age: 50
Discharge: HOME | End: 2025-01-08

## 2025-01-10 ENCOUNTER — OFFICE VISIT (OUTPATIENT)
Dept: PHYSICAL THERAPY | Facility: REHABILITATION | Age: 50
End: 2025-01-10
Payer: OTHER GOVERNMENT

## 2025-01-10 DIAGNOSIS — M70.62 GREATER TROCHANTERIC BURSITIS OF LEFT HIP: Primary | ICD-10-CM

## 2025-01-10 DIAGNOSIS — M25.552 LEFT HIP PAIN: ICD-10-CM

## 2025-01-10 PROCEDURE — 97140 MANUAL THERAPY 1/> REGIONS: CPT | Performed by: PHYSICAL THERAPIST

## 2025-01-10 PROCEDURE — 97110 THERAPEUTIC EXERCISES: CPT | Performed by: PHYSICAL THERAPIST

## 2025-01-10 PROCEDURE — 97112 NEUROMUSCULAR REEDUCATION: CPT | Performed by: PHYSICAL THERAPIST

## 2025-01-10 NOTE — PROGRESS NOTES
Daily Note     Today's date: 1/10/2025  Patient name: Suzie Burrell  : 1975  MRN: [id number]  Referring provider: Abhi Edmond MD  Dx:   Encounter Diagnosis     ICD-10-CM    1. Greater trochanteric bursitis of left hip  M70.62       2. Left hip pain  M25.552                      Subjective: patient states her hip still really bothers her especially when she is working      Objective: See treatment diary below      Assessment: Tolerated treatment well. Patient demonstrated fatigue post treatment, exhibited good technique with therapeutic exercises, and would benefit from continued PT Patient with a lot of tightness around posterior/lateral hip since surgery that is leading to bursitis      Plan: Continue per plan of care.      Precautions: L GI       Manuals 1/10            STM done                                                   Neuro Re-Ed             bridges 2x10            clamshells 2x10            sidesteps 2 laps no band            SLS                                                    Ther Ex             Piriformis s 10sx5            Hip abd/ext X10 ea            ITB s 10sx5                                                                             Ther Activity             Step ups             STS             Gait Training                                       Modalities

## 2025-01-13 ENCOUNTER — APPOINTMENT (OUTPATIENT)
Age: 50
End: 2025-01-13
Payer: OTHER GOVERNMENT

## 2025-01-13 ENCOUNTER — OCCMED (OUTPATIENT)
Age: 50
End: 2025-01-13
Payer: OTHER MISCELLANEOUS

## 2025-01-13 DIAGNOSIS — S49.91XA SHOULDER INJURY, RIGHT, INITIAL ENCOUNTER: ICD-10-CM

## 2025-01-13 DIAGNOSIS — S49.91XA SHOULDER INJURY, RIGHT, INITIAL ENCOUNTER: Primary | ICD-10-CM

## 2025-01-13 PROCEDURE — G0383 LEV 4 HOSP TYPE B ED VISIT: HCPCS | Performed by: STUDENT IN AN ORGANIZED HEALTH CARE EDUCATION/TRAINING PROGRAM

## 2025-01-13 PROCEDURE — 73030 X-RAY EXAM OF SHOULDER: CPT

## 2025-01-15 ENCOUNTER — OFFICE VISIT (OUTPATIENT)
Dept: PHYSICAL THERAPY | Facility: REHABILITATION | Age: 50
End: 2025-01-15
Payer: OTHER GOVERNMENT

## 2025-01-15 DIAGNOSIS — M25.552 LEFT HIP PAIN: ICD-10-CM

## 2025-01-15 DIAGNOSIS — M70.62 GREATER TROCHANTERIC BURSITIS OF LEFT HIP: Primary | ICD-10-CM

## 2025-01-15 PROCEDURE — 97140 MANUAL THERAPY 1/> REGIONS: CPT

## 2025-01-15 PROCEDURE — 97112 NEUROMUSCULAR REEDUCATION: CPT

## 2025-01-15 PROCEDURE — 97110 THERAPEUTIC EXERCISES: CPT

## 2025-01-15 NOTE — PROGRESS NOTES
Daily Note     Today's date: 1/15/2025  Patient name: Suzie Burrell  : 1975  MRN: [id number]  Referring provider: Abhi Edmond MD  Dx:   Encounter Diagnosis     ICD-10-CM    1. Greater trochanteric bursitis of left hip  M70.62       2. Left hip pain  M25.552           Start Time: 1445  Stop Time: 1530  Total time in clinic (min): 45 minutes    Subjective: Patient reports hip soreness secondary to just coming from work. She also reports falling on her buttock a few days ago, increased soreness noted from that as all. She reports no complaints after previous session.      Objective: See treatment diary below      Assessment: Tolerated treatment well. Patient demonstrated fatigue post treatment, exhibited good technique with therapeutic exercises, and would benefit from continued PT Fatigues quickly with most TE, limited by pain with certain TE, especially clamshells. Patient reports relief with manuals      Plan: Continue per plan of care.  Progress treatment as tolerated.       Precautions: L GI       Manuals 1/10 1/15           STM done Done glut/piriformis                                                  Neuro Re-Ed             bridges 2x10 2x10           clamshells 2x10 2x10           sidesteps 2 laps no band 2 laps no band            SLS                                                    Ther Ex             Piriformis s 10sx5 10''x5           Hip abd/ext X10 ea 2x10 ea           ITB s 10sx5            Bike   5' no resis                                                               Ther Activity             Step ups             STS             Gait Training                                       Modalities

## 2025-01-16 ENCOUNTER — APPOINTMENT (OUTPATIENT)
Age: 50
End: 2025-01-16
Payer: OTHER MISCELLANEOUS

## 2025-01-16 PROCEDURE — 99213 OFFICE O/P EST LOW 20 MIN: CPT | Performed by: STUDENT IN AN ORGANIZED HEALTH CARE EDUCATION/TRAINING PROGRAM

## 2025-01-17 ENCOUNTER — TELEPHONE (OUTPATIENT)
Dept: NON INVASIVE DIAGNOSTICS | Facility: HOSPITAL | Age: 50
End: 2025-01-17

## 2025-01-17 ENCOUNTER — OFFICE VISIT (OUTPATIENT)
Dept: PHYSICAL THERAPY | Facility: REHABILITATION | Age: 50
End: 2025-01-17
Payer: OTHER GOVERNMENT

## 2025-01-17 DIAGNOSIS — M25.552 LEFT HIP PAIN: ICD-10-CM

## 2025-01-17 DIAGNOSIS — M70.62 GREATER TROCHANTERIC BURSITIS OF LEFT HIP: Primary | ICD-10-CM

## 2025-01-17 PROCEDURE — 97140 MANUAL THERAPY 1/> REGIONS: CPT | Performed by: PHYSICAL THERAPIST

## 2025-01-17 PROCEDURE — 97112 NEUROMUSCULAR REEDUCATION: CPT | Performed by: PHYSICAL THERAPIST

## 2025-01-17 PROCEDURE — 97110 THERAPEUTIC EXERCISES: CPT | Performed by: PHYSICAL THERAPIST

## 2025-01-17 NOTE — TELEPHONE ENCOUNTER
Phone Consult fluoro guided injections, arthrograms, thyroid bx         Not on Thinners:    Attempted to call patient x3 to discuss upcoming procedure. Message left for patient to discuss upcoming right hip corticosteroid injection at [facility]. Allergies reviewed and verified patient does not currently take any anticoagulant medications. Pre procedure instructions including diet, taking own medications and need for  discussed with patient. Patient instructed that she may eat normally and take medications as usual before the procedure. Procedure and post procedure expectations and instructions reviewed with the patient. Patient verbalizes understanding and denies any questions at this time. Reminded of the location, date and time of the expected procedure. Name and contact number provided in case patient has any further questions.

## 2025-01-17 NOTE — PROGRESS NOTES
"Daily Note     Today's date: 2025  Patient name: Suzie Burrell  : 1975  MRN: [id number]  Referring provider: Abhi Edmond MD  Dx:   Encounter Diagnosis     ICD-10-CM    1. Greater trochanteric bursitis of left hip  M70.62       2. Left hip pain  M25.552                      Subjective: patient states her hip is still bothering her      Objective: See treatment diary below      Assessment: Tolerated treatment well. Patient demonstrated fatigue post treatment, exhibited good technique with therapeutic exercises, and would benefit from continued PT Patient with continual tightness of lateral hip muscles. Patient still displaying decreased lateral hip weakness      Plan: Continue per plan of care.      Precautions: L GI       Manuals 1/10 1/15 1/17          STM done Done glut/piriformis Done glut/piriformis                                                 Neuro Re-Ed             bridges 2x10 2x10 2x10          clamshells 2x10 2x10 2x10 supine grn          sidesteps 2 laps no band 2 laps no band  2 laps          SLS                                                    Ther Ex             Piriformis s 10sx5 10''x5 10sx5          Hip abd/ext X10 ea 2x10 ea 2x10 ea          ITB s 10sx5  10sx5          Bike   5' no resis 5' no res          ltr   5\"x10                                                 Ther Activity             Step ups             STS             Gait Training                                       Modalities                                                "

## 2025-01-20 ENCOUNTER — OFFICE VISIT (OUTPATIENT)
Dept: PHYSICAL THERAPY | Facility: REHABILITATION | Age: 50
End: 2025-01-20
Payer: OTHER GOVERNMENT

## 2025-01-20 DIAGNOSIS — M70.62 GREATER TROCHANTERIC BURSITIS OF LEFT HIP: Primary | ICD-10-CM

## 2025-01-20 DIAGNOSIS — M25.552 LEFT HIP PAIN: ICD-10-CM

## 2025-01-20 PROCEDURE — 97112 NEUROMUSCULAR REEDUCATION: CPT | Performed by: PHYSICAL THERAPIST

## 2025-01-20 PROCEDURE — 97110 THERAPEUTIC EXERCISES: CPT | Performed by: PHYSICAL THERAPIST

## 2025-01-20 PROCEDURE — 97140 MANUAL THERAPY 1/> REGIONS: CPT | Performed by: PHYSICAL THERAPIST

## 2025-01-20 NOTE — PROGRESS NOTES
"Daily Note     Today's date: 2025  Patient name: Suzie Burrell  : 1975  MRN: [id number]  Referring provider: Abhi Edmond MD  Dx:   Encounter Diagnosis     ICD-10-CM    1. Greater trochanteric bursitis of left hip  M70.62       2. Left hip pain  M25.552                      Subjective: patient states she is noticing she is able to do more before the pain      Objective: See treatment diary below      Assessment: Tolerated treatment well. Patient demonstrated fatigue post treatment, exhibited good technique with therapeutic exercises, and would benefit from continued PT Patient with improving tolerance to strengthening. Patient starting to see some carrying over to function but she is still limited in her standing and walking      Plan: Continue per plan of care.      Precautions: L GI       Manuals 1/10 1/15 1/17 1/20         STM done Done glut/piriformis Done glut/piriformis done                                                Neuro Re-Ed             bridges 2x10 2x10 2x10 2x10         clamshells 2x10 2x10 2x10 supine grn Supine grn 3x10         sidesteps 2 laps no band 2 laps no band  2 laps 3 laps         SLS                                                    Ther Ex             Piriformis s 10sx5 10''x5 10sx5 10sx5         Hip abd/ext X10 ea 2x10 ea 2x10 ea 2x10         ITB s 10sx5  10sx5 10sx5         Bike   5' no resis 5' no res 5' lvl 1         ltr   5\"x10 5\"x10                                                Ther Activity             Step ups             STS             Gait Training                                       Modalities                                                  "

## 2025-01-22 ENCOUNTER — OFFICE VISIT (OUTPATIENT)
Dept: PHYSICAL THERAPY | Facility: REHABILITATION | Age: 50
End: 2025-01-22
Payer: OTHER GOVERNMENT

## 2025-01-22 DIAGNOSIS — M25.552 LEFT HIP PAIN: ICD-10-CM

## 2025-01-22 DIAGNOSIS — M70.62 GREATER TROCHANTERIC BURSITIS OF LEFT HIP: Primary | ICD-10-CM

## 2025-01-22 PROCEDURE — 97140 MANUAL THERAPY 1/> REGIONS: CPT

## 2025-01-22 PROCEDURE — 97112 NEUROMUSCULAR REEDUCATION: CPT

## 2025-01-22 PROCEDURE — 97110 THERAPEUTIC EXERCISES: CPT

## 2025-01-22 NOTE — PROGRESS NOTES
"Daily Note     Today's date: 2025  Patient name: Suzie Burrell  : 1975  MRN: [id number]  Referring provider: Abhi Edmond MD  Dx:   Encounter Diagnosis     ICD-10-CM    1. Greater trochanteric bursitis of left hip  M70.62       2. Left hip pain  M25.552           Start Time: 1445  Stop Time: 1530  Total time in clinic (min): 45 minutes    Subjective: Patient reports some soreness at start of session. She reports soreness while at work, able to increase reps for her HEP.       Objective: See treatment diary below      Assessment: Tolerated treatment well. Patient demonstrated fatigue post treatment, exhibited good technique with therapeutic exercises, and would benefit from continued PT      Plan: Continue per plan of care.  Progress treatment as tolerated.       Precautions: L GI       Manuals 1/10 1/15 1/17 1/20 1/22        STM done Done glut/piriformis Done glut/piriformis done done                                               Neuro Re-Ed             bridges 2x10 2x10 2x10 2x10 3x10        clamshells 2x10 2x10 2x10 supine grn Supine grn 3x10 Supine GTB 3x10        sidesteps 2 laps no band 2 laps no band  2 laps 3 laps 3 laps        SLS                                                    Ther Ex             Piriformis s 10sx5 10''x5 10sx5 10sx5 10x5'' ea        Hip abd/ext X10 ea 2x10 ea 2x10 ea 2x10 3x10 ea        ITB s 10sx5  10sx5 10sx5 10x5''        Bike   5' no resis 5' no res 5' lvl 1 5' lvl 1        ltr   5\"x10 5\"x10 2x10x5''                                               Ther Activity             Step ups             STS             Gait Training                                       Modalities                                                    "

## 2025-01-24 ENCOUNTER — HOSPITAL ENCOUNTER (OUTPATIENT)
Dept: RADIOLOGY | Facility: HOSPITAL | Age: 50
Discharge: HOME | End: 2025-01-24
Payer: OTHER GOVERNMENT

## 2025-01-24 DIAGNOSIS — M16.11 PRIMARY OSTEOARTHRITIS OF ONE HIP, RIGHT: ICD-10-CM

## 2025-01-24 PROCEDURE — 20610 DRAIN/INJ JOINT/BURSA W/O US: CPT

## 2025-01-24 PROCEDURE — 77002 NEEDLE LOCALIZATION BY XRAY: CPT

## 2025-01-24 RX ORDER — ROPIVACAINE HYDROCHLORIDE 2 MG/ML
10 INJECTION, SOLUTION EPIDURAL; INFILTRATION; PERINEURAL ONCE
Status: COMPLETED | OUTPATIENT
Start: 2025-01-24 | End: 2025-01-24

## 2025-01-24 RX ORDER — METHYLPREDNISOLONE ACETATE 80 MG/ML
80 INJECTION, SUSPENSION INTRA-ARTICULAR; INTRALESIONAL; INTRAMUSCULAR; SOFT TISSUE ONCE
Status: COMPLETED | OUTPATIENT
Start: 2025-01-24 | End: 2025-01-24

## 2025-01-24 RX ORDER — LIDOCAINE HYDROCHLORIDE 10 MG/ML
5 INJECTION, SOLUTION EPIDURAL; INFILTRATION; INTRACAUDAL; PERINEURAL ONCE
Status: COMPLETED | OUTPATIENT
Start: 2025-01-24 | End: 2025-01-24

## 2025-01-24 RX ADMIN — IOHEXOL 1 ML: 300 INJECTION, SOLUTION INTRAVENOUS at 15:01

## 2025-01-24 RX ADMIN — METHYLPREDNISOLONE ACETATE 80 MG: 80 INJECTION, SUSPENSION INTRA-ARTICULAR; INTRALESIONAL; INTRAMUSCULAR; SOFT TISSUE at 15:01

## 2025-01-24 RX ADMIN — LIDOCAINE HYDROCHLORIDE 5 ML: 10 INJECTION, SOLUTION EPIDURAL; INFILTRATION; INTRACAUDAL at 15:01

## 2025-01-24 RX ADMIN — ROPIVACAINE HYDROCHLORIDE 2 ML: 2 INJECTION, SOLUTION EPIDURAL; INFILTRATION at 15:01

## 2025-01-27 ENCOUNTER — OFFICE VISIT (OUTPATIENT)
Dept: PHYSICAL THERAPY | Facility: REHABILITATION | Age: 50
End: 2025-01-27
Payer: OTHER GOVERNMENT

## 2025-01-27 DIAGNOSIS — M70.62 GREATER TROCHANTERIC BURSITIS OF LEFT HIP: Primary | ICD-10-CM

## 2025-01-27 DIAGNOSIS — M25.552 LEFT HIP PAIN: ICD-10-CM

## 2025-01-27 PROCEDURE — 97112 NEUROMUSCULAR REEDUCATION: CPT | Performed by: PHYSICAL THERAPIST

## 2025-01-27 PROCEDURE — 97140 MANUAL THERAPY 1/> REGIONS: CPT | Performed by: PHYSICAL THERAPIST

## 2025-01-27 PROCEDURE — 97110 THERAPEUTIC EXERCISES: CPT | Performed by: PHYSICAL THERAPIST

## 2025-01-27 NOTE — PROGRESS NOTES
"Daily Note     Today's date: 2025  Patient name: Suzie Burrell  : 1975  MRN: [id number]  Referring provider: Abhi Edmond MD  Dx:   Encounter Diagnosis     ICD-10-CM    1. Greater trochanteric bursitis of left hip  M70.62       2. Left hip pain  M25.552                      Subjective: patient states she is now walking a little without the cane      Objective: See treatment diary below      Assessment: Tolerated treatment well. Patient demonstrated fatigue post treatment, exhibited good technique with therapeutic exercises, and would benefit from continued PT Patient continues to make slow abd steady functional improvements. She is now able to walk without the cane around the house      Plan: Continue per plan of care.      Precautions: L GI       Manuals 1/10 1/15 1/17 1/20 1/22 1/27       STM done Done glut/piriformis Done glut/piriformis done done done                                              Neuro Re-Ed             bridges 2x10 2x10 2x10 2x10 3x10 3x10       clamshells 2x10 2x10 2x10 supine grn Supine grn 3x10 Supine GTB 3x10 Supine GTB 3x10       sidesteps 2 laps no band 2 laps no band  2 laps 3 laps 3 laps 3 laps pink        SLS                                                    Ther Ex             Piriformis s 10sx5 10''x5 10sx5 10sx5 10x5'' ea 5\"x10       Hip abd/ext X10 ea 2x10 ea 2x10 ea 2x10 3x10 ea 3x10 ea       ITB s 10sx5  10sx5 10sx5 10x5'' 5\"x10       Bike   5' no resis 5' no res 5' lvl 1 5' lvl 1 5' lvl 1       ltr   5\"x10 5\"x10 2x10x5'' 2x10x5\"                                              Ther Activity             Step ups             STS             Gait Training                                       Modalities                                                      "

## 2025-01-29 ENCOUNTER — OFFICE VISIT (OUTPATIENT)
Dept: PHYSICAL THERAPY | Facility: REHABILITATION | Age: 50
End: 2025-01-29
Payer: OTHER GOVERNMENT

## 2025-01-29 DIAGNOSIS — M25.552 LEFT HIP PAIN: ICD-10-CM

## 2025-01-29 DIAGNOSIS — M70.62 GREATER TROCHANTERIC BURSITIS OF LEFT HIP: Primary | ICD-10-CM

## 2025-01-29 PROCEDURE — 97140 MANUAL THERAPY 1/> REGIONS: CPT | Performed by: PHYSICAL THERAPIST

## 2025-01-29 PROCEDURE — 97112 NEUROMUSCULAR REEDUCATION: CPT | Performed by: PHYSICAL THERAPIST

## 2025-01-29 PROCEDURE — 97110 THERAPEUTIC EXERCISES: CPT | Performed by: PHYSICAL THERAPIST

## 2025-01-29 NOTE — PROGRESS NOTES
"Daily Note     Today's date: 2025  Patient name: Suzie Burrell  : 1975  MRN: [id number]  Referring provider: Abhi Edmond MD  Dx:   Encounter Diagnosis     ICD-10-CM    1. Greater trochanteric bursitis of left hip  M70.62       2. Left hip pain  M25.552                      Subjective: patient states she was walking around without her cane yesterday      Objective: See treatment diary below      Assessment: Tolerated treatment well. Patient demonstrated fatigue post treatment, exhibited good technique with therapeutic exercises, and would benefit from continued PT Patient with improving lateral hip strength. She is now able to walk around without her cane without increase in pain      Plan: Continue per plan of care.      Precautions: L GI       Manuals 1/10 1/15 1/17 1/20 1/22 1/27 1/29      STM done Done glut/piriformis Done glut/piriformis done done done done                                             Neuro Re-Ed             bridges 2x10 2x10 2x10 2x10 3x10 3x10 3x10      clamshells 2x10 2x10 2x10 supine grn Supine grn 3x10 Supine GTB 3x10 Supine GTB 3x10 Supin 3x10 GTB      sidesteps 2 laps no band 2 laps no band  2 laps 3 laps 3 laps 3 laps pink  3 laps pink      SLS                                                    Ther Ex             Piriformis s 10sx5 10''x5 10sx5 10sx5 10x5'' ea 5\"x10 10\"x5      Hip abd/ext X10 ea 2x10 ea 2x10 ea 2x10 3x10 ea 3x10 ea 3x10 ea      ITB s 10sx5  10sx5 10sx5 10x5'' 5\"x10 10\"x5      Bike   5' no resis 5' no res 5' lvl 1 5' lvl 1 5' lvl 1 5' lvl 1      ltr   5\"x10 5\"x10 2x10x5'' 2x10x5\" 5\"x10                                             Ther Activity             Step ups       2x10      STS             Gait Training                                       Modalities                                                        "

## 2025-02-03 ENCOUNTER — OFFICE VISIT (OUTPATIENT)
Dept: PHYSICAL THERAPY | Facility: REHABILITATION | Age: 50
End: 2025-02-03
Payer: OTHER GOVERNMENT

## 2025-02-03 DIAGNOSIS — M25.552 LEFT HIP PAIN: ICD-10-CM

## 2025-02-03 DIAGNOSIS — M70.62 GREATER TROCHANTERIC BURSITIS OF LEFT HIP: Primary | ICD-10-CM

## 2025-02-03 PROCEDURE — 97110 THERAPEUTIC EXERCISES: CPT | Performed by: PHYSICAL THERAPIST

## 2025-02-03 PROCEDURE — 97140 MANUAL THERAPY 1/> REGIONS: CPT | Performed by: PHYSICAL THERAPIST

## 2025-02-03 PROCEDURE — 97112 NEUROMUSCULAR REEDUCATION: CPT | Performed by: PHYSICAL THERAPIST

## 2025-02-03 NOTE — PROGRESS NOTES
"Daily Note     Today's date: 2/3/2025  Patient name: Suzie Burrell  : 1975  MRN: [id number]  Referring provider: Abhi Edmond MD  Dx:   Encounter Diagnosis     ICD-10-CM    1. Greater trochanteric bursitis of left hip  M70.62       2. Left hip pain  M25.552                      Subjective: patient states she had a flare up this weekend bit she is doing better today      Objective: See treatment diary below      Assessment: Tolerated treatment well. Patient demonstrated fatigue post treatment, exhibited good technique with therapeutic exercises, and would benefit from continued PT Patient with increased pain after last session so held the step ups. Hoping the pain was just from doing a little too much last session      Plan: Continue per plan of care.      Precautions: L GI       Manuals 1/10 1/15 1/17 1/20 1/22 1/27 1/29 2/3     STM done Done glut/piriformis Done glut/piriformis done done done done done                                            Neuro Re-Ed             bridges 2x10 2x10 2x10 2x10 3x10 3x10 3x10 3x10     clamshells 2x10 2x10 2x10 supine grn Supine grn 3x10 Supine GTB 3x10 Supine GTB 3x10 Supin 3x10 GTB Supine 3x10 GTB     sidesteps 2 laps no band 2 laps no band  2 laps 3 laps 3 laps 3 laps pink  3 laps pink 3 lap pink     SLS                                                    Ther Ex             Piriformis s 10sx5 10''x5 10sx5 10sx5 10x5'' ea 5\"x10 10\"x5 10sx5     Hip abd/ext X10 ea 2x10 ea 2x10 ea 2x10 3x10 ea 3x10 ea 3x10 ea 3x10 ea     ITB s 10sx5  10sx5 10sx5 10x5'' 5\"x10 10\"x5 10\"x5     Bike   5' no resis 5' no res 5' lvl 1 5' lvl 1 5' lvl 1 5' lvl 1 5' lvl 1     ltr   5\"x10 5\"x10 2x10x5'' 2x10x5\" 5\"x10 5\"x10                                            Ther Activity             Step ups       2x10 held     STS             Gait Training                                       Modalities                                                          "

## 2025-02-07 ENCOUNTER — OFFICE VISIT (OUTPATIENT)
Dept: PHYSICAL THERAPY | Facility: REHABILITATION | Age: 50
End: 2025-02-07
Payer: OTHER GOVERNMENT

## 2025-02-07 DIAGNOSIS — M25.552 LEFT HIP PAIN: ICD-10-CM

## 2025-02-07 DIAGNOSIS — M70.62 GREATER TROCHANTERIC BURSITIS OF LEFT HIP: Primary | ICD-10-CM

## 2025-02-07 PROCEDURE — 97140 MANUAL THERAPY 1/> REGIONS: CPT | Performed by: PHYSICAL THERAPIST

## 2025-02-07 PROCEDURE — 97112 NEUROMUSCULAR REEDUCATION: CPT | Performed by: PHYSICAL THERAPIST

## 2025-02-07 PROCEDURE — 97110 THERAPEUTIC EXERCISES: CPT | Performed by: PHYSICAL THERAPIST

## 2025-02-07 NOTE — PROGRESS NOTES
"Daily Note     Today's date: 2025  Patient name: Suzie Burrell  : 1975  MRN: [id number]  Referring provider: Abhi Edmond MD  Dx:   Encounter Diagnosis     ICD-10-CM    1. Greater trochanteric bursitis of left hip  M70.62       2. Left hip pain  M25.552                      Subjective: patient states she is feeling better today. She feels like her flare up is in the past      Objective: See treatment diary below      Assessment: Tolerated treatment well. Patient demonstrated fatigue post treatment, exhibited good technique with therapeutic exercises, and would benefit from continued PT Patient with improving tolerance to strengthening. Patient had a flare up earlier this week but she seems to have recovered from it      Plan: Continue per plan of care.      Precautions: L GI       Manuals 1/10 1/15 1/17 1/20 1/22 1/27 1/29 2/3 2/7    STM done Done glut/piriformis Done glut/piriformis done done done done done done                                           Neuro Re-Ed             bridges 2x10 2x10 2x10 2x10 3x10 3x10 3x10 3x10 3x10    clamshells 2x10 2x10 2x10 supine grn Supine grn 3x10 Supine GTB 3x10 Supine GTB 3x10 Supin 3x10 GTB Supine 3x10 GTB Supine GTB 3x10    sidesteps 2 laps no band 2 laps no band  2 laps 3 laps 3 laps 3 laps pink  3 laps pink 3 lap pink 3 laps pink    SLS                                                    Ther Ex             Piriformis s 10sx5 10''x5 10sx5 10sx5 10x5'' ea 5\"x10 10\"x5 10sx5 10sx5    Hip abd/ext X10 ea 2x10 ea 2x10 ea 2x10 3x10 ea 3x10 ea 3x10 ea 3x10 ea 3x10 ea    ITB s 10sx5  10sx5 10sx5 10x5'' 5\"x10 10\"x5 10\"x5 10\"x5    Bike   5' no resis 5' no res 5' lvl 1 5' lvl 1 5' lvl 1 5' lvl 1 5' lvl 1 5' lvl 1    ltr   5\"x10 5\"x10 2x10x5'' 2x10x5\" 5\"x10 5\"x10 5\"x10                                           Ther Activity             Step ups       2x10 held     STS             Gait Training                                       Modalities                        "

## 2025-02-10 ENCOUNTER — OFFICE VISIT (OUTPATIENT)
Dept: PHYSICAL THERAPY | Facility: REHABILITATION | Age: 50
End: 2025-02-10
Payer: OTHER GOVERNMENT

## 2025-02-10 DIAGNOSIS — M25.552 LEFT HIP PAIN: ICD-10-CM

## 2025-02-10 DIAGNOSIS — M70.62 GREATER TROCHANTERIC BURSITIS OF LEFT HIP: Primary | ICD-10-CM

## 2025-02-10 PROCEDURE — 97112 NEUROMUSCULAR REEDUCATION: CPT

## 2025-02-10 PROCEDURE — 97110 THERAPEUTIC EXERCISES: CPT

## 2025-02-10 PROCEDURE — 97140 MANUAL THERAPY 1/> REGIONS: CPT

## 2025-02-10 NOTE — PROGRESS NOTES
"Daily Note     Today's date: 2/10/2025  Patient name: Suzie Burrell  : 1975  MRN: [id number]  Referring provider: Abhi Edmond MD  Dx:   Encounter Diagnosis     ICD-10-CM    1. Greater trochanteric bursitis of left hip  M70.62       2. Left hip pain  M25.552           Start Time: 1445  Stop Time: 1530  Total time in clinic (min): 45 minutes    Subjective: Patient reports hip is still very bothersome stating \"it is still very flared up.\"       Objective: See treatment diary below      Assessment: Tolerated treatment well. Patient demonstrated fatigue post treatment, exhibited good technique with therapeutic exercises, and would benefit from continued PT Did not complete certain TE secondary to patient's discomfort level.         Plan: Continue per plan of care.  Progress treatment as tolerated.       Precautions: L GI       Manuals 1/10 1/15 1/17 1/20 1/22 1/27 1/29 2/3 2/7 2/10   STM done Done glut/piriformis Done glut/piriformis done done done done done done done                                          Neuro Re-Ed             bridges 2x10 2x10 2x10 2x10 3x10 3x10 3x10 3x10 3x10 3x10   clamshells 2x10 2x10 2x10 supine grn Supine grn 3x10 Supine GTB 3x10 Supine GTB 3x10 Supin 3x10 GTB Supine 3x10 GTB Supine GTB 3x10 Supine GTB 3x10   sidesteps 2 laps no band 2 laps no band  2 laps 3 laps 3 laps 3 laps pink  3 laps pink 3 lap pink 3 laps pink nv   SLS                                                    Ther Ex             Piriformis s 10sx5 10''x5 10sx5 10sx5 10x5'' ea 5\"x10 10\"x5 10sx5 10sx5 10x5''   Hip abd/ext X10 ea 2x10 ea 2x10 ea 2x10 3x10 ea 3x10 ea 3x10 ea 3x10 ea 3x10 ea 2x10 R only   ITB s 10sx5  10sx5 10sx5 10x5'' 5\"x10 10\"x5 10\"x5 10\"x5 10''x5   Bike   5' no resis 5' no res 5' lvl 1 5' lvl 1 5' lvl 1 5' lvl 1 5' lvl 1 5' lvl 1 5' no resis   ltr   5\"x10 5\"x10 2x10x5'' 2x10x5\" 5\"x10 5\"x10 5\"x10 5''x10                                          Ther Activity             Step ups       2x10 " held  held   STS             Gait Training                                       Modalities

## 2025-02-12 ENCOUNTER — OFFICE VISIT (OUTPATIENT)
Dept: PHYSICAL THERAPY | Facility: REHABILITATION | Age: 50
End: 2025-02-12
Payer: OTHER GOVERNMENT

## 2025-02-12 DIAGNOSIS — M25.552 LEFT HIP PAIN: ICD-10-CM

## 2025-02-12 DIAGNOSIS — M70.62 GREATER TROCHANTERIC BURSITIS OF LEFT HIP: Primary | ICD-10-CM

## 2025-02-12 PROCEDURE — 97112 NEUROMUSCULAR REEDUCATION: CPT | Performed by: PHYSICAL THERAPIST

## 2025-02-12 PROCEDURE — 97530 THERAPEUTIC ACTIVITIES: CPT | Performed by: PHYSICAL THERAPIST

## 2025-02-12 PROCEDURE — 97110 THERAPEUTIC EXERCISES: CPT | Performed by: PHYSICAL THERAPIST

## 2025-02-12 NOTE — PROGRESS NOTES
"Daily Note     Today's date: 2025  Patient name: Suzie Burrell  : 1975  MRN: [id number]  Referring provider: Abhi Edmond MD  Dx:   Encounter Diagnosis     ICD-10-CM    1. Greater trochanteric bursitis of left hip  M70.62       2. Left hip pain  M25.552                      Subjective: patient states she feels like she is finally past the flare up      Objective: See treatment diary below      Assessment: Tolerated treatment well. Patient demonstrated fatigue post treatment, exhibited good technique with therapeutic exercises, and would benefit from continued PT Patient with improving pain. Patient able to add step ups back in without issue.       Plan: Continue per plan of care.      Precautions: L GI       Manuals 1/10 1/15 1/17 1/20 1/22 1/27 1/29 2/3 2/7 2/10 2/12   STM done Done glut/piriformis Done glut/piriformis done done done done done done done done                                             Neuro Re-Ed              bridges 2x10 2x10 2x10 2x10 3x10 3x10 3x10 3x10 3x10 3x10 3x10   clamshells 2x10 2x10 2x10 supine grn Supine grn 3x10 Supine GTB 3x10 Supine GTB 3x10 Supin 3x10 GTB Supine 3x10 GTB Supine GTB 3x10 Supine GTB 3x10 Supine gtb 3x10   sidesteps 2 laps no band 2 laps no band  2 laps 3 laps 3 laps 3 laps pink  3 laps pink 3 lap pink 3 laps pink nv Pink 3laps   SLS                                                        Ther Ex              Piriformis s 10sx5 10''x5 10sx5 10sx5 10x5'' ea 5\"x10 10\"x5 10sx5 10sx5 10x5'' 5\"x10   Hip abd/ext X10 ea 2x10 ea 2x10 ea 2x10 3x10 ea 3x10 ea 3x10 ea 3x10 ea 3x10 ea 2x10 R only 3x10   ITB s 10sx5  10sx5 10sx5 10x5'' 5\"x10 10\"x5 10\"x5 10\"x5 10''x5 10sx5   Bike   5' no resis 5' no res 5' lvl 1 5' lvl 1 5' lvl 1 5' lvl 1 5' lvl 1 5' lvl 1 5' no resis 5' lvl 1   ltr   5\"x10 5\"x10 2x10x5'' 2x10x5\" 5\"x10 5\"x10 5\"x10 5''x10 5\"x10                                             Ther Activity              Step ups       2x10 held  held 2x10   STS       "        Gait Training                                          Modalities

## 2025-02-17 ENCOUNTER — OFFICE VISIT (OUTPATIENT)
Dept: PHYSICAL THERAPY | Facility: REHABILITATION | Age: 50
End: 2025-02-17
Payer: OTHER GOVERNMENT

## 2025-02-17 DIAGNOSIS — M70.62 GREATER TROCHANTERIC BURSITIS OF LEFT HIP: Primary | ICD-10-CM

## 2025-02-17 DIAGNOSIS — M25.552 LEFT HIP PAIN: ICD-10-CM

## 2025-02-17 PROCEDURE — 97530 THERAPEUTIC ACTIVITIES: CPT | Performed by: PHYSICAL THERAPIST

## 2025-02-17 PROCEDURE — 97110 THERAPEUTIC EXERCISES: CPT | Performed by: PHYSICAL THERAPIST

## 2025-02-17 PROCEDURE — 97112 NEUROMUSCULAR REEDUCATION: CPT | Performed by: PHYSICAL THERAPIST

## 2025-02-17 NOTE — PROGRESS NOTES
"Daily Note     Today's date: 2025  Patient name: Suzie Burrell  : 1975  MRN: [id number]  Referring provider: Abhi Edmond MD  Dx:   Encounter Diagnosis     ICD-10-CM    1. Greater trochanteric bursitis of left hip  M70.62       2. Left hip pain  M25.552                      Subjective: Suzie reports she is feeling good today.       Objective: See treatment diary below      Assessment: Tolerated treatment well. No complaints of pain      Presented with less muscular tension around L greater trochanter and no TTP.     Patient demonstrated appropriate level of challenge and muscular fatigue throughout session and noted good status at end of visit.     Patient demonstrated fatigue post treatment, exhibited good technique with therapeutic exercises, and would benefit from continued PT    Patient treated by ROSALINA Farris under direct PT supervision.      Plan: Continue per plan of care.      Precautions: L GI       Manuals 2/17  1/17 1/20 1/22 1/27 1/29 2/3 2/7 2/10 2/12   STM Done    Done glut/piriformis done done done done done done done done                                             Neuro Re-Ed              bridges 3x10  2x10 2x10 3x10 3x10 3x10 3x10 3x10 3x10 3x10   clamshells 3x10 SL no band  2x10 supine grn Supine grn 3x10 Supine GTB 3x10 Supine GTB 3x10 Supin 3x10 GTB Supine 3x10 GTB Supine GTB 3x10 Supine GTB 3x10 Supine gtb 3x10   sidesteps 3 laps green   2 laps 3 laps 3 laps 3 laps pink  3 laps pink 3 lap pink 3 laps pink nv Pink 3laps   SLS                                                        Ther Ex              Piriformis s 10sx5  10sx5 10sx5 10x5'' ea 5\"x10 10\"x5 10sx5 10sx5 10x5'' 5\"x10   Hip abd/ext 2x10 ea  2x10 ea 2x10 3x10 ea 3x10 ea 3x10 ea 3x10 ea 3x10 ea 2x10 R only 3x10   ITB s 10sx5  10sx5 10sx5 10x5'' 5\"x10 10\"x5 10\"x5 10\"x5 10''x5 10sx5   Bike  5'   5' no res 5' lvl 1 5' lvl 1 5' lvl 1 5' lvl 1 5' lvl 1 5' lvl 1 5' no resis 5' lvl 1   ltr 5\"x10  5\"x10 5\"x10 2x10x5'' " "2x10x5\" 5\"x10 5\"x10 5\"x10 5''x10 5\"x10                                             Ther Activity              Step ups 3x10      2x10 held  held 2x10   STS              Gait Training                                          Modalities                                                                     "

## 2025-02-24 ENCOUNTER — OFFICE VISIT (OUTPATIENT)
Dept: PHYSICAL THERAPY | Facility: REHABILITATION | Age: 50
End: 2025-02-24
Payer: OTHER GOVERNMENT

## 2025-02-24 DIAGNOSIS — M70.62 GREATER TROCHANTERIC BURSITIS OF LEFT HIP: Primary | ICD-10-CM

## 2025-02-24 DIAGNOSIS — M25.552 LEFT HIP PAIN: ICD-10-CM

## 2025-02-24 PROCEDURE — 97112 NEUROMUSCULAR REEDUCATION: CPT | Performed by: PHYSICAL THERAPIST

## 2025-02-24 PROCEDURE — 97110 THERAPEUTIC EXERCISES: CPT | Performed by: PHYSICAL THERAPIST

## 2025-02-24 PROCEDURE — 97530 THERAPEUTIC ACTIVITIES: CPT | Performed by: PHYSICAL THERAPIST

## 2025-02-24 NOTE — PROGRESS NOTES
"Daily Note     Today's date: 2025  Patient name: Suzie Burrell  : 1975  MRN: [id number]  Referring provider: Abhi Edmond MD  Dx:   Encounter Diagnosis     ICD-10-CM    1. Greater trochanteric bursitis of left hip  M70.62       2. Left hip pain  M25.552                      Subjective: patient states she is now walking without the canr      Objective: See treatment diary below      Assessment: Tolerated treatment well. Patient demonstrated fatigue post treatment, exhibited good technique with therapeutic exercises, and would benefit from continued PT Patient is doing better. She is having less pain and displaying improving function. We will check in one more time and hopefully progress to HEP      Plan: Continue per plan of care.      Precautions: L GI       Manuals 2/17 2/24 1/17 1/20 1/22 1/27 1/29 2/3 2/7 2/10 2/12   STM Done    Done glut/piriformis done done done done done done done done                                             Neuro Re-Ed              bridges 3x10 3x10 2x10 2x10 3x10 3x10 3x10 3x10 3x10 3x10 3x10   clamshells 3x10 SL no band 3x10 SL pink 2x10 supine grn Supine grn 3x10 Supine GTB 3x10 Supine GTB 3x10 Supin 3x10 GTB Supine 3x10 GTB Supine GTB 3x10 Supine GTB 3x10 Supine gtb 3x10   sidesteps 3 laps green  3 laps grn 2 laps 3 laps 3 laps 3 laps pink  3 laps pink 3 lap pink 3 laps pink nv Pink 3laps   SLS                                                        Ther Ex              Piriformis s 10sx5 10sx5 10sx5 10sx5 10x5'' ea 5\"x10 10\"x5 10sx5 10sx5 10x5'' 5\"x10   Hip abd/ext 2x10 ea 2x10 ea 2x10 ea 2x10 3x10 ea 3x10 ea 3x10 ea 3x10 ea 3x10 ea 2x10 R only 3x10   ITB s 10sx5 10sx5 10sx5 10sx5 10x5'' 5\"x10 10\"x5 10\"x5 10\"x5 10''x5 10sx5   Bike  5'  5' lvl 2 5' no res 5' lvl 1 5' lvl 1 5' lvl 1 5' lvl 1 5' lvl 1 5' lvl 1 5' no resis 5' lvl 1   ltr 5\"x10 5\"x10 5\"x10 5\"x10 2x10x5'' 2x10x5\" 5\"x10 5\"x10 5\"x10 5''x10 5\"x10                                             Ther Activity "              Step ups 3x10 3x10     2x10 held  held 2x10   STS  2x10            Gait Training                                          Modalities

## 2025-03-05 ENCOUNTER — OFFICE VISIT (OUTPATIENT)
Dept: PHYSICAL THERAPY | Facility: REHABILITATION | Age: 50
End: 2025-03-05
Payer: OTHER GOVERNMENT

## 2025-03-05 DIAGNOSIS — M25.552 LEFT HIP PAIN: ICD-10-CM

## 2025-03-05 DIAGNOSIS — M70.62 GREATER TROCHANTERIC BURSITIS OF LEFT HIP: Primary | ICD-10-CM

## 2025-03-05 PROCEDURE — 97112 NEUROMUSCULAR REEDUCATION: CPT | Performed by: PHYSICAL THERAPIST

## 2025-03-05 PROCEDURE — 97110 THERAPEUTIC EXERCISES: CPT | Performed by: PHYSICAL THERAPIST

## 2025-03-05 PROCEDURE — 97530 THERAPEUTIC ACTIVITIES: CPT | Performed by: PHYSICAL THERAPIST

## 2025-03-05 NOTE — PROGRESS NOTES
"Daily Note     Today's date: 3/5/2025  Patient name: Suzie Burrell  : 1975  MRN: [id number]  Referring provider: Abhi Edmond MD  Dx:   Encounter Diagnosis     ICD-10-CM    1. Greater trochanteric bursitis of left hip  M70.62       2. Left hip pain  M25.552                      Subjective: patient states she keeps getting better every week      Objective: See treatment diary below      Assessment: Tolerated treatment well. Patient demonstrated fatigue post treatment, exhibited good technique with therapeutic exercises, and would benefit from continued PT Patient is progressing well. She will continue her exercises at home and check in if needed      Plan: Continue per plan of care.      Precautions: L GI       Manuals 2/17 2/24 3/5 1/17 1/20 1/22 1/27 1/29 2/3 2/7 2/10 2/12   STM Done    done Done glut/piriformis done done done done done done done done                                                Neuro Re-Ed               bridges 3x10 3x10 3x10 2x10 2x10 3x10 3x10 3x10 3x10 3x10 3x10 3x10   clamshells 3x10 SL no band 3x10 SL pink 3x10 2x10 supine grn Supine grn 3x10 Supine GTB 3x10 Supine GTB 3x10 Supin 3x10 GTB Supine 3x10 GTB Supine GTB 3x10 Supine GTB 3x10 Supine gtb 3x10   sidesteps 3 laps green  3 laps grn  2 laps 3 laps 3 laps 3 laps pink  3 laps pink 3 lap pink 3 laps pink nv Pink 3laps   SLS                                                            Ther Ex               Piriformis s 10sx5 10sx5 10sx5 10sx5 10sx5 10x5'' ea 5\"x10 10\"x5 10sx5 10sx5 10x5'' 5\"x10   Hip abd/ext 2x10 ea 2x10 ea 3x10 ea 2x10 ea 2x10 3x10 ea 3x10 ea 3x10 ea 3x10 ea 3x10 ea 2x10 R only 3x10   ITB s 10sx5 10sx5 10sx5 10sx5 10sx5 10x5'' 5\"x10 10\"x5 10\"x5 10\"x5 10''x5 10sx5   Bike  5'  5' lvl 2 5' lvl 2 5' no res 5' lvl 1 5' lvl 1 5' lvl 1 5' lvl 1 5' lvl 1 5' lvl 1 5' no resis 5' lvl 1   ltr 5\"x10 5\"x10 5\"x10 5\"x10 5\"x10 2x10x5'' 2x10x5\" 5\"x10 5\"x10 5\"x10 5''x10 5\"x10                                              "   Ther Activity               Step ups 3x10 3x10      2x10 held  held 2x10   STS  2x10             Gait Training                                             Modalities

## 2025-03-22 ENCOUNTER — APPOINTMENT (OUTPATIENT)
Dept: LAB | Facility: CLINIC | Age: 50
End: 2025-03-22
Payer: OTHER GOVERNMENT

## 2025-03-22 DIAGNOSIS — R73.01 IFG (IMPAIRED FASTING GLUCOSE): ICD-10-CM

## 2025-03-22 DIAGNOSIS — E78.5 HYPERLIPIDEMIA, UNSPECIFIED HYPERLIPIDEMIA TYPE: ICD-10-CM

## 2025-03-22 DIAGNOSIS — E66.09 CLASS 2 OBESITY DUE TO EXCESS CALORIES WITHOUT SERIOUS COMORBIDITY WITH BODY MASS INDEX (BMI) OF 37.0 TO 37.9 IN ADULT: ICD-10-CM

## 2025-03-22 DIAGNOSIS — E66.812 CLASS 2 OBESITY DUE TO EXCESS CALORIES WITHOUT SERIOUS COMORBIDITY WITH BODY MASS INDEX (BMI) OF 37.0 TO 37.9 IN ADULT: ICD-10-CM

## 2025-03-22 LAB
25(OH)D3 SERPL-MCNC: 54.1 NG/ML (ref 30–100)
ALBUMIN SERPL BCG-MCNC: 4.1 G/DL (ref 3.5–5)
ALP SERPL-CCNC: 57 U/L (ref 34–104)
ALT SERPL W P-5'-P-CCNC: 13 U/L (ref 7–52)
ANION GAP SERPL CALCULATED.3IONS-SCNC: 5 MMOL/L (ref 4–13)
AST SERPL W P-5'-P-CCNC: 14 U/L (ref 13–39)
BILIRUB SERPL-MCNC: 0.54 MG/DL (ref 0.2–1)
BUN SERPL-MCNC: 19 MG/DL (ref 5–25)
CALCIUM SERPL-MCNC: 9.3 MG/DL (ref 8.4–10.2)
CHLORIDE SERPL-SCNC: 105 MMOL/L (ref 96–108)
CHOLEST SERPL-MCNC: 180 MG/DL (ref ?–200)
CO2 SERPL-SCNC: 29 MMOL/L (ref 21–32)
CREAT SERPL-MCNC: 0.59 MG/DL (ref 0.6–1.3)
EST. AVERAGE GLUCOSE BLD GHB EST-MCNC: 123 MG/DL
GFR SERPL CREATININE-BSD FRML MDRD: 108 ML/MIN/1.73SQ M
GLUCOSE P FAST SERPL-MCNC: 107 MG/DL (ref 65–99)
HBA1C MFR BLD: 5.9 %
HDLC SERPL-MCNC: 56 MG/DL
LDLC SERPL CALC-MCNC: 108 MG/DL (ref 0–100)
LDLC SERPL DIRECT ASSAY-MCNC: 111 MG/DL (ref 0–100)
NONHDLC SERPL-MCNC: 124 MG/DL
POTASSIUM SERPL-SCNC: 3.9 MMOL/L (ref 3.5–5.3)
PROT SERPL-MCNC: 7 G/DL (ref 6.4–8.4)
SODIUM SERPL-SCNC: 139 MMOL/L (ref 135–147)
TRIGL SERPL-MCNC: 78 MG/DL (ref ?–150)
TSH SERPL DL<=0.05 MIU/L-ACNC: 2.76 UIU/ML (ref 0.45–4.5)

## 2025-03-22 PROCEDURE — 80061 LIPID PANEL: CPT

## 2025-03-22 PROCEDURE — 83721 ASSAY OF BLOOD LIPOPROTEIN: CPT

## 2025-03-22 PROCEDURE — 84443 ASSAY THYROID STIM HORMONE: CPT

## 2025-03-22 PROCEDURE — 36415 COLL VENOUS BLD VENIPUNCTURE: CPT

## 2025-03-22 PROCEDURE — 82306 VITAMIN D 25 HYDROXY: CPT

## 2025-03-22 PROCEDURE — 80053 COMPREHEN METABOLIC PANEL: CPT

## 2025-03-22 PROCEDURE — 83036 HEMOGLOBIN GLYCOSYLATED A1C: CPT

## 2025-03-23 ENCOUNTER — RESULTS FOLLOW-UP (OUTPATIENT)
Dept: FAMILY MEDICINE CLINIC | Facility: CLINIC | Age: 50
End: 2025-03-23

## 2025-03-23 NOTE — RESULT ENCOUNTER NOTE
Unstable labs - will review with patient at upcoming appointment.    IFG - Stable.  To consider Metformin XR vs. GLP1A?    Hyperlipidemia - Recommend lifestyle modifications.    The 10-year ASCVD risk score (Nick MTZ, et al., 2019) is: 0.7%    Values used to calculate the score:      Age: 49 years      Sex: Female      Is Non- : No      Diabetic: No      Tobacco smoker: No      Systolic Blood Pressure: 118 mmHg      Is BP treated: No      HDL Cholesterol: 56 mg/dL      Total Cholesterol: 180 mg/dL

## 2025-03-24 ENCOUNTER — OFFICE VISIT (OUTPATIENT)
Dept: FAMILY MEDICINE CLINIC | Facility: CLINIC | Age: 50
End: 2025-03-24
Payer: OTHER GOVERNMENT

## 2025-03-24 VITALS
WEIGHT: 223.6 LBS | HEART RATE: 91 BPM | OXYGEN SATURATION: 96 % | SYSTOLIC BLOOD PRESSURE: 122 MMHG | TEMPERATURE: 97.6 F | DIASTOLIC BLOOD PRESSURE: 70 MMHG | RESPIRATION RATE: 16 BRPM | HEIGHT: 65 IN | BODY MASS INDEX: 37.25 KG/M2

## 2025-03-24 DIAGNOSIS — E66.09 CLASS 2 OBESITY DUE TO EXCESS CALORIES WITHOUT SERIOUS COMORBIDITY WITH BODY MASS INDEX (BMI) OF 37.0 TO 37.9 IN ADULT: ICD-10-CM

## 2025-03-24 DIAGNOSIS — R73.01 IFG (IMPAIRED FASTING GLUCOSE): Primary | ICD-10-CM

## 2025-03-24 DIAGNOSIS — N20.0 KIDNEY STONES, CALCIUM OXALATE: ICD-10-CM

## 2025-03-24 DIAGNOSIS — E78.5 HYPERLIPIDEMIA, UNSPECIFIED HYPERLIPIDEMIA TYPE: ICD-10-CM

## 2025-03-24 DIAGNOSIS — T63.481A ANAPHYLAXIS DUE TO INSECT VENOM: ICD-10-CM

## 2025-03-24 DIAGNOSIS — T78.2XXA ANAPHYLAXIS DUE TO INSECT VENOM: ICD-10-CM

## 2025-03-24 DIAGNOSIS — Z13.6 SCREENING FOR CARDIOVASCULAR CONDITION: ICD-10-CM

## 2025-03-24 DIAGNOSIS — Z13.1 SCREENING FOR DIABETES MELLITUS: ICD-10-CM

## 2025-03-24 DIAGNOSIS — Z86.0100 HISTORY OF COLON POLYPS: ICD-10-CM

## 2025-03-24 DIAGNOSIS — E66.812 CLASS 2 OBESITY DUE TO EXCESS CALORIES WITHOUT SERIOUS COMORBIDITY WITH BODY MASS INDEX (BMI) OF 37.0 TO 37.9 IN ADULT: ICD-10-CM

## 2025-03-24 PROCEDURE — 99214 OFFICE O/P EST MOD 30 MIN: CPT | Performed by: FAMILY MEDICINE

## 2025-03-24 NOTE — ASSESSMENT & PLAN NOTE
Stable.  Patient declines Metformin XR 500mg 3 pills QD because she is worried about possible kidney damage, and declines GLP-1 agonist.  Recommend lifestyle modifications.     Orders:  •  Comprehensive metabolic panel; Future  •  Hemoglobin A1C; Future

## 2025-03-24 NOTE — ASSESSMENT & PLAN NOTE
Stable.  Recommend lifestyle modifications.  Patient previously declined GLP-1 agonist.      Orders:  •  TSH, 3rd generation with Free T4 reflex; Future

## 2025-03-24 NOTE — ASSESSMENT & PLAN NOTE
Stable without statin. Recommend lifestyle modifications.     The 10-year ASCVD risk score (Nick MTZ, et al., 2019) is: 0.8%    Values used to calculate the score:      Age: 49 years      Sex: Female      Is Non- : No      Diabetic: No      Tobacco smoker: No      Systolic Blood Pressure: 122 mmHg      Is BP treated: No      HDL Cholesterol: 56 mg/dL      Total Cholesterol: 180 mg/dL    Orders:  •  CBC and differential; Future  •  Comprehensive metabolic panel; Future  •  Lipid panel; Future  •  TSH, 3rd generation with Free T4 reflex; Future  •  LDL cholesterol, direct; Future

## 2025-03-24 NOTE — PROGRESS NOTES
Assessment/Plan:  Assessment & Plan  IFG (impaired fasting glucose)  Stable.  Patient declines Metformin XR 500mg 3 pills QD because she is worried about possible kidney damage, and declines GLP-1 agonist.  Recommend lifestyle modifications.     Orders:  •  Comprehensive metabolic panel; Future  •  Hemoglobin A1C; Future    Hyperlipidemia, unspecified hyperlipidemia type  Stable without statin. Recommend lifestyle modifications.     The 10-year ASCVD risk score (Nick MTZ, et al., 2019) is: 0.8%    Values used to calculate the score:      Age: 49 years      Sex: Female      Is Non- : No      Diabetic: No      Tobacco smoker: No      Systolic Blood Pressure: 122 mmHg      Is BP treated: No      HDL Cholesterol: 56 mg/dL      Total Cholesterol: 180 mg/dL    Orders:  •  CBC and differential; Future  •  Comprehensive metabolic panel; Future  •  Lipid panel; Future  •  TSH, 3rd generation with Free T4 reflex; Future  •  LDL cholesterol, direct; Future    Kidney stones, calcium oxalate  Stable. Caution c calcium supplements.        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  Use Epi Pen PRN.  Pending Allergy consult.         Class 2 obesity due to excess calories without serious comorbidity with body mass index (BMI) of 37.0 to 37.9 in adult      Stable.  Recommend lifestyle modifications.  Patient previously declined GLP-1 agonist.      Orders:  •  TSH, 3rd generation with Free T4 reflex; Future    History of colon polyps  Colonoscopy is up-to-date.         Screening for diabetes mellitus    Orders:  •  Hemoglobin A1C; Future    Screening for cardiovascular condition    Orders:  •  CBC and differential; Future  •  Comprehensive metabolic panel; Future  •  Lipid panel; Future  •  LDL cholesterol, direct; Future          Return in about 6 months (around 9/24/2025) for Physical / 6mo - IFG, HL, Labs.      Future Appointments   Date Time Provider Department Center   9/29/2025  2:30 PM Jennifer Fall  DO [facility]   10/31/2025  2:20 PM [facility]   11/4/2025 11:00 AM Lori Alonso MD [facility]        Subjective:     Suzie is a 49 y.o. female who presents today for a follow-up on her chronic medical conditions.        HPI:  Chief Complaint   Patient presents with   • Follow-up     6mo - IFG, HL, Labs. No new problems or concerns.      -- Above per clinical staff and reviewed. --      HPI      Today:      Return in about 6 months (around 3/23/2025) for 6mo - IFG, HL, Labs       6mo OV          had a stroke 3/22 and has left hemiparalysis.  He is living at [facility] in [facility].  It is unknown when he will return home.  She sees him weekly.  [facility]  inv[facility].      Obesity - Watching diet.  No regular exercise.  Walks occasionally.       IFG - New Dx 10/5/22.  She is not taking Metformin XR 500mg 3 pills QD, and she only took 1 pill QD for 1 week and stopped it because people told her it was bad for her kidneys.  She previously declined Pre-DM education.        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 - Pending appt c Allergist Dr. Del Castillo 12/22/22 - referred 8/10/22.  Has not used to use her Epi Pen yet.       Hyperlipidemia - No statin previously.         B/L Hip Pain / B/L Hip Dysplasia - Management per Ortho Dr. Edmond - Next appt PRN.  She states she needs hip replacement at 49yo.  She states her hips have never been normal childbirth in 2010.  S/p Left THR 11/1/23.  S/p CSI.  Not using Mobic 15mg QD and Tylenol PRN.        Scoliosis - Management per Ortho.  F/U PRN as referred to [facility] - patient cancelled appt.  Denies back pain.  Occasionally has paresthesias mid-thoracic back.       H/O Colon Polyps - Next colonoscopy due 5/22/27.       Reviewed:  Labs 3/22/25, Gyn 6/7/24, Ortho 12/19/24     Sees Gyn [facility] Dr. Alonso yearly.  Next appt 11/25.      The following portions of the patient's  "history were reviewed and updated as appropriate: allergies, current medications, past family history, past medical history, past social history, past surgical history and problem list.    PHQ-2/9 Depression Screening    Little interest or pleasure in doing things: 0 - not at all  Feeling down, depressed, or hopeless: 0 - not at all  PHQ-2 Score: 0  PHQ-2 Interpretation: Negative depression screen             Review of Systems   Constitutional:  Negative for appetite change, chills, diaphoresis, fatigue and fever.   Respiratory:  Negative for chest tightness and shortness of breath.    Cardiovascular:  Negative for chest pain.   Gastrointestinal:  Negative for abdominal pain, blood in stool, diarrhea, nausea and vomiting.   Genitourinary:  Negative for dysuria.   Musculoskeletal:  Negative for arthralgias.        Current Outpatient Medications   Medication Sig Dispense Refill   • Cholecalciferol (VITAMIN D3 GUMMIES ADULT PO) Take 2 each by mouth in the morning     • DIHYDROBERBERINE PO Take 100 mg by mouth in the morning For glucose management     • EPINEPHrine (EPIPEN) 0.3 mg/0.3 mL SOAJ Inject 0.3 mL (0.3 mg total) into a muscle once for 1 dose 0.6 mL 1   • Green Tea, Michelle sinensis, (GREEN TEA PO) Take 2 tablets by mouth 2 (two) times a day     • meloxicam (Mobic) 15 mg tablet Take 1 tablet (15 mg total) by mouth daily 30 tablet 1   • Multiple Vitamins-Minerals (multivitamin with minerals) tablet Take 1 tablet by mouth daily 30 tablet 0   • NON FORMULARY Take 2 tablets by mouth in the morning Age Loc meta- OTC supplement       No current facility-administered medications for this visit.       Objective:  /70   Pulse 91   Temp 97.6 °F (36.4 °C) (Temporal)   Resp 16   Ht 5' 5\" (1.651 m)   Wt 101 kg (223 lb 9.6 oz)   SpO2 96%   BMI 37.21 kg/m²    Wt Readings from Last 3 Encounters:   03/24/25 101 kg (223 lb 9.6 oz)   12/19/24 103 kg (228 lb)   11/01/24 101 kg (223 lb)      BP Readings from Last 3 " "Encounters:   03/24/25 122/70   12/19/24 118/78   11/01/24 114/73          Physical Exam  Vitals and nursing note reviewed.   Constitutional:       Appearance: Normal appearance. She is well-developed. She is obese.   HENT:      Head: Normocephalic and atraumatic.   Eyes:      Conjunctiva/sclera: Conjunctivae normal.   Neck:      Thyroid: No thyromegaly.   Cardiovascular:      Rate and Rhythm: Normal rate and regular rhythm.      Pulses: Normal pulses.      Heart sounds: Normal heart sounds.   Pulmonary:      Effort: Pulmonary effort is normal.      Breath sounds: Normal breath sounds.   Abdominal:      General: Bowel sounds are normal. There is no distension.      Palpations: Abdomen is soft. There is no mass.      Tenderness: There is no abdominal tenderness. There is no guarding or rebound.   Musculoskeletal:         General: No swelling or tenderness.      Cervical back: Neck supple.      Right lower leg: No edema.      Left lower leg: No edema.   Lymphadenopathy:      Cervical: No cervical adenopathy.   Neurological:      General: No focal deficit present.      Mental Status: She is alert and oriented to person, place, and time.   Psychiatric:         Mood and Affect: Mood normal.         Behavior: Behavior normal.         Thought Content: Thought content normal.         Judgment: Judgment normal.         Lab Results:      Lab Results   Component Value Date    WBC 6.37 09/14/2024    HGB 14.2 09/14/2024    HCT 43.5 09/14/2024     09/14/2024    TRIG 78 03/22/2025    HDL 56 03/22/2025    LDLDIRECT 111 (H) 03/22/2025    ALT 13 03/22/2025    AST 14 03/22/2025     06/24/2015    K 3.9 03/22/2025     03/22/2025    CREATININE 0.59 (L) 03/22/2025    BUN 19 03/22/2025    CO2 29 03/22/2025    INR 1.00 10/19/2023    GLUF 107 (H) 03/22/2025    HGBA1C 5.9 (H) 03/22/2025     No results found for: \"URICACID\"  Invalid input(s): \"BASENAME\" Vitamin D    No results found.     POCT Labs        Depression Screening " and Follow-up Plan: Patient was screened for depression during today's encounter. They screened negative with a PHQ-2 score of 0.

## 2025-04-25 ENCOUNTER — PATIENT MESSAGE (OUTPATIENT)
Dept: FAMILY MEDICINE CLINIC | Facility: CLINIC | Age: 50
End: 2025-04-25

## 2025-07-24 ENCOUNTER — ANNUAL EXAM (OUTPATIENT)
Dept: OBGYN CLINIC | Facility: CLINIC | Age: 50
End: 2025-07-24
Payer: OTHER GOVERNMENT

## 2025-07-24 VITALS
DIASTOLIC BLOOD PRESSURE: 72 MMHG | WEIGHT: 225.2 LBS | HEIGHT: 65 IN | SYSTOLIC BLOOD PRESSURE: 104 MMHG | BODY MASS INDEX: 37.52 KG/M2

## 2025-07-24 DIAGNOSIS — Z12.31 ENCOUNTER FOR SCREENING MAMMOGRAM FOR MALIGNANT NEOPLASM OF BREAST: ICD-10-CM

## 2025-07-24 DIAGNOSIS — Z01.419 WOMEN'S ANNUAL ROUTINE GYNECOLOGICAL EXAMINATION: Primary | ICD-10-CM

## 2025-07-24 PROCEDURE — 99396 PREV VISIT EST AGE 40-64: CPT | Performed by: OBSTETRICS & GYNECOLOGY

## 2025-07-24 NOTE — PROGRESS NOTES
Annual Wellness Visit  Name: Suzie Burrell      : 1975      MRN: [id number]  Encounter Provider: Lori Alonso MD  Encounter Date: 2025   Encounter department: [facility]    50 y.o.  yo presents today for her annual exam.:  Assessment & Plan  Encounter for screening mammogram for malignant neoplasm of breast    Orders:  •  Mammo screening bilateral w 3d and cad; Future    Women's annual routine gynecological examination                The following were reviewed in today's visit: ASCCP guidelines, breast self exam, mammography screening ordered, menopause, exercise, and healthy diet.    Patient to return to office in yearly for annual exam.     All questions have been answered to her satisfaction.        CC:  Annual Gynecologic Examination  Chief Complaint   Patient presents with   • Gynecologic Exam     Patient presents for her yearly exam.  Last pap 2023 neg pap/ neg hpv   mammo 10/29/2024  colonoscopy 22 - repeat 5 years       HPI: Suzie Burrell is a 50 y.o.  who presents for annual gynecologic examination.  She has the following concerns:  none, reg monthly period      Health Maintenance:    Exercise: rarely  Breast exams/breast awareness: yes  Last mammogram:   Colorectal cancer screenin    Past Medical History[1]    Past Surgical History[2]    Past OB/Gyn History:   No LMP recorded.    Menopausal status: perimenopausal  Menopausal symptoms: None    Last Pap:  : no abnormalities  History of abnormal Pap smear: no    Patient is not currently sexually active.   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[email or web address]   Tobacco Use   • Smoking status: Never     Passive exposure: Never   • Smokeless tobacco: Never   Vaping Use   • Vaping status: Never Used   Substance and Sexual Activity   • Alcohol use: Yes     Alcohol/week: 1.0 standard drink of alcohol     Types: 1 Glasses of wine per week     Comment: rare   • Drug use: Never   • Sexual activity: Not Currently     Partners: Male     Birth control/protection: Abstinence, Condom Male, None   Other Topics Concern   • Not on file   Social History Narrative        Lives with , Daughter    1 Child - 1 Daughter    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 [email or web address]     Social Drivers of Health     Financial Resource Strain: Not on file   Food Insecurity: No Food Insecurity (11/2/2023)    Nursing - Inadequate Food Risk Classification    • Worried About Running Out of Food in the Last Year: Never true    • Ran Out of Food in the Last Year: Never true    • Ran Out of Food in the Last Year: Not on file   Transportation Needs: No Transportation Needs (11/2/2023)    PRAPARE - Transportation    • Lack of Transportation (Medical): No    • Lack of Transportation (Non-Medical): No   Physical Activity: Not on file   Stress: Not on file   Social Connections: Not on file   Intimate Partner Violence: Not on file   Housing Stability: Low Risk  (11/2/2023)    Housing Stability Vital Sign    • Unable to Pay for Housing in the Last Year: No    • Number of Times Moved in the Last Year: 1    • Homeless in the Last Year: No     Domestic violence screen: negative    Allergies:  Allergies[4]    Medications:  Current Medications[5]    Review of Systems:  Review of Systems   Constitutional: Negative.    HENT: Negative.     Respiratory: Negative.     Cardiovascular: Negative.    Gastrointestinal: Negative.    Genitourinary:  "Negative.    Neurological: Negative.          Physical Exam:  /72 (BP Location: Right arm, Patient Position: Sitting, Cuff Size: Large)   Ht 5' 5\" (1.651 m)   Wt 102 kg (225 lb 3.2 oz)   Breastfeeding No   BMI 37.48 kg/m²    Physical Exam  Constitutional:       Appearance: Normal appearance.   Genitourinary:      Bladder and urethral meatus normal.      No lesions in the vagina.      Right Labia: No rash, tenderness, lesions, skin changes or Bartholin's cyst.     Left Labia: No tenderness, lesions, skin changes, Bartholin's cyst or rash.     No vaginal erythema, tenderness or bleeding.        Right Adnexa: not tender, not full and no mass present.     Left Adnexa: not tender, not full and no mass present.     Cervix is nulliparous.      No cervical motion tenderness, discharge, lesion or polyp.      Uterus is not enlarged, fixed or tender.      No uterine mass detected.     Urethral meatus caruncle not present.     No urethral tenderness or mass present.   Breasts:     Right: No swelling, bleeding, inverted nipple, mass, nipple discharge, skin change or tenderness.      Left: No swelling, bleeding, inverted nipple, mass, nipple discharge, skin change or tenderness.   HENT:      Head: Normocephalic and atraumatic.     Eyes:      Extraocular Movements: Extraocular movements intact.      Conjunctiva/sclera: Conjunctivae normal.      Pupils: Pupils are equal, round, and reactive to light.       Cardiovascular:      Rate and Rhythm: Normal rate and regular rhythm.      Heart sounds: Normal heart sounds. No murmur heard.  Pulmonary:      Effort: Pulmonary effort is normal. No respiratory distress.      Breath sounds: Normal breath sounds. No wheezing or rales.   Abdominal:      General: There is no distension.      Palpations: Abdomen is soft.      Tenderness: There is no abdominal tenderness. There is no guarding.     Neurological:      General: No focal deficit present.      Mental Status: She is alert and " oriented to person, place, and time.     Skin:     General: Skin is warm and dry.     Psychiatric:         Mood and Affect: Mood normal.         Behavior: Behavior normal.   Vitals and nursing note reviewed.                                    [1]  Past Medical History:  Diagnosis Date   • Abnormal Pap smear of cervix     In my medical recoreds. Dont remember exact year.   • Anaphylaxis due to insect venom 08/10/2022   • Arthritis     It was last year   • COVID-19 2022   • History of colon polyps 2022   • HPV (human papilloma virus) infection    • Hyperlipidemia    • IFG (impaired fasting glucose)    • Internal hemorrhoids 2022    denies   • Kidney stone 2015   • Kidney stones, calcium oxalate 2015   • Obesity    • Pneumonia     Resolved 2017    • Scoliosis    • Tremor     Left Hand Resting Tremor   • Varicella     Childhood   • Visual impairment     Wear glasses   [2]  Past Surgical History:  Procedure Laterality Date   • APPENDECTOMY  10/14/2018    Appendix taken out   •  SECTION       x 1 ; Failure to progress   • COLONOSCOPY     • FL GUIDED NEEDLE PLAC BX/ASP/INJ  3/15/2024   • FL GUIDED NEEDLE PLAC BX/ASP/INJ  2024   • FL INJECTION LEFT HIP (NON ARTHROGRAM)  10/12/2020   • FL INJECTION RIGHT HIP (NON ARTHROGRAM)  2025   • WI ARTHRP ACETBLR/PROX FEM PROSTC AGRFT/ALGRFT Left 2023    Procedure: ARTHROPLASTY HIP TOTAL, LEFT POSTERIOR, SAME DAY DISCHARGE;  Surgeon: Abhi Edmond MD;  Location: [facility];  Service: Orthopedics   • WI LAPAROSCOPIC APPENDECTOMY N/A 10/14/2018    Procedure: APPENDECTOMY LAPAROSCOPIC;  Surgeon: Zohaib Schwarz MD;  Location[facility];  Service: General   • WISDOM TOOTH EXTRACTION     [3]  Family History  Problem Relation Name Age of Onset   • Hypertension Father Oliver    • Alcohol abuse Father Oliver    • Kidney disease Father Oliver         CKD on ESRD   • Diabetes type II Father Oliver 50   • Diabetes Father Oliver         Type  2   • No Known Problems Mother     • Scoliosis Brother Gregg Bean    • Hip dysplasia Daughter Lindsay    • No Known Problems Maternal Grandmother     • No Known Problems Maternal Grandfather     • No Known Problems Paternal Grandmother     • No Known Problems Paternal Grandfather     • No Known Problems Maternal Aunt     • No Known Problems Maternal Aunt     • No Known Problems Paternal Aunt     • No Known Problems Paternal Aunt     • No Known Problems Paternal Aunt     • No Known Problems Paternal Aunt     [4]  Allergies  Allergen Reactions   • Bee Venom Anaphylaxis   [5]    Current Outpatient Medications:   •  Cholecalciferol (VITAMIN D3 GUMMIES ADULT PO), Take 2 each by mouth in the morning, Disp: , Rfl:   •  EPINEPHrine (EPIPEN) 0.3 mg/0.3 mL SOAJ, Inject 0.3 mL (0.3 mg total) into a muscle once for 1 dose, Disp: 0.6 mL, Rfl: 1  •  meloxicam (Mobic) 15 mg tablet, Take 1 tablet (15 mg total) by mouth daily, Disp: 30 tablet, Rfl: 1  •  Multiple Vitamins-Minerals (multivitamin with minerals) tablet, Take 1 tablet by mouth daily, Disp: 30 tablet, Rfl: 0

## 2025-07-29 DIAGNOSIS — T78.2XXA ANAPHYLAXIS, INITIAL ENCOUNTER: ICD-10-CM

## 2025-07-30 RX ORDER — EPINEPHRINE 0.3 MG/.3ML
0.3 INJECTION SUBCUTANEOUS ONCE
Qty: 0.6 ML | Refills: 0 | Status: SHIPPED | OUTPATIENT
Start: 2025-07-30 | End: 2025-07-30

## 2025-08-07 DIAGNOSIS — M16.11 PRIMARY OSTEOARTHRITIS OF ONE HIP, RIGHT: Primary | ICD-10-CM

## 2025-08-12 ENCOUNTER — OFFICE VISIT (OUTPATIENT)
Dept: FAMILY MEDICINE CLINIC | Facility: CLINIC | Age: 50
End: 2025-08-12
Payer: OTHER GOVERNMENT

## 2025-08-19 ENCOUNTER — TELEPHONE (OUTPATIENT)
Dept: NON INVASIVE DIAGNOSTICS | Facility: HOSPITAL | Age: 50
End: 2025-08-19

## 2025-08-21 ENCOUNTER — OFFICE VISIT (OUTPATIENT)
Dept: OBGYN CLINIC | Facility: CLINIC | Age: 50
End: 2025-08-21
Attending: FAMILY MEDICINE
Payer: OTHER GOVERNMENT

## 2025-08-21 VITALS — BODY MASS INDEX: 37.82 KG/M2 | HEIGHT: 65 IN | WEIGHT: 227 LBS

## 2025-08-21 DIAGNOSIS — R22.31 MASS OF WRIST, RIGHT: Primary | ICD-10-CM

## 2025-08-21 DIAGNOSIS — M25.531 RIGHT WRIST PAIN: ICD-10-CM

## 2025-08-21 DIAGNOSIS — M25.531 PAIN IN RIGHT WRIST: ICD-10-CM

## 2025-08-21 PROCEDURE — 99243 OFF/OP CNSLTJ NEW/EST LOW 30: CPT | Performed by: SURGERY

## (undated) DEVICE — SUT MONOCRYL 3-0 PS-2 27 IN Y427H

## (undated) DEVICE — SYRINGE 30ML LL

## (undated) DEVICE — ENDOPATH ETS-FLEX45 ARTICULATING ENDOSCOPIC LINEAR CUTTER, NO RELOAD: Brand: ENDOPATH

## (undated) DEVICE — COBAN 6 IN STERILE

## (undated) DEVICE — GLOVE SRG BIOGEL 7.5

## (undated) DEVICE — SUT VICRYL 0 CT-1 36 IN J946H

## (undated) DEVICE — IRRIG ENDO FLO TUBING

## (undated) DEVICE — 3M™ IOBAN™ 2 ANTIMICROBIAL INCISE DRAPE 6648EZ: Brand: IOBAN™ 2

## (undated) DEVICE — SILVER-COATED ANTIMICROBIAL BARRIER DRESSING: Brand: ACTICOAT   4" X 8"

## (undated) DEVICE — SUT VICRYL 1 CT-1 27 IN J261H

## (undated) DEVICE — ADHESIVE SKIN CLOSURE SYS EXOFIN FUSION 22CM

## (undated) DEVICE — SCD SEQUENTIAL COMPRESSION COMFORT SLEEVE MEDIUM KNEE LENGTH: Brand: KENDALL SCD

## (undated) DEVICE — TIBURON HIP DRAPE WITH POUCHES: Brand: CONVERTORS

## (undated) DEVICE — Device: Brand: RINGLOC+

## (undated) DEVICE — IMPERVIOUS STOCKINETTE: Brand: DEROYAL

## (undated) DEVICE — DRAPE EQUIPMENT RF WAND

## (undated) DEVICE — GAUZE SPONGES,16 PLY: Brand: CURITY

## (undated) DEVICE — PLUMEPEN PRO 10FT

## (undated) DEVICE — ENDOPATH XCEL BLADELESS TROCARS WITH STABILITY SLEEVES: Brand: ENDOPATH XCEL

## (undated) DEVICE — ABDOMINAL PAD: Brand: DERMACEA

## (undated) DEVICE — ALLENTOWN LAP CHOLE APP PACK: Brand: CARDINAL HEALTH

## (undated) DEVICE — VIAL DECANTER

## (undated) DEVICE — 3M™ STERI-DRAPE™ U-DRAPE 1015: Brand: STERI-DRAPE™

## (undated) DEVICE — GLOVE SRG BIOGEL ECLIPSE 7

## (undated) DEVICE — LIGHT HANDLE COVER SLEEVE DISP BLUE STELLAR

## (undated) DEVICE — SAW BLADE OSCILLATING BRAZOL 167

## (undated) DEVICE — ADHESIVE SKN CLSR HISTOACRYL FLEX 0.5ML LF

## (undated) DEVICE — SUT VICRYL 2-0 CT-1 27 IN J259H

## (undated) DEVICE — HARMONIC 1100 SHEARS, 36CM SHAFT LENGTH: Brand: HARMONIC

## (undated) DEVICE — NEEDLE 22 G X 1 1/2 SAFETY

## (undated) DEVICE — HANDPIECE SET WITH RETRACTABLE COAXIAL FAN SPRAY TIP AND SUCTION TUBE: Brand: INTERPULSE

## (undated) DEVICE — BETHLEHEM TOTAL HIP, KIT: Brand: CARDINAL HEALTH

## (undated) DEVICE — ETS45 RELOAD STANDARD 45MM: Brand: ENDOPATH

## (undated) DEVICE — STERILE SURGICAL LUBRICANT,  TUBE: Brand: SURGILUBE

## (undated) DEVICE — SPECIMEN CONTAINER STERILE PEEL PACK

## (undated) DEVICE — TROCAR APPPLE 5MM EXTENDED LENGTH

## (undated) DEVICE — INTENDED FOR TISSUE SEPARATION, AND OTHER PROCEDURES THAT REQUIRE A SHARP SURGICAL BLADE TO PUNCTURE OR CUT.: Brand: BARD-PARKER ® CARBON RIB-BACK BLADES

## (undated) DEVICE — ADHESIVE SKIN CLOSURE SYS EXOFIN FUSION 30CM

## (undated) DEVICE — ENDOPOUCH RETRIEVER SPECIMEN RETRIEVAL BAGS: Brand: ENDOPOUCH RETRIEVER

## (undated) DEVICE — SUT STRATAFIX SPIRAL PDS PLUS 1 CTX 18 IN SXPP1A400

## (undated) DEVICE — NEEDLE 18 G X 1 1/2

## (undated) DEVICE — Device

## (undated) DEVICE — TOWEL SURG XR DETECT GREEN STRL RFD

## (undated) DEVICE — IMPLANTABLE DEVICE
Type: IMPLANTABLE DEVICE | Site: HIP | Status: NON-FUNCTIONAL
Brand: G7 ACETABULAR SCREW

## (undated) DEVICE — SUT MONOCRYL 4-0 PS-2 27 IN Y426H

## (undated) DEVICE — CHLORAPREP HI-LITE 26ML ORANGE

## (undated) DEVICE — UNDYED BRAIDED (POLYGLACTIN 910), SYNTHETIC ABSORBABLE SUTURE: Brand: COATED VICRYL

## (undated) DEVICE — HEAVY DUTY TABLE COVER: Brand: CONVERTORS

## (undated) DEVICE — HOOD: Brand: FLYTE, SURGICOOL

## (undated) DEVICE — GLOVE INDICATOR PI UNDERGLOVE SZ 8 BLUE

## (undated) DEVICE — INTENDED FOR TISSUE SEPARATION, AND OTHER PROCEDURES THAT REQUIRE A SHARP SURGICAL BLADE TO PUNCTURE OR CUT.: Brand: BARD-PARKER SAFETY BLADES SIZE 11, STERILE

## (undated) DEVICE — SURGICAL GOWN, XL SMARTSLEEVE: Brand: CONVERTORS